# Patient Record
Sex: FEMALE | Race: WHITE | Employment: OTHER | RURAL
[De-identification: names, ages, dates, MRNs, and addresses within clinical notes are randomized per-mention and may not be internally consistent; named-entity substitution may affect disease eponyms.]

---

## 2017-01-06 ENCOUNTER — TELEPHONE (OUTPATIENT)
Dept: FAMILY MEDICINE CLINIC | Age: 82
End: 2017-01-06

## 2017-01-06 DIAGNOSIS — G25.81 RLS (RESTLESS LEGS SYNDROME): Primary | ICD-10-CM

## 2017-01-06 NOTE — TELEPHONE ENCOUNTER
Alva Lynne, Jane's daughter, called and would like to get a recommendation for a Neurologist.  She said her moms restless legs have not gotten any better and they would like for her to see a specialist.  Could you please give her a call back at [phone]. Thank you.

## 2017-01-06 NOTE — TELEPHONE ENCOUNTER
Spoke with daughter, given name of Neuro clinic and Dr. Urbano Orta. They want to call to make appointment so both daughters will be able to attend.  Told to call if they need any help with scheduling

## 2017-01-16 ENCOUNTER — TELEPHONE (OUTPATIENT)
Dept: FAMILY MEDICINE CLINIC | Age: 82
End: 2017-01-16

## 2017-01-16 NOTE — TELEPHONE ENCOUNTER
[phone] contact number juan Poon called and need you to call back asap concerning mom's medication and leg aggravation might be an issue to change Effexor 37.5 mg 3X/day and states that mom is miserable. She scheduled Dr. Hema Rojas 01/24/2017 and would like to discuss this before hand. Please call at earliest convenience.   Thanks,

## 2017-01-24 ENCOUNTER — OFFICE VISIT (OUTPATIENT)
Dept: NEUROLOGY | Age: 82
End: 2017-01-24

## 2017-01-24 VITALS
RESPIRATION RATE: 16 BRPM | HEART RATE: 99 BPM | BODY MASS INDEX: 26.06 KG/M2 | SYSTOLIC BLOOD PRESSURE: 116 MMHG | WEIGHT: 138 LBS | DIASTOLIC BLOOD PRESSURE: 52 MMHG | OXYGEN SATURATION: 98 % | HEIGHT: 61 IN

## 2017-01-24 DIAGNOSIS — E55.9 VITAMIN D DEFICIENCY: ICD-10-CM

## 2017-01-24 DIAGNOSIS — G25.81 RLS (RESTLESS LEGS SYNDROME): ICD-10-CM

## 2017-01-24 DIAGNOSIS — I67.89 CEREBRAL MICROVASCULAR DISEASE: ICD-10-CM

## 2017-01-24 DIAGNOSIS — R41.3 MEMORY DIFFICULTIES: ICD-10-CM

## 2017-01-24 DIAGNOSIS — E11.42 DIABETIC PERIPHERAL NEUROPATHY ASSOCIATED WITH TYPE 2 DIABETES MELLITUS (HCC): Primary | ICD-10-CM

## 2017-01-24 DIAGNOSIS — E53.8 B12 DEFICIENCY: ICD-10-CM

## 2017-01-24 DIAGNOSIS — I65.23 STENOSIS OF BOTH INTERNAL CAROTID ARTERIES: ICD-10-CM

## 2017-01-24 RX ORDER — GABAPENTIN 100 MG/1
100 CAPSULE ORAL 3 TIMES DAILY
Qty: 100 CAP | Refills: 11 | Status: SHIPPED | OUTPATIENT
Start: 2017-01-24 | End: 2017-02-15

## 2017-01-24 NOTE — PATIENT INSTRUCTIONS
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
## 2017-01-24 NOTE — MR AVS SNAPSHOT
Visit Information Date & Time Provider Department Dept. Phone Encounter #  
 1/24/2017  2:00 PM Chad Auguste MD [facility] [phone] [id number] Follow-up Instructions Return in about 3 months (around 4/24/2017). Your Appointments 1/24/2017  3:30 PM  
PROCEDURE with DOPPLER_NEUMR [facility] [address] [place] [address]) Appt Note: doppler tas patient, jrb 1/24/17  
 [address] [place] [address], 
[address] [place] [address], [address] [facility]  
[phone]  
  
   
 [address] [place] [address], [address] [place] [address], [address] [place] [address] [phone]  
  
    
 3/8/2017 10:30 AM  
ESTABLISHED PATIENT with Charles Hilton MD  
[place] [address] ([address]) Appt Note: 4 mo f/u  
 [address] [facility] [address] [facility] [address] [phone]  
  
   
 [address] [address] Upcoming Health Maintenance Date Due OSTEOPOROSIS SCREENING (DEXA) 2/27/2000 Pneumococcal 65+ Low/Medium Risk (2 of 2 - PCV13) 11/5/2011 DTaP/Tdap/Td series (1 - Tdap) 10/3/2015 MEDICARE YEARLY EXAM 11/24/2016 MICROALBUMIN Q1 4/5/2017 HEMOGLOBIN A1C Q6M 5/8/2017 FOOT EXAM Q1 7/5/2017 EYE EXAM RETINAL OR DILATED Q1 11/3/2017 LIPID PANEL Q1 11/8/2017 GLAUCOMA SCREENING Q2Y 11/3/2018 Allergies as of 1/24/2017  Review Complete On: 1/24/2017 By: Levi Arita No Known Allergies Current Immunizations  Reviewed on 10/13/2015 Name Date Influenza High Dose Vaccine PF 10/5/2016, 9/25/2015 Pneumococcal Polysaccharide (PPSV-23) 11/5/2010 Td 10/2/2015 Zoster Vaccine, Live 11/5/2010 Not reviewed this visit You Were Diagnosed With   
  
 Codes Comments Diabetic peripheral neuropathy associated with type 2 diabetes mellitus ([facility] [address]    -  Primary ICD-10-CM: E11.42 
ICD-9-CM: 250.60, 357.2 Memory difficulties     ICD-10-CM: R41.3 ICD-9-CM: 780.93   
 Vitamin D deficiency     ICD-10-CM: E55.9 ICD-9-CM: 268.9 RLS (restless legs syndrome)     ICD-10-CM: G25.81 ICD-9-CM: 333.94   
 B12 deficiency     ICD-10-CM: E53.8 ICD-9-CM: 266.2 Stenosis of both internal carotid arteries     ICD-10-CM: I65.23 ICD-9-CM: 433.10, 433.30 Cerebral microvascular disease     ICD-10-CM: I67.9 ICD-9-CM: 437.9 Vitals OB Status Smoking Status Postmenopausal Former Smoker Preferred Pharmacy Pharmacy Name Phone [facility] [address], [place] [phone] [phone] Your Updated Medication List  
  
   
This list is accurate as of: 1/24/17  3:22 PM.  Always use your most recent med list.  
  
  
  
  
 gabapentin 100 mg capsule Commonly known as:  NEURONTIN Take 1 Cap by mouth three (3) times daily. glucose blood VI test strips strip Commonly known as:  RELION PRIME TEST STRIPS  
test BID for NIDDM E11.9, One touch Ultra Blue  
  
 magnesium 250 mg Tab Take 500 mg by mouth nightly. metFORMIN 500 mg tablet Commonly known as:  GLUCOPHAGE Take 1 Tab by mouth two (2) times daily (with meals). Dose adjustment  
  
 rOPINIRole 2 mg tablet Commonly known as:  Nancy Vieira Take 1 Tab by mouth three (3) times daily. Indications: RESTLESS LEGS SYNDROME  
  
 venlafaxine 37.5 mg tablet Commonly known as:  [facility] Take 1 Tab by mouth three (3) times daily. vit B Cmplx 3-FA-Vit C-Biotin 1- mg-mg-mcg tablet Commonly known as:  NEPHRO RAJEEV RX Take 1 Tab by mouth daily. Indications: with stress relief B 12 VITAMIN B12 PO Take  by mouth. VITAMIN D3 1,000 unit tablet Generic drug:  cholecalciferol Take  by mouth daily. Prescriptions Sent to Pharmacy Refills  
 gabapentin (NEURONTIN) 100 mg capsule 11 Sig: Take 1 Cap by mouth three (3) times daily.   
 Class: Normal  
 Pharmacy: [address] [place] [address] [address] Ph #: [phone] Route: Oral  
  
We Performed the Following HEMOGLOBIN A1C WITH EAG [17493 CPT(R)] REFERRAL TO PSYCHIATRY [REF91 Custom] REFERRAL TO PSYCHOLOGY [HUM27 Custom] Comments:  
 Neuro psych testing for dementia vs psuedo dementia SED RATE (ESR) [id number] CPT(R)] VITAMIN B12 & FOLATE [23858 CPT(R)] VITAMIN D, 25 HYROXY PANEL [[id number] Custom] Follow-up Instructions Return in about 3 months (around 4/24/2017). To-Do List   
 01/24/2017 Imaging:  DUPLEX CAROTID BILATERAL AMB NEURO   
  
 02/02/2017 Imaging:  MRI BRAIN W WO CONT Referral Information Referral ID Referred By Referred To  
  
 [id number] Victoria Philip [address] [facility], [address] [place] [address] Phone: [phone] Fax: [phone] Visits Status Start Date End Date 1 New Request 1/24/17 1/24/18 If your referral has a status of pending review or denied, additional information will be sent to support the outcome of this decision. Referral ID Referred By Referred To  
 [id number] Tanisha Gonzalez MD  
   [address] [place] [address] [address] [facility] Phone: [phone] Fax: [phone] Visits Status Start Date End Date 1 New Request 1/24/17 1/24/18 If your referral has a status of pending review or denied, additional information will be sent to support the outcome of this decision. Patient Instructions A Healthy Lifestyle: Care Instructions Your Care Instructions A healthy lifestyle can help you feel good, stay at a healthy weight, and have plenty of energy for both work and play. A healthy lifestyle is something you can share with your whole family.  
A healthy lifestyle also can lower your risk for serious health problems, such as high blood pressure, heart disease, and diabetes. You can follow a few steps listed below to improve your health and the health of your family. Follow-up care is a key part of your treatment and safety. Be sure to make and go to all appointments, and call your doctor if you are having problems. Its also a good idea to know your test results and keep a list of the medicines you take. How can you care for yourself at home? · Do not eat too much sugar, fat, or fast foods. You can still have dessert and treats now and then. The goal is moderation. · Start small to improve your eating habits. Pay attention to portion sizes, drink less juice and soda pop, and eat more fruits and vegetables. ¨ Eat a healthy amount of food. A 3-ounce serving of meat, for example, is about the size of a deck of cards. Fill the rest of your plate with vegetables and whole grains. ¨ Limit the amount of soda and sports drinks you have every day. Drink more water when you are thirsty. ¨ Eat at least 5 servings of fruits and vegetables every day. It may seem like a lot, but it is not hard to reach this goal. A serving or helping is 1 piece of fruit, 1 cup of vegetables, or 2 cups of leafy, raw vegetables. Have an apple or some carrot sticks as an afternoon snack instead of a candy bar. Try to have fruits and/or vegetables at every meal. 
· Make exercise part of your daily routine. You may want to start with simple activities, such as walking, bicycling, or slow swimming. Try to be active 30 to 60 minutes every day. You do not need to do all 30 to 60 minutes all at once. For example, you can exercise 3 times a day for 10 or 20 minutes. Moderate exercise is safe for most people, but it is always a good idea to talk to your doctor before starting an exercise program. 
· Keep moving. Agustin Cain the lawn, work in the garden, or [employer]. Take the stairs instead of the elevator at work. · If you smoke, quit. People who smoke have an increased risk for heart attack, stroke, cancer, and other lung illnesses. Quitting is hard, but there are ways to boost your chance of quitting tobacco for good. ¨ Use nicotine gum, patches, or lozenges. ¨ Ask your doctor about stop-smoking programs and medicines. ¨ Keep trying. In addition to reducing your risk of diseases in the future, you will notice some benefits soon after you stop using tobacco. If you have shortness of breath or asthma symptoms, they will likely get better within a few weeks after you quit. · Limit how much alcohol you drink. Moderate amounts of alcohol (up to 2 drinks a day for men, 1 drink a day for women) are okay. But drinking too much can lead to liver problems, high blood pressure, and other health problems. Family health If you have a family, there are many things you can do together to improve your health. · Eat meals together as a family as often as possible. · Eat healthy foods. This includes fruits, vegetables, lean meats and dairy, and whole grains. · Include your family in your fitness plan. Most people think of activities such as jogging or tennis as the way to fitness, but there are many ways you and your family can be more active. Anything that makes you breathe hard and gets your heart pumping is exercise. Here are some tips: 
¨ Walk to do errands or to take your child to school or the bus. ¨ Go for a family bike ride after dinner instead of watching TV. Where can you learn more? Go to http://adam-jono.info/. Enter C096 in the search box to learn more about \"A Healthy Lifestyle: Care Instructions. \" Current as of: July 26, 2016 Content Version: 11.1 © 0631-6263 [employer]. Care instructions adapted under license by [employer] (which disclaims liability or warranty for this information).  If you have questions about a medical condition or this instruction, always ask your healthcare professional. [place] [address] any warranty or liability for your use of this information. Introducing [facility]! Beba Balderas introduces [employer] patient portal. Now you can access parts of your medical record, email your doctor's office, and request medication refills online. 1. In your internet browser, go to [email or web address] 2. Click on the First Time User? Click Here link in the Sign In box. You will see the New Member Sign Up page. 3. Enter your [employer] Access Code exactly as it appears below. You will not need to use this code after youve completed the sign-up process. If you do not sign up before the expiration date, you must request a new code. · [employer] Access Code: [email or web address] Expires: 4/24/2017  1:19 PM 
 
4. Enter the last four digits of your Social Security Number (xxxx) and Date of Birth (mm/dd/yyyy) as indicated and click Submit. You will be taken to the next sign-up page. 5. Create a [employer] ID. This will be your [employer] login ID and cannot be changed, so think of one that is secure and easy to remember. 6. Create a [employer] password. You can change your password at any time. 7. Enter your Password Reset Question and Answer. This can be used at a later time if you forget your password. 8. Enter your e-mail address. You will receive e-mail notification when new information is available in [address] 9. Click Sign Up. You can now view and download portions of your medical record. 10. Click the Download Summary menu link to download a portable copy of your medical information. If you have questions, please visit the Frequently Asked Questions section of the [email or web address] website. Remember, [employer] is NOT to be used for urgent needs. For medical emergencies, dial 911. Now available from your iPhone and Android! Please provide this summary of care documentation to your next provider. Your primary care clinician is listed as Wily Whitney. If you have any questions after today's visit, please call [phone].
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
## 2017-01-26 ENCOUNTER — DOCUMENTATION ONLY (OUTPATIENT)
Dept: NEUROLOGY | Age: 82
End: 2017-01-26

## 2017-01-27 LAB
25(OH)D2 SERPL-MCNC: <1 NG/ML
25(OH)D3 SERPL-MCNC: 52 NG/ML
25(OH)D3+25(OH)D2 SERPL-MCNC: 53 NG/ML
ERYTHROCYTE [SEDIMENTATION RATE] IN BLOOD BY WESTERGREN METHOD: 2 MM/HR (ref 0–40)
EST. AVERAGE GLUCOSE BLD GHB EST-MCNC: 260 MG/DL
FOLATE SERPL-MCNC: >20 NG/ML
HBA1C MFR BLD: 10.7 % (ref 4.8–5.6)
VIT B12 SERPL-MCNC: 1387 PG/ML (ref 211–946)

## 2017-02-03 ENCOUNTER — TELEPHONE (OUTPATIENT)
Dept: NEUROLOGY | Age: 82
End: 2017-02-03

## 2017-02-06 DIAGNOSIS — E11.42 DIABETIC PERIPHERAL NEUROPATHY ASSOCIATED WITH TYPE 2 DIABETES MELLITUS (HCC): Primary | ICD-10-CM

## 2017-02-06 RX ORDER — GABAPENTIN 300 MG/1
300 CAPSULE ORAL 3 TIMES DAILY
Qty: 100 CAP | Refills: 11 | Status: SHIPPED | OUTPATIENT
Start: 2017-02-06 | End: 2017-03-15 | Stop reason: SDUPTHER

## 2017-02-06 NOTE — TELEPHONE ENCOUNTER
Jane Haynes, they can get a hold of Dr. Richar Garcia office for neuropsych testing, can you somehow email Iris Jay, they call and nobody answered the phone

## 2017-02-06 NOTE — TELEPHONE ENCOUNTER
Taking gabapentin 200mg 3 times daily with very little help. She is also frustrated with taking this amount of medication. They are ok with 1 pill 3 times daily with 300mg if necessary. Wondering about a 1 pill daily medication for this instead. Please advise. Can call the daughter at [phone] or at work during the day: [phone]    This is more issues with antidepressants and the RLS which is made worse by the medications for depression. She would like to get the patient in as soon as possible because of the issues and the patient is not sleeping. They think they might try to go to one they are more comfortable with.

## 2017-02-09 ENCOUNTER — TELEPHONE (OUTPATIENT)
Dept: NEUROLOGY | Age: 82
End: 2017-02-09

## 2017-02-09 DIAGNOSIS — E55.9 VITAMIN D DEFICIENCY: ICD-10-CM

## 2017-02-09 DIAGNOSIS — I65.23 STENOSIS OF BOTH INTERNAL CAROTID ARTERIES: ICD-10-CM

## 2017-02-09 DIAGNOSIS — E11.42 DIABETIC PERIPHERAL NEUROPATHY ASSOCIATED WITH TYPE 2 DIABETES MELLITUS (HCC): Primary | ICD-10-CM

## 2017-02-09 DIAGNOSIS — I67.89 CEREBRAL MICROVASCULAR DISEASE: ICD-10-CM

## 2017-02-09 DIAGNOSIS — R41.3 MEMORY DIFFICULTIES: ICD-10-CM

## 2017-02-09 DIAGNOSIS — E53.8 B12 DEFICIENCY: ICD-10-CM

## 2017-02-09 NOTE — TELEPHONE ENCOUNTER
In order for the patient to keep her 8a mri appt, she needs to have a creatin order for labs. She can have it done there, but the order has to be faxed to the attention of Missy Travis today.  Fax [phone]

## 2017-02-13 ENCOUNTER — TELEPHONE (OUTPATIENT)
Dept: FAMILY MEDICINE CLINIC | Age: 82
End: 2017-02-13

## 2017-02-13 NOTE — TELEPHONE ENCOUNTER
Pumajose Alexandra called today and wanted you to know she checked her sugar and these were her results:    2/09/17  476  2/11/17 574  2/13/17 235    She has an apt on 3/8/17 and wanted to know if she should be seen sooner. Please give her a call back at [phone]. Thank you.

## 2017-02-14 NOTE — TELEPHONE ENCOUNTER
Spoke with patient, states BS was 300s this morning, states she is watching her diet.  Scheduled to be seen earlier appointment

## 2017-02-15 ENCOUNTER — OFFICE VISIT (OUTPATIENT)
Dept: FAMILY MEDICINE CLINIC | Age: 82
End: 2017-02-15

## 2017-02-15 ENCOUNTER — TELEPHONE (OUTPATIENT)
Dept: FAMILY MEDICINE CLINIC | Age: 82
End: 2017-02-15

## 2017-02-15 VITALS
HEART RATE: 75 BPM | DIASTOLIC BLOOD PRESSURE: 82 MMHG | SYSTOLIC BLOOD PRESSURE: 126 MMHG | BODY MASS INDEX: 27.06 KG/M2 | WEIGHT: 143.2 LBS | RESPIRATION RATE: 17 BRPM | OXYGEN SATURATION: 97 %

## 2017-02-15 DIAGNOSIS — Z00.00 MEDICARE ANNUAL WELLNESS VISIT, SUBSEQUENT: ICD-10-CM

## 2017-02-15 DIAGNOSIS — Z71.89 ACP (ADVANCE CARE PLANNING): ICD-10-CM

## 2017-02-15 DIAGNOSIS — E11.9 TYPE 2 DIABETES MELLITUS WITHOUT COMPLICATION, WITHOUT LONG-TERM CURRENT USE OF INSULIN (HCC): Primary | ICD-10-CM

## 2017-02-15 DIAGNOSIS — N63.10 BREAST MASS, RIGHT: ICD-10-CM

## 2017-02-15 RX ORDER — GLIMEPIRIDE 2 MG/1
2 TABLET ORAL
Qty: 30 TAB | Refills: 12 | Status: SHIPPED | OUTPATIENT
Start: 2017-02-15 | End: 2018-04-24 | Stop reason: SDUPTHER

## 2017-02-15 NOTE — PROGRESS NOTES
Chief Complaint   Patient presents with    Annual Wellness Visit    Diabetes    Breast pain         HPI:       is a 80 y.o. female. Deb Turner lives in [place], [place], near her son who is a . SHe enjoys gardening. She has AODM, RLS and Depression. She has taken SSRI medications with favorable outcomes over the years. She will generally wean herself off when she is feeling better. Her A1c July 6, 2016 was 8.3. Lab Results   Component Value Date/Time    Creatinine 0.52 11/08/2016 11:39 AM       No vascular or retinal complications. She takes Metformin. New Issues:  A1c is 10.7. Not compliant with diet. Checks glucose daily and this has been running higher than 200 for several weeks. RLS is being addressed with neurology who has discontinued her SSRI as this is worsening her RLS. She is due for SAWV    No Known Allergies    Current Outpatient Prescriptions   Medication Sig    glimepiride (AMARYL) 2 mg tablet Take 1 Tab by mouth every morning.  CYANOCOBALAMIN, VITAMIN B-12, (VITAMIN B12 PO) Take  by mouth.  glucose blood VI test strips (RELION PRIME TEST STRIPS) strip test BID for NIDDM E11.9, One touch Ultra Blue    metFORMIN (GLUCOPHAGE) 500 mg tablet Take 1 Tab by mouth two (2) times daily (with meals). Dose adjustment    rOPINIRole (REQUIP) 2 mg tablet Take 1 Tab by mouth three (3) times daily. Indications: RESTLESS LEGS SYNDROME    magnesium 250 mg tab Take 500 mg by mouth nightly.  gabapentin (NEURONTIN) 300 mg capsule Take 1 Cap by mouth three (3) times daily.  cholecalciferol (VITAMIN D3) 1,000 unit tablet Take  by mouth daily.  vit B Cmplx 3-FA-Vit C-Biotin (NEPHRO RAJEEV RX) 1- mg-mg-mcg tablet Take 1 Tab by mouth daily. Indications: with stress relief B 12     No current facility-administered medications for this visit.         Past Medical History   Diagnosis Date    Anxiety     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     bursitis  Depression     Diabetes ([facility] [address]     GERD (gastroesophageal reflux disease)     H/O hand fracture     Hyperlipidemia     Osteopenia     Recurrent UTI     RLS (restless legs syndrome)          ROS:  Denies fever, chills, cough, chest pain, SOB,  nausea, vomiting, or diarrhea. Denies wt loss, wt gain, hemoptysis, hematochezia or melena. Physical Examination:    Visit Vitals    /82 (BP 1 Location: Left arm, BP Patient Position: Sitting)    Pulse 75    Resp 17    Wt 143 lb 3.2 oz (65 kg)    SpO2 97%    BMI 27.06 kg/m2     General: Alert and Ox3, Fluent speech  HEENT:  NC/AT, EOMI, OP: clear  Neck:  Supple, no adenopathy, JVD, mass or bruit  Chest:  Clear to Ausculation, without wheezes, rales, rubs or ronchi:  Right breast has an ill defined mass in the axillary tail of caceres  Cardiac: RRR  Abdomen:  +BS, soft, nontender without palpable HSM  Extremities:  No cyanosis, clubbing or edema  Neurologic:  Ambulatory without assist, CN 2-12 grossly intact. Moves all extremities. Skin: no rash  Lymphadenopathy: no cervical or supraclavicular nodes    Lab Results   Component Value Date/Time    Hemoglobin A1c 10.7 01/24/2017 03:51 PM       Lab Results   Component Value Date/Time    Sodium 140 11/08/2016 11:39 AM    Potassium 4.7 11/08/2016 11:39 AM    Chloride 98 11/08/2016 11:39 AM    CO2 26 11/08/2016 11:39 AM    Glucose 204 11/08/2016 11:39 AM    BUN 18 11/08/2016 11:39 AM    Creatinine 0.52 11/08/2016 11:39 AM    BUN/Creatinine ratio 35 11/08/2016 11:39 AM    GFR est  11/08/2016 11:39 AM    GFR est non-AA 90 11/08/2016 11:39 AM    Calcium 10.0 11/08/2016 11:39 AM    Bilirubin, total 0.4 11/08/2016 11:39 AM    AST (SGOT) 31 11/08/2016 11:39 AM    Alk.  phosphatase 98 11/08/2016 11:39 AM    Protein, total 6.4 11/08/2016 11:39 AM    Albumin 4.3 11/08/2016 11:39 AM    A-G Ratio 2.0 11/08/2016 11:39 AM    ALT (SGPT) 44 11/08/2016 11:39 AM       Lab Results   Component Value Date/Time    WBC 7.9 07/05/2016 10:55 AM    HGB 14.3 07/05/2016 10:55 AM    HCT 42.5 07/05/2016 10:55 AM    PLATELET 412 61/59/9037 10:55 AM    MCV 92 07/05/2016 10:55 AM           ASSESSMENT AND PLAN:     1. AODM:  Teaching today. She has seen the dietician. Has reference materials at home. Reviewed diet with her. Not due to A1c until after April. Add Glimepiride 2 mg every am.  May need to adjust dose. If unable to achieve a glucose < 200, will start Lantus. She will continue to check glucose daily and return for F/U in 2 weeks  2. Depression:  Neurology will advise her on antidepressant. 3.  Due for SAWV4  4. Right breast pain with ill defined palpable mass:  US right breast.      Orders Placed This Encounter    US BREAST RT COMPLETE 4 QUAD     Standing Status:   Future     Standing Expiration Date:   3/15/2018    glimepiride (AMARYL) 2 mg tablet     Sig: Take 1 Tab by mouth every morning. Dispense:  30 Tab     Refill:  12       Sary Koroma MD, FACP        ______________________________________________________________________    Scharlene Gowers is a 80 y.o. female and presents for annual Medicare Wellness Visit. Problem List: Reviewed with patient and discussed risk factors. Patient Active Problem List   Diagnosis Code    Hyperlipidemia E78.5    Anxiety F41.9    Type 2 diabetes mellitus without complication (HCC) R57.4    RLS (restless legs syndrome) G25.81    Diabetic peripheral neuropathy associated with type 2 diabetes mellitus (HCC) E11.42    Memory difficulties R41.3    B12 deficiency E53.8    Vitamin D deficiency E55.9    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    Cerebral microvascular disease I67.9       Current medical providers:  Patient Care Team:  Sabrina Evangelista MD as PCP - General (Internal Medicine)    PM, , Medications/Allergies: reviewed, on chart. Female Alcohol Screening:   On any occasion during the past 3 months, have you had more than 3 drinks containing alcohol? No    Do you average more than 7 drinks per week? No    ROS:  Constitutional: No fever, chills or weight loss  Respiratory: No cough, SOB   CV: No chest pain or Palpitations    Objective:  Visit Vitals    /82 (BP 1 Location: Left arm, BP Patient Position: Sitting)    Pulse 75    Resp 17    Wt 143 lb 3.2 oz (65 kg)    SpO2 97%    BMI 27.06 kg/m2    Body mass index is 27.06 kg/(m^2). Assessment of cognitive impairment: Alert and oriented x 3    Depression Screen:   PHQ 2 / 9, over the last two weeks 10/26/2016   Little interest or pleasure in doing things Not at all   Feeling down, depressed or hopeless Not at all   Total Score PHQ 2 0       Fall Risk Assessment:    Fall Risk Assessment, last 12 mths 10/26/2016   Able to walk? Yes   Fall in past 12 months? No       Functional Ability:   Does the patient exhibit a steady gait? yes   How long did it take the patient to get up and walk from a sitting position? 12 sec   Is the patient self reliant?  (ie can do own laundry, meals, household chores)  yes     Does the patient handle his/her own medications? yes     Does the patient handle his/her own money? yes     Is the patients home safe (ie good lighting, handrails on stairs and bath, etc.)? yes     Did you notice or did patient express any hearing difficulties? yes     Did you notice or did patient express any vision difficulties?    no       Advance Care Planning:   Patient was offered the opportunity to discuss advance care planning:  yes     Does patient have an Advance Directive:  yes   If no, did you provide information on Caring Connections?  no       Plan:      Orders Placed This Encounter    US BREAST RT COMPLETE 4 QUAD    glimepiride (AMARYL) 2 mg tablet       Health Maintenance   Topic Date Due    OSTEOPOROSIS SCREENING (DEXA)  02/27/2000    Pneumococcal 65+ Low/Medium Risk (2 of 2 - PCV13) 11/05/2011    DTaP/Tdap/Td series (1 - Tdap) 10/03/2015    MEDICARE YEARLY EXAM 11/24/2016    MICROALBUMIN Q1  04/05/2017    FOOT EXAM Q1  07/05/2017    HEMOGLOBIN A1C Q6M  07/24/2017    EYE EXAM RETINAL OR DILATED Q1  11/03/2017    LIPID PANEL Q1  11/08/2017    GLAUCOMA SCREENING Q2Y  11/03/2018    ZOSTER VACCINE AGE 60>  Completed    INFLUENZA AGE 9 TO ADULT  Completed       *Patient verbalized understanding and agreement with the plan. A copy of the After Visit Summary with personalized health plan was given to the patient today.

## 2017-02-15 NOTE — ACP (ADVANCE CARE PLANNING)
Mingo Florez has a living will. In the event that she cannot speak for herself, contact her daughter, Emir Alfonso at [phone].     Nitza Delgadillo

## 2017-02-15 NOTE — TELEPHONE ENCOUNTER
Spoke with Phani Lay, informed of scheduled mammogram and US of breast. Discussed medication change and reinforced need to control diet choices.

## 2017-02-15 NOTE — MR AVS SNAPSHOT
Visit Information Date & Time Provider Department Dept. Phone Encounter #  
 2/15/2017 11:30 AM Johanna Peters, [address] Ay[address] Damico [id number] Your Appointments 3/1/2017 10:00 AM  
ESTABLISHED PATIENT with MD Suad Beal 38 ([facility]) Appt Note: 2 wk f/u  
 1000 [facility] [address] [facility],5Th [facility] [address] [phone]  
  
   
 [address] [facility],5Th Floor [address]  
  
    
 3/8/2017 10:30 AM  
ESTABLISHED PATIENT with MD Suad Beal 38 ([facility]) Appt Note: 4 mo f/u  
 1000 [facility] [address] [facility],5Th Floor [address] [phone] 4/13/2017 12:20 PM  
Follow Up with Kun Croft MD  
Neurology [facility] at [facility]) Appt Note: f/u RLS, b 1/24/17 $0cp  
 [address], [address] [address]  
[address], [address] [facility] [address] [address]  
  
    
 4/13/2017  1:40 PM  
New Patient with Jackie Nunn MD  
7501 [facility]) Appt Note: NP, referred by Dr. Zina Lopez from neuro, depression; corrected time [address] [address]  
[address] [place] [address] [address]  
  
    
 5/24/2017 11:20 AM  
New Patient with Lise Bronson PsyD Neurology [address] [facility]) Appt Note: new patient dementia vs psuedo dementia/ [address] [place] [address] [facility] 99 [address] [phone]  
  
   
 [address]  
  
    
 5/24/2017  1:00 PM  
Any with Lise Bronson PsyD Neurology [address] [facility]) Appt Note: 3 hour testing/ [address] [facility] [address] [place] [address] [phone] [place] [address] Upcoming Health Maintenance Date Due OSTEOPOROSIS SCREENING (DEXA) 2/27/2000 Pneumococcal 65+ Low/Medium Risk (2 of 2 - PCV13) 11/5/2011 DTaP/Tdap/Td series (1 - Tdap) 10/3/2015 MEDICARE YEARLY EXAM 11/24/2016 MICROALBUMIN Q1 4/5/2017 FOOT EXAM Q1 7/5/2017 HEMOGLOBIN A1C Q6M 7/24/2017 EYE EXAM RETINAL OR DILATED Q1 11/3/2017 LIPID PANEL Q1 11/8/2017 GLAUCOMA SCREENING Q2Y 11/3/2018 Allergies as of 2/15/2017  Review Complete On: 2/15/2017 By: Arnold Howe MD  
 No Known Allergies Current Immunizations  Reviewed on 10/13/2015 Name Date Influenza High Dose Vaccine PF 10/5/2016, 9/25/2015 Pneumococcal Polysaccharide (PPSV-23) 11/5/2010 Td 10/2/2015 Zoster Vaccine, Live 11/5/2010 Not reviewed this visit You Were Diagnosed With   
  
 Codes Comments Type 2 diabetes mellitus without complication, without long-term current use of insulin (HCC)    -  Primary ICD-10-CM: E11.9 ICD-9-CM: 250.00 Breast mass, right     ICD-10-CM: N63 
ICD-9-CM: 611.72 Medicare annual wellness visit, subsequent     ICD-10-CM: Z00.00 ICD-9-CM: V70.0 ACP (advance care planning)     ICD-10-CM: Z71.89 ICD-9-CM: V65.49 Vitals BP Pulse Resp Weight(growth percentile) SpO2 BMI  
 126/82 (BP 1 Location: Left arm, BP Patient Position: Sitting) 75 17 143 lb 3.2 oz (65 kg) 97% 27.06 kg/m2 OB Status Smoking Status Postmenopausal Former Smoker Vitals History BMI and BSA Data Body Mass Index Body Surface Area  
 27.06 kg/m 2 1.67 m 2 Preferred Pharmacy Pharmacy Name Phone [facility] [place] [address], [place] [phone] [phone] Your Updated Medication List  
  
   
This list is accurate as of: 2/15/17 12:48 PM.  Always use your most recent med list.  
  
  
  
  
 gabapentin 300 mg capsule Commonly known as:  NEURONTIN Take 1 Cap by mouth three (3) times daily. glimepiride 2 mg tablet Commonly known as:  AMARYL Take 1 Tab by mouth every morning. glucose blood VI test strips strip Commonly known as:  RELION PRIME TEST STRIPS  
test BID for NIDDM E11.9, One touch Ultra Blue  
  
 magnesium 250 mg Tab Take 500 mg by mouth nightly. metFORMIN 500 mg tablet Commonly known as:  GLUCOPHAGE Take 1 Tab by mouth two (2) times daily (with meals). Dose adjustment  
  
 rOPINIRole 2 mg tablet Commonly known as:  Cherl Lian Take 1 Tab by mouth three (3) times daily. Indications: RESTLESS LEGS SYNDROME  
  
 vit B Cmplx 3-FA-Vit C-Biotin 1- mg-mg-mcg tablet Commonly known as:  NEPHRO RAJEEV RX Take 1 Tab by mouth daily. Indications: with stress relief B 12 VITAMIN B12 PO Take  by mouth. VITAMIN D3 1,000 unit tablet Generic drug:  cholecalciferol Take  by mouth daily. Prescriptions Sent to Pharmacy Refills  
 glimepiride (AMARYL) 2 mg tablet 12 Sig: Take 1 Tab by mouth every morning. Class: Normal  
 Pharmacy: [address] [facility], [address] Ph #: [phone] Route: Oral  
  
To-Do List   
 02/28/2017 Imaging:  US BREAST RT COMPLETE 4 QUAD Patient Instructions If you have any questions regarding [employer], you may call [employer] support at [phone]. Introducing [facility] & HE[facility]! [facility] introduces Dashbook patient portal. Now you can access parts of your medical record, email your doctor's office, and request medication refills online. 1. In your internet browser, go to [email or web address] 2. Click on the First Time User? Click Here link in the Sign In box. You will see the New Member Sign Up page. 3. Enter your Dashbook Access Code exactly as it appears below. You will not need to use this code after youve completed the sign-up process.  If you do not sign up before the expiration date, you must request a new code. · Cambiatta Access Code: [email or web address] Expires: 4/24/2017  1:19 PM 
 
4. Enter the last four digits of your Social Security Number (xxxx) and Date of Birth (mm/dd/yyyy) as indicated and click Submit. You will be taken to the next sign-up page. 5. Create a Cambiatta ID. This will be your Cambiatta login ID and cannot be changed, so think of one that is secure and easy to remember. 6. Create a Cambiatta password. You can change your password at any time. 7. Enter your Password Reset Question and Answer. This can be used at a later time if you forget your password. 8. Enter your e-mail address. You will receive e-mail notification when new information is available in [address] 9. Click Sign Up. You can now view and download portions of your medical record. 10. Click the Download Summary menu link to download a portable copy of your medical information. If you have questions, please visit the Frequently Asked Questions section of the Cambiat[employer] website. Remember, Cambiatta is NOT to be used for urgent needs. For medical emergencies, dial 911. Now available from your iPhone and Android! Please provide this summary of care documentation to your next provider. Your primary care clinician is listed as Hamida Mancilla. If you have any questions after today's visit, please call [phone].

## 2017-02-15 NOTE — TELEPHONE ENCOUNTER
Sulema Leonora (Daughter) called. Wants Miranda to call her please. Cain Rodriguez could not tell her what procedure she is having tomorrow and how to take her meds. Please call.

## 2017-02-20 ENCOUNTER — TELEPHONE (OUTPATIENT)
Dept: FAMILY MEDICINE CLINIC | Age: 82
End: 2017-02-20

## 2017-02-23 DIAGNOSIS — G25.81 RLS (RESTLESS LEGS SYNDROME): ICD-10-CM

## 2017-02-23 DIAGNOSIS — R41.3 MEMORY DIFFICULTIES: ICD-10-CM

## 2017-02-23 DIAGNOSIS — I67.89 CEREBRAL MICROVASCULAR DISEASE: ICD-10-CM

## 2017-02-23 DIAGNOSIS — E55.9 VITAMIN D DEFICIENCY: ICD-10-CM

## 2017-02-23 DIAGNOSIS — I65.23 STENOSIS OF BOTH INTERNAL CAROTID ARTERIES: ICD-10-CM

## 2017-02-23 DIAGNOSIS — E11.42 DIABETIC PERIPHERAL NEUROPATHY ASSOCIATED WITH TYPE 2 DIABETES MELLITUS (HCC): ICD-10-CM

## 2017-02-23 DIAGNOSIS — E53.8 B12 DEFICIENCY: ICD-10-CM

## 2017-02-28 ENCOUNTER — TELEPHONE (OUTPATIENT)
Dept: NEUROLOGY | Age: 82
End: 2017-02-28

## 2017-02-28 NOTE — TELEPHONE ENCOUNTER
----- Message from Laura Jacob sent at 2/28/2017  3:12 PM EST -----  Regarding: FW: Dr. Jay Genera: [phone]      ----- Message -----     From: Uzair Adams     Sent: 2/27/2017   1:55 PM       To: [facility] Front Office  Subject: Dr. Candace Hunt                              Pt wants to know if the doctor received the results from her MRI and the psychology. She would also like to know is it still necessary for her to see the other two doctors he referred. Best contact number is [phone].       Thanks

## 2017-03-01 ENCOUNTER — OFFICE VISIT (OUTPATIENT)
Dept: FAMILY MEDICINE CLINIC | Age: 82
End: 2017-03-01

## 2017-03-01 VITALS
HEART RATE: 72 BPM | BODY MASS INDEX: 27.36 KG/M2 | WEIGHT: 144.8 LBS | OXYGEN SATURATION: 94 % | RESPIRATION RATE: 16 BRPM | SYSTOLIC BLOOD PRESSURE: 114 MMHG | DIASTOLIC BLOOD PRESSURE: 60 MMHG

## 2017-03-01 DIAGNOSIS — Z23 ENCOUNTER FOR IMMUNIZATION: ICD-10-CM

## 2017-03-01 DIAGNOSIS — E11.9 TYPE 2 DIABETES MELLITUS WITHOUT COMPLICATION, WITHOUT LONG-TERM CURRENT USE OF INSULIN (HCC): Primary | ICD-10-CM

## 2017-03-01 RX ORDER — BUSPIRONE HYDROCHLORIDE 5 MG/1
5 TABLET ORAL
Qty: 90 TAB | Refills: 4
Start: 2017-03-01 | End: 2017-11-16 | Stop reason: SDUPTHER

## 2017-03-01 RX ORDER — LANOLIN ALCOHOL/MO/W.PET/CERES
CREAM (GRAM) TOPICAL
COMMUNITY
End: 2017-04-13 | Stop reason: CLARIF

## 2017-03-01 NOTE — MR AVS SNAPSHOT
Visit Information Date & Time Provider Department Dept. Phone Encounter #  
 3/1/2017 10:00 [place] [address] [address] [id number] Your Appointments 3/8/2017 10:30 AM  
ESTABLISHED PATIENT with Norma Kilgore MD  
[facility]) Appt Note: 4 mo f/u  
 1000 [facility] [address] [facility],5Th Floor [address] [phone]  
  
   
 1000 [facility] [address] [facility],5Th Floor [address] 4/13/2017 12:20 PM  
Follow Up with Bette Lao MD  
Neurology Clinic at [facility]) Appt Note: f/u RLS, jrb 1/24/17 $0cp  
 200 [facility], 
[address] [place] [address], [address] [address]  
695 [address] [facility], [address] [place] [address], [address] [facility] [address] [address]  
  
    
 4/13/2017  1:40 PM  
New Patient with Sarika Hawkins MD  
[facility]) Appt Note: NP, referred by Dr. Fuentes from neuro, depression; corrected time [address] [facility] [address] [address]  
[facility] [address] [place] [address] Drive [address] [address]  
  
    
 5/24/2017 11:20 AM  
New Patient with Sarah Zaragoza PsyD Neurology [address] [facility]) Appt Note: new patient dementia vs psuedo dementia/ [address] [place] [address] [facility] [address] [address] [phone]  
  
   
 [place] [address]  
  
    
 5/24/2017  1:00 PM  
Any with Sarah Zaragoza PsyD Neurology [address] [facility]) Appt Note: 3 hour testing/ [address] [place] [address] [place] [address] [address] [phone]  
  
   
 [place] [address] [address] [address] Upcoming Health Maintenance Date Due Pneumococcal 65+ Low/Medium Risk (2 of 2 - PCV13) 11/5/2011 MICROALBUMIN Q1 4/5/2017 FOOT EXAM Q1 7/5/2017 HEMOGLOBIN A1C Q6M 7/24/2017 EYE EXAM RETINAL OR DILATED Q1 11/3/2017 LIPID PANEL Q1 11/8/2017 MEDICARE YEARLY EXAM 2/16/2018 BREAST CANCER SCRN MAMMOGRAM 2/16/2018 GLAUCOMA SCREENING Q2Y 11/3/2018 DTaP/Tdap/Td series (2 - Td) 3/1/2027 Allergies as of 3/1/2017  Review Complete On: 3/1/2017 By: Clement Rodriguez MD  
 No Known Allergies Current Immunizations  Reviewed on 10/13/2015 Name Date Influenza High Dose Vaccine PF 10/5/2016, 9/25/2015 Pneumococcal Conjugate (PCV-13)  Incomplete Pneumococcal Polysaccharide (PPSV-23) 11/5/2010 Td 10/2/2015 Zoster Vaccine, Live 11/5/2010 Not reviewed this visit You Were Diagnosed With   
  
 Codes Comments Type 2 diabetes mellitus without complication, without long-term current use of insulin (HCC)    -  Primary ICD-10-CM: E11.9 ICD-9-CM: 250.00 Encounter for immunization     ICD-10-CM: S34 ICD-9-CM: V03.89 Vitals BP  
  
  
  
  
  
 114/60 (BP 1 Location: Left arm, BP Patient Position: Sitting) BMI and BSA Data Body Mass Index Body Surface Area  
 27.36 kg/m 2 1.68 m 2 Preferred Pharmacy Pharmacy Name [facility] [place] [address] [phone] Your Updated Medication List  
  
   
This list is accurate as of: 3/1/17 10:48 AM.  Always use your most recent med list.  
  
  
  
  
 busPIRone 5 mg tablet Commonly known as:  BUSPAR Take 1 Tab by mouth nightly. FERROUSUL 325 mg (65 mg iron) tablet Generic drug:  ferrous sulfate Take  by mouth Daily (before breakfast). gabapentin 300 mg capsule Commonly known as:  NEURONTIN Take 1 Cap by mouth three (3) times daily. glimepiride 2 mg tablet Commonly known as:  AMARYL Take 1 Tab by mouth every morning. glucose blood VI test strips strip Commonly known as:  RELION PRIME TEST STRIPS  
test BID for NIDDM E11.9, One touch Ultra Blue magnesium 250 mg Tab Take 500 mg by mouth nightly. metFORMIN 500 mg tablet Commonly known as:  GLUCOPHAGE Take 1 Tab by mouth two (2) times daily (with meals). Dose adjustment  
  
 vit B Cmplx 3-FA-Vit C-Biotin 1- mg-mg-mcg tablet Commonly known as:  NEPHRO RAJEEV RX Take 1 Tab by mouth daily. Indications: with stress relief B 12 VITAMIN B12 PO Take  by mouth. VITAMIN D3 1,000 unit tablet Generic drug:  cholecalciferol Take  by mouth daily. We Performed the Following ADMIN PNEUMOCOCCAL VACCINE [ \[facility]\""] PNEUMOCOCCAL CONJ VACCINE 13 VALENT IM [id number] CPT(R)] Introducing [facility] & [facility] SERVICES! Shirin Pearce introduces Molecule Synth patient portal. Now you can access parts of your medical record, email your doctor's office, and request medication refills online. 1. In your internet browser, go to [email or web address] 2. Click on the First Time User? Click Here link in the Sign In box. You will see the New Member Sign Up page. 3. Enter your Molecule Synth Access Code exactly as it appears below. You will not need to use this code after youve completed the sign-up process. If you do not sign up before the expiration date, you must request a new code. · Molecule Synth Access Code: [email or web address] Expires: 4/24/2017  1:19 PM 
 
4. Enter the last four digits of your Social Security Number (xxxx) and Date of Birth (mm/dd/yyyy) as indicated and click Submit. You will be taken to the next sign-up page. 5. Create a Cel[employer] ID. This will be your Molecule Synth login ID and cannot be changed, so think of one that is secure and easy to remember. 6. Create a [employer] password. You can change your password at any time. 7. Enter your Password Reset Question and Answer. This can be used at a later time if you forget your password. 8. Enter your e-mail address. You will receive e-mail notification when new information is available in [address] 9. Click Sign Up. You can now view and download portions of your medical record. 10. Click the Download Summary menu link to download a portable copy of your medical information. If you have questions, please visit the Frequently Asked Questions section of the [employer] website. Remember, [employer] is NOT to be used for urgent needs. For medical emergencies, dial 911. Now available from your iPhone and Android! Please provide this summary of care documentation to your next provider. Your primary care clinician is listed as Laisha Brewer. If you have any questions after today's visit, please call [phone].

## 2017-03-01 NOTE — PROGRESS NOTES
Chief Complaint   Patient presents with    Diabetes         HPI:      Romero Foster is a 80 y.o. female. Jacky Lea lives in [place], [place], near her son who is a . SHe enjoys gardening. She has AODM, RLS and Depression. She has taken SSRI medications with favorable outcomes over the years. She will generally wean herself off when she is feeling better. Her A1c July 6, 2016 was 8.3. Lab Results   Component Value Date/Time    Creatinine 0.52 11/08/2016 11:39 AM       No vascular or retinal complications. She takes Metformin. New Issues:  Notes that glucose is much better controlled now. No hypoglycemia. Depression is better now that daughter is checking on her regularly. Buspar helps. No Known Allergies    Current Outpatient Prescriptions   Medication Sig    ferrous sulfate (FERROUSUL) 325 mg (65 mg iron) tablet Take  by mouth Daily (before breakfast).  glimepiride (AMARYL) 2 mg tablet Take 1 Tab by mouth every morning.  gabapentin (NEURONTIN) 300 mg capsule Take 1 Cap by mouth three (3) times daily.  CYANOCOBALAMIN, VITAMIN B-12, (VITAMIN B12 PO) Take  by mouth.  glucose blood VI test strips (RELION PRIME TEST STRIPS) strip test BID for NIDDM E11.9, One touch Ultra Blue    metFORMIN (GLUCOPHAGE) 500 mg tablet Take 1 Tab by mouth two (2) times daily (with meals). Dose adjustment    cholecalciferol (VITAMIN D3) 1,000 unit tablet Take  by mouth daily.  vit B Cmplx 3-FA-Vit C-Biotin (NEPHRO RAJEEV RX) 1- mg-mg-mcg tablet Take 1 Tab by mouth daily. Indications: with stress relief B 12    magnesium 250 mg tab Take 500 mg by mouth nightly.  rOPINIRole (REQUIP) 2 mg tablet Take 1 Tab by mouth three (3) times daily. Indications: RESTLESS LEGS SYNDROME     No current facility-administered medications for this visit.         Past Medical History:   Diagnosis Date    Anxiety     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    bursitis    Depression     Diabetes ([facility] [address]     GERD (gastroesophageal reflux disease)     H/O hand fracture     Hyperlipidemia     Osteopenia     Recurrent UTI     RLS (restless legs syndrome)          ROS:  Denies fever, chills, cough, chest pain, SOB,  nausea, vomiting, or diarrhea. Denies wt loss, wt gain, hemoptysis, hematochezia or melena. Physical Examination:    Visit Vitals    /60 (BP 1 Location: Left arm, BP Patient Position: Sitting)    Pulse 72    Resp 16    Wt 144 lb 12.8 oz (65.7 kg)    SpO2 94%    BMI 27.36 kg/m2     General: Alert and Ox3, Fluent speech  HEENT:  NC/AT, EOMI, OP: clear  Neck:  Supple, no adenopathy, JVD, mass or bruit  Chest:  Clear to Ausculation, without wheezes, rales, rubs or ronchi  Cardiac: RRR  Abdomen:  +BS, soft, nontender without palpable HSM  Extremities:  No cyanosis, clubbing or edema  Neurologic:  Ambulatory without assist, CN 2-12 grossly intact. Moves all extremities. Skin: no rash  Lymphadenopathy: no cervical or supraclavicular nodes    Lab Results   Component Value Date/Time    Hemoglobin A1c 10.7 01/24/2017 03:51 PM       Lab Results   Component Value Date/Time    Sodium 140 11/08/2016 11:39 AM    Potassium 4.7 11/08/2016 11:39 AM    Chloride 98 11/08/2016 11:39 AM    CO2 26 11/08/2016 11:39 AM    Glucose 204 11/08/2016 11:39 AM    BUN 18 11/08/2016 11:39 AM    Creatinine 0.52 11/08/2016 11:39 AM    BUN/Creatinine ratio 35 11/08/2016 11:39 AM    GFR est  11/08/2016 11:39 AM    GFR est non-AA 90 11/08/2016 11:39 AM    Calcium 10.0 11/08/2016 11:39 AM    Bilirubin, total 0.4 11/08/2016 11:39 AM    AST (SGOT) 31 11/08/2016 11:39 AM    Alk.  phosphatase 98 11/08/2016 11:39 AM    Protein, total 6.4 11/08/2016 11:39 AM    Albumin 4.3 11/08/2016 11:39 AM    A-G Ratio 2.0 11/08/2016 11:39 AM    ALT (SGPT) 44 11/08/2016 11:39 AM       Lab Results   Component Value Date/Time    WBC 7.9 07/05/2016 10:55 AM    HGB 14.3 07/05/2016 10:55 AM    HCT 42.5 07/05/2016 10:55 AM    PLATELET 096 75/20/6153 10:55 AM    MCV 92 07/05/2016 10:55 AM           ASSESSMENT AND PLAN:     1. AODM:  She is doing so much better and will maintain current meds and diet. Needs A1c after April 25.  2.  Depression:  Much better today;  Buspirone 5 mg HS is helping  3. P-13 today    Orders Placed This Encounter    ferrous sulfate (FERROUSUL) 325 mg (65 mg iron) tablet     Sig: Take  by mouth Daily (before breakfast).        Nohelia Brenner MD, [address]

## 2017-03-02 DIAGNOSIS — N63.10 BREAST MASS, RIGHT: ICD-10-CM

## 2017-03-07 ENCOUNTER — TELEPHONE (OUTPATIENT)
Dept: NEUROLOGY | Age: 82
End: 2017-03-07

## 2017-03-08 ENCOUNTER — TELEPHONE (OUTPATIENT)
Dept: NEUROLOGY | Age: 82
End: 2017-03-08

## 2017-03-08 NOTE — TELEPHONE ENCOUNTER
----- Message from Jarad Nguyen sent at 3/7/2017  4:16 PM EST -----  Regarding:  Dr.Smith Mitzy Mendez   Message: Pt needs to know if she needs to keep appointment with Dr. Neha Madrigal and  Dr. Karuna Wade.  Best contact number [phone]

## 2017-03-08 NOTE — TELEPHONE ENCOUNTER
----- Message from Rekha Naidu sent at 3/3/2017  3:47 PM EST -----  Regarding: Dr. Erasto Kauffman  Pt returning call from [facility].  Pt number [phone]

## 2017-03-15 DIAGNOSIS — E11.42 DIABETIC PERIPHERAL NEUROPATHY ASSOCIATED WITH TYPE 2 DIABETES MELLITUS (HCC): Primary | ICD-10-CM

## 2017-03-15 RX ORDER — GABAPENTIN 300 MG/1
CAPSULE ORAL
Qty: 100 CAP | Refills: 11
Start: 2017-03-15 | End: 2018-01-12

## 2017-03-16 DIAGNOSIS — N63.10 BREAST MASS, RIGHT: ICD-10-CM

## 2017-04-13 ENCOUNTER — OFFICE VISIT (OUTPATIENT)
Dept: NEUROLOGY | Age: 82
End: 2017-04-13

## 2017-04-13 VITALS
OXYGEN SATURATION: 97 % | SYSTOLIC BLOOD PRESSURE: 110 MMHG | DIASTOLIC BLOOD PRESSURE: 64 MMHG | WEIGHT: 145 LBS | HEIGHT: 61 IN | HEART RATE: 83 BPM | BODY MASS INDEX: 27.38 KG/M2

## 2017-04-13 DIAGNOSIS — R41.3 MEMORY DIFFICULTIES: ICD-10-CM

## 2017-04-13 DIAGNOSIS — E11.42 DIABETIC PERIPHERAL NEUROPATHY ASSOCIATED WITH TYPE 2 DIABETES MELLITUS (HCC): Primary | ICD-10-CM

## 2017-04-13 DIAGNOSIS — I67.89 CEREBRAL MICROVASCULAR DISEASE: ICD-10-CM

## 2017-04-13 DIAGNOSIS — G25.81 RLS (RESTLESS LEGS SYNDROME): ICD-10-CM

## 2017-04-13 DIAGNOSIS — I65.23 STENOSIS OF BOTH INTERNAL CAROTID ARTERIES: ICD-10-CM

## 2017-04-13 RX ORDER — ROPINIROLE 3 MG/1
3 TABLET, FILM COATED ORAL 2 TIMES DAILY
Qty: 100 TAB | Refills: 11 | Status: SHIPPED | OUTPATIENT
Start: 2017-04-13 | End: 2018-02-12 | Stop reason: SDUPTHER

## 2017-04-13 NOTE — MR AVS SNAPSHOT
Visit Information Date & Time Provider Department Dept. Phone Encounter #  
 4/13/2017 12:20 PM Baldev Bee MD [facility]rology [facility] at [facility] [phone] [id number] Follow-up Instructions Return in about 6 months (around 10/13/2017). Your Appointments 5/2/2017 10:30 AM  
ESTABLISHED PATIENT with Allyson Narayanan MD  
[facility] [address] ([address]) Appt Note: 2 MO F/U  
 1000 [facility] [address] [facility],5Th Floor [address] [phone]  
  
   
 [address] [address] 5/24/2017 11:20 AM  
New Patient with Haven Alberto, GetyD Neurology [address]) Appt Note: new patient dementia vs psuedo dementia/ Eladio Nadeem [place] [address] [address] [phone]  
  
   
 [place] [address]  
  
    
 5/24/2017  1:00 PM  
Any with Haven [place], PsyD Neurology [address]) Appt Note: 3 hour testing/ Eladio [place] [address] [address] [phone]  
  
   
 [place] [address] [address] [address] Upcoming Health Maintenance Date Due MICROALBUMIN Q1 4/5/2017 FOOT EXAM Q1 7/5/2017 HEMOGLOBIN A1C Q6M 7/24/2017 EYE EXAM RETINAL OR DILATED Q1 11/3/2017 LIPID PANEL Q1 11/8/2017 MEDICARE YEARLY EXAM 2/16/2018 BREAST CANCER SCRN MAMMOGRAM 2/16/2018 GLAUCOMA SCREENING Q2Y 11/3/2018 DTaP/Tdap/Td series (2 - Td) 3/1/2027 Allergies as of 4/13/2017  Review Complete On: 4/13/2017 By: Baldev Bee MD  
 No Known Allergies Current Immunizations  Reviewed on 10/13/2015 Name Date Influenza High Dose Vaccine PF 10/5/2016, 9/25/2015 Pneumococcal Conjugate (PCV-13) 3/1/2017 10:48 AM  
 Pneumococcal Polysaccharide (PPSV-23) 11/5/2010 Td 10/2/2015 Zoster Vaccine, Live 11/5/2010 Not reviewed this visit You Were Diagnosed With   
  
 Codes Comments Diabetic peripheral neuropathy associated with type 2 diabetes mellitus ([facility] [address]    -  Primary ICD-10-CM: E11.42 
ICD-9-CM: 250.60, 357.2 Cerebral microvascular disease     ICD-10-CM: I67.9 ICD-9-CM: 437.9 Stenosis of both internal carotid arteries     ICD-10-CM: I65.23 ICD-9-CM: 433.10, 433.30 Memory difficulties     ICD-10-CM: R41.3 ICD-9-CM: 780.93   
 RLS (restless legs syndrome)     ICD-10-CM: G25.81 ICD-9-CM: 333.94 Vitals BP Pulse Height(growth percentile) Weight(growth percentile) SpO2 BMI  
 110/64 83 5' 1\" (1.549 m) 145 lb (65.8 kg) 97% 27.4 kg/m2 OB Status Smoking Status Postmenopausal Former Smoker BMI and BSA Data Body Mass Index Body Surface Area  
 27.4 kg/m 2 1.68 m 2 Preferred Pharmacy Pharmacy Name Phone [facility] [address] [place] - [address] [phone] Your Updated Medication List  
  
   
This list is accurate as of: 4/13/17  1:22 PM.  Always use your most recent med list.  
  
  
  
  
 busPIRone 5 mg tablet Commonly known as:  BUSPAR Take 1 Tab by mouth nightly.  
  
 gabapentin 300 mg capsule Commonly known as:  NEURONTIN  
1 pill twice a day and 2 at bedtime  
  
 glimepiride 2 mg tablet Commonly known as:  AMARYL Take 1 Tab by mouth every morning. glucose blood VI test strips strip Commonly known as:  RELION PRIME TEST STRIPS  
test BID for NIDDM E11.9, One touch Ultra Blue  
  
 magnesium 250 mg Tab Take 500 mg by mouth nightly. metFORMIN 500 mg tablet Commonly known as:  GLUCOPHAGE Take 1 Tab by mouth two (2) times daily (with meals). Dose adjustment  
  
 rOPINIRole 3 mg Tab tab Commonly known as:  Ranjith Pascal Take 1 Tab by mouth two (2) times a day. vit B Cmplx 3-FA-Vit C-Biotin 1- mg-mg-mcg tablet Commonly known as:  NEPHRO RAJEEV RX  
 Take 1 Tab by mouth daily. Indications: with stress relief B 12 VITAMIN B12 PO Take  by mouth. VITAMIN D3 1,000 unit tablet Generic drug:  cholecalciferol Take  by mouth daily. Prescriptions Sent to Pharmacy Refills  
 rOPINIRole (REQUIP) 3 mg tab tab 11 Sig: Take 1 Tab by mouth two (2) times a day. Class: Normal  
 Pharmacy: [facility] [address] [facility] [address] Ph #: [phone] Route: Oral  
  
Follow-up Instructions Return in about 6 months (around 10/13/2017). Patient Instructions A Healthy Lifestyle: Care Instructions Your Care Instructions A healthy lifestyle can help you feel good, stay at a healthy weight, and have plenty of energy for both work and play. A healthy lifestyle is something you can share with your whole family. A healthy lifestyle also can lower your risk for serious health problems, such as high blood pressure, heart disease, and diabetes. You can follow a few steps listed below to improve your health and the health of your family. Follow-up care is a key part of your treatment and safety. Be sure to make and go to all appointments, and call your doctor if you are having problems. Its also a good idea to know your test results and keep a list of the medicines you take. How can you care for yourself at home? · Do not eat too much sugar, fat, or fast foods. You can still have dessert and treats now and then. The goal is moderation. · Start small to improve your eating habits. Pay attention to portion sizes, drink less juice and soda pop, and eat more fruits and vegetables. ¨ Eat a healthy amount of food. A 3-ounce serving of meat, for example, is about the size of a deck of cards. Fill the rest of your plate with vegetables and whole grains. ¨ Limit the amount of soda and sports drinks you have every day. Drink more water when you are thirsty. ¨ Eat at least 5 servings of fruits and vegetables every day. It may seem like a lot, but it is not hard to reach this goal. A serving or helping is 1 piece of fruit, 1 cup of vegetables, or 2 cups of leafy, raw vegetables. Have an apple or some carrot sticks as an afternoon snack instead of a candy bar. Try to have fruits and/or vegetables at every meal. 
· Make exercise part of your daily routine. You may want to start with simple activities, such as walking, bicycling, or slow swimming. Try to be active 30 to 60 minutes every day. You do not need to do all 30 to 60 minutes all at once. For example, you can exercise 3 times a day for 10 or 20 minutes. Moderate exercise is safe for most people, but it is always a good idea to talk to your doctor before starting an exercise program. 
· Keep moving. [place] the lawn, work in the garden, or [place]. Take the stairs instead of the elevator at work. · If you smoke, quit. People who smoke have an increased risk for heart attack, stroke, cancer, and other lung illnesses. Quitting is hard, but there are ways to boost your chance of quitting tobacco for good. ¨ Use nicotine gum, patches, or lozenges. ¨ Ask your doctor about stop-smoking programs and medicines. ¨ Keep trying. In addition to reducing your risk of diseases in the future, you will notice some benefits soon after you stop using tobacco. If you have shortness of breath or asthma symptoms, they will likely get better within a few weeks after you quit. · Limit how much alcohol you drink. Moderate amounts of alcohol (up to 2 drinks a day for men, 1 drink a day for women) are okay. But drinking too much can lead to liver problems, high blood pressure, and other health problems. Family health If you have a family, there are many things you can do together to improve your health. · Eat meals together as a family as often as possible. · Eat healthy foods.  This includes fruits, vegetables, lean meats and dairy, and whole grains. · Include your family in your fitness plan. Most people think of activities such as jogging or tennis as the way to fitness, but there are many ways you and your family can be more active. Anything that makes you breathe hard and gets your heart pumping is exercise. Here are some tips: 
¨ Walk to do errands or to take your child to school or the bus. ¨ Go for a family bike ride after dinner instead of watching TV. Where can you learn more? Go to http://adam-jono.info/. Enter U023 in the search box to learn more about \"A Healthy Lifestyle: Care Instructions. \" Current as of: July 26, 2016 Content Version: 11.2 © 3679-6202 [employer]. Care instructions adapted under license by [employer] (which disclaims liability or warranty for this information). If you have questions about a medical condition or this instruction, always ask your healthcare professional. [place] [address] any warranty or liability for your use of this information. Introducing [facility]! [employer] introduces BioV[employer] patient portal. Now you can access parts of your medical record, email your doctor's office, and request medication refills online. 1. In your internet browser, go to [email or web address] 2. Click on the First Time User? Click Here link in the Sign In box. You will see the New Member Sign Up page. 3. Enter your BioV[employer]ria Access Code exactly as it appears below. You will not need to use this code after youve completed the sign-up process. If you do not sign up before the expiration date, you must request a new code. · BioV[employer] Access Code: [email or web address] Expires: 4/24/2017  2:19 PM 
 
4. Enter the last four digits of your Social Security Number (xxxx) and Date of Birth (mm/dd/yyyy) as indicated and click Submit. You will be taken to the next sign-up page. 5. Create a BURLESQUICEOUS ID. This will be your BURLESQUICEOUS login ID and cannot be changed, so think of one that is secure and easy to remember. 6. Create a BURLESQUICEOUS password. You can change your password at any time. 7. Enter your Password Reset Question and Answer. This can be used at a later time if you forget your password. 8. Enter your e-mail address. You will receive e-mail notification when new information is available in [address] 9. Click Sign Up. You can now view and download portions of your medical record. 10. Click the Download Summary menu link to download a portable copy of your medical information. If you have questions, please visit the Frequently Asked Questions section of the BURLESQUICEOUS website. Remember, BURLESQUICEOUS is NOT to be used for urgent needs. For medical emergencies, dial 911. Now available from your iPhone and Android! Please provide this summary of care documentation to your next provider. Your primary care clinician is listed as Missy Sanchez. If you have any questions after today's visit, please call [phone].

## 2017-04-13 NOTE — LETTER
4/13/2017 8:41 PM 
 
Patient:  Yoseph Murphy  
YOB: 1935 Date of Visit: 4/13/2017 Dear No Recipients: Thank you for referring Ms. Odalis Pineda to me for evaluation/treatment. Below are the relevant portions of my assessment and plan of care. Consult REFERRED BY: 
Jovani Moran MD 
 
CHIEF COMPLAINT: Restless leg syndrome, depression, insomnia, and memory loss Subjective:  
 
Yoseph Murphy is a 80 y.o. right-handed  female seen for evaluation of a new problem of worsening restless leg syndrome to the point that the patient cannot sleep. She is currently taking Neurontin 300 mg at around 2 PM and 600 mg around 4 PM, takes Requip 2 mg at 630 and at 830 when she wakes up again with her symptoms being much worse. She is not sleeping well at nighttime. Her symptoms initially got better when she went off the Effexor. She is only on BuSpar now 5 mg a day. She is off her iron pills in addition. She wants to know whether she can increase her Requip. We will advance her to 3 mg at 6:30 PM and 8:30 PM, and she is to change her Neurontin dosing to 300 mg at supper and 600 mg at 8:30 PM.  We will maybe increase that more if we need to. She does sleep more in the daytime and we advised her she should quit sleeping during the day. She is to start melatonin 3-5 mg at bedtime to help her sleep in addition. She is also seen for evaluation of a multiplicity of neurological problems, the main ones being progressive memory loss, worsening restless leg syndrome, severe depression, diabetic neuropathy.   Patient says she has had restless leg syndrome is 1998,  Her and her daughters are concerned that she may be having some side effect of the medication as a list off the side effects including nasal congestion, depression, and a multiplicity of other side effects, but we told him last time they were most likely not related to the Requip and when she stopped the Effexor they all got better and she went back on the Requip. She is also tried other SSRIs including Celexa and other medication, and seems to have either intolerance or side effects on all the medications and will like to try a new antidepressant, and I suggested that she needs to see a therapist who is more skilled at using medication to help her along this line, and since we are going to change her restless legs medication we should do it only one thing at a time. She has had a gradual progressive memory loss for the last year or 2, some of which seems more anxiety related, but she is concerned she has dementia. We will send her to psychology for neuropsych testing to rule out pseudodementia, normal memory loss of aging, or progressive degenerative brain condition like Alzheimer's, and she is to get that done in about a month's time. She has never had a history of stroke or seizures. She was concerned she had Parkinson's disease because she knows is a chronic neurologic disease, but we assured her she has no symptoms suggesting Parkinson's disease. She also has diabetes, and thinks she has neuropathy with migratory paresthesias all over her body. She is also had some increasing difficulty with urinary urgency incontinence, I suggested she see a urologist.  Seems to have a fair amount of musculoskeletal pain. She has had some increased stress because her   (bankruptcy, and he had been unfaithful to her and that has really upset her. She has a lot of cardiac issues and shortness of breath. She has seen Louanna Hodgkin and they have told her that she has osteoarthritis of the hip lumbar spine and she was treated with physical therapy. She has had endoscopy and has esophagitis and gastritis. She also has fibromyalgia. She thinks she has had an EMG in the past also but has no idea when it was done who did it.   She seen an audiologist and speech therapist.  As high-frequency hearing loss.  He has had sleep studies done by neurologist in [facility]. She has chronic sinus problems. She has occasional headache and a generalized fashion. She gets blurred vision intermittently. She has migratory paresthesias over her arms and legs. Restless leg can be so bad that she would just shake in bed at night and sometimes it does not go away and she cannot sleep all night long. She has not had any recent tests or imaging done in over 10 years. Past Medical History:  
Diagnosis Date  Anxiety  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  
 bursitis  Depression  Diabetes ([address]  GERD (gastroesophageal reflux disease)  H/O hand fracture  Hyperlipidemia  Osteopenia  Recurrent UTI  RLS (restless legs syndrome) Past Surgical History:  
Procedure Laterality Date  HX APPENDECTOMY  HX COLONOSCOPY  2010  HX DILATION AND CURETTAGE    
 HX TONSIL AND ADENOIDECTOMY Family History Problem Relation Age of Onset  Cancer Mother   
  breast/ lung  Cancer Father   
  prostate  Migraines Father  Cancer Sister   
  breast  
 Heart Disease Maternal Grandmother Social History Substance Use Topics  Smoking status: Former Smoker Packs/day: 0.50 Years: 20.00 Types: Cigarettes Quit date: 7/8/1960  Smokeless tobacco: Never Used  Alcohol use 1.2 oz/week 2 Glasses of wine per week Current Outpatient Prescriptions:  
  rOPINIRole (REQUIP) 3 mg tab tab, Take 1 Tab by mouth two (2) times a day., Disp: 100 Tab, Rfl: 11 
  gabapentin (NEURONTIN) 300 mg capsule, 1 pill twice a day and 2 at bedtime, Disp: 100 Cap, Rfl: 11 
  busPIRone (BUSPAR) 5 mg tablet, Take 1 Tab by mouth nightly., Disp: 90 Tab, Rfl: 4 
  glimepiride (AMARYL) 2 mg tablet, Take 1 Tab by mouth every morning., Disp: 30 Tab, Rfl: 12 
  CYANOCOBALAMIN, VITAMIN B-12, (VITAMIN B12 PO), Take  by mouth., Disp: , Rfl:  
   glucose blood VI test strips (RELION PRIME TEST STRIPS) strip, test BID for NIDDM E11.9, One touch Ultra Blue, Disp: 100 Strip, Rfl: 3 
  metFORMIN (GLUCOPHAGE) 500 mg tablet, Take 1 Tab by mouth two (2) times daily (with meals). Dose adjustment, Disp: 60 Tab, Rfl: 4   cholecalciferol (VITAMIN D3) 1,000 unit tablet, Take  by mouth daily. , Disp: , Rfl:  
  vit B Cmplx 3-FA-Vit C-Biotin (NEPHRO RAJEEV RX) 1- mg-mg-mcg tablet, Take 1 Tab by mouth daily. Indications: with stress relief B 12, Disp: , Rfl:  
  magnesium 250 mg tab, Take 500 mg by mouth nightly., Disp: , Rfl:  
 
 
 
No Known Allergies Review of Systems: A comprehensive review of systems was negative except for: Constitutional: positive for fatigue, malaise and anorexia Eyes: positive for visual disturbance Ears, nose, mouth, throat, and face: positive for hearing loss and tinnitus Respiratory: positive for cough, wheezing or dyspnea on exertion Cardiovascular: positive for chest pressure/discomfort, dyspnea, palpitations, irregular heart beats, near-syncope Gastrointestinal: positive for dyspepsia, reflux symptoms and nausea Genitourinary: positive for frequency, urinary incontinence and hesitancy Musculoskeletal: positive for myalgias, arthralgias, stiff joints, neck pain, back pain and muscle weakness Neurological: positive for headaches, memory problems, paresthesia, coordination problems, gait problems, tremor and weakness Behvioral/Psych: positive for anxiety and depression Vitals:  
 04/13/17 1300 BP: 110/64 Pulse: 83 SpO2: 97% Weight: 145 lb (65.8 kg) Height: 5' 1\" (1.549 m) Objective: I 
 
 
NEUROLOGICAL EXAM: 
 
Appearance:   patient well-developed, well-nourished, provides a fair history and is in no acute distress.     
Mental Status: Oriented to time, place and person, and the president, patient can do serial sevens, remember 2 out of 3 words at 30 seconds with distraction, spell world backwards and draw a clock that shows a time 10:50, basically a normal Mini-Mental status exam and cognitive function is normal and speech is fluent and no aphasia or dysarthria. Mood and affect anxious and depressed . Cranial Nerves:   Intact visual fields. Fundi are benign. CASIMIRO, EOM's full, no nystagmus, no ptosis. Facial sensation is normal. Corneal reflexes are not tested. Facial movement is symmetric. Hearing is abnormal bilaterally. Palate is midline with normal sternocleidomastoid and trapezius muscles are normal. Tongue is midline. Neck without meningismus or bruits Temporal arteries are not tender or enlarged Motor:  4/5 strength in upper and lower proximal and distal muscles. Normal bulk and tone. No fasciculations. Reflexes:   Deep tendon reflexes 1+/4 and symmetrical. 
No babinski or clonus present Sensory:   Normal to touch, pinprick and vibration and temperature decreased in both feet. DSS is intact Gait:  Normal gait for her age and arthritis . Tremor:   No tremor noted. Cerebellar:  No cerebellar signs present. Neurovascular:  Normal heart sounds and regular rhythm, peripheral pulses decreased, and no carotid bruits. Assessment: ICD-10-CM ICD-9-CM 1. Diabetic peripheral neuropathy associated with type 2 diabetes mellitus (HCC) E11.42 250.60   
  357.2 2. Cerebral microvascular disease I67.9 437.9 3. 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 433.10   
  433.30   
4. Memory difficulties R41.3 780.93   
5. RLS (restless legs syndrome) G25.81 333.94 Plan:  
 
Patient with a multiplicity of problems, the most salient seem to be #1  Restless leg syndrome, for which we will increase the Requip to 3 mg twice a day, and change the gabapentin to 300 mg at supper and 600 mg at 8:30 PM, and we may need to advance further depending on her response to the medications and clinical symptoms. For her memory loss she needs neuropsych testing and MRI imaging of the brain and B12 levels thyroid functions and vitamin D levels were all normal for her age For her depression she is sent to psychiatry to evaluate her medications and make appropriate changes She needs neuropsych testing to rule out pseudodementia, normal memory loss of aging, or dementia and she will get that next month For her insomnia she is to try melatonin 3-5 mg at night She is to take a multivitamin, vitamin D, and try to remain mentally and physically active Carotid Dopplers unremarkable last visit 40 minutes spent with patient and her daughters in detail, reviewed her records, reviewed her MRI scan and lab tests personally on the computer, discussing her history, and having in-depth conversation with both daughters and the patient, and discussing treatment options, therapeutic plans, diagnostic workup, and they agree with plans. Signed By: Avelina Quiroz MD   
 April 13, 2017 CC: Yolande Camacho MD 
FAX: [phone] This note will not be viewable in [address] If you have questions, please do not hesitate to call me. I look forward to following Ms. Pineda along with you. Sincerely, Avelina Quiroz MD

## 2017-04-13 NOTE — PATIENT INSTRUCTIONS

## 2017-04-14 NOTE — PROGRESS NOTES
Consult  REFERRED BY:  Beltran Mosquera MD    CHIEF COMPLAINT: Restless leg syndrome, depression, insomnia, and memory loss      Subjective:     Sade Granado is a 80 y.o. right-handed  female seen for evaluation of a new problem of worsening restless leg syndrome to the point that the patient cannot sleep. She is currently taking Neurontin 300 mg at around 2 PM and 600 mg around 4 PM, takes Requip 2 mg at 630 and at 830 when she wakes up again with her symptoms being much worse. She is not sleeping well at nighttime. Her symptoms initially got better when she went off the Effexor. She is only on BuSpar now 5 mg a day. She is off her iron pills in addition. She wants to know whether she can increase her Requip. We will advance her to 3 mg at 6:30 PM and 8:30 PM, and she is to change her Neurontin dosing to 300 mg at supper and 600 mg at 8:30 PM.  We will maybe increase that more if we need to. She does sleep more in the daytime and we advised her she should quit sleeping during the day. She is to start melatonin 3-5 mg at bedtime to help her sleep in addition. She is also seen for evaluation of a multiplicity of neurological problems, the main ones being progressive memory loss, worsening restless leg syndrome, severe depression, diabetic neuropathy. Patient says she has had restless leg syndrome is 1998,  Her and her daughters are concerned that she may be having some side effect of the medication as a list off the side effects including nasal congestion, depression, and a multiplicity of other side effects, but we told him last time they were most likely not related to the Requip and when she stopped the Effexor they all got better and she went back on the Requip.   She is also tried other SSRIs including Celexa and other medication, and seems to have either intolerance or side effects on all the medications and will like to try a new antidepressant, and I suggested that she needs to see a therapist who is more skilled at using medication to help her along this line, and since we are going to change her restless legs medication we should do it only one thing at a time. She has had a gradual progressive memory loss for the last year or 2, some of which seems more anxiety related, but she is concerned she has dementia. We will send her to psychology for neuropsych testing to rule out pseudodementia, normal memory loss of aging, or progressive degenerative brain condition like Alzheimer's, and she is to get that done in about a month's time. She has never had a history of stroke or seizures. She was concerned she had Parkinson's disease because she knows is a chronic neurologic disease, but we assured her she has no symptoms suggesting Parkinson's disease. She also has diabetes, and thinks she has neuropathy with migratory paresthesias all over her body. She is also had some increasing difficulty with urinary urgency incontinence, I suggested she see a urologist.  Seems to have a fair amount of musculoskeletal pain. She has had some increased stress because her   (bankruptcy, and he had been unfaithful to her and that has really upset her. She has a lot of cardiac issues and shortness of breath. She has seen [facility] and they have told her that she has osteoarthritis of the hip lumbar spine and she was treated with physical therapy. She has had endoscopy and has esophagitis and gastritis. She also has fibromyalgia. She thinks she has had an EMG in the past also but has no idea when it was done who did it. She seen an audiologist and speech therapist.  As high-frequency hearing loss. He has had sleep studies done by neurologist in [facility]. She has chronic sinus problems. She has occasional headache and a generalized fashion. She gets blurred vision intermittently. She has migratory paresthesias over her arms and legs.   Restless leg can be so bad that she would just shake in bed at night and sometimes it does not go away and she cannot sleep all night long. She has not had any recent tests or imaging done in over 10 years. Past Medical History:   Diagnosis Date    Anxiety     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    bursitis    Depression     Diabetes (HCC)     GERD (gastroesophageal reflux disease)     H/O hand fracture     Hyperlipidemia     Osteopenia     Recurrent UTI     RLS (restless legs syndrome)       Past Surgical History:   Procedure Laterality Date    HX APPENDECTOMY      HX COLONOSCOPY  2010    HX DILATION AND CURETTAGE      HX TONSIL AND ADENOIDECTOMY       Family History   Problem Relation Age of Onset    Cancer Mother      breast/ lung    Cancer Father      prostate    Migraines Father     Cancer Sister      breast    Heart Disease Maternal Grandmother       Social History   Substance Use Topics    Smoking status: Former Smoker     Packs/day: 0.50     Years: 20.00     Types: Cigarettes     Quit date: 7/8/1960    Smokeless tobacco: Never Used    Alcohol use 1.2 oz/week     2 Glasses of wine per week         Current Outpatient Prescriptions:     rOPINIRole (REQUIP) 3 mg tab tab, Take 1 Tab by mouth two (2) times a day., Disp: 100 Tab, Rfl: 11    gabapentin (NEURONTIN) 300 mg capsule, 1 pill twice a day and 2 at bedtime, Disp: 100 Cap, Rfl: 11    busPIRone (BUSPAR) 5 mg tablet, Take 1 Tab by mouth nightly., Disp: 90 Tab, Rfl: 4    glimepiride (AMARYL) 2 mg tablet, Take 1 Tab by mouth every morning., Disp: 30 Tab, Rfl: 12    CYANOCOBALAMIN, VITAMIN B-12, (VITAMIN B12 PO), Take  by mouth., Disp: , Rfl:     glucose blood VI test strips (RELION PRIME TEST STRIPS) strip, test BID for NIDDM E11.9, One touch Ultra Blue, Disp: 100 Strip, Rfl: 3    metFORMIN (GLUCOPHAGE) 500 mg tablet, Take 1 Tab by mouth two (2) times daily (with meals).  Dose adjustment, Disp: 60 Tab, Rfl: 4    cholecalciferol (VITAMIN D3) 1,000 unit tablet, Take  by mouth daily. , Disp: , Rfl:     vit B Cmplx 3-FA-Vit C-Biotin (NEPHRO RAJEEV RX) 1- mg-mg-mcg tablet, Take 1 Tab by mouth daily. Indications: with stress relief B 12, Disp: , Rfl:     magnesium 250 mg tab, Take 500 mg by mouth nightly., Disp: , Rfl:         No Known Allergies     Review of Systems:  A comprehensive review of systems was negative except for: Constitutional: positive for fatigue, malaise and anorexia  Eyes: positive for visual disturbance  Ears, nose, mouth, throat, and face: positive for hearing loss and tinnitus  Respiratory: positive for cough, wheezing or dyspnea on exertion  Cardiovascular: positive for chest pressure/discomfort, dyspnea, palpitations, irregular heart beats, near-syncope  Gastrointestinal: positive for dyspepsia, reflux symptoms and nausea  Genitourinary: positive for frequency, urinary incontinence and hesitancy  Musculoskeletal: positive for myalgias, arthralgias, stiff joints, neck pain, back pain and muscle weakness  Neurological: positive for headaches, memory problems, paresthesia, coordination problems, gait problems, tremor and weakness  Behvioral/Psych: positive for anxiety and depression   Vitals:    04/13/17 1300   BP: 110/64   Pulse: 83   SpO2: 97%   Weight: 145 lb (65.8 kg)   Height: 5' 1\" (1.549 m)     Objective:     I      NEUROLOGICAL EXAM:    Appearance:   patient well-developed, well-nourished, provides a fair history and is in no acute distress. Mental Status: Oriented to time, place and person, and the president, patient can do serial sevens, remember 2 out of 3 words at 30 seconds with distraction, spell world backwards and draw a clock that shows a time 10:50, basically a normal Mini-Mental status exam and cognitive function is normal and speech is fluent and no aphasia or dysarthria. Mood and affect anxious and depressed . Cranial Nerves:   Intact visual fields. Fundi are benign. CASIMIRO, EOM's full, no nystagmus, no ptosis.  Facial sensation is normal. Corneal reflexes are not tested. Facial movement is symmetric. Hearing is abnormal bilaterally. Palate is midline with normal sternocleidomastoid and trapezius muscles are normal. Tongue is midline. Neck without meningismus or bruits  Temporal arteries are not tender or enlarged    Motor:  4/5 strength in upper and lower proximal and distal muscles. Normal bulk and tone. No fasciculations. Reflexes:   Deep tendon reflexes 1+/4 and symmetrical.  No babinski or clonus present   Sensory:   Normal to touch, pinprick and vibration and temperature decreased in both feet. DSS is intact   Gait:  Normal gait for her age and arthritis . Tremor:   No tremor noted. Cerebellar:  No cerebellar signs present. Neurovascular:  Normal heart sounds and regular rhythm, peripheral pulses decreased, and no carotid bruits. Assessment:       ICD-10-CM ICD-9-CM    1. Diabetic peripheral neuropathy associated with type 2 diabetes mellitus (HCC) E11.42 250.60      357.2    2. Cerebral microvascular disease I67.9 437.9    3. 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 433.10      433.30    4. Memory difficulties R41.3 780.93    5. RLS (restless legs syndrome) G25.81 333.94          Plan:     Patient with a multiplicity of problems, the most salient seem to be   #1  Restless leg syndrome, for which we will increase the Requip to 3 mg twice a day, and change the gabapentin to 300 mg at supper and 600 mg at 8:30 PM, and we may need to advance further depending on her response to the medications and clinical symptoms.   For her memory loss she needs neuropsych testing and MRI imaging of the brain and B12 levels thyroid functions and vitamin D levels were all normal for her age  For her depression she is sent to psychiatry to evaluate her medications and make appropriate changes  She needs neuropsych testing to rule out pseudodementia, normal memory loss of aging, or dementia and she will get that next month  For her insomnia she is to try melatonin 3-5 mg at night  She is to take a multivitamin, vitamin D, and try to remain mentally and physically active  Carotid Dopplers unremarkable last visit  40 minutes spent with patient and her daughters in detail, reviewed her records, reviewed her MRI scan and lab tests personally on the computer, discussing her history, and having in-depth conversation with both daughters and the patient, and discussing treatment options, therapeutic plans, diagnostic workup, and they agree with plans. Signed By: Unique Guzman MD     April 13, 2017       CC: Roque Corcoran MD  FAX: [phone]    This note will not be viewable in [address]

## 2017-04-27 ENCOUNTER — LAB ONLY (OUTPATIENT)
Dept: FAMILY MEDICINE CLINIC | Age: 82
End: 2017-04-27

## 2017-04-27 DIAGNOSIS — E78.5 HYPERLIPIDEMIA, UNSPECIFIED HYPERLIPIDEMIA TYPE: ICD-10-CM

## 2017-04-27 DIAGNOSIS — E11.9 TYPE 2 DIABETES MELLITUS WITHOUT COMPLICATION, WITHOUT LONG-TERM CURRENT USE OF INSULIN (HCC): Primary | ICD-10-CM

## 2017-04-27 NOTE — MR AVS SNAPSHOT
Visit Information Date & Time Provider Department Dept. Phone Encounter #  
 4/27/2017 10:00 AM [facility] [phone] [id number] Your Appointments 5/2/2017 10:30 AM  
ESTABLISHED PATIENT with MD Suad Canela 38 ([address]) Appt Note: 2 MO F/U  
 [address] [employer] [address] [facility],5[address] [address] [phone]  
  
   
 [address] [employer] [address] [place] [address],[address] [address] 5/24/2017 11:20 AM  
New Patient with Dell Harry PsyD Neurology [address]) Appt Note: new patient dementia vs psuedo dementia/ [address] [place] [address] [address] [phone]  
  
   
 [place] [address]  
  
    
 5/24/2017  1:00 PM  
Any with Dell Harry PsyD Neurology [address]) Appt Note: 3 hour testing/ [address] [place] [address] [address] [phone]  
  
   
 [place] [address] [id number] [address] Upcoming Health Maintenance Date Due MICROALBUMIN Q1 4/5/2017 FOOT EXAM Q1 7/5/2017 HEMOGLOBIN A1C Q6M 7/24/2017 EYE EXAM RETINAL OR DILATED Q1 11/3/2017 LIPID PANEL Q1 11/8/2017 MEDICARE YEARLY EXAM 2/16/2018 BREAST CANCER SCRN MAMMOGRAM 2/16/2018 GLAUCOMA SCREENING Q2Y 11/3/2018 DTaP/Tdap/Td series (2 - Td) 3/1/2027 Allergies as of 4/27/2017  Review Complete On: 4/13/2017 By: Jessi Damon MD  
 No Known Allergies Current Immunizations  Reviewed on 10/13/2015 Name Date Influenza High Dose Vaccine PF 10/5/2016, 9/25/2015 Pneumococcal Conjugate (PCV-13) 3/1/2017 10:48 AM  
 Pneumococcal Polysaccharide (PPSV-23) 11/5/2010 Td 10/2/2015 Zoster Vaccine, Live 11/5/2010 Not reviewed this visit You Were Diagnosed With   
  
 Codes Comments Type 2 diabetes mellitus without complication, without long-term current use of insulin (HCC)    -  Primary ICD-10-CM: E11.9 ICD-9-CM: 250.00 Hyperlipidemia, unspecified hyperlipidemia type     ICD-10-CM: E78.5 ICD-9-CM: 272.4 Vitals OB Status Smoking Status Postmenopausal Former Smoker Preferred Pharmacy Pharmacy Name Phone [facility] [place] - [address] [phone] Your Updated Medication List  
  
   
This list is accurate as of: 4/27/17 10:03 AM.  Always use your most recent med list.  
  
  
  
  
 busPIRone 5 mg tablet Commonly known as:  BUSPAR Take 1 Tab by mouth nightly.  
  
 gabapentin 300 mg capsule Commonly known as:  NEURONTIN  
1 pill twice a day and 2 at bedtime  
  
 glimepiride 2 mg tablet Commonly known as:  AMARYL Take 1 Tab by mouth every morning. glucose blood VI test strips strip Commonly known as:  RELION PRIME TEST STRIPS  
test BID for NIDDM E11.9, One touch Ultra Blue  
  
 magnesium 250 mg Tab Take 500 mg by mouth nightly. metFORMIN 500 mg tablet Commonly known as:  GLUCOPHAGE Take 1 Tab by mouth two (2) times daily (with meals). Dose adjustment  
  
 rOPINIRole 3 mg Tab tab Commonly known as:  Evie Glass Take 1 Tab by mouth two (2) times a day. vit B Cmplx 3-FA-Vit C-Biotin 1- mg-mg-mcg tablet Commonly known as:  NEPHRO RAJEEV RX Take 1 Tab by mouth daily. Indications: with stress relief B 12 VITAMIN B12 PO Take  by mouth. VITAMIN D3 1,000 unit tablet Generic drug:  cholecalciferol Take  by mouth daily. We Performed the Following HEMOGLOBIN A1C WITH EAG [48083 CPT(R)] LIPID PANEL [57186 CPT(R)] METABOLIC PANEL, COMPREHENSIVE [09501 CPT(R)] Introducing [facility]!    
 [employer] introduces [employer] patient portal. Now you can access parts of your medical record, email your doctor's office, and request medication refills online. 1. In your internet browser, go to [email or web address] 2. Click on the First Time User? Click Here link in the Sign In box. You will see the New Member Sign Up page. 3. Enter your [employer] Access Code exactly as it appears below. You will not need to use this code after youve completed the sign-up process. If you do not sign up before the expiration date, you must request a new code. · [employer] Access Code: [email or web address] Expires: 7/26/2017 10:02 AM 
 
4. Enter the last four digits of your Social Security Number (xxxx) and Date of Birth (mm/dd/yyyy) as indicated and click Submit. You will be taken to the next sign-up page. 5. Create a [employer] ID. This will be your [employer] login ID and cannot be changed, so think of one that is secure and easy to remember. 6. Create a [employer] password. You can change your password at any time. 7. Enter your Password Reset Question and Answer. This can be used at a later time if you forget your password. 8. Enter your e-mail address. You will receive e-mail notification when new information is available in [address] 9. Click Sign Up. You can now view and download portions of your medical record. 10. Click the Download Summary menu link to download a portable copy of your medical information. If you have questions, please visit the Frequently Asked Questions section of the [employer] website. Remember, [employer] is NOT to be used for urgent needs. For medical emergencies, dial 911. Now available from your iPhone and Android! Please provide this summary of care documentation to your next provider. Your primary care clinician is listed as Juan Hightower. If you have any questions after today's visit, please call [phone].

## 2017-04-28 LAB
ALBUMIN SERPL-MCNC: 4.3 G/DL (ref 3.5–4.7)
ALBUMIN/GLOB SERPL: 2 {RATIO} (ref 1.2–2.2)
ALP SERPL-CCNC: 76 IU/L (ref 39–117)
ALT SERPL-CCNC: 37 IU/L (ref 0–32)
AST SERPL-CCNC: 38 IU/L (ref 0–40)
BILIRUB SERPL-MCNC: 0.3 MG/DL (ref 0–1.2)
BUN SERPL-MCNC: 17 MG/DL (ref 8–27)
BUN/CREAT SERPL: 27 (ref 12–28)
CALCIUM SERPL-MCNC: 9.6 MG/DL (ref 8.7–10.3)
CHLORIDE SERPL-SCNC: 104 MMOL/L (ref 96–106)
CHOLEST SERPL-MCNC: 205 MG/DL (ref 100–199)
CO2 SERPL-SCNC: 26 MMOL/L (ref 18–29)
CREAT SERPL-MCNC: 0.62 MG/DL (ref 0.57–1)
EST. AVERAGE GLUCOSE BLD GHB EST-MCNC: 209 MG/DL
GLOBULIN SER CALC-MCNC: 2.1 G/DL (ref 1.5–4.5)
GLUCOSE SERPL-MCNC: 86 MG/DL (ref 65–99)
HBA1C MFR BLD: 8.9 % (ref 4.8–5.6)
HDLC SERPL-MCNC: 54 MG/DL
LDLC SERPL CALC-MCNC: 125 MG/DL (ref 0–99)
POTASSIUM SERPL-SCNC: 4 MMOL/L (ref 3.5–5.2)
PROT SERPL-MCNC: 6.4 G/DL (ref 6–8.5)
SODIUM SERPL-SCNC: 149 MMOL/L (ref 134–144)
TRIGL SERPL-MCNC: 129 MG/DL (ref 0–149)
VLDLC SERPL CALC-MCNC: 26 MG/DL (ref 5–40)

## 2017-05-02 ENCOUNTER — OFFICE VISIT (OUTPATIENT)
Dept: FAMILY MEDICINE CLINIC | Age: 82
End: 2017-05-02

## 2017-05-02 VITALS
SYSTOLIC BLOOD PRESSURE: 127 MMHG | BODY MASS INDEX: 26.81 KG/M2 | HEART RATE: 88 BPM | DIASTOLIC BLOOD PRESSURE: 63 MMHG | WEIGHT: 142 LBS | RESPIRATION RATE: 16 BRPM | TEMPERATURE: 98.9 F | HEIGHT: 61 IN | OXYGEN SATURATION: 98 %

## 2017-05-02 DIAGNOSIS — G25.81 RLS (RESTLESS LEGS SYNDROME): ICD-10-CM

## 2017-05-02 DIAGNOSIS — F41.9 ANXIETY: ICD-10-CM

## 2017-05-02 DIAGNOSIS — E11.9 TYPE 2 DIABETES MELLITUS WITHOUT COMPLICATION, WITHOUT LONG-TERM CURRENT USE OF INSULIN (HCC): Primary | ICD-10-CM

## 2017-05-02 DIAGNOSIS — E53.8 CYANOCOBALAMIN DEFICIENCY: ICD-10-CM

## 2017-05-02 NOTE — PROGRESS NOTES
Chief Complaint   Patient presents with    Medication Refill         HPI:       is a 80 y.o. female with T2DM. With support from her daughters and a lot of hard work, she has dropped her A1c from 10.7 to 8.9.  FS this morning was 130. Ate green and black beans with perch last evening. Walking and gardening. RLS and depression are better at this time. Under the care of neurology and psychiatry. No Known Allergies    Current Outpatient Prescriptions   Medication Sig    rOPINIRole (REQUIP) 3 mg tab tab Take 1 Tab by mouth two (2) times a day.  gabapentin (NEURONTIN) 300 mg capsule 1 pill twice a day and 2 at bedtime    busPIRone (BUSPAR) 5 mg tablet Take 1 Tab by mouth nightly.  glimepiride (AMARYL) 2 mg tablet Take 1 Tab by mouth every morning.  CYANOCOBALAMIN, VITAMIN B-12, (VITAMIN B12 PO) Take  by mouth.  glucose blood VI test strips (RELION PRIME TEST STRIPS) strip test BID for NIDDM E11.9, One touch Ultra Blue    metFORMIN (GLUCOPHAGE) 500 mg tablet Take 1 Tab by mouth two (2) times daily (with meals). Dose adjustment    cholecalciferol (VITAMIN D3) 1,000 unit tablet Take  by mouth daily.  vit B Cmplx 3-FA-Vit C-Biotin (NEPHRO RAJEEV RX) 1- mg-mg-mcg tablet Take 1 Tab by mouth daily. Indications: with stress relief B 12    magnesium 250 mg tab Take 500 mg by mouth nightly. No current facility-administered medications for this visit. Past Medical History:   Diagnosis Date    Anxiety     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    bursitis    Depression     Diabetes (HCC)     GERD (gastroesophageal reflux disease)     H/O hand fracture     Hyperlipidemia     Osteopenia     Recurrent UTI     RLS (restless legs syndrome)          ROS:  Denies fever, chills, cough, chest pain, SOB,  nausea, vomiting, or diarrhea. Denies wt loss, wt gain, hemoptysis, hematochezia or melena.     Physical Examination:    /63 (BP 1 Location: Right arm, BP Patient Position: Sitting)  Pulse 88  Temp 98.9 °F (37.2 °C) (Oral)   Resp 16  Ht 5' 1\" (1.549 m)  Wt 142 lb (64.4 kg)  SpO2 98%  BMI 26.83 kg/m2    General: Alert and Ox3, Fluent speech  HEENT:  NC/AT, EOMI, OP: clear  Neck:  Supple, no adenopathy, JVD, mass or bruit  Chest:  Clear to Ausculation, without wheezes, rales, rubs or ronchi  Cardiac: RRR  Abdomen:  +BS, soft, nontender without palpable HSM  Extremities:  No cyanosis, clubbing or edema  Neurologic:  Ambulatory without assist, CN 2-12 grossly intact. Moves all extremities. Skin: no rash  Lymphadenopathy: no cervical or supraclavicular nodes      ASSESSMENT AND PLAN:     1. T2DM:  Continue current meds. Checking A1c again in 3 months. 2.  RLD and Depression: these have imroved. 3.  RTC in 3 months    No orders of the defined types were placed in this encounter.       Nathanial Brunner, MD, [facility]

## 2017-05-02 NOTE — MR AVS SNAPSHOT
Visit Information Date & Time Provider Department Dept. Phone Encounter #  
 5/2/2017 10:30 AM Svitlana Carrillo MD [address] [place] [address] [id number] Follow-up Instructions Return in about 3 months (around 8/2/2017) for Follow up. Follow-up and Disposition History Your Appointments 5/24/2017 11:20 AM  
New Patient with [place], [facility] [address]) Appt Note: new patient dementia vs psuedo dementia/ [address] [place] [address] [place] [address] [phone]  
  
   
 [place] [address]  
  
    
 5/24/2017  1:00 PM  
Any with Lis Palms, [facility] [address]) Appt Note: 3 hour testing/ [address] [place] [address] [place] [address] [phone]  
  
   
 [place] [address] [id number] [address] Upcoming Health Maintenance Date Due MICROALBUMIN Q1 4/5/2017 FOOT EXAM Q1 7/5/2017 INFLUENZA AGE 9 TO ADULT 8/1/2017 HEMOGLOBIN A1C Q6M 10/27/2017 EYE EXAM RETINAL OR DILATED Q1 11/3/2017 MEDICARE YEARLY EXAM 2/16/2018 BREAST CANCER SCRN MAMMOGRAM 2/16/2018 LIPID PANEL Q1 4/27/2018 GLAUCOMA SCREENING Q2Y 11/3/2018 DTaP/Tdap/Td series (2 - Td) 3/1/2027 Allergies as of 5/2/2017  Review Complete On: 5/2/2017 By: Julisa Rivera LPN No Known Allergies Current Immunizations  Reviewed on 10/13/2015 Name Date Influenza High Dose Vaccine PF 10/5/2016, 9/25/2015 Pneumococcal Conjugate (PCV-13) 3/1/2017 10:48 AM  
 Pneumococcal Polysaccharide (PPSV-23) 11/5/2010 Td 10/2/2015 Zoster Vaccine, Live 11/5/2010 Not reviewed this visit You Were Diagnosed With   
  
 Codes Comments Type 2 diabetes mellitus without complication, without long-term current use of insulin (HCC)    -  Primary ICD-10-CM: E11.9 ICD-9-CM: 250.00   
 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  
 26.83 kg/m 2 1.66 m 2 Preferred Pharmacy Pharmacy Name Phone [facility] [place] [facility] [place] - [address] [phone] Your Updated Medication List  
  
   
This list is accurate as of: 5/2/17 12:50 PM.  Always use your most recent med list.  
  
  
  
  
 busPIRone 5 mg tablet Commonly known as:  BUSPAR Take 1 Tab by mouth nightly.  
  
 gabapentin 300 mg capsule Commonly known as:  NEURONTIN  
1 pill twice a day and 2 at bedtime  
  
 glimepiride 2 mg tablet Commonly known as:  AMARYL Take 1 Tab by mouth every morning. glucose blood VI test strips strip Commonly known as:  RELION PRIME TEST STRIPS  
test BID for NIDDM E11.9, One touch Ultra Blue  
  
 magnesium 250 mg Tab Take 500 mg by mouth nightly. metFORMIN 500 mg tablet Commonly known as:  GLUCOPHAGE Take 1 Tab by mouth two (2) times daily (with meals). Dose adjustment  
  
 rOPINIRole 3 mg Tab tab Commonly known as:  Lesta Goo Take 1 Tab by mouth two (2) times a day. vit B Cmplx 3-FA-Vit C-Biotin 1- mg-mg-mcg tablet Commonly known as:  NEPHRO RAJEEV RX Take 1 Tab by mouth daily. Indications: with stress relief B 12 VITAMIN B12 PO Take  by mouth. VITAMIN D3 1,000 unit tablet Generic drug:  cholecalciferol Take  by mouth daily. Follow-up Instructions Return in about 3 months (around 8/2/2017) for Follow up. To-Do List   
 08/02/2017 Lab:  CBC WITH AUTOMATED DIFF   
  
 08/02/2017 Lab:  LIPID PANEL   
  
 08/02/2017 Lab:  METABOLIC PANEL, COMPREHENSIVE   
  
 08/02/2017 Lab:  VITAMIN B12 Introducing [facility]! Endy Waldron introduces [employer] patient portal. Now you can access parts of your medical record, email your doctor's office, and request medication refills online. 1. In your internet browser, go to [email or web address] 2. Click on the First Time User? Click Here link in the Sign In box. You will see the New Member Sign Up page. 3. Enter your [employer] Access Code exactly as it appears below. You will not need to use this code after youve completed the sign-up process. If you do not sign up before the expiration date, you must request a new code. · [employer] Access Code: [email or web address] Expires: 7/26/2017 10:02 AM 
 
4. Enter the last four digits of your Social Security Number (xxxx) and Date of Birth (mm/dd/yyyy) as indicated and click Submit. You will be taken to the next sign-up page. 5. Create a [employer] ID. This will be your [employer] login ID and cannot be changed, so think of one that is secure and easy to remember. 6. Create a [employer] password. You can change your password at any time. 7. Enter your Password Reset Question and Answer. This can be used at a later time if you forget your password. 8. Enter your e-mail address. You will receive e-mail notification when new information is available in [address] 9. Click Sign Up. You can now view and download portions of your medical record. 10. Click the Download Summary menu link to download a portable copy of your medical information. If you have questions, please visit the Frequently Asked Questions section of the [employer] website. Remember, [employer] is NOT to be used for urgent needs. For medical emergencies, dial 911. Now available from your iPhone and Android! Please provide this summary of care documentation to your next provider. Your primary care clinician is listed as Leda Heredia. If you have any questions after today's visit, please call [phone].

## 2017-05-24 ENCOUNTER — OFFICE VISIT (OUTPATIENT)
Dept: NEUROLOGY | Age: 82
End: 2017-05-24

## 2017-05-24 DIAGNOSIS — F41.9 ANXIETY AND DEPRESSION: ICD-10-CM

## 2017-05-24 DIAGNOSIS — R41.3 MEMORY LOSS: ICD-10-CM

## 2017-05-24 DIAGNOSIS — F32.A ANXIETY AND DEPRESSION: ICD-10-CM

## 2017-05-24 DIAGNOSIS — F09 MILD COGNITIVE DISORDER: Primary | ICD-10-CM

## 2017-05-24 DIAGNOSIS — E11.42 DIABETIC PERIPHERAL NEUROPATHY ASSOCIATED WITH TYPE 2 DIABETES MELLITUS (HCC): ICD-10-CM

## 2017-05-24 DIAGNOSIS — F41.9 MODERATE ANXIETY: ICD-10-CM

## 2017-05-24 DIAGNOSIS — G25.81 RLS (RESTLESS LEGS SYNDROME): ICD-10-CM

## 2017-05-24 DIAGNOSIS — E55.9 VITAMIN D DEFICIENCY: ICD-10-CM

## 2017-05-24 DIAGNOSIS — R41.89 PSEUDODEMENTIA: ICD-10-CM

## 2017-05-24 DIAGNOSIS — F33.1 DEPRESSION, MAJOR, RECURRENT, MODERATE (HCC): ICD-10-CM

## 2017-05-24 DIAGNOSIS — R47.89 WORD FINDING PROBLEM: ICD-10-CM

## 2017-05-24 DIAGNOSIS — I67.89 CEREBRAL MICROVASCULAR DISEASE: ICD-10-CM
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
## 2017-06-13 ENCOUNTER — OFFICE VISIT (OUTPATIENT)
Dept: NEUROLOGY | Age: 82
End: 2017-06-13

## 2017-06-13 VITALS
BODY MASS INDEX: 27 KG/M2 | HEIGHT: 61 IN | SYSTOLIC BLOOD PRESSURE: 120 MMHG | OXYGEN SATURATION: 95 % | WEIGHT: 143 LBS | DIASTOLIC BLOOD PRESSURE: 72 MMHG | HEART RATE: 65 BPM

## 2017-06-13 DIAGNOSIS — G25.81 RLS (RESTLESS LEGS SYNDROME): ICD-10-CM

## 2017-06-13 DIAGNOSIS — R01.1 MURMUR: ICD-10-CM

## 2017-06-13 DIAGNOSIS — F41.9 ANXIETY: ICD-10-CM

## 2017-06-13 DIAGNOSIS — R41.3 MEMORY DIFFICULTIES: Primary | ICD-10-CM

## 2017-06-13 NOTE — MR AVS SNAPSHOT
Visit Information Date & Time Provider Department Dept. Phone Encounter #  
 6/13/2017 11:00 AM Jonell Boeck, NP [facility]rology [facility] at [facility] [phone] [id number] Your Appointments 8/1/2017  1:20 PM  
Follow Up with Edwige Davis PsyD [address] [facility] ([address] [facility]) Appt Note: office feedback. ..kme; office feedback. ..[address] [facility] [address] [facility] [address] [address] [phone]  
  
   
 [facility]  
  
    
 8/2/2017 11:00 AM  
ESTABLISHED PATIENT with Georgette Crowley MD  
[place] [address] ([address]) Appt Note: 3 mo f/u  
 [address] [place] [address] [facility],5Th Floor [address] [phone]  
  
   
 [address] [address]  
  
    
 10/2/2017  1:00 PM  
Follow Up with Arleen Mccracken MD  
Neurology Clinic at [address]) Appt Note: follow up. ..tlw 5/8/17  
 [address], [address] [address]  
[address] [facility], [address] [place] [address], [address] [place] [address] St [address] [phone] Upcoming Health Maintenance Date Due MICROALBUMIN Q1 4/5/2017 FOOT EXAM Q1 7/5/2017 INFLUENZA AGE 9 TO ADULT 8/1/2017 HEMOGLOBIN A1C Q6M 10/27/2017 EYE EXAM RETINAL OR DILATED Q1 11/3/2017 MEDICARE YEARLY EXAM 2/16/2018 BREAST CANCER SCRN MAMMOGRAM 2/16/2018 LIPID PANEL Q1 4/27/2018 GLAUCOMA SCREENING Q2Y 11/3/2018 DTaP/Tdap/Td series (2 - Td) 3/1/2027 Allergies as of 6/13/2017  Review Complete On: 6/13/2017 By: Marly Ferrari LPN No Known Allergies Current Immunizations  Reviewed on 10/13/2015 Name Date Influenza High Dose Vaccine PF 10/5/2016, 9/25/2015 Pneumococcal Conjugate (PCV-13) 3/1/2017 10:48 AM  
 Pneumococcal Polysaccharide (PPSV-23) 11/5/2010 Td 10/2/2015 Zoster Vaccine, Live 11/5/2010 Not reviewed this visit You Were Diagnosed With   
  
 Codes Comments Memory difficulties    -  Primary ICD-10-CM: R41.3 ICD-9-CM: 780.93 Anxiety     ICD-10-CM: F41.9 ICD-9-CM: 300.00   
 RLS (restless legs syndrome)     ICD-10-CM: G25.81 ICD-9-CM: 333.94   
 Murmur     ICD-10-CM: R01.1 ICD-9-CM: 967. 2 Vitals BP Pulse Height(growth percentile) Weight(growth percentile) SpO2 BMI  
 120/72 65 5' 1\" (1.549 m) 143 lb (64.9 kg) 95% 27.02 kg/m2 OB Status Smoking Status Postmenopausal Former Smoker Vitals History BMI and BSA Data Body Mass Index Body Surface Area  
 27.02 kg/m 2 1.67 m 2 Preferred Pharmacy Pharmacy Name Phone [facility] [phone] [phone] Your Updated Medication List  
  
   
This list is accurate as of: 6/13/17 11:39 AM.  Always use your most recent med list.  
  
  
  
  
 busPIRone 5 mg tablet Commonly known as:  BUSPAR Take 1 Tab by mouth nightly.  
  
 gabapentin 300 mg capsule Commonly known as:  NEURONTIN  
1 pill twice a day and 2 at bedtime  
  
 glimepiride 2 mg tablet Commonly known as:  AMARYL Take 1 Tab by mouth every morning. glucose blood VI test strips strip Commonly known as:  RELION PRIME TEST STRIPS  
test BID for NIDDM E11.9, One touch Ultra Blue  
  
 magnesium 250 mg Tab Take 500 mg by mouth nightly. metFORMIN 500 mg tablet Commonly known as:  GLUCOPHAGE Take 1 Tab by mouth two (2) times daily (with meals). Dose adjustment  
  
 rOPINIRole 3 mg Tab tab Commonly known as:  Rhona  Take 1 Tab by mouth two (2) times a day. vit B Cmplx 3-FA-Vit C-Biotin 1- mg-mg-mcg tablet Commonly known as:  NEPHRO RAJEEV RX Take 1 Tab by mouth daily. Indications: with stress relief B 12 VITAMIN B12 PO Take  by mouth. VITAMIN D3 1,000 unit tablet Generic drug:  cholecalciferol Take  by mouth daily. We Performed the Following REFERRAL TO CARDIOLOGY [PFX50 Custom] Comments: Murmur, chest pain, shortness of breath Referral Information Referral ID Referred By Referred To  
  
 [id number] Himanshu MCGEE MD   
   [address] Phone: [phone] Fax: [phone] Visits Status Start Date End Date 1 New Request 6/13/17 6/13/18 If your referral has a status of pending review or denied, additional information will be sent to support the outcome of this decision. Patient Instructions PRESCRIPTION REFILL POLICY [facility] Statement to Patients April 1, 2014 In an effort to ensure the large volume of patient prescription refills is processed in the most efficient and expeditious manner, we are asking our patients to assist us by calling your Pharmacy for all prescription refills, this will include also your  Mail Order Pharmacy. The pharmacy will contact our office electronically to continue the refill process. Please do not wait until the last minute to call your pharmacy. We need at least 48 hours (2days) to fill prescriptions. We also encourage you to call your pharmacy before going to  your prescription to make sure it is ready. With regard to controlled substance prescription refill requests (narcotic refills) that need to be picked up at our office, we ask your cooperation by providing us with at least 72 hours (3days) notice that you will need a refill. We will not refill narcotic prescription refill requests after 4:00pm on any weekday, Monday through Thursday, or after 2:00pm on Fridays, or on the weekends. We encourage everyone to explore another way of getting your prescription refill request processed using [employer], our patient web portal through our electronic medical record system.  [employer] is an efficient and effective way to communicate your medication request directly to the office and  downloadable as an abram on your smart phone . [employer] also features a review functionality that allows you to view your medication list as well as leave messages for your physician. Are you ready to get connected? If so please review the attatched instructions or speak to any of our staff to get you set up right away! Thank you so much for your cooperation. Should you have any questions please contact our Practice Administrator. The Physicians and Staff,  [facility] A Healthy Lifestyle: Care Instructions Your Care Instructions A healthy lifestyle can help you feel good, stay at a healthy weight, and have plenty of energy for both work and play. A healthy lifestyle is something you can share with your whole family. A healthy lifestyle also can lower your risk for serious health problems, such as high blood pressure, heart disease, and diabetes. You can follow a few steps listed below to improve your health and the health of your family. Follow-up care is a key part of your treatment and safety. Be sure to make and go to all appointments, and call your doctor if you are having problems. Its also a good idea to know your test results and keep a list of the medicines you take. How can you care for yourself at home? · Do not eat too much sugar, fat, or fast foods. You can still have dessert and treats now and then. The goal is moderation. · Start small to improve your eating habits. Pay attention to portion sizes, drink less juice and soda pop, and eat more fruits and vegetables. ¨ Eat a healthy amount of food. A 3-ounce serving of meat, for example, is about the size of a deck of cards. Fill the rest of your plate with vegetables and whole grains. ¨ Limit the amount of soda and sports drinks you have every day. Drink more water when you are thirsty. ¨ Eat at least 5 servings of fruits and vegetables every day. It may seem like a lot, but it is not hard to reach this goal. A serving or helping is 1 piece of fruit, 1 cup of vegetables, or 2 cups of leafy, raw vegetables. Have an apple or some carrot sticks as an afternoon snack instead of a candy bar. Try to have fruits and/or vegetables at every meal. 
· Make exercise part of your daily routine. You may want to start with simple activities, such as walking, bicycling, or slow swimming. Try to be active 30 to 60 minutes every day. You do not need to do all 30 to 60 minutes all at once. For example, you can exercise 3 times a day for 10 or 20 minutes. Moderate exercise is safe for most people, but it is always a good idea to talk to your doctor before starting an exercise program. 
· Keep moving. Simone Frieda the lawn, work in the garden, or [employer]. Take the stairs instead of the elevator at work. · If you smoke, quit. People who smoke have an increased risk for heart attack, stroke, cancer, and other lung illnesses. Quitting is hard, but there are ways to boost your chance of quitting tobacco for good. ¨ Use nicotine gum, patches, or lozenges. ¨ Ask your doctor about stop-smoking programs and medicines. ¨ Keep trying. In addition to reducing your risk of diseases in the future, you will notice some benefits soon after you stop using tobacco. If you have shortness of breath or asthma symptoms, they will likely get better within a few weeks after you quit. · Limit how much alcohol you drink. Moderate amounts of alcohol (up to 2 drinks a day for men, 1 drink a day for women) are okay. But drinking too much can lead to liver problems, high blood pressure, and other health problems. Family health If you have a family, there are many things you can do together to improve your health. · Eat meals together as a family as often as possible. · Eat healthy foods.  This includes fruits, vegetables, lean meats and dairy, and whole grains. · Include your family in your fitness plan. Most people think of activities such as jogging or tennis as the way to fitness, but there are many ways you and your family can be more active. Anything that makes you breathe hard and gets your heart pumping is exercise. Here are some tips: 
¨ Walk to do errands or to take your child to school or the bus. ¨ Go for a family bike ride after dinner instead of watching TV. Where can you learn more? Go to http://adam-jono.info/. Enter U565 in the search box to learn more about \"A Healthy Lifestyle: Care Instructions. \" Current as of: July 26, 2016 Content Version: 11.2 © 0612-5992 [employer]. Care instructions adapted under license by [employer] (which disclaims liability or warranty for this information). If you have questions about a medical condition or this instruction, always ask your healthcare professional. [place] [address] any warranty or liability for your use of this information. Introducing [facility] & HE[facility]! Cuate Pedroza introduces [employer] patient portal. Now you can access parts of your medical record, email your doctor's office, and request medication refills online. 1. In your internet browser, go to [email or web address] 2. Click on the First Time User? Click Here link in the Sign In box. You will see the New Member Sign Up page. 3. Enter your [employer] Access Code exactly as it appears below. You will not need to use this code after youve completed the sign-up process. If you do not sign up before the expiration date, you must request a new code. · [employer] Access Code: [email or web address] Expires: 7/26/2017 10:02 AM 
 
4. Enter the last four digits of your Social Security Number (xxxx) and Date of Birth (mm/dd/yyyy) as indicated and click Submit. You will be taken to the next sign-up page. 5. Create a [employer] ID. This will be your [employer] login ID and cannot be changed, so think of one that is secure and easy to remember. 6. Create a [employer] password. You can change your password at any time. 7. Enter your Password Reset Question and Answer. This can be used at a later time if you forget your password. 8. Enter your e-mail address. You will receive e-mail notification when new information is available in [address] 9. Click Sign Up. You can now view and download portions of your medical record. 10. Click the Download Summary menu link to download a portable copy of your medical information. If you have questions, please visit the Frequently Asked Questions section of the [employer] website. Remember, [employer] is NOT to be used for urgent needs. For medical emergencies, dial 911. Now available from your iPhone and Android! Please provide this summary of care documentation to your next provider. Your primary care clinician is listed as Emiliana Callaway. If you have any questions after today's visit, please call [phone].

## 2017-06-13 NOTE — LETTER
2017 4:49 PM 
 
RE:    Ed Briscoe  
[address] [address] Thank you for agreeing to see [facility] I am referring my patient to you for evaluation of Cardiology. Please see her 
pertinent patient information below. Date:             2017 Name:  Darron Garcia 
:  1935 MRN:  [id number] PCP:  Raquel Thrasher MD 
 
Chief Complaint Patient presents with  Follow-up  Results Neuropsych results. HISTORY OF PRESENT ILLNESS: 
Ed Briscoe is a 80 y.o., female who presents today for follow up for reported memory loss and RLS. Neuropsych testing was not suggestive of dementia. She thinks that her memory issues are stable, her family thinks that she forgets information from appointments, names, whether she has told her family something. Things will come to her if she is reminded. She admits that she has had trouble with her mood for years, has been on many antidepressants. She is currently on buspar and is following with psychiatry. She last tried effexor, which made her RLS worse. She does feel that RLS is well controlled currently, she takes 3 mg requip in the morning and 6 mg at bedtime. She does think that she is more likely to nap in the afternoon. She does not think that afternoon nap is affecting her night time sleep, but her daughters think that she is not sleeping as well. She keeps the TV in her room, likes to have the company. She turns it off to read but will turn it back on throughout the night when she wakes up. She is not seeing a counselor, did see one after her   and she did not find it helpful. She is busy with her [employer], she plays piano during the service but admits that she is not very socially engaged with other members of the [employer].  She complains of pressure in her chest when she lies down, has been told that she has a murmur but has not seen a cardiologist.  She also thinks that she has worsening shortness of breath and exercise intolerance. 5.24.2017 neuropsych In my opinion, this appears to be a case of normal aging exacerbated by marked depression and anxiety (pseudodementia). I suggest consideration for medication for anxiety and depression. Counseling may prove helpful as well. I find her competent to make medical decisions, financial decisions, to vote, to , and to own a firearm. I am not concerned about driving or day-to-day supervision. I fully expect an improvement in her day-to-day memory functioning pending successful mental health intervention. If not a follow up neuropsych would then be indicated. Baseline now established. Follow up prn. Clinical correlation is, of course, indicated. 4.13.2017 reynold Khanna is a 80 y.o. right-handed  female seen for evaluation of a new problem of worsening restless leg syndrome to the point that the patient cannot sleep. She is currently taking Neurontin 300 mg at around 2 PM and 600 mg around 4 PM, takes Requip 2 mg at 630 and at 830 when she wakes up again with her symptoms being much worse. She is not sleeping well at nighttime. Her symptoms initially got better when she went off the Effexor. She is only on BuSpar now 5 mg a day. She is off her iron pills in addition. She wants to know whether she can increase her Requip. We will advance her to 3 mg at 6:30 PM and 8:30 PM, and she is to change her Neurontin dosing to 300 mg at supper and 600 mg at 8:30 PM. We will maybe increase that more if we need to. She does sleep more in the daytime and we advised her she should quit sleeping during the day. She is to start melatonin 3-5 mg at bedtime to help her sleep in addition.  She is also seen for evaluation of a multiplicity of neurological problems, the main ones being progressive memory loss, worsening restless leg syndrome, severe depression, diabetic neuropathy. Patient says she has had restless leg syndrome is , Her and her daughters are concerned that she may be having some side effect of the medication as a list off the side effects including nasal congestion, depression, and a multiplicity of other side effects, but we told him last time they were most likely not related to the Requip and when she stopped the Effexor they all got better and she went back on the Requip. She is also tried other SSRIs including Celexa and other medication, and seems to have either intolerance or side effects on all the medications and will like to try a new antidepressant, and I suggested that she needs to see a therapist who is more skilled at using medication to help her along this line, and since we are going to change her restless legs medication we should do it only one thing at a time. She has had a gradual progressive memory loss for the last year or 2, some of which seems more anxiety related, but she is concerned she has dementia. We will send her to psychology for neuropsych testing to rule out pseudodementia, normal memory loss of aging, or progressive degenerative brain condition like Alzheimer's, and she is to get that done in about a month's time. She has never had a history of stroke or seizures. She was concerned she had Parkinson's disease because she knows is a chronic neurologic disease, but we assured her she has no symptoms suggesting Parkinson's disease. She also has diabetes, and thinks she has neuropathy with migratory paresthesias all over her body. She is also had some increasing difficulty with urinary urgency incontinence, I suggested she see a urologist. Seems to have a fair amount of musculoskeletal pain. She has had some increased stress because her   (bankruptcy, and he had been unfaithful to her and that has really upset her.  She has a lot of cardiac issues and shortness of breath. She has seen [address] [place] [address] and they have told her that she has osteoarthritis of the hip lumbar spine and she was treated with physical therapy. She has had endoscopy and has esophagitis and gastritis. She also has fibromyalgia. She thinks she has had an EMG in the past also but has no idea when it was done who did it. She seen an audiologist and speech therapist. As high-frequency hearing loss. He has had sleep studies done by neurologist in [facility]. She has chronic sinus problems. She has occasional headache and a generalized fashion. She gets blurred vision intermittently. She has migratory paresthesias over her arms and legs. Restless leg can be so bad that she would just shake in bed at night and sometimes it does not go away and she cannot sleep all night long. She has not had any recent tests or imaging done in over 10 years. Current Outpatient Prescriptions Medication Sig  
 rOPINIRole (REQUIP) 3 mg tab tab Take 1 Tab by mouth two (2) times a day.  gabapentin (NEURONTIN) 300 mg capsule 1 pill twice a day and 2 at bedtime  busPIRone (BUSPAR) 5 mg tablet Take 1 Tab by mouth nightly. (Patient taking differently: Take 5 mg by mouth two (2) times a day.)  glimepiride (AMARYL) 2 mg tablet Take 1 Tab by mouth every morning.  CYANOCOBALAMIN, VITAMIN B-12, (VITAMIN B12 PO) Take  by mouth.  glucose blood VI test strips (RELION PRIME TEST STRIPS) strip test BID for NIDDM E11.9, One touch Ultra Blue  metFORMIN (GLUCOPHAGE) 500 mg tablet Take 1 Tab by mouth two (2) times daily (with meals). Dose adjustment  cholecalciferol (VITAMIN D3) 1,000 unit tablet Take  by mouth daily.  vit B Cmplx 3-FA-Vit C-Biotin (NEPHRO RAJEEV RX) 1- mg-mg-mcg tablet Take 1 Tab by mouth daily. Indications: with stress relief B 12  
 magnesium 250 mg tab Take 500 mg by mouth nightly. No current facility-administered medications for this visit. No Known Allergies Past Medical History:  
Diagnosis Date  Anxiety  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  
 bursitis  Depression  Diabetes ([facility] [address]  GERD (gastroesophageal reflux disease)  H/O hand fracture  Hyperlipidemia  Osteopenia  Recurrent UTI  RLS (restless legs syndrome) Past Surgical History:  
Procedure Laterality Date  HX APPENDECTOMY  HX COLONOSCOPY  2010  HX DILATION AND CURETTAGE    
 HX TONSIL AND ADENOIDECTOMY Social History Social History  Marital status: UNKNOWN Spouse name: N/A  
 Number of children: 4  
 Years of education: N/A Occupational History [address] Worked for [employer] Social History Main Topics  Smoking status: Former Smoker Packs/day: 0.50 Years: 20.00 Types: Cigarettes Quit date: 7/8/1960  Smokeless tobacco: Never Used  Alcohol use 1.8 oz/week 3 Glasses of wine per week  Drug use: Yes Special: Prescription  Sexual activity: No  
 
Other Topics Concern   Service No  
 Blood Transfusions No  
 Caffeine Concern No  
 Occupational Exposure No  
 Hobby Hazards No  
 Sleep Concern No  
 Stress Concern Yes  
  usually financial  
 Weight Concern No  
 Special Diet Yes Diabetic  Back Care No  
 Exercise No  
  stays active, loves to be outside, daily walks [address] [facility] [facility] Yes  Self-Exams Yes Social History Narrative Family History Problem Relation Age of Onset  Cancer Mother   
  breast/ lung  Cancer Father   
  prostate  Migraines Father  Cancer Sister   
  breast  
 Heart Disease Maternal Grandmother PHYSICAL EXAMINATION:   
Visit Vitals  /72  Pulse 65  Ht 5' 1\" (1.549 m)  Wt 143 lb (64.9 kg)  SpO2 95%  BMI 27.02 kg/m2 General:  Well defined, nourished, and groomed individual in no acute distress. Neck: Supple, nontender, no bruits, no pain with resistance to active range of motion. Heart: Regular rate and rhythm, + murmur, no rub, or gallop. Normal S1S2. Lungs:  Clear to auscultation bilaterally with equal chest expansion, no cough, no wheeze Musculoskeletal:  Extremities revealed no edema and had full range of motion of joints. Psych:  Good mood and bright affect NEUROLOGICAL EXAMINATION:    
Mental Status:   Alert and oriented to person, place, and time with recent and remote memory intact. Attention span and concentration are normal. Speech is fluent with a full fund of knowledge. Cranial Nerves:   
II, III, IV, VI:  Visual acuity grossly intact. Pupils are equal, round, and reactive to light. Extra-ocular movements are full and fluid. No ptosis or nystagmus. V-XII: Hearing is grossly intact. Facial features are symmetric, with normal sensation and strength. The palate rises symmetrically and the tongue protrudes midline. Sternocleidomastoids 5/5. Motor Examination: Normal tone, bulk, and strength, 5/5 muscle strength throughout. Coordination:  Finger to nose testing was normal.   No resting or intention tremor Gait and Station:  Steady while walking. Normal arm swing. No pronator drift. No muscle wasting or fasciculations noted. ASSESSMENT AND PLAN 
  ICD-10-CM ICD-9-CM 1. Memory difficulties R41.3 780.93   
2. Anxiety F41.9 300.00 3. RLS (restless legs syndrome) G25.81 333.94   
4. Murmur R01.1 785.2 REFERRAL TO CARDIOLOGY 80-year-old female seen in follow-up for RLS and complaints of memory changes. Are less well controlled on current regimen, she does think that daytime dosing of Mirapex might make her little bit tired but otherwise she tolerates it well. 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 She follows with psychiatry, but admits that mood is still not well controlled.   She also has a heart murmur on exam today, reports that she is short of breath and feels chest pressure when she lies down at night. 1.  Patient and her family are reassured that she does not have evidence of dementia, instructed to notify us if she feels that memory is getting worse we can repeat testing 2. Discussed the importance of continuing to follow with her psychiatrist for management of her mood, but that she should also establish care with a psychologist close to home for counseling 3. Continue Mirapex 3 mg in the morning, 6 mg at bedtime for RLS. Advised to limit daytime napping to improve nighttime sleep 4. Referral to cardiology for evaluation of murmur and complaints of chest pressure at night and shortness of breath Follow-up in 4 months as scheduled with Dr. Douglas Le, call sooner with alden Tompkins NP This note was created using voice recognition software. Despite editing, there may be syntax errors. I appreciate your assistance in Ms. Pineda's care  and look forward to your findings and recommendations. Sincerely, Erica Ramon NP Patient Registration [address] [facility] Contact Information Patient ID: [id number] Last Name: [address] [place] [address] Name: 
First Name: Patel Hagan Phone: 
Middle Name: [address] [place] [address] Sex: F YOB: 1935 Name: 
Social Security No.: [id number] Phone: 
Address: [address] [place] [address] Guarantor Information (to whom statements are sent) Zip: [address] Name: Radha Silva City: [place]: [place] Address: [address] [address] Home Phone: [phone] Phone: ( ) _______ - ______________ Work Phone: Other: 
Mobile Phone: [phone] Patient Referred by: ______________________ Marital Status: UNKNOWN Patient PCP: ________________________ Primary Insurance Information Insurance Plan Name: Dami ([address] [place] [address] [address]) Address to [address] [address] Insurance Phone Number: [phone] [address] Policy Information Policy Abreu Patient's relationship to policy abreu: Self Last Name:CARNEAL 
ID/Certification No.: [id number] First Name:MARCUS Policy/Group No.: Middle Name: P Issue Date: 02/01/2000 Address:[address] [address] Exp Date: [facility]:[place] [id number] Copay Amount: ___________________ Social Sec Number: [id number] Co-insurance Percent:__________________________________ YOB: 1935 Sex: ______________ Employer: 
 
Secondary Insurance Information Insurance Plan Name: [facility] (Medicare Supplement) Address to Send Claims: [address] [place] [address] Insurance Phone Number: [phone] [address] Policy Information Policy Abreu Patient's relationship to policy abreu: Self Last Name:CARNEAL 
ID/Certification No.: [id number] First Name:MARCUS Policy/Group No.:[id number] Middle Name: P Issue Date: 07/01/2014 Address:[facility] [address] Exp Date: [facility]: [place] [id number] Copay Amount: ______________________ Social Sec Number: [id number] Co-insurance Percent:__________________________________ YOB: 1935 Sex: ____________ Employer:

## 2017-06-13 NOTE — PATIENT INSTRUCTIONS
[facility]   Statement to Patients  April 1, 2014      In an effort to ensure the large volume of patient prescription refills is processed in the most efficient and expeditious manner, we are asking our patients to assist us by calling your Pharmacy for all prescription refills, this will include also your  Mail Order Pharmacy. The pharmacy will contact our office electronically to continue the refill process. Please do not wait until the last minute to call your pharmacy. We need at least 48 hours (2days) to fill prescriptions. We also encourage you to call your pharmacy before going to  your prescription to make sure it is ready. With regard to controlled substance prescription refill requests (narcotic refills) that need to be picked up at our office, we ask your cooperation by providing us with at least 72 hours (3days) notice that you will need a refill. We will not refill narcotic prescription refill requests after 4:00pm on any weekday, Monday through Thursday, or after 2:00pm on Fridays, or on the weekends. We encourage everyone to explore another way of getting your prescription refill request processed using [employer], our patient web portal through our electronic medical record system. [employer] is an efficient and effective way to communicate your medication request directly to the office and  downloadable as an abram on your smart phone . [employer] also features a review functionality that allows you to view your medication list as well as leave messages for your physician. Are you ready to get connected? If so please review the attatched instructions or speak to any of our staff to get you set up right away! Thank you so much for your cooperation. Should you have any questions please contact our Practice Administrator.     The Physicians and Staff,  [facility]          A Healthy Lifestyle: Care Instructions  Your Care Instructions  A healthy lifestyle can help you feel good, stay at a healthy weight, and have plenty of energy for both work and play. A healthy lifestyle is something you can share with your whole family. A healthy lifestyle also can lower your risk for serious health problems, such as high blood pressure, heart disease, and diabetes. You can follow a few steps listed below to improve your health and the health of your family. Follow-up care is a key part of your treatment and safety. Be sure to make and go to all appointments, and call your doctor if you are having problems. Its also a good idea to know your test results and keep a list of the medicines you take. How can you care for yourself at home? · Do not eat too much sugar, fat, or fast foods. You can still have dessert and treats now and then. The goal is moderation. · Start small to improve your eating habits. Pay attention to portion sizes, drink less juice and soda pop, and eat more fruits and vegetables. ¨ Eat a healthy amount of food. A 3-ounce serving of meat, for example, is about the size of a deck of cards. Fill the rest of your plate with vegetables and whole grains. ¨ Limit the amount of soda and sports drinks you have every day. Drink more water when you are thirsty. ¨ Eat at least 5 servings of fruits and vegetables every day. It may seem like a lot, but it is not hard to reach this goal. A serving or helping is 1 piece of fruit, 1 cup of vegetables, or 2 cups of leafy, raw vegetables. Have an apple or some carrot sticks as an afternoon snack instead of a candy bar. Try to have fruits and/or vegetables at every meal.  · Make exercise part of your daily routine. You may want to start with simple activities, such as walking, bicycling, or slow swimming. Try to be active 30 to 60 minutes every day. You do not need to do all 30 to 60 minutes all at once. For example, you can exercise 3 times a day for 10 or 20 minutes.  Moderate exercise is safe for most people, but it is always a good idea to talk to your doctor before starting an exercise program.  · Keep moving. [employer] the lawn, work in the garden, or [employer]. Take the stairs instead of the elevator at work. · If you smoke, quit. People who smoke have an increased risk for heart attack, stroke, cancer, and other lung illnesses. Quitting is hard, but there are ways to boost your chance of quitting tobacco for good. ¨ Use nicotine gum, patches, or lozenges. ¨ Ask your doctor about stop-smoking programs and medicines. ¨ Keep trying. In addition to reducing your risk of diseases in the future, you will notice some benefits soon after you stop using tobacco. If you have shortness of breath or asthma symptoms, they will likely get better within a few weeks after you quit. · Limit how much alcohol you drink. Moderate amounts of alcohol (up to 2 drinks a day for men, 1 drink a day for women) are okay. But drinking too much can lead to liver problems, high blood pressure, and other health problems. Family health  If you have a family, there are many things you can do together to improve your health. · Eat meals together as a family as often as possible. · Eat healthy foods. This includes fruits, vegetables, lean meats and dairy, and whole grains. · Include your family in your fitness plan. Most people think of activities such as jogging or tennis as the way to fitness, but there are many ways you and your family can be more active. Anything that makes you breathe hard and gets your heart pumping is exercise. Here are some tips:  ¨ Walk to do errands or to take your child to school or the bus. ¨ Go for a family bike ride after dinner instead of watching TV. Where can you learn more? Go to http://adam-jono.info/. Enter R578 in the search box to learn more about \"A Healthy Lifestyle: Care Instructions. \"  Current as of: July 26, 2016  Content Version: 11.2  © 2713-6627 [employer]. Care instructions adapted under license by [employer] (which disclaims liability or warranty for this information). If you have questions about a medical condition or this instruction, always ask your healthcare professional. [address] any warranty or liability for your use of this information.

## 2017-06-13 NOTE — PROGRESS NOTES
Date:             2017    Name:  Giovany Tomlinson  :  1935  MRN:  [id number]     PCP:  Rochelle Bedolla MD    Chief Complaint   Patient presents with    Follow-up    Results     Neuropsych results. HISTORY OF PRESENT ILLNESS:  Neftali Guillen is a 80 y.o., female who presents today for follow up for reported memory loss and RLS. Neuropsych testing was not suggestive of dementia. She thinks that her memory issues are stable, her family thinks that she forgets information from appointments, names, whether she has told her family something. Things will come to her if she is reminded. She admits that she has had trouble with her mood for years, has been on many antidepressants. She is currently on buspar and is following with psychiatry. She last tried effexor, which made her RLS worse. She does feel that RLS is well controlled currently, she takes 3 mg requip in the morning and 6 mg at bedtime. She does think that she is more likely to nap in the afternoon. She does not think that afternoon nap is affecting her night time sleep, but her daughters think that she is not sleeping as well. She keeps the TV in her room, likes to have the company. She turns it off to read but will turn it back on throughout the night when she wakes up. She is not seeing a counselor, did see one after her   and she did not find it helpful. She is busy with her [employer], she plays piano during the service but admits that she is not very socially engaged with other members of the [employer]. She complains of pressure in her chest when she lies down, has been told that she has a murmur but has not seen a cardiologist.  She also thinks that she has worsening shortness of breath and exercise intolerance. 2017 neuropsych  In my opinion, this appears to be a case of normal aging exacerbated by marked depression and anxiety (pseudodementia).   I suggest consideration for medication for anxiety and depression. Counseling may prove helpful as well. I find her competent to make medical decisions, financial decisions, to vote, to , and to own a firearm. I am not concerned about driving or day-to-day supervision. I fully expect an improvement in her day-to-day memory functioning pending successful mental health intervention. If not a follow up neuropsych would then be indicated. Baseline now established. Follow up prn. Clinical correlation is, of course, indicated. 4.13.2017 reynold  Parish Alanis is a 80 y.o. right-handed  female seen for evaluation of a new problem of worsening restless leg syndrome to the point that the patient cannot sleep. She is currently taking Neurontin 300 mg at around 2 PM and 600 mg around 4 PM, takes Requip 2 mg at 630 and at 830 when she wakes up again with her symptoms being much worse. She is not sleeping well at nighttime. Her symptoms initially got better when she went off the Effexor. She is only on BuSpar now 5 mg a day. She is off her iron pills in addition. She wants to know whether she can increase her Requip. We will advance her to 3 mg at 6:30 PM and 8:30 PM, and she is to change her Neurontin dosing to 300 mg at supper and 600 mg at 8:30 PM. We will maybe increase that more if we need to. She does sleep more in the daytime and we advised her she should quit sleeping during the day. She is to start melatonin 3-5 mg at bedtime to help her sleep in addition. She is also seen for evaluation of a multiplicity of neurological problems, the main ones being progressive memory loss, worsening restless leg syndrome, severe depression, diabetic neuropathy.  Patient says she has had restless leg syndrome is 1998, Her and her daughters are concerned that she may be having some side effect of the medication as a list off the side effects including nasal congestion, depression, and a multiplicity of other side effects, but we told him last time they were most likely not related to the Requip and when she stopped the Effexor they all got better and she went back on the Requip. She is also tried other SSRIs including Celexa and other medication, and seems to have either intolerance or side effects on all the medications and will like to try a new antidepressant, and I suggested that she needs to see a therapist who is more skilled at using medication to help her along this line, and since we are going to change her restless legs medication we should do it only one thing at a time. She has had a gradual progressive memory loss for the last year or 2, some of which seems more anxiety related, but she is concerned she has dementia. We will send her to psychology for neuropsych testing to rule out pseudodementia, normal memory loss of aging, or progressive degenerative brain condition like Alzheimer's, and she is to get that done in about a month's time. She has never had a history of stroke or seizures. She was concerned she had Parkinson's disease because she knows is a chronic neurologic disease, but we assured her she has no symptoms suggesting Parkinson's disease. She also has diabetes, and thinks she has neuropathy with migratory paresthesias all over her body. She is also had some increasing difficulty with urinary urgency incontinence, I suggested she see a urologist. Seems to have a fair amount of musculoskeletal pain. She has had some increased stress because her   (bankruptcy, and he had been unfaithful to her and that has really upset her. She has a lot of cardiac issues and shortness of breath. She has seen Kevin Gibson and they have told her that she has osteoarthritis of the hip lumbar spine and she was treated with physical therapy. She has had endoscopy and has esophagitis and gastritis. She also has fibromyalgia. She thinks she has had an EMG in the past also but has no idea when it was done who did it.  She seen an audiologist and speech therapist. As high-frequency hearing loss. He has had sleep studies done by neurologist in [facility]. She has chronic sinus problems. She has occasional headache and a generalized fashion. She gets blurred vision intermittently. She has migratory paresthesias over her arms and legs. Restless leg can be so bad that she would just shake in bed at night and sometimes it does not go away and she cannot sleep all night long. She has not had any recent tests or imaging done in over 10 years. Current Outpatient Prescriptions   Medication Sig    rOPINIRole (REQUIP) 3 mg tab tab Take 1 Tab by mouth two (2) times a day.  gabapentin (NEURONTIN) 300 mg capsule 1 pill twice a day and 2 at bedtime    busPIRone (BUSPAR) 5 mg tablet Take 1 Tab by mouth nightly. (Patient taking differently: Take 5 mg by mouth two (2) times a day.)    glimepiride (AMARYL) 2 mg tablet Take 1 Tab by mouth every morning.  CYANOCOBALAMIN, VITAMIN B-12, (VITAMIN B12 PO) Take  by mouth.  glucose blood VI test strips (RELION PRIME TEST STRIPS) strip test BID for NIDDM E11.9, One touch Ultra Blue    metFORMIN (GLUCOPHAGE) 500 mg tablet Take 1 Tab by mouth two (2) times daily (with meals). Dose adjustment    cholecalciferol (VITAMIN D3) 1,000 unit tablet Take  by mouth daily.  vit B Cmplx 3-FA-Vit C-Biotin (NEPHRO RAJEEV RX) 1- mg-mg-mcg tablet Take 1 Tab by mouth daily. Indications: with stress relief B 12    magnesium 250 mg tab Take 500 mg by mouth nightly. No current facility-administered medications for this visit.       No Known Allergies  Past Medical History:   Diagnosis Date    Anxiety     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    bursitis    Depression     Diabetes (HCC)     GERD (gastroesophageal reflux disease)     H/O hand fracture     Hyperlipidemia     Osteopenia     Recurrent UTI     RLS (restless legs syndrome)      Past Surgical History:   Procedure Laterality Date    HX APPENDECTOMY      HX COLONOSCOPY 2010    HX DILATION AND CURETTAGE      HX TONSIL AND ADENOIDECTOMY       Social History     Social History    Marital status: UNKNOWN     Spouse name: N/A    Number of children: 3    Years of education: N/A     Occupational History    Victim Assistance      Worked for [employer]     Social History Main Topics    Smoking status: Former Smoker     Packs/day: 0.50     Years: 20.00     Types: Cigarettes     Quit date: 7/8/1960    Smokeless tobacco: Never Used    Alcohol use 1.8 oz/week     3 Glasses of wine per week    Drug use: Yes     Special: Prescription    Sexual activity: No     Other Topics Concern     Service No    Blood Transfusions No    Caffeine Concern No    Occupational Exposure No    Hobby Hazards No    Sleep Concern No    Stress Concern Yes     usually financial    Weight Concern No    Special Diet Yes     Diabetic    Back Care No    Exercise No     stays active, loves to be outside, daily walks   [place] Yes    Self-Exams Yes     Social History Narrative     Family History   Problem Relation Age of Onset    Cancer Mother      breast/ lung    Cancer Father      prostate    Migraines Father     Cancer Sister      breast    Heart Disease Maternal Grandmother          PHYSICAL EXAMINATION:    Visit Vitals    /72    Pulse 65    Ht 5' 1\" (1.549 m)    Wt 143 lb (64.9 kg)    SpO2 95%    BMI 27.02 kg/m2     General:  Well defined, nourished, and groomed individual in no acute distress. Neck: Supple, nontender, no bruits, no pain with resistance to active range of motion. Heart: Regular rate and rhythm, + murmur, no rub, or gallop. Normal S1S2. Lungs:  Clear to auscultation bilaterally with equal chest expansion, no cough, no wheeze  Musculoskeletal:  Extremities revealed no edema and had full range of motion of joints.     Psych:  Good mood and bright affect    NEUROLOGICAL EXAMINATION:     Mental Status:   Alert and oriented to person, place, and time with recent and remote memory intact. Attention span and concentration are normal. Speech is fluent with a full fund of knowledge. Cranial Nerves:    II, III, IV, VI:  Visual acuity grossly intact. Pupils are equal, round, and reactive to light. Extra-ocular movements are full and fluid. No ptosis or nystagmus. V-XII: Hearing is grossly intact. Facial features are symmetric, with normal sensation and strength. The palate rises symmetrically and the tongue protrudes midline. Sternocleidomastoids 5/5. Motor Examination: Normal tone, bulk, and strength, 5/5 muscle strength throughout. Coordination:  Finger to nose testing was normal.   No resting or intention tremor  Gait and Station:  Steady while walking. Normal arm swing. No pronator drift. No muscle wasting or fasciculations noted. ASSESSMENT AND PLAN    ICD-10-CM ICD-9-CM    1. Memory difficulties R41.3 780.93    2. Anxiety F41.9 300.00    3. RLS (restless legs syndrome) G25.81 333.94    4. Murmur R01.1 785.2 REFERRAL TO CARDIOLOGY     55-year-old female seen in follow-up for RLS and complaints of memory changes. Are less well controlled on current regimen, she does think that daytime dosing of Mirapex might make her little bit tired but otherwise she tolerates it well. 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 She follows with psychiatry, but admits that mood is still not well controlled. She also has a heart murmur on exam today, reports that she is short of breath and feels chest pressure when she lies down at night. 1.  Patient and her family are reassured that she does not have evidence of dementia, instructed to notify us if she feels that memory is getting worse we can repeat testing  2.   Discussed the importance of continuing to follow with her psychiatrist for management of her mood, but that she should also establish care with a psychologist close to home for counseling  3. Continue Mirapex 3 mg in the morning, 6 mg at bedtime for RLS. Advised to limit daytime napping to improve nighttime sleep  4. Referral to cardiology for evaluation of murmur and complaints of chest pressure at night and shortness of breath    Follow-up in 4 months as scheduled with Dr. Yaakov Mcdowell, call sooner with concerns      Casi Beckford NP    This note was created using voice recognition software. Despite editing, there may be syntax errors.

## 2017-07-12 ENCOUNTER — CLINICAL SUPPORT (OUTPATIENT)
Dept: CARDIOLOGY CLINIC | Age: 82
End: 2017-07-12

## 2017-07-12 ENCOUNTER — OFFICE VISIT (OUTPATIENT)
Dept: CARDIOLOGY CLINIC | Age: 82
End: 2017-07-12

## 2017-07-12 VITALS
HEART RATE: 70 BPM | DIASTOLIC BLOOD PRESSURE: 80 MMHG | BODY MASS INDEX: 26.15 KG/M2 | RESPIRATION RATE: 16 BRPM | SYSTOLIC BLOOD PRESSURE: 110 MMHG | WEIGHT: 138.5 LBS | OXYGEN SATURATION: 97 % | HEIGHT: 61 IN

## 2017-07-12 DIAGNOSIS — E11.42 DIABETIC PERIPHERAL NEUROPATHY ASSOCIATED WITH TYPE 2 DIABETES MELLITUS (HCC): ICD-10-CM

## 2017-07-12 DIAGNOSIS — E78.2 MIXED HYPERLIPIDEMIA: ICD-10-CM

## 2017-07-12 DIAGNOSIS — R06.09 DOE (DYSPNEA ON EXERTION): ICD-10-CM

## 2017-07-12 DIAGNOSIS — I65.23 STENOSIS OF BOTH INTERNAL CAROTID ARTERIES: ICD-10-CM

## 2017-07-12 DIAGNOSIS — R00.2 PALPITATIONS: Primary | ICD-10-CM

## 2017-07-12 DIAGNOSIS — R01.1 MURMUR, CARDIAC: ICD-10-CM

## 2017-07-12 DIAGNOSIS — R01.1 MURMUR, CARDIAC: Primary | ICD-10-CM

## 2017-07-12 NOTE — MR AVS SNAPSHOT
Visit Information Date & Time Provider Department Dept. Phone Encounter #  
 7/12/2017  2:00 PM Delona Felty, [address] [facility] [phone] [id number] Your Appointments 7/24/2017  9:30 AM  
STRESS TEST with HARSH, [facility] [address]) Appt Note: per Dr. Ave Correa, 5'1, 138, STRESS TEST LEXISCAN/CARDIOLITE [[id number]] (Order [id number]) [address] [facility]  
[phone] [address] [facility]  
  
    
 8/1/2017  1:20 PM  
Follow Up with mEy Middleton PsyD [address] [place] [address] ([facility]) Appt Note: office feedback. ..kme; office feedback. ..[address] [facility] [address] [address] [phone]  
  
   
 [facility]  
  
    
 8/2/2017 11:00 AM  
ESTABLISHED PATIENT with Mattie Leyden, MD  
[place] [address] ([address]) Appt Note: 3 mo f/u  
 [address] [address] [phone]  
  
   
 [address] [address]  
  
    
 10/2/2017  1:00 PM  
Follow Up with Severo Frederic, MD  
[facility] at [facility] [address] [facility]) Appt Note: follow up. ..tlw 5/8/17  
 [address] [phone]  
[address], [address] [place] [address] [address] [phone] Upcoming Health Maintenance Date Due MICROALBUMIN Q1 4/5/2017 FOOT EXAM Q1 7/5/2017 INFLUENZA AGE 9 TO ADULT 8/1/2017 HEMOGLOBIN A1C Q6M 10/27/2017 EYE EXAM RETINAL OR DILATED Q1 11/3/2017 MEDICARE YEARLY EXAM 2/16/2018 BREAST CANCER SCRN MAMMOGRAM 2/16/2018 LIPID PANEL Q1 4/27/2018 GLAUCOMA SCREENING Q2Y 11/3/2018 DTaP/Tdap/Td series (2 - Td) 3/1/2027 Allergies as of 7/12/2017  Review Complete On: 7/12/2017 By: Delona Felty, MD  
 No Known Allergies Current Immunizations  Reviewed on 10/13/2015 Name Date Influenza High Dose Vaccine PF 10/5/2016, 9/25/2015 Pneumococcal Conjugate (PCV-13) 3/1/2017 10:48 AM  
 Pneumococcal Polysaccharide (PPSV-23) 11/5/2010 Td 10/2/2015 Zoster Vaccine, Live 11/5/2010 Not reviewed this visit You Were Diagnosed With   
  
 Codes Comments Mixed hyperlipidemia    -  Primary ICD-10-CM: G99.3 ICD-9-CM: 272.2 Murmur, cardiac     ICD-10-CM: R01.1 ICD-9-CM: 785.2 Stenosis of both internal carotid arteries     ICD-10-CM: I65.23 ICD-9-CM: 433.10, 433.30 Diabetic peripheral neuropathy associated with type 2 diabetes mellitus (HCC)     ICD-10-CM: E11.42 
ICD-9-CM: 250.60, 357.2 ALFORD (dyspnea on exertion)     ICD-10-CM: R06.09 
ICD-9-CM: 786.09 Vitals BP Pulse Resp Height(growth percentile) Weight(growth percentile) SpO2  
 110/80 (BP 1 Location: Right arm, BP Patient Position: Sitting) 70 16 5' 1\" (1.549 m) 138 lb 8 oz (62.8 kg) 97% BMI OB Status Smoking Status 26.17 kg/m2 Postmenopausal Former Smoker Vitals History BMI and BSA Data Body Mass Index Body Surface Area  
 26.17 kg/m 2 1.64 m 2 Preferred Pharmacy Pharmacy Name Phone [facility] [place] [address] [place] [id number] [phone] Your Updated Medication List  
  
   
This list is accurate as of: 7/12/17  3:25 PM.  Always use your most recent med list.  
  
  
  
  
 busPIRone 5 mg tablet Commonly known as:  BUSPAR Take 1 Tab by mouth nightly.  
  
 gabapentin 300 mg capsule Commonly known as:  NEURONTIN  
1 pill twice a day and 2 at bedtime  
  
 glimepiride 2 mg tablet Commonly known as:  AMARYL Take 1 Tab by mouth every morning.  
  
 magnesium 250 mg Tab Take 500 mg by mouth nightly. metFORMIN 500 mg tablet Commonly known as:  GLUCOPHAGE  
TAKE ONE TABLET BY MOUTH TWICE DAILY (WITH  MEALS)  --  DOSE  ADJUSTMENT RELION PRIME TEST STRIPS strip Generic drug:  glucose blood VI test strips USE 1 STRIP EACH TIME TO TEST BLOOD SUGAR 2 TIMES A DAY FOR NON-INSULIN DEPENDANT DIABETES MANAGEMENT  
  
 rOPINIRole 3 mg Tab tab Commonly known as:  [place] Take 1 Tab by mouth two (2) times a day. vit B Cmplx 3-FA-Vit C-Biotin 1- mg-mg-mcg tablet Commonly known as:  NEPHRO RAJEEV RX Take 1 Tab by mouth daily. Indications: with stress relief B 12 VITAMIN B12 PO Take  by mouth. VITAMIN D3 1,000 unit tablet Generic drug:  cholecalciferol Take  by mouth daily. We Performed the Following AMB POC EKG ROUTINE W/ 12 LEADS, INTER & REP [69565 CPT(R)] To-Do List   
 Around 07/12/2017 ECHO:  2D ECHO COMPLETE ADULT (TTE) W OR WO CONTR Around 07/12/2017 ECG:  CARDIAC HOLTER MONITOR, 24 HOURS Around 07/12/2017 ECG:  STRESS TEST LEXISCAN/CARDIOLITE Introducing [facility]! Francisco Gilbert introduces Auth0 patient portal. Now you can access parts of your medical record, email your doctor's office, and request medication refills online. 1. In your internet browser, go to [email or web address] 2. Click on the First Time User? Click Here link in the Sign In box. You will see the New Member Sign Up page. 3. Enter your Auth0 Access Code exactly as it appears below. You will not need to use this code after youve completed the sign-up process. If you do not sign up before the expiration date, you must request a new code. · Auth0 Access Code: [email or web address] Expires: 7/26/2017 10:02 AM 
 
4. Enter the last four digits of your Social Security Number (xxxx) and Date of Birth (mm/dd/yyyy) as indicated and click Submit. You will be taken to the next sign-up page. 5. Create a Auth0 ID. This will be your Auth0 login ID and cannot be changed, so think of one that is secure and easy to remember. 6. Create a scrible password. You can change your password at any time. 7. Enter your Password Reset Question and Answer. This can be used at a later time if you forget your password. 8. Enter your e-mail address. You will receive e-mail notification when new information is available in [address] 9. Click Sign Up. You can now view and download portions of your medical record. 10. Click the Download Summary menu link to download a portable copy of your medical information. If you have questions, please visit the Frequently Asked Questions section of the scrible website. Remember, scrible is NOT to be used for urgent needs. For medical emergencies, dial 911. Now available from your iPhone and Android! Please provide this summary of care documentation to your next provider. Your primary care clinician is listed as [employer]. If you have any questions after today's visit, please call [phone].

## 2017-07-12 NOTE — PROGRESS NOTES
NAME:  Benjamin Palma   :   1935   MRN:   [id number]   PCP:  Evelyn Tineo MD           Subjective: The patient is a 80y.o. year old female  who presents for a new patient evalation for the following: murmur. Patient reports ALFORD which is unchanged. She notes erratic, fast non-continual fast heartbeats that occur when she is laying supine. No associated symptoms just an awareness. This has occurred for the last 6-8 mo. She denies any chest pain, pressure, tightness or indigestion. Was noted to have a murmur during PE. She denies rheumatic fever or scarlet fever. Overall, her daughter points out that she is quite functional and does a lot of gardening and seems to tolerate above average activity for her age.       Patient Active Problem List   Diagnosis Code    Hyperlipidemia E78.5    Anxiety F41.9    Type 2 diabetes mellitus without complication (HCC) B41.9    RLS (restless legs syndrome) G25.81    Diabetic peripheral neuropathy associated with type 2 diabetes mellitus (HCC) E11.42    Memory difficulties R41.3    B12 deficiency E53.8    Vitamin D deficiency E55.9    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    Cerebral microvascular disease I67.9    Medicare annual wellness visit, subsequent Z00.00       Past Medical History:   Diagnosis Date    Anxiety     Degenerative joint disease (DJD) of hip 2013    bursitis    Depression     Diabetes ([facility] [address]     GERD (gastroesophageal reflux disease)     H/O hand fracture     Hyperlipidemia     Osteopenia     Recurrent UTI     RLS (restless legs syndrome)        Social History   Substance Use Topics    Smoking status: Former Smoker     Packs/day: 0.50     Years: 20.00     Types: Cigarettes     Quit date: 1960    Smokeless tobacco: Never Used    Alcohol use 1.8 oz/week     3 Glasses of wine per week      Family History   Problem Relation Age of Onset    Cancer Mother      breast/ lung    Cancer Father prostate    Migraines Father     Cancer Sister      breast    Heart Disease Maternal Grandmother         Review of Systems  Constitutional: Negative for fever, chills, and diaphoresis. Respiratory: Negative for cough, hemoptysis, sputum production, reports shortness of breath. Cardiovascular: Negative for chest pain, orthopnea, claudication, leg swelling and PND. Reports palpitations  Gastrointestinal: Negative for heartburn, nausea, vomiting, blood in stool and melena. Genitourinary: Negative for dysuria and flank pain. Musculoskeletal: Negative for joint pain and back pain. Skin: Negative for rash. Neurological: Negative for focal weakness, seizures, loss of consciousness, weakness and headaches. Endo/Heme/Allergies: Does not bruise/bleed easily. Psychiatric/Behavioral: Negative for memory loss. The patient does not have insomnia. Objective:       Vitals:    07/12/17 1325 07/12/17 1326   BP: 120/80 110/80   Pulse: 70    Resp: 16    SpO2: 97%    Weight: 138 lb 8 oz (62.8 kg)    Height: 5' 1\" (1.549 m)     Body mass index is 26.17 kg/(m^2). General PE    Gen: NAD     Mental Status - Alert. General Appearance - Not in acute distress. Neck - no JVD     Chest and Lung Exam     Inspection: Accessory muscles - No use of accessory muscles in breathing. Auscultation:   Breath sounds: - Normal.     Cardiovascular   Inspection: Jugular vein - Bilateral - Inspection Normal.   Palpation/Percussion:   Apical Impulse: - Normal.   Auscultation: Rhythm - Regular. Heart Sounds - S1 WNL and S2 WNL. No S3 or S4. Murmurs & Other Heart Sounds: Auscultation of the heart reveals - II-III/VI NADIR    Peripheral Vascular   Upper Extremity: Inspection - Bilateral - No Cyanotic nailbeds or Digital clubbing. Lower Extremity:   Palpation: Edema - Bilateral - No edema. Abdomen: Soft, non-tender, bowel sounds are active.      Neuro: A&O times 3, CN and motor grossly WNL      Data Review:     EKG -  Sinus Rhythm  - occasional PAC     # PACs = 1.  -Prominent R(V1) and left axis -nonspecific  -Seen with pulmonary disease -possible anterior fascicular block. Allergies reviewed  No Known Allergies    Medications reviewed  Current Outpatient Prescriptions   Medication Sig    RELION PRIME TEST STRIPS strip USE 1 STRIP EACH TIME TO TEST BLOOD SUGAR 2 TIMES A DAY FOR NON-INSULIN DEPENDANT DIABETES MANAGEMENT    metFORMIN (GLUCOPHAGE) 500 mg tablet TAKE ONE TABLET BY MOUTH TWICE DAILY (WITH  MEALS)  --  DOSE  ADJUSTMENT    rOPINIRole (REQUIP) 3 mg tab tab Take 1 Tab by mouth two (2) times a day. (Patient taking differently: Take 3 mg by mouth nightly.)    gabapentin (NEURONTIN) 300 mg capsule 1 pill twice a day and 2 at bedtime    busPIRone (BUSPAR) 5 mg tablet Take 1 Tab by mouth nightly. (Patient taking differently: Take 5 mg by mouth two (2) times a day.)    glimepiride (AMARYL) 2 mg tablet Take 1 Tab by mouth every morning.  CYANOCOBALAMIN, VITAMIN B-12, (VITAMIN B12 PO) Take  by mouth.  cholecalciferol (VITAMIN D3) 1,000 unit tablet Take  by mouth daily.  magnesium 250 mg tab Take 500 mg by mouth nightly.  vit B Cmplx 3-FA-Vit C-Biotin (NEPHRO RAJEEV RX) 1- mg-mg-mcg tablet Take 1 Tab by mouth daily. Indications: with stress relief B 12     No current facility-administered medications for this visit. Assessment:       ICD-10-CM ICD-9-CM    1. Murmur, cardiac R01.1 785.2 2D ECHO COMPLETE ADULT (TTE) W OR WO CONTR      STRESS TEST LEXISCAN/CARDIOLITE      CARDIAC HOLTER MONITOR, 24 HOURS   2. Mixed hyperlipidemia E78.2 272.2 AMB POC EKG ROUTINE W/ 12 LEADS, INTER & REP      2D ECHO COMPLETE ADULT (TTE) W OR WO CONTR      STRESS TEST LEXISCAN/CARDIOLITE      CARDIAC HOLTER MONITOR, 24 HOURS   3. 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 433.10 2D ECHO COMPLETE ADULT (TTE) W OR WO CONTR     433.30 STRESS TEST LEXISCAN/CARDIOLITE      CARDIAC HOLTER MONITOR, 24 HOURS   4.  Diabetic peripheral neuropathy associated with type 2 diabetes mellitus (HCC) E11.42 250.60 2D ECHO COMPLETE ADULT (TTE) W OR WO CONTR     357.2 STRESS TEST LEXISCAN/CARDIOLITE      CARDIAC HOLTER MONITOR, 24 HOURS   5. ALFORD (dyspnea on exertion) R06.09 786.09 2D ECHO COMPLETE ADULT (TTE) W OR WO CONTR      STRESS TEST LEXISCAN/CARDIOLITE      CARDIAC HOLTER MONITOR, 24 HOURS        Orders Placed This Encounter    AMB POC EKG ROUTINE W/ 12 LEADS, INTER & REP     Order Specific Question:   Reason for Exam:     Answer:   routine    LEXISCAN/CARDIOLITE, Clinic Performed     Standing Status:   Future     Standing Expiration Date:   1/12/2018     Order Specific Question:   Reason for Exam:     Answer:   ALFORD, DM, murmur    HOLTER MONITOR, 24 HOURS, Clinic Performed     Standing Status:   Future     Standing Expiration Date:   1/12/2018     Order Specific Question:   Reason for Exam:     Answer:   nocturnal palpitations, aortic murmur    2D ECHO COMPLETE ADULT (TTE) W OR WO CONTR     Standing Status:   Future     Number of Occurrences:   1     Standing Expiration Date:   1/12/2018     Order Specific Question:   Reason for Exam:     Answer:   ALFORD, aortic murmur. Order Specific Question:   Contrast Enhancement (Bubble Study, Definity, Optison) may be used if criteria listed in established evidence-based protocol has been identified. Answer:   Yes         Plan:     Patient presents for new patient evaluation. 1. ALFORD- aortic murmur. Will evaluate for AS. If not significant will evaluate for ischemia with lexiscan. 2. Nocturnal palpitations- Holter today. Normal TSH in November. Follow-up to be determined based on test results.          Emilee Blackmon MD

## 2017-07-13 NOTE — PROGRESS NOTES
Good newsecho shows mild thickening of the aortic valve as we discussed might be the case, but no flow limitation. Proceed with the remainder of testing as planned.

## 2017-07-14 ENCOUNTER — TELEPHONE (OUTPATIENT)
Dept: CARDIOLOGY CLINIC | Age: 82
End: 2017-07-14

## 2017-07-14 NOTE — TELEPHONE ENCOUNTER
----- Message from Jennifer Turcios MD sent at 7/13/2017  4:59 PM EDT -----  [place] ray shows mild thickening of the aortic valve as we discussed might be the case, but no flow limitation. Proceed with the remainder of testing as planned.

## 2017-07-24 ENCOUNTER — CLINICAL SUPPORT (OUTPATIENT)
Dept: CARDIOLOGY CLINIC | Age: 82
End: 2017-07-24

## 2017-07-24 DIAGNOSIS — E78.2 MIXED HYPERLIPIDEMIA: ICD-10-CM

## 2017-07-24 DIAGNOSIS — E11.42 DIABETIC PERIPHERAL NEUROPATHY ASSOCIATED WITH TYPE 2 DIABETES MELLITUS (HCC): ICD-10-CM

## 2017-07-24 DIAGNOSIS — I65.23 STENOSIS OF BOTH INTERNAL CAROTID ARTERIES: ICD-10-CM

## 2017-07-24 DIAGNOSIS — R01.1 MURMUR, CARDIAC: ICD-10-CM

## 2017-07-24 DIAGNOSIS — R06.09 DOE (DYSPNEA ON EXERTION): ICD-10-CM

## 2017-07-24 NOTE — PROGRESS NOTES
Stress test without evidence of blockages in the arteries of the heart. Holter monitor results to follow.

## 2017-07-26 ENCOUNTER — TELEPHONE (OUTPATIENT)
Dept: CARDIOLOGY CLINIC | Age: 82
End: 2017-07-26

## 2017-07-26 NOTE — TELEPHONE ENCOUNTER
Verified patient with two identifiers. Pt informed of stress test results. Will call with monitor results once complete. Pt verbalized understanding.

## 2017-07-26 NOTE — TELEPHONE ENCOUNTER
----- Message from Alfred Chaudhry MD sent at 7/24/2017  3:51 PM EDT -----  Stress test without evidence of blockages in the arteries of the heart. Holter monitor results to follow.

## 2017-07-27 ENCOUNTER — TELEPHONE (OUTPATIENT)
Dept: CARDIOLOGY CLINIC | Age: 82
End: 2017-07-27

## 2017-07-27 NOTE — TELEPHONE ENCOUNTER
----- Message from Francesca Bernabe MD sent at 7/26/2017 11:15 PM EDT -----  Occasional early heartbeats from the upper and bottom chambers of the heart that are not dangerous correspond to her awareness of her heart. Nothing of concern. If doing well, continue with regular activity and follow-up in 6-12 months.

## 2017-07-27 NOTE — TELEPHONE ENCOUNTER
Miss Reyna Cardoso,     Pt called back and she is requesting if you could tell leave the same information you just gave her on her voicemail, so she can write it down.      Thanks

## 2017-07-27 NOTE — TELEPHONE ENCOUNTER
Verified patient with two identifiers. Pt informed of monitor results. She states she is doing well. She will follow up in 6-12 months or sooner if needed. Pt verbalized understanding.

## 2017-07-28 NOTE — TELEPHONE ENCOUNTER
Verified patient with two identifiers. Called pt back with information she wrote it down and verbalized understanding.

## 2017-07-31 NOTE — PROGRESS NOTES
Spoke with Jasmyn Tenorio at [employer] and they received the request for the add-on testing and confirmed that it is in progress.  KT

## 2017-08-01 ENCOUNTER — OFFICE VISIT (OUTPATIENT)
Dept: NEUROLOGY | Age: 82
End: 2017-08-01

## 2017-08-01 ENCOUNTER — TELEPHONE (OUTPATIENT)
Dept: NEUROLOGY | Age: 82
End: 2017-08-01

## 2017-08-01 DIAGNOSIS — F33.1 DEPRESSION, MAJOR, RECURRENT, MODERATE (HCC): ICD-10-CM

## 2017-08-01 DIAGNOSIS — F09 MILD COGNITIVE DISORDER: Primary | ICD-10-CM

## 2017-08-01 DIAGNOSIS — R41.89 PSEUDODEMENTIA: ICD-10-CM

## 2017-08-01 DIAGNOSIS — F41.9 MODERATE ANXIETY: ICD-10-CM

## 2017-08-01 NOTE — TELEPHONE ENCOUNTER
----- Message from Alissa Huang sent at 8/1/2017  9:28 AM EDT -----  Regarding: ALAYNA Newton/Telephone  Pt stated that she missed a call from the practice and would llike for another attempt to be made. Her best contact number is [phone].

## 2017-08-01 NOTE — PROGRESS NOTES
Office feedback session with the patient and daughter today. I reviewed the results of the recent Neuropsychological Evaluation, including discussing individual tests as well as patient's areas of neurocognitive strength versus weakness. Education was provided regarding my diagnostic impressions, and we discussed treatment plan/options. I also answered numerous questions related to the clinical findings, including discussing various methods to improve cognition and mood. Counseling provided regarding mood and cognition. 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 Family finds this relieving news. CBT and supportive psychotherapy techniques were utilized. The patient will follow up with the referring provider, and reported being very pleased with the services provided. Follow up with [facility] prn. 20 minutes with patient and daughter, record review, coordination of care. Records provided. We discussed: This is a predominantly normal range Neuropsychological Evaluation with respect to neurocognitive functioning. In this regard, she is showing mild impairments with verbal fluency and confrontation naming. They are very mild problems. All of her other domain performances, including mental status,  visual attention, auditory learning, auditory memory, processing speed, working memory, perceptual reasoning, verbal comprehension, bilateral fine motor dexterity, and executive functioning are well within the normal range. Emotionally, there is support for moderate depression and anxiety.                             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 I suggest consideration for medication for anxiety and depression. Counseling may prove helpful as well.   I find her competent to make medical decisions, financial decisions, to vote, to , and to own a firearm. I am not concerned about driving or day-to-day supervision. I fully expect an improvement in her day-to-day memory functioning pending successful mental health intervention. If not a follow up neuropsych would then be indicated. Baseline now established. Follow up prn. Clinical correlation is, of course, indicated.                              I will discuss these findings with the patient when she follows up with me in the near future.   A follow up Neuropsychological Evaluation is indicated on a prn basis, especially if there are any cognitive and/or emotional changes.       DIAGNOSES:                                 The Referral Dx of Memory Loss - IS NOT SUPPORTED                                                                    Major Depression - Moderate                                                                    Anxiety - Moderate

## 2017-08-02 ENCOUNTER — OFFICE VISIT (OUTPATIENT)
Dept: FAMILY MEDICINE CLINIC | Age: 82
End: 2017-08-02

## 2017-08-02 VITALS
DIASTOLIC BLOOD PRESSURE: 68 MMHG | BODY MASS INDEX: 25.45 KG/M2 | HEART RATE: 73 BPM | HEIGHT: 61 IN | OXYGEN SATURATION: 98 % | RESPIRATION RATE: 18 BRPM | SYSTOLIC BLOOD PRESSURE: 132 MMHG | WEIGHT: 134.8 LBS

## 2017-08-02 DIAGNOSIS — E78.2 MIXED HYPERLIPIDEMIA: ICD-10-CM

## 2017-08-02 DIAGNOSIS — F32.89 OTHER DEPRESSION: ICD-10-CM

## 2017-08-02 DIAGNOSIS — E11.42 DIABETIC PERIPHERAL NEUROPATHY ASSOCIATED WITH TYPE 2 DIABETES MELLITUS (HCC): Primary | ICD-10-CM

## 2017-08-02 PROBLEM — F32.A DEPRESSION: Status: ACTIVE | Noted: 2017-08-02

## 2017-08-02 NOTE — PROGRESS NOTES
Chief Complaint   Patient presents with    Results     Lab results     Leg Injury     right leg, itching over the past week but is better now. Is currently neosporin.  Allergies         HPI:       is a 80 y.o. female with T2DM who has lowered her A1c from 8.9 to 7. Works in the garden daily. 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 Participates in [employer] groups. Followed recently by cardiology and neurology in [address] [place] [address]. Urged to see a counselor by her daughter. . Presently not taking statins. No Known Allergies    Current Outpatient Prescriptions   Medication Sig    RELION PRIME TEST STRIPS strip USE 1 STRIP EACH TIME TO TEST BLOOD SUGAR 2 TIMES A DAY FOR NON-INSULIN DEPENDANT DIABETES MANAGEMENT    rOPINIRole (REQUIP) 3 mg tab tab Take 1 Tab by mouth two (2) times a day. (Patient taking differently: Take 3 mg by mouth nightly.)    gabapentin (NEURONTIN) 300 mg capsule 1 pill twice a day and 2 at bedtime    CYANOCOBALAMIN, VITAMIN B-12, (VITAMIN B12 PO) Take  by mouth.  cholecalciferol (VITAMIN D3) 1,000 unit tablet Take  by mouth daily.  vit B Cmplx 3-FA-Vit C-Biotin (NEPHRO RAJEEV RX) 1- mg-mg-mcg tablet Take 1 Tab by mouth daily. Indications: with stress relief B 12    magnesium 250 mg tab Take 500 mg by mouth nightly.  metFORMIN (GLUCOPHAGE) 500 mg tablet TAKE ONE TABLET BY MOUTH TWICE DAILY (WITH  MEALS)  --  DOSE  ADJUSTMENT    busPIRone (BUSPAR) 5 mg tablet Take 1 Tab by mouth nightly. (Patient taking differently: Take 5 mg by mouth two (2) times a day.)    glimepiride (AMARYL) 2 mg tablet Take 1 Tab by mouth every morning. No current facility-administered medications for this visit.         Past Medical History:   Diagnosis Date    Anxiety     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    bursitis    Depression     Diabetes (HCC)     GERD (gastroesophageal reflux disease)     H/O hand fracture     Hyperlipidemia     Osteopenia     Recurrent UTI  RLS (restless legs syndrome)          ROS:  Denies fever, chills, cough, chest pain, SOB,  nausea, vomiting, or diarrhea. Denies wt loss, wt gain, hemoptysis, hematochezia or melena. Physical Examination:    /68 (BP 1 Location: Left arm, BP Patient Position: Sitting)  Pulse 73  Resp 18  Ht 5' 0.5\" (1.537 m)  Wt 134 lb 12.8 oz (61.1 kg)  SpO2 98%  BMI 25.89 kg/m2    General: Alert and Ox3, Fluent speech  HEENT:  NC/AT, EOMI, OP: clear  Neck:  Supple, no adenopathy, JVD, mass or bruit  Chest:  Clear to Ausculation, without wheezes, rales, rubs or ronchi  Cardiac: RRR  Abdomen:  +BS, soft, nontender without palpable HSM  Extremities:  No cyanosis, clubbing or edema  Neurologic:  Ambulatory without assist, CN 2-12 grossly intact. Moves all extremities. Skin: no rash  Lymphadenopathy: no cervical or supraclavicular nodes      ASSESSMENT AND PLAN:     1. T2DM:  Excellent control. Needs labs and appt in 4 months  2. Depression:  She has Laura Ambriz's number  3. :  Close to target. 4.  allergiesL OTC FLonase for 2 wk trial    No orders of the defined types were placed in this encounter.       Prateek Cross MD, [address]

## 2017-08-02 NOTE — PROGRESS NOTES
Urged to no longer use Neosporin. Expect wound to slowly improve over the next 2 weeks; if not, RTC for re evaluation.     Chip Pandey

## 2017-08-02 NOTE — MR AVS SNAPSHOT
Visit Information Date & Time Provider Department Dept. Phone Encounter #  
 8/2/2017 11:00 AM Maru Ozuna MD [address] [place] [address] [id number] Your Appointments 2/12/2018  2:00 PM  
Follow Up with Cline Nyhan, MD  
Neurology [facility] at [facility] [address]) Appt Note: follow up. ..tlw 5/8/17; r/s from 10/2 f/up nerve issues $0 cp [facility] 8/1  
 [facility], 
[address] [place] [address], [address] [phone]  
[address] [facility], [address] [place] [address], [address] [phone] Upcoming Health Maintenance Date Due MICROALBUMIN Q1 4/5/2017 FOOT EXAM Q1 7/5/2017 INFLUENZA AGE 9 TO ADULT 8/1/2017 EYE EXAM RETINAL OR DILATED Q1 11/3/2017 HEMOGLOBIN A1C Q6M 1/27/2018 MEDICARE YEARLY EXAM 2/16/2018 BREAST CANCER SCRN MAMMOGRAM 2/16/2018 LIPID PANEL Q1 7/27/2018 GLAUCOMA SCREENING Q2Y 11/3/2018 DTaP/Tdap/Td series (2 - Td) 3/1/2027 Allergies as of 8/2/2017  Review Complete On: 8/2/2017 By: Maru Ozuna MD  
 No Known Allergies Current Immunizations  Reviewed on 8/2/2017 Name Date Influenza High Dose Vaccine PF 10/5/2016, 9/25/2015 Pneumococcal Conjugate (PCV-13) 3/1/2017 10:48 AM  
 Pneumococcal Polysaccharide (PPSV-23) 11/5/2010 Td 10/2/2015 Zoster Vaccine, Live 11/5/2010 Reviewed by Margarita Gaffney on 8/2/2017 at 11:14 AM  
Vitals BP Pulse Resp Height(growth percentile) Weight(growth percentile) SpO2  
 132/68 (BP 1 Location: Left arm, BP Patient Position: Sitting) 73 18 5' 0.5\" (1.537 m) 134 lb 12.8 oz (61.1 kg) 98% BMI OB Status Smoking Status 25.89 kg/m2 Postmenopausal Former Smoker Vitals History BMI and BSA Data Body Mass Index Body Surface Area  
 25.89 kg/m 2 1.61 m 2 Preferred Pharmacy Pharmacy Name Phone [facility] [place] - [address] [phone] Your Updated Medication List  
  
   
This list is accurate as of: 8/2/17 11:44 AM.  Always use your most recent med list.  
  
  
  
  
 busPIRone 5 mg tablet Commonly known as:  BUSPAR Take 1 Tab by mouth nightly.  
  
 gabapentin 300 mg capsule Commonly known as:  NEURONTIN  
1 pill twice a day and 2 at bedtime  
  
 glimepiride 2 mg tablet Commonly known as:  AMARYL Take 1 Tab by mouth every morning.  
  
 magnesium 250 mg Tab Take 500 mg by mouth nightly. metFORMIN 500 mg tablet Commonly known as:  GLUCOPHAGE  
TAKE ONE TABLET BY MOUTH TWICE DAILY (WITH  MEALS)  --  DOSE  ADJUSTMENT  
  
 RELION PRIME TEST STRIPS strip Generic drug:  glucose blood VI test strips USE 1 STRIP EACH TIME TO TEST BLOOD SUGAR 2 TIMES A DAY FOR NON-INSULIN DEPENDANT DIABETES MANAGEMENT  
  
 rOPINIRole 3 mg Tab tab Commonly known as:  Lavena She Take 1 Tab by mouth two (2) times a day. vit B Cmplx 3-FA-Vit C-Biotin 1- mg-mg-mcg tablet Commonly known as:  NEPHRO RAJEEV RX Take 1 Tab by mouth daily. Indications: with stress relief B 12 VITAMIN B12 PO Take  by mouth. VITAMIN D3 1,000 unit tablet Generic drug:  cholecalciferol Take  by mouth daily. Introducing [facility]! Manuelito Alonso introduces [employer] patient portal. Now you can access parts of your medical record, email your doctor's office, and request medication refills online. 1. In your internet browser, go to [email or web address] 2. Click on the First Time User? Click Here link in the Sign In box. You will see the New Member Sign Up page. 3. Enter your [employer] Access Code exactly as it appears below. You will not need to use this code after youve completed the sign-up process. If you do not sign up before the expiration date, you must request a new code. · [employer] Access Code: [email or web address] Expires: 10/25/2017 10:15 AM 
 
 4. Enter the last four digits of your Social Security Number (xxxx) and Date of Birth (mm/dd/yyyy) as indicated and click Submit. You will be taken to the next sign-up page. 5. Create a Risk Ident ID. This will be your Risk Ident login ID and cannot be changed, so think of one that is secure and easy to remember. 6. Create a Risk Ident password. You can change your password at any time. 7. Enter your Password Reset Question and Answer. This can be used at a later time if you forget your password. 8. Enter your e-mail address. You will receive e-mail notification when new information is available in [address] 9. Click Sign Up. You can now view and download portions of your medical record. 10. Click the Download Summary menu link to download a portable copy of your medical information. If you have questions, please visit the Frequently Asked Questions section of the Risk Ident website. Remember, Risk Ident is NOT to be used for urgent needs. For medical emergencies, dial 911. Now available from your iPhone and Android! Please provide this summary of care documentation to your next provider. Your primary care clinician is listed as Goldie Nath. If you have any questions after today's visit, please call [phone].

## 2017-08-16 ENCOUNTER — TELEPHONE (OUTPATIENT)
Dept: FAMILY MEDICINE CLINIC | Age: 82
End: 2017-08-16

## 2017-08-16 NOTE — TELEPHONE ENCOUNTER
Pt called requesting if  called the [address] in [place] for a prior authorization on the [employer].  Pt best contact number is [phone]     Message from call center

## 2017-11-10 RX ORDER — METFORMIN HYDROCHLORIDE 500 MG/1
500 TABLET ORAL 2 TIMES DAILY WITH MEALS
Qty: 180 TAB | Refills: 4 | Status: SHIPPED | OUTPATIENT
Start: 2017-11-10 | End: 2018-05-11 | Stop reason: DRUGHIGH

## 2017-11-16 RX ORDER — BUSPIRONE HYDROCHLORIDE 5 MG/1
5 TABLET ORAL 2 TIMES DAILY
Qty: 60 TAB | Refills: 5 | Status: SHIPPED | OUTPATIENT
Start: 2017-11-16 | End: 2018-10-29 | Stop reason: SDUPTHER

## 2017-11-21 ENCOUNTER — TELEPHONE (OUTPATIENT)
Dept: NEUROLOGY | Age: 82
End: 2017-11-21

## 2017-11-21 NOTE — TELEPHONE ENCOUNTER
----- Message from Nya Mueller sent at 11/21/2017  9:45 AM EST -----  Regarding: Dr. Matthew Lopez Telephone/ Refill  Janelle White, daughter, is needing recommendation for her mothers medications. The doctor recommended that the pt be on anxiety and depression meds, but she would rather Dr. Pamela Aguilera have an input.              Kim's best contact number is [phone]

## 2018-01-12 ENCOUNTER — OFFICE VISIT (OUTPATIENT)
Dept: FAMILY MEDICINE CLINIC | Age: 83
End: 2018-01-12

## 2018-01-12 VITALS
OXYGEN SATURATION: 98 % | TEMPERATURE: 98.4 F | SYSTOLIC BLOOD PRESSURE: 112 MMHG | HEIGHT: 60 IN | BODY MASS INDEX: 26.11 KG/M2 | WEIGHT: 133 LBS | RESPIRATION RATE: 16 BRPM | HEART RATE: 90 BPM | DIASTOLIC BLOOD PRESSURE: 60 MMHG

## 2018-01-12 DIAGNOSIS — E78.2 MIXED HYPERLIPIDEMIA: ICD-10-CM

## 2018-01-12 DIAGNOSIS — Z23 ENCOUNTER FOR IMMUNIZATION: ICD-10-CM

## 2018-01-12 DIAGNOSIS — E11.42 DIABETIC PERIPHERAL NEUROPATHY ASSOCIATED WITH TYPE 2 DIABETES MELLITUS (HCC): Primary | ICD-10-CM

## 2018-01-12 DIAGNOSIS — E53.8 B12 DEFICIENCY: ICD-10-CM

## 2018-01-12 NOTE — MR AVS SNAPSHOT
Visit Information Date & Time Provider Department Dept. Phone Encounter #  
 1/12/2018  2:00 PM Michel Grant, [address] [address] [id number] Follow-up Instructions Return in about 4 months (around 5/12/2018). Follow-up and Disposition History Your Appointments 2/12/2018  2:00 PM  
Follow Up with Roscoe Baig MD  
[facility] [address] [facility]) Appt Note: follow up. ..tlw 5/8/17; r/s from 10/2 f/up nerve issues $0 cp [facility] 8/1  
 [address] [facility], 
[address] [place] [address], [address] [phone]  
[address] [facility], [address] [place] [address], [phone] Upcoming Health Maintenance Date Due MICROALBUMIN Q1 4/5/2017 FOOT EXAM Q1 7/5/2017 Influenza Age 5 to Adult 8/1/2017 EYE EXAM RETINAL OR DILATED Q1 11/3/2017 HEMOGLOBIN A1C Q6M 1/27/2018 BREAST CANCER SCRN MAMMOGRAM 2/16/2018 MEDICARE YEARLY EXAM 2/16/2018 LIPID PANEL Q1 7/27/2018 GLAUCOMA SCREENING Q2Y 11/3/2018 DTaP/Tdap/Td series (2 - Td) 3/1/2027 Allergies as of 1/12/2018  Review Complete On: 1/12/2018 By: Michel Grant MD  
 No Known Allergies Current Immunizations  Reviewed on 8/2/2017 Name Date Influenza High Dose Vaccine PF 1/12/2018  1:47 PM, 10/5/2016, 9/25/2015 Pneumococcal Conjugate (PCV-13) 3/1/2017 10:48 AM  
 Pneumococcal Polysaccharide (PPSV-23) 11/5/2010 Td 10/2/2015 Zoster Vaccine, Live 11/5/2010 Not reviewed this visit You Were Diagnosed With   
  
 Codes Comments Diabetic peripheral neuropathy associated with type 2 diabetes mellitus ([facility] [address]    -  Primary ICD-10-CM: E11.42 
ICD-9-CM: 250.60, 357.2 Encounter for immunization     ICD-10-CM: Q13 ICD-9-CM: V03.89 Mixed hyperlipidemia     ICD-10-CM: E78.2 ICD-9-CM: 272.2 B12 deficiency     ICD-10-CM: E53.8 ICD-9-CM: 266.2 Vitals BP Pulse Temp Resp Height(growth percentile) Weight(growth percentile) 112/60 (BP 1 Location: Left arm, BP Patient Position: Sitting) 90 98.4 °F (36.9 °C) (Oral) 16 5' (1.524 m) 133 lb (60.3 kg) SpO2 BMI OB Status Smoking Status 98% 25.97 kg/m2 Postmenopausal Former Smoker BMI and BSA Data Body Mass Index Body Surface Area  
 25.97 kg/m 2 1.6 m 2 Preferred Pharmacy Pharmacy Name Phone [facility] [phone] Your Updated Medication List  
  
   
This list is accurate as of: 1/12/18  2:14 PM.  Always use your most recent med list.  
  
  
  
  
 busPIRone 5 mg tablet Commonly known as:  BUSPAR Take 1 Tab by mouth two (2) times a day. glimepiride 2 mg tablet Commonly known as:  AMARYL Take 1 Tab by mouth every morning. glucose blood VI test strips strip Commonly known as:  RELION PRIME TEST STRIPS  
by Does Not Apply route two (2) times a day. Use 1 strip each time to test glucose 2 times a day. E11.9  
  
 magnesium 250 mg Tab Take 500 mg by mouth nightly. metFORMIN 500 mg tablet Commonly known as:  GLUCOPHAGE Take 1 Tab by mouth two (2) times daily (with meals). rOPINIRole 3 mg Tab tab Commonly known as:  Niraj Rounds Take 1 Tab by mouth two (2) times a day. vit B Cmplx 3-FA-Vit C-Biotin 1- mg-mg-mcg tablet Commonly known as:  NEPHRO RAJEEV RX Take 1 Tab by mouth daily. Indications: with stress relief B 12 VITAMIN B12 PO Take  by mouth. VITAMIN D3 1,000 unit tablet Generic drug:  cholecalciferol Take  by mouth daily. zinc 50 mg Tab tablet Take  by mouth daily. We Performed the Following ADMIN INFLUENZA VIRUS VAC [ [facility]] CBC WITH AUTOMATED DIFF [81064 CPT(R)] HEMOGLOBIN A1C WITH EAG [28375 CPT(R)]  DIABETES FOOT EXAM [7 Custom] INFLUENZA VIRUS VACCINE, HIGH DOSE SEASONAL, PRESERVATIVE FREE [29882 CPT(R)] LIPID PANEL [96749 CPT(R)] METABOLIC PANEL, COMPREHENSIVE [36425 CPT(R)] TSH 3RD GENERATION [30507 CPT(R)] VITAMIN B12 [id number] CPT(R)] Follow-up Instructions Return in about 4 months (around 5/12/2018). To-Do List   
 03/20/2018 Lab:  MICROALBUMIN, UR, RAND W/ MICROALBUMIN/CREA RATIO Patient Instructions Vaccine Information Statement Influenza (Flu) Vaccine (Inactivated or Recombinant): What you need to know Many Vaccine Information Statements are available in [place] and other languages. See www.immunize.org/vis Hojas de Información Sobre Vacunas están disponibles en Español y en muchos otros idiomas. Visite www.immunize.org/vis 1. Why get vaccinated? Influenza (flu) is a contagious disease that spreads around the [place] every year, usually between October and May. Flu is caused by influenza viruses, and is spread mainly by coughing, sneezing, and close contact. Anyone can get flu. Flu strikes suddenly and can last several days. Symptoms vary by age, but can include: 
 fever/chills  sore throat  muscle aches  fatigue  cough  headache  runny or stuffy nose Flu can also lead to pneumonia and blood infections, and cause diarrhea and seizures in children. If you have a medical condition, such as heart or lung disease, flu can make it worse. Flu is more dangerous for some people. Infants and young children, people 72years of age and older, pregnant women, and people with certain health conditions or a weakened immune system are at greatest risk. Each year thousands of people in the [facility] die from flu, and many more are hospitalized. Flu vaccine can: 
 keep you from getting flu, 
 make flu less severe if you do get it, and 
 keep you from spreading flu to your family and other people. 2. Inactivated and recombinant flu vaccines A dose of flu vaccine is recommended every flu season. Children 6 months through 6years of age may need two doses during the same flu season. Everyone else needs only one dose each flu season. Some inactivated flu vaccines contain a very small amount of a mercury-based preservative called thimerosal. Studies have not shown thimerosal in vaccines to be harmful, but flu vaccines that do not contain thimerosal are available. There is no live flu virus in flu shots. They cannot cause the flu. There are many flu viruses, and they are always changing. Each year a new flu vaccine is made to protect against three or four viruses that are likely to cause disease in the upcoming flu season. But even when the vaccine doesnt exactly match these viruses, it may still provide some protection Flu vaccine cannot prevent: 
 flu that is caused by a virus not covered by the vaccine, or 
 illnesses that look like flu but are not. It takes about 2 weeks for protection to develop after vaccination, and protection lasts through the flu season. 3. Some people should not get this vaccine Tell the person who is giving you the vaccine:  If you have any severe, life-threatening allergies. If you ever had a life-threatening allergic reaction after a dose of flu vaccine, or have a severe allergy to any part of this vaccine, you may be advised not to get vaccinated. Most, but not all, types of flu vaccine contain a small amount of egg protein.  If you ever had Guillain-Barré Syndrome (also called GBS). Some people with a history of GBS should not get this vaccine. This should be discussed with your doctor.  If you are not feeling well. It is usually okay to get flu vaccine when you have a mild illness, but you might be asked to come back when you feel better. 4. Risks of a vaccine reaction With any medicine, including vaccines, there is a chance of reactions. These are usually mild and go away on their own, but serious reactions are also possible. Most people who get a flu shot do not have any problems with it. Minor problems following a flu shot include:  
 soreness, redness, or swelling where the shot was given  hoarseness  sore, red or itchy eyes  cough  fever  aches  headache  itching  fatigue If these problems occur, they usually begin soon after the shot and last 1 or 2 days. More serious problems following a flu shot can include the following:  There may be a small increased risk of Guillain-Barré Syndrome (GBS) after inactivated flu vaccine. This risk has been estimated at 1 or 2 additional cases per million people vaccinated. This is much lower than the risk of severe complications from flu, which can be prevented by flu vaccine.  Young children who get the flu shot along with pneumococcal vaccine (PCV13) and/or DTaP vaccine at the same time might be slightly more likely to have a seizure caused by fever. Ask your doctor for more information. Tell your doctor if a child who is getting flu vaccine has ever had a seizure. Problems that could happen after any injected vaccine:  People sometimes faint after a medical procedure, including vaccination. Sitting or lying down for about 15 minutes can help prevent fainting, and injuries caused by a fall. Tell your doctor if you feel dizzy, or have vision changes or ringing in the ears.  Some people get severe pain in the shoulder and have difficulty moving the arm where a shot was given. This happens very rarely.  Any medication can cause a severe allergic reaction. Such reactions from a vaccine are very rare, estimated at about 1 in a million doses, and would happen within a few minutes to a few hours after the vaccination. As with any medicine, there is a very remote chance of a vaccine causing a serious injury or death. The safety of vaccines is always being monitored. For more information, visit: www.cdc.gov/vaccinesafety/ 
 
5. What if there is a serious reaction? What should I look for?  Look for anything that concerns you, such as signs of a severe allergic reaction, very high fever, or unusual behavior. Signs of a severe allergic reaction can include hives, swelling of the face and throat, difficulty breathing, a fast heartbeat, dizziness, and weakness  usually within a few minutes to a few hours after the vaccination. What should I do?  If you think it is a severe allergic reaction or other emergency that cant wait, call 9-1-1 and get the person to the nearest hospital. Otherwise, call your doctor.  Reactions should be reported to the Vaccine Adverse Event Reporting System (VAERS). Your doctor should file this report, or you can do it yourself through  the VAERS web site at www.vaers. [facility].gov, or by calling 3-766.730.4555. VAERS does not give medical advice. 6. The National Vaccine Injury Compensation Program 
 
The [facility] Vaccine Injury Compensation Program (VICP) is a federal program that was created to compensate people who may have been injured by certain vaccines. Persons who believe they may have been injured by a vaccine can learn about the program and about filing a claim by calling 2-604.219.8442 or visiting the [employer] website at www.[facility].gov/vaccinecompensation. There is a time limit to file a claim for compensation. 7. How can I learn more?  Ask your healthcare provider. He or she can give you the vaccine package insert or suggest other sources of information.  Call your local or state health department.  Contact the Centers for Disease Control and Prevention (CDC): 
- Call 9-128.251.5012 (1-800-CDC-INFO) or 
- Visit CDCs website at www.cdc.gov/flu Vaccine Information Statement Inactivated Influenza Vaccine 8/7/2015 
42 AMILCAR Day [id number] Department of [facility] and [employer]ial Centers for Disease Control and Prevention Office Use Only If you have any questions regarding [employer], you may call [employer] support at [phone]. Patient Instructions History Introducing [facility]! [address] introduces [employer] patient portal. Now you can access parts of your medical record, email your doctor's office, and request medication refills online. 1. In your internet browser, go to [email or web address] [employer] 2. Click on the First Time User? Click Here link in the Sign In box. You will see the New Member Sign Up page. 3. Enter your [employer] Access Code exactly as it appears below. You will not need to use this code after youve completed the sign-up process. If you do not sign up before the expiration date, you must request a new code. · [employer] Access Code: [email or web address] Expires: 4/12/2018  1:42 PM 
 
4. Enter the last four digits of your Social Security Number (xxxx) and Date of Birth (mm/dd/yyyy) as indicated and click Submit. You will be taken to the next sign-up page. 5. Create a [employer] ID. This will be your [employer] login ID and cannot be changed, so think of one that is secure and easy to remember. 6. Create a [employer] password. You can change your password at any time. 7. Enter your Password Reset Question and Answer. This can be used at a later time if you forget your password. 8. Enter your e-mail address. You will receive e-mail notification when new information is available in [address] 9. Click Sign Up. You can now view and download portions of your medical record. 10. Click the Download Summary menu link to download a portable copy of your medical information.  
 
If you have questions, please visit the Frequently Asked Questions section of the [employer]. Remember, [employer] is NOT to be used for urgent needs. For medical emergencies, dial 911. Now available from your iPhone and Android! Please provide this summary of care documentation to your next provider. Your primary care clinician is listed as William Benton. If you have any questions after today's visit, please call [phone].

## 2018-01-12 NOTE — PATIENT INSTRUCTIONS
Vaccine Information Statement    Influenza (Flu) Vaccine (Inactivated or Recombinant): What you need to know    Many Vaccine Information Statements are available in English and other languages. See www.immunize.org/vis  Hojas de Información Sobre Vacunas están disponibles en Español y en muchos otros idiomas. Visite www.immunize.org/vis    1. Why get vaccinated? Influenza (flu) is a contagious disease that spreads around the [place] every year, usually between October and May. Flu is caused by influenza viruses, and is spread mainly by coughing, sneezing, and close contact. Anyone can get flu. Flu strikes suddenly and can last several days. Symptoms vary by age, but can include:   fever/chills   sore throat   muscle aches   fatigue   cough   headache    runny or stuffy nose    Flu can also lead to pneumonia and blood infections, and cause diarrhea and seizures in children. If you have a medical condition, such as heart or lung disease, flu can make it worse. Flu is more dangerous for some people. Infants and young children, people 72years of age and older, pregnant women, and people with certain health conditions or a weakened immune system are at greatest risk. Each year thousands of people in the [facility] die from flu, and many more are hospitalized. Flu vaccine can:   keep you from getting flu,   make flu less severe if you do get it, and   keep you from spreading flu to your family and other people. 2. Inactivated and recombinant flu vaccines    A dose of flu vaccine is recommended every flu season. Children 6 months through 6years of age may need two doses during the same flu season. Everyone else needs only one dose each flu season.        Some inactivated flu vaccines contain a very small amount of a mercury-based preservative called thimerosal. Studies have not shown thimerosal in vaccines to be harmful, but flu vaccines that do not contain thimerosal are available. There is no live flu virus in flu shots. They cannot cause the flu. There are many flu viruses, and they are always changing. Each year a new flu vaccine is made to protect against three or four viruses that are likely to cause disease in the upcoming flu season. But even when the vaccine doesnt exactly match these viruses, it may still provide some protection    Flu vaccine cannot prevent:   flu that is caused by a virus not covered by the vaccine, or   illnesses that look like flu but are not. It takes about 2 weeks for protection to develop after vaccination, and protection lasts through the flu season. 3. Some people should not get this vaccine    Tell the person who is giving you the vaccine:     If you have any severe, life-threatening allergies. If you ever had a life-threatening allergic reaction after a dose of flu vaccine, or have a severe allergy to any part of this vaccine, you may be advised not to get vaccinated. Most, but not all, types of flu vaccine contain a small amount of egg protein.  If you ever had Guillain-Barré Syndrome (also called GBS). Some people with a history of GBS should not get this vaccine. This should be discussed with your doctor.  If you are not feeling well. It is usually okay to get flu vaccine when you have a mild illness, but you might be asked to come back when you feel better. 4. Risks of a vaccine reaction    With any medicine, including vaccines, there is a chance of reactions. These are usually mild and go away on their own, but serious reactions are also possible. Most people who get a flu shot do not have any problems with it.      Minor problems following a flu shot include:    soreness, redness, or swelling where the shot was given     hoarseness   sore, red or itchy eyes   cough   fever   aches   headache   itching   fatigue  If these problems occur, they usually begin soon after the shot and last 1 or 2 days. More serious problems following a flu shot can include the following:     There may be a small increased risk of Guillain-Barré Syndrome (GBS) after inactivated flu vaccine. This risk has been estimated at 1 or 2 additional cases per million people vaccinated. This is much lower than the risk of severe complications from flu, which can be prevented by flu vaccine.  Young children who get the flu shot along with pneumococcal vaccine (PCV13) and/or DTaP vaccine at the same time might be slightly more likely to have a seizure caused by fever. Ask your doctor for more information. Tell your doctor if a child who is getting flu vaccine has ever had a seizure. Problems that could happen after any injected vaccine:      People sometimes faint after a medical procedure, including vaccination. Sitting or lying down for about 15 minutes can help prevent fainting, and injuries caused by a fall. Tell your doctor if you feel dizzy, or have vision changes or ringing in the ears.  Some people get severe pain in the shoulder and have difficulty moving the arm where a shot was given. This happens very rarely.  Any medication can cause a severe allergic reaction. Such reactions from a vaccine are very rare, estimated at about 1 in a million doses, and would happen within a few minutes to a few hours after the vaccination. As with any medicine, there is a very remote chance of a vaccine causing a serious injury or death. The safety of vaccines is always being monitored. For more information, visit: www.cdc.gov/vaccinesafety/    5. What if there is a serious reaction? What should I look for?  Look for anything that concerns you, such as signs of a severe allergic reaction, very high fever, or unusual behavior.     Signs of a severe allergic reaction can include hives, swelling of the face and throat, difficulty breathing, a fast heartbeat, dizziness, and weakness - usually within a few minutes to a few hours after the vaccination. What should I do?  If you think it is a severe allergic reaction or other emergency that cant wait, call 9-1-1 and get the person to the nearest hospital. Otherwise, call your doctor.  Reactions should be reported to the Vaccine Adverse Event Reporting System (VAERS). Your doctor should file this report, or you can do it yourself through  the VAERS web site at www.vaers. [facility].gov, or by calling 4-149.378.4134. VAERS does not give medical advice. 6. The National Vaccine Injury Compensation Program    The [facility] Vaccine Injury Compensation Program (VICP) is a federal program that was created to compensate people who may have been injured by certain vaccines. Persons who believe they may have been injured by a vaccine can learn about the program and about filing a claim by calling 9-117.319.5658 or visiting the [employer] website at www.[facility].gov/vaccinecompensation. There is a time limit to file a claim for compensation. 7. How can I learn more?  Ask your healthcare provider. He or she can give you the vaccine package insert or suggest other sources of information.  Call your local or state health department.  Contact the Centers for Disease Control and Prevention (CDC):  - Call 0-176.758.9739 (1-800-CDC-INFO) or  - Visit CDCs website at www.cdc.gov/flu    Vaccine Information Statement   Inactivated Influenza Vaccine   8/7/2015  42 U.  Marissa [id number]    Department of Health and Human Services  Centers for Disease Control and Prevention    Office Use Only  If you have any questions regarding [employer], you may call [employer] support at [phone].

## 2018-01-12 NOTE — PROGRESS NOTES
Aida Forde is a 80 y.o. female who presents for routine immunizations. She denies any symptoms , reactions or allergies that would exclude them from being immunized today. Risks and adverse reactions were discussed and the VIS was given to them. All questions were addressed.     Selene cEhevarria RN

## 2018-01-12 NOTE — PROGRESS NOTES
Chief Complaint   Patient presents with    Diabetes         HPI:      Anna Guadarrama is a 80 y.o. female. Retired. Plays piano and enjoys gardening. Lives in [facility]. Has 4 daughters, two twins. Also has twin granddaughters. T2DM:  Denies hypoglycemia. FS readings from home are in the mid 100 range. Compliant with meds. No side effects. RLS remains a problem. She is B 12 def. No Known Allergies    Current Outpatient Prescriptions   Medication Sig    zinc 50 mg tab tablet Take  by mouth daily.  busPIRone (BUSPAR) 5 mg tablet Take 1 Tab by mouth two (2) times a day.  metFORMIN (GLUCOPHAGE) 500 mg tablet Take 1 Tab by mouth two (2) times daily (with meals).  glucose blood VI test strips (RELION PRIME TEST STRIPS) strip by Does Not Apply route two (2) times a day. Use 1 strip each time to test glucose 2 times a day. E11.9    glimepiride (AMARYL) 2 mg tablet Take 1 Tab by mouth every morning.  CYANOCOBALAMIN, VITAMIN B-12, (VITAMIN B12 PO) Take  by mouth.  cholecalciferol (VITAMIN D3) 1,000 unit tablet Take  by mouth daily.  vit B Cmplx 3-FA-Vit C-Biotin (NEPHRO RAJEEV RX) 1- mg-mg-mcg tablet Take 1 Tab by mouth daily. Indications: with stress relief B 12    magnesium 250 mg tab Take 500 mg by mouth nightly.  rOPINIRole (REQUIP) 3 mg tab tab Take 1 Tab by mouth two (2) times a day. (Patient taking differently: Take 3 mg by mouth nightly.)     No current facility-administered medications for this visit. Past Medical History:   Diagnosis Date    Anxiety     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    bursitis    Depression     Diabetes (HCC)     GERD (gastroesophageal reflux disease)     H/O hand fracture     Hyperlipidemia     Osteopenia     Recurrent UTI     RLS (restless legs syndrome)          ROS:  Denies fever, chills, cough, chest pain, SOB,  nausea, vomiting, or diarrhea.   Denies wt loss, wt gain, hemoptysis, hematochezia or melena. Physical Examination:    /60 (BP 1 Location: Left arm, BP Patient Position: Sitting)  Pulse 90  Temp 98.4 °F (36.9 °C) (Oral)   Resp 16  Ht 5' (1.524 m)  Wt 133 lb (60.3 kg)  SpO2 98%  BMI 25.97 kg/m2    General: Alert and Ox3, Fluent speech  HEENT:  NC/AT, EOMI, OP: clear  Neck:  Supple, no adenopathy, JVD, mass or bruit  Chest:  Clear to Ausculation, without wheezes, rales, rubs or ronchi  Cardiac: RRR with a 2/6 NADIR  Abdomen:  +BS, soft, nontender without palpable HSM  Extremities:  No cyanosis, clubbing or edema  Neurologic:  Ambulatory without assist, CN 2-12 grossly intact. Moves all extremities. Skin: no rash  Lymphadenopathy: no cervical or supraclavicular nodes  Diabetic Foot Exam:  Both feet are examined and demonstrate intact DP pulses. There is good capillary refill and sensation is intact. Skin is intact and there are no ulcers or sores. ASSESSMENT AND PLAN:     1. T2DM:  Labs and foot exam today  2. HTN is well controlled  3. RLS: nothing new to offer at this time  4. RTC in May    Orders Placed This Encounter    Influenza virus vaccine ([address]) 72 years and older ([address])    Administration fee () for Medicare insured patients    zinc 50 mg tab tablet     Sig: Take  by mouth daily.        Andres Rosenthal MD, [address]

## 2018-01-13 LAB
ALBUMIN SERPL-MCNC: 4.4 G/DL (ref 3.5–4.7)
ALBUMIN/GLOB SERPL: 2.2 {RATIO} (ref 1.2–2.2)
ALP SERPL-CCNC: 65 IU/L (ref 39–117)
ALT SERPL-CCNC: 22 IU/L (ref 0–32)
AST SERPL-CCNC: 20 IU/L (ref 0–40)
BASOPHILS # BLD AUTO: 0 X10E3/UL (ref 0–0.2)
BASOPHILS NFR BLD AUTO: 0 %
BILIRUB SERPL-MCNC: 0.4 MG/DL (ref 0–1.2)
BUN SERPL-MCNC: 23 MG/DL (ref 8–27)
BUN/CREAT SERPL: 35 (ref 12–28)
CALCIUM SERPL-MCNC: 9.4 MG/DL (ref 8.7–10.3)
CHLORIDE SERPL-SCNC: 102 MMOL/L (ref 96–106)
CHOLEST SERPL-MCNC: 211 MG/DL (ref 100–199)
CO2 SERPL-SCNC: 21 MMOL/L (ref 18–29)
CREAT SERPL-MCNC: 0.66 MG/DL (ref 0.57–1)
EOSINOPHIL # BLD AUTO: 0.1 X10E3/UL (ref 0–0.4)
EOSINOPHIL NFR BLD AUTO: 1 %
ERYTHROCYTE [DISTWIDTH] IN BLOOD BY AUTOMATED COUNT: 12.8 % (ref 12.3–15.4)
EST. AVERAGE GLUCOSE BLD GHB EST-MCNC: 146 MG/DL
GLOBULIN SER CALC-MCNC: 2 G/DL (ref 1.5–4.5)
GLUCOSE SERPL-MCNC: 83 MG/DL (ref 65–99)
HBA1C MFR BLD: 6.7 % (ref 4.8–5.6)
HCT VFR BLD AUTO: 42 % (ref 34–46.6)
HDLC SERPL-MCNC: 56 MG/DL
HGB BLD-MCNC: 14.6 G/DL (ref 11.1–15.9)
IMM GRANULOCYTES # BLD: 0 X10E3/UL (ref 0–0.1)
IMM GRANULOCYTES NFR BLD: 0 %
LDLC SERPL CALC-MCNC: 139 MG/DL (ref 0–99)
LYMPHOCYTES # BLD AUTO: 1.7 X10E3/UL (ref 0.7–3.1)
LYMPHOCYTES NFR BLD AUTO: 18 %
MCH RBC QN AUTO: 32.2 PG (ref 26.6–33)
MCHC RBC AUTO-ENTMCNC: 34.8 G/DL (ref 31.5–35.7)
MCV RBC AUTO: 93 FL (ref 79–97)
MONOCYTES # BLD AUTO: 0.6 X10E3/UL (ref 0.1–0.9)
MONOCYTES NFR BLD AUTO: 6 %
NEUTROPHILS # BLD AUTO: 6.9 X10E3/UL (ref 1.4–7)
NEUTROPHILS NFR BLD AUTO: 75 %
PLATELET # BLD AUTO: 287 X10E3/UL (ref 150–379)
POTASSIUM SERPL-SCNC: 3.9 MMOL/L (ref 3.5–5.2)
PROT SERPL-MCNC: 6.4 G/DL (ref 6–8.5)
RBC # BLD AUTO: 4.53 X10E6/UL (ref 3.77–5.28)
SODIUM SERPL-SCNC: 141 MMOL/L (ref 134–144)
TRIGL SERPL-MCNC: 78 MG/DL (ref 0–149)
TSH SERPL DL<=0.005 MIU/L-ACNC: 2.55 UIU/ML (ref 0.45–4.5)
VIT B12 SERPL-MCNC: 1612 PG/ML (ref 232–1245)
VLDLC SERPL CALC-MCNC: 16 MG/DL (ref 5–40)
WBC # BLD AUTO: 9.3 X10E3/UL (ref 3.4–10.8)

## 2018-02-07 ENCOUNTER — TELEPHONE (OUTPATIENT)
Dept: FAMILY MEDICINE CLINIC | Age: 83
End: 2018-02-07

## 2018-02-07 NOTE — TELEPHONE ENCOUNTER
Fax from [facility] DR ZULLY BENZ stating glimepiride 2 mg  is on backorder and they want to know if they can give patient 4 mg take 1/2 tablet. Please advise.

## 2018-02-12 ENCOUNTER — OFFICE VISIT (OUTPATIENT)
Dept: NEUROLOGY | Age: 83
End: 2018-02-12

## 2018-02-12 VITALS
SYSTOLIC BLOOD PRESSURE: 110 MMHG | OXYGEN SATURATION: 97 % | BODY MASS INDEX: 26.9 KG/M2 | DIASTOLIC BLOOD PRESSURE: 62 MMHG | HEIGHT: 60 IN | WEIGHT: 137 LBS | HEART RATE: 97 BPM

## 2018-02-12 DIAGNOSIS — E11.42 DIABETIC PERIPHERAL NEUROPATHY ASSOCIATED WITH TYPE 2 DIABETES MELLITUS (HCC): Primary | ICD-10-CM

## 2018-02-12 DIAGNOSIS — I67.89 CEREBRAL MICROVASCULAR DISEASE: ICD-10-CM

## 2018-02-12 DIAGNOSIS — I65.23 STENOSIS OF BOTH INTERNAL CAROTID ARTERIES: ICD-10-CM

## 2018-02-12 DIAGNOSIS — G25.81 RLS (RESTLESS LEGS SYNDROME): ICD-10-CM

## 2018-02-12 RX ORDER — ROPINIROLE 3 MG/1
3 TABLET, FILM COATED ORAL 2 TIMES DAILY
Qty: 100 TAB | Refills: 11 | Status: SHIPPED | OUTPATIENT
Start: 2018-02-12 | End: 2019-03-14 | Stop reason: SDUPTHER

## 2018-02-12 RX ORDER — MELATONIN 5 MG
5 CAPSULE ORAL
COMMUNITY
End: 2019-10-07 | Stop reason: ALTCHOICE

## 2018-02-12 NOTE — LETTER
2/12/2018 8:11 PM 
 
Patient:  Chan Pena  
YOB: 1935 Date of Visit: 2/12/2018 Dear No Recipients: Thank you for referring Ms. Odalis Pineda to me for evaluation/treatment. Below are the relevant portions of my assessment and plan of care. Consult REFERRED BY: 
Olga Dumont MD 
 
CHIEF COMPLAINT: Restless leg syndrome, depression, insomnia, and memory loss Subjective:  
 
Chan Pena is a 80 y.o. right-handed  female seen for evaluation at the request of Dr. Ralf Patel for  a new problem of worsening restless leg syndrome to the point that the patient cannot sleep and takes Requip 3 mg twice a day, taking it at 6 or 7 PM, and then at 9 PM each night. . The patient does have diabetes and diabetic neuropathy, and that may be a cause of her restless leg syndromes. We recommended that she try melatonin for sleep, and she can take Tylenol PM at bedtime in addition if he needs something else for her pain. This also helps her too, and she seems to have more arthritis is bothering her to the Tylenol PM may help that in addition. Her symptoms initially got better when she went off the Effexor. She is only on BuSpar now 5 mg twice a day. She is off her iron pills in addition. We will maybe increase the Requip more if we need to. She was intolerant of the Neurontin, and does not want to take that anymore. She is also seen for evaluation of a multiplicity of neurological problems, the main ones being progressive memory loss, worsening restless leg syndrome, severe depression, diabetic neuropathy.   Patient says she has had restless leg syndrome since 1998,   She has also tried other SSRIs including Celexa and other medication, and seems to have either intolerance or side effects on all the medications and will like to try a new antidepressant, and I suggested that she needs to see a therapist who is more skilled at using medication to help her along this line, and since we are going to change her restless legs medication we should do it only one thing at a time. 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 She has never had a history of stroke or seizures. She had some urinary frequency and we referred her to another neurologist, and she also has a fair amount of musculoskeletal pain. She has had some increased stress because her   (bankruptcy, and he had been unfaithful to her and that has really upset her. She has a lot of cardiac issues and shortness of breath. She has seen [address] [place] [address] and they have told her that she has osteoarthritis of the hip lumbar spine and she was treated with physical therapy. She has had endoscopy and has esophagitis and gastritis. She also has fibromyalgia. She thinks she has had an EMG in the past also but has no idea when it was done who did it. She seen an audiologist and speech therapist.  As high-frequency hearing loss. He has had sleep studies done by neurologist in [place]. She has chronic sinus problems. She has occasional headache and a generalized fashion. She gets blurred vision intermittently. She has migratory paresthesias over her arms and legs. Past Medical History:  
Diagnosis Date  Anxiety  Degenerative joint disease (DJD) of hip 2013  
 bursitis  Depression  Diabetes ([facility] [address]  GERD (gastroesophageal reflux disease)  H/O hand fracture  Hyperlipidemia  Osteopenia  Recurrent UTI  RLS (restless legs syndrome) Past Surgical History:  
Procedure Laterality Date  HX APPENDECTOMY  HX COLONOSCOPY    HX DILATION AND CURETTAGE    
 HX TONSIL AND ADENOIDECTOMY Family History Problem Relation Age of Onset  Cancer Mother   
  breast/ lung  Cancer Father   
  prostate  Migraines Father  Cancer Sister   
  breast  
 Heart Disease Maternal Grandmother Social History Substance Use Topics  Smoking status: Former Smoker Packs/day: 0.50 Years: 20.00 Types: Cigarettes Quit date: 7/8/1960  Smokeless tobacco: Never Used  Alcohol use 1.8 oz/week 3 Glasses of wine per week Current Outpatient Prescriptions:  
  LUTEIN PO, Take  by mouth., Disp: , Rfl:  
  melatonin 5 mg cap capsule, Take 5 mg by mouth nightly., Disp: , Rfl:  
  rOPINIRole (REQUIP) 3 mg tab tab, Take 1 Tab by mouth two (2) times a day., Disp: 100 Tab, Rfl: 11 
  zinc 50 mg tab tablet, Take  by mouth daily. , Disp: , Rfl:  
  busPIRone (BUSPAR) 5 mg tablet, Take 1 Tab by mouth two (2) times a day., Disp: 60 Tab, Rfl: 5 
  metFORMIN (GLUCOPHAGE) 500 mg tablet, Take 1 Tab by mouth two (2) times daily (with meals). , Disp: 180 Tab, Rfl: 4 
  glucose blood VI test strips (RELION PRIME TEST STRIPS) strip, by Does Not Apply route two (2) times a day. Use 1 strip each time to test glucose 2 times a day. E11.9, Disp: 100 Strip, Rfl: 11 
  glimepiride (AMARYL) 2 mg tablet, Take 1 Tab by mouth every morning., Disp: 30 Tab, Rfl: 12 
  cholecalciferol (VITAMIN D3) 1,000 unit tablet, Take  by mouth daily. , Disp: , Rfl:  
  vit B Cmplx 3-FA-Vit C-Biotin (NEPHRO RAJEEV RX) 1- mg-mg-mcg tablet, Take 1 Tab by mouth daily. Indications: with stress relief B 12, Disp: , Rfl:  
  magnesium 250 mg tab, Take 500 mg by mouth nightly., Disp: , Rfl:  
 
 
 
No Known Allergies Review of Systems: A comprehensive review of systems was negative except for: Constitutional: positive for fatigue, malaise and anorexia Eyes: positive for visual disturbance Ears, nose, mouth, throat, and face: positive for hearing loss and tinnitus Respiratory: positive for cough, wheezing or dyspnea on exertion Cardiovascular: positive for chest pressure/discomfort, dyspnea, palpitations, irregular heart beats, near-syncope Gastrointestinal: positive for dyspepsia, reflux symptoms and nausea Genitourinary: positive for frequency, urinary incontinence and hesitancy Musculoskeletal: positive for myalgias, arthralgias, stiff joints, neck pain, back pain and muscle weakness Neurological: positive for headaches, memory problems, paresthesia, coordination problems, gait problems, tremor and weakness Behvioral/Psych: positive for anxiety and depression Vitals:  
 02/12/18 1414 BP: 110/62 Pulse: 97 SpO2: 97% Weight: 137 lb (62.1 kg) Height: 5' (1.524 m) Objective: I 
 
 
NEUROLOGICAL EXAM: 
 
Appearance:   patient well-developed, well-nourished, provides a fair history and is in no acute distress. Mental Status: Oriented to time, place and person, and the president, patient can do serial sevens, remember 2 out of 3 words at 30 seconds with distraction, spell world backwards and draw a clock that shows a time 10:50, basically a normal Mini-Mental status exam and cognitive function is normal and speech is fluent and no aphasia or dysarthria. Mood and affect anxious and depressed . Cranial Nerves:   Intact visual fields. Fundi are benign. CASIMIRO, EOM's full, no nystagmus, no ptosis. Facial sensation is normal. Corneal reflexes are not tested. Facial movement is symmetric. Hearing is abnormal bilaterally. Palate is midline with normal sternocleidomastoid and trapezius muscles are normal. Tongue is midline. Neck without meningismus or bruits Temporal arteries are not tender or enlarged Motor:  4/5 strength in upper and lower proximal and distal muscles. Normal bulk and tone. No fasciculations. Reflexes:   Deep tendon reflexes 1+/4 and symmetrical. 
No babinski or clonus present Sensory:   Normal to touch, pinprick and vibration and temperature decreased in both feet. DSS is intact Gait:  Normal gait for her age and arthritis . Tremor:   No tremor noted. Cerebellar:  No cerebellar signs present. Neurovascular:  Normal heart sounds and regular rhythm, peripheral pulses decreased, and no carotid bruits. Assessment: ICD-10-CM ICD-9-CM 1. Diabetic peripheral neuropathy associated with type 2 diabetes mellitus (HCC) E11.42 250.60 LUTEIN PO  
  357.2 melatonin 5 mg cap capsule  
   rOPINIRole (REQUIP) 3 mg tab tab 2. 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 433.10 LUTEIN PO  
  433.30 melatonin 5 mg cap capsule  
   rOPINIRole (REQUIP) 3 mg tab tab 3. Cerebral microvascular disease I67.9 437.9 LUTEIN PO  
   melatonin 5 mg cap capsule  
   rOPINIRole (REQUIP) 3 mg tab tab 4. RLS (restless legs syndrome) G25.81 333.94 LUTEIN PO  
   melatonin 5 mg cap capsule  
   rOPINIRole (REQUIP) 3 mg tab tab Plan:  
 
Patient with a multiplicity of problems, the most salient seem to be restless leg syndrome, for which we will continue the Requip 3 mg twice a day, and start melatonin 5 mg at night, and she can take Tylenol PM as tolerated New prescription in for patient today for the Requip. For her memory loss she needs to remain mentally and physically active and take her vitamins and vitamin D on a regular basis For her insomnia she is to try melatonin 5 mg at night She is to take a multivitamin, vitamin D, and try to remain mentally and physically active Carotid Dopplers unremarkable last visit 35 minutes spent with patient and her daughters in detail, reviewed her records, reviewed her MRI scan and lab tests personally on the computer, discussing her history, and having in-depth conversation with both daughters and the patient, and discussing treatment options, therapeutic plans, diagnostic workup, and they agree with plans. Signed By: Roscoe Baig MD   
 February 12, 2018 CC: Michel Grant MD 
FAX: [phone] This note will not be viewable in [address] If you have questions, please do not hesitate to call me. I look forward to following Ms. Pineda along with you. Sincerely, Roscoe Baig MD

## 2018-02-13 NOTE — PROGRESS NOTES
Consult  REFERRED BY:  Yovana Johnson MD    CHIEF COMPLAINT: Restless leg syndrome, depression, insomnia, and memory loss      Subjective:     Twylla Lennox is a 80 y.o. right-handed  female seen for evaluation at the request of Dr. Choco Fonseca for  a new problem of worsening restless leg syndrome to the point that the patient cannot sleep and takes Requip 3 mg twice a day, taking it at 6 or 7 PM, and then at 9 PM each night. . The patient does have diabetes and diabetic neuropathy, and that may be a cause of her restless leg syndromes. We recommended that she try melatonin for sleep, and she can take Tylenol PM at bedtime in addition if he needs something else for her pain. This also helps her too, and she seems to have more arthritis is bothering her to the Tylenol PM may help that in addition. Her symptoms initially got better when she went off the Effexor. She is only on BuSpar now 5 mg twice a day. She is off her iron pills in addition. We will maybe increase the Requip more if we need to. She was intolerant of the Neurontin, and does not want to take that anymore. She is also seen for evaluation of a multiplicity of neurological problems, the main ones being progressive memory loss, worsening restless leg syndrome, severe depression, diabetic neuropathy. Patient says she has had restless leg syndrome since 1998,   She has also tried other SSRIs including Celexa and other medication, and seems to have either intolerance or side effects on all the medications and will like to try a new antidepressant, and I suggested that she needs to see a therapist who is more skilled at using medication to help her along this line, and since we are going to change her restless legs medication we should do it only one thing at a time.   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 She has never had a history of stroke or seizures. She had some urinary frequency and we referred her to another neurologist, and she also has a fair amount of musculoskeletal pain. She has had some increased stress because her   (bankruptcy, and he had been unfaithful to her and that has really upset her. She has a lot of cardiac issues and shortness of breath. She has seen [facility] and they have told her that she has osteoarthritis of the hip lumbar spine and she was treated with physical therapy. She has had endoscopy and has esophagitis and gastritis. She also has fibromyalgia. She thinks she has had an EMG in the past also but has no idea when it was done who did it. She seen an audiologist and speech therapist.  As high-frequency hearing loss. He has had sleep studies done by neurologist in [facility]. She has chronic sinus problems. She has occasional headache and a generalized fashion. She gets blurred vision intermittently. She has migratory paresthesias over her arms and legs.       Past Medical History:   Diagnosis Date    Anxiety     Degenerative joint disease (DJD) of hip 2013    bursitis    Depression     Diabetes (HCC)     GERD (gastroesophageal reflux disease)     H/O hand fracture     Hyperlipidemia     Osteopenia     Recurrent UTI     RLS (restless legs syndrome)       Past Surgical History:   Procedure Laterality Date    HX APPENDECTOMY      HX COLONOSCOPY      HX DILATION AND CURETTAGE      HX TONSIL AND ADENOIDECTOMY       Family History   Problem Relation Age of Onset    Cancer Mother      breast/ lung    Cancer Father      prostate    Migraines Father     Cancer Sister      breast    Heart Disease Maternal Grandmother       Social History   Substance Use Topics    Smoking status: Former Smoker     Packs/day: 0.50     Years: 20.00     Types: Cigarettes     Quit date: 7/8/1960    Smokeless tobacco: Never Used    Alcohol use 1.8 oz/week     3 Glasses of wine per week         Current Outpatient Prescriptions:     LUTEIN PO, Take  by mouth., Disp: , Rfl:     melatonin 5 mg cap capsule, Take 5 mg by mouth nightly., Disp: , Rfl:     rOPINIRole (REQUIP) 3 mg tab tab, Take 1 Tab by mouth two (2) times a day., Disp: 100 Tab, Rfl: 11    zinc 50 mg tab tablet, Take  by mouth daily. , Disp: , Rfl:     busPIRone (BUSPAR) 5 mg tablet, Take 1 Tab by mouth two (2) times a day., Disp: 60 Tab, Rfl: 5    metFORMIN (GLUCOPHAGE) 500 mg tablet, Take 1 Tab by mouth two (2) times daily (with meals). , Disp: 180 Tab, Rfl: 4    glucose blood VI test strips (RELION PRIME TEST STRIPS) strip, by Does Not Apply route two (2) times a day. Use 1 strip each time to test glucose 2 times a day. E11.9, Disp: 100 Strip, Rfl: 11    glimepiride (AMARYL) 2 mg tablet, Take 1 Tab by mouth every morning., Disp: 30 Tab, Rfl: 12    cholecalciferol (VITAMIN D3) 1,000 unit tablet, Take  by mouth daily. , Disp: , Rfl:     vit B Cmplx 3-FA-Vit C-Biotin (NEPHRO RAJEEV RX) 1- mg-mg-mcg tablet, Take 1 Tab by mouth daily.  Indications: with stress relief B 12, Disp: , Rfl:     magnesium 250 mg tab, Take 500 mg by mouth nightly., Disp: , Rfl:         No Known Allergies     Review of Systems:  A comprehensive review of systems was negative except for: Constitutional: positive for fatigue, malaise and anorexia  Eyes: positive for visual disturbance  Ears, nose, mouth, throat, and face: positive for hearing loss and tinnitus  Respiratory: positive for cough, wheezing or dyspnea on exertion  Cardiovascular: positive for chest pressure/discomfort, dyspnea, palpitations, irregular heart beats, near-syncope  Gastrointestinal: positive for dyspepsia, reflux symptoms and nausea  Genitourinary: positive for frequency, urinary incontinence and hesitancy  Musculoskeletal: positive for myalgias, arthralgias, stiff joints, neck pain, back pain and muscle weakness  Neurological: positive for headaches, memory problems, paresthesia, coordination problems, gait problems, tremor and weakness  Behvioral/Psych: positive for anxiety and depression   Vitals:    02/12/18 1414   BP: 110/62   Pulse: 97   SpO2: 97%   Weight: 137 lb (62.1 kg)   Height: 5' (1.524 m)     Objective:     I      NEUROLOGICAL EXAM:    Appearance:   patient well-developed, well-nourished, provides a fair history and is in no acute distress. Mental Status: Oriented to time, place and person, and the president, patient can do serial sevens, remember 2 out of 3 words at 30 seconds with distraction, spell world backwards and draw a clock that shows a time 10:50, basically a normal Mini-Mental status exam and cognitive function is normal and speech is fluent and no aphasia or dysarthria. Mood and affect anxious and depressed . Cranial Nerves:   Intact visual fields. Fundi are benign. CASIMIRO, EOM's full, no nystagmus, no ptosis. Facial sensation is normal. Corneal reflexes are not tested. Facial movement is symmetric. Hearing is abnormal bilaterally. Palate is midline with normal sternocleidomastoid and trapezius muscles are normal. Tongue is midline. Neck without meningismus or bruits  Temporal arteries are not tender or enlarged    Motor:  4/5 strength in upper and lower proximal and distal muscles. Normal bulk and tone. No fasciculations. Reflexes:   Deep tendon reflexes 1+/4 and symmetrical.  No babinski or clonus present   Sensory:   Normal to touch, pinprick and vibration and temperature decreased in both feet. DSS is intact   Gait:  Normal gait for her age and arthritis . Tremor:   No tremor noted. Cerebellar:  No cerebellar signs present. Neurovascular:  Normal heart sounds and regular rhythm, peripheral pulses decreased, and no carotid bruits. Assessment:       ICD-10-CM ICD-9-CM    1.  Diabetic peripheral neuropathy associated with type 2 diabetes mellitus (HCC) E11.42 250.60 LUTEIN PO     357.2 melatonin 5 mg cap capsule      rOPINIRole (REQUIP) 3 mg tab tab   2. 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 433.10 LUTEIN PO     433.30 melatonin 5 mg cap capsule      rOPINIRole (REQUIP) 3 mg tab tab   3. Cerebral microvascular disease I67.9 437.9 LUTEIN PO      melatonin 5 mg cap capsule      rOPINIRole (REQUIP) 3 mg tab tab   4. RLS (restless legs syndrome) G25.81 333.94 LUTEIN PO      melatonin 5 mg cap capsule      rOPINIRole (REQUIP) 3 mg tab tab         Plan:     Patient with a multiplicity of problems, the most salient seem to be restless leg syndrome, for which we will continue the Requip 3 mg twice a day, and start melatonin 5 mg at night, and she can take Tylenol PM as tolerated  New prescription in for patient today for the Requip. For her memory loss she needs to remain mentally and physically active and take her vitamins and vitamin D on a regular basis  For her insomnia she is to try melatonin 5 mg at night  She is to take a multivitamin, vitamin D, and try to remain mentally and physically active  Carotid Dopplers unremarkable last visit  35 minutes spent with patient and her daughters in detail, reviewed her records, reviewed her MRI scan and lab tests personally on the computer, discussing her history, and having in-depth conversation with both daughters and the patient, and discussing treatment options, therapeutic plans, diagnostic workup, and they agree with plans. Signed By: Carolin Willett MD     February 12, 2018       CC: Heidi Bailey MD  FAX: [phone]    This note will not be viewable in [address]

## 2018-04-09 ENCOUNTER — TELEPHONE (OUTPATIENT)
Dept: FAMILY MEDICINE CLINIC | Age: 83
End: 2018-04-09

## 2018-04-09 NOTE — TELEPHONE ENCOUNTER
Spoke with Andrew Hamilton, states she was rushed to [facility] with severe chest pain.   Ruled out heart, told costochondritis, sent home with Z-pack, home on the 5th, had to hold Metformin for 48hours, concerned about resuming  Metformin, explained reason for holding and at this point [id number] [address] to resume as it has been >3days

## 2018-04-09 NOTE — TELEPHONE ENCOUNTER
Patient was seen in ER Thurs. 5th. They told her to hold her Metformin for a few days and get in touch with her PCP . She would like to speak with Dr. Treva Pool or Thuy Spicer [id number].

## 2018-04-23 ENCOUNTER — TELEPHONE (OUTPATIENT)
Dept: FAMILY MEDICINE CLINIC | Age: 83
End: 2018-04-23

## 2018-04-23 DIAGNOSIS — Z12.39 SCREENING FOR BREAST CANCER: Primary | ICD-10-CM

## 2018-04-23 NOTE — TELEPHONE ENCOUNTER
LVM that she is due for Mammogram, checking to see if she had had one already and if not does she need us to schedule for her.   Patient called back, will schedule her mammogram

## 2018-04-24 ENCOUNTER — TELEPHONE (OUTPATIENT)
Dept: FAMILY MEDICINE CLINIC | Age: 83
End: 2018-04-24

## 2018-04-24 DIAGNOSIS — E11.9 TYPE 2 DIABETES MELLITUS WITHOUT COMPLICATION, WITHOUT LONG-TERM CURRENT USE OF INSULIN (HCC): ICD-10-CM

## 2018-04-24 RX ORDER — GLIMEPIRIDE 2 MG/1
2 TABLET ORAL
Qty: 90 TAB | Refills: 3 | Status: SHIPPED | OUTPATIENT
Start: 2018-04-24 | End: 2019-07-25 | Stop reason: SDUPTHER

## 2018-04-24 NOTE — TELEPHONE ENCOUNTER
Spoke with patient, we received a refill request from [address] [place] [address] thinking the patient requested the refill, she didn't recognize the name.

## 2018-04-24 NOTE — TELEPHONE ENCOUNTER
----- Message from [facility] sent at 4/24/2018 11:24 AM EDT -----  Regarding: Dr. Yoselin Dia  Pt stated the pharmacy called her and told her that she had \"Glimepiride\" ready and she is unaware of why the doctor called her in this medication? Pt best contact [phone].

## 2018-05-11 ENCOUNTER — OFFICE VISIT (OUTPATIENT)
Dept: FAMILY MEDICINE CLINIC | Age: 83
End: 2018-05-11

## 2018-05-11 VITALS
WEIGHT: 132 LBS | DIASTOLIC BLOOD PRESSURE: 60 MMHG | TEMPERATURE: 98.1 F | SYSTOLIC BLOOD PRESSURE: 94 MMHG | OXYGEN SATURATION: 95 % | BODY MASS INDEX: 24.92 KG/M2 | HEART RATE: 80 BPM | HEIGHT: 61 IN | RESPIRATION RATE: 16 BRPM

## 2018-05-11 DIAGNOSIS — E53.8 CYANOCOBALAMIN DEFICIENCY: ICD-10-CM

## 2018-05-11 DIAGNOSIS — E78.2 MIXED HYPERLIPIDEMIA: ICD-10-CM

## 2018-05-11 DIAGNOSIS — E53.8 B12 DEFICIENCY: ICD-10-CM

## 2018-05-11 DIAGNOSIS — Z00.00 MEDICARE ANNUAL WELLNESS VISIT, SUBSEQUENT: Primary | ICD-10-CM

## 2018-05-11 DIAGNOSIS — E11.42 DIABETIC PERIPHERAL NEUROPATHY ASSOCIATED WITH TYPE 2 DIABETES MELLITUS (HCC): ICD-10-CM

## 2018-05-11 RX ORDER — METFORMIN HYDROCHLORIDE 500 MG/1
500 TABLET, EXTENDED RELEASE ORAL
Qty: 90 TAB | Refills: 4 | Status: SHIPPED | OUTPATIENT
Start: 2018-05-11 | End: 2019-09-03 | Stop reason: SDUPTHER

## 2018-05-11 NOTE — ACP (ADVANCE CARE PLANNING)
Pt has advance directive at home. Recommended to bring by the clinic for inclusion in chart at patient's convenience.  Discussed 5/11/2018

## 2018-05-11 NOTE — PATIENT INSTRUCTIONS
If you have any questions regarding [employer], you may call [employer] support at [phone].

## 2018-05-11 NOTE — MR AVS SNAPSHOT
[facility] 
 
 
 [address] [address] [phone] Patient: Herve Tilley 
MRN: [id number] BAR:5/85/4650 Visit Information Date & Time Provider Department Dept. Phone Encounter #  
 5/11/2018  2:00 PM So Green, Kareem Schroeder [id number] Follow-up Instructions Return in about 4 months (around 9/11/2018). Your Appointments 9/4/2018  3:00 PM  
Follow Up with Manpreet Rosales MD  
[facility] at [facility] Appt Note: Follow up $0CP tdb 2/12/18  
 200 [facility], 
[address] [place] [address], [address] [address]  
[address] [facility], 300 [facility], [address] [address] Upcoming Health Maintenance Date Due MICROALBUMIN Q1 4/5/2017 EYE EXAM RETINAL OR DILATED Q1 11/3/2017 MEDICARE YEARLY EXAM 3/14/2018 HEMOGLOBIN A1C Q6M 7/12/2018 Influenza Age 5 to Adult 8/1/2018 GLAUCOMA SCREENING Q2Y 11/3/2018 FOOT EXAM Q1 1/12/2019 LIPID PANEL Q1 1/12/2019 BREAST CANCER SCRN MAMMOGRAM 5/4/2019 DTaP/Tdap/Td series (2 - Td) 3/1/2027 Allergies as of 5/11/2018  Review Complete On: 5/11/2018 By: So Green MD  
 No Known Allergies Current Immunizations  Reviewed on 8/2/2017 Name Date Influenza High Dose Vaccine PF 1/12/2018  1:47 PM, 10/5/2016, 9/25/2015 Pneumococcal Conjugate (PCV-13) 3/1/2017 10:48 AM  
 Pneumococcal Polysaccharide (PPSV-23) 11/5/2010 Td 10/2/2015 Zoster Vaccine, Live 11/5/2010 Not reviewed this visit You Were Diagnosed With   
  
 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 
ICD-9-CM: 250.60, 357.2 B12 deficiency     ICD-10-CM: E53.8 ICD-9-CM: 266.2 Mixed hyperlipidemia     ICD-10-CM: E78.2 ICD-9-CM: 272.2 Cyanocobalamin deficiency     ICD-10-CM: E53.8 ICD-9-CM: 266.2 Vitals BP Pulse Temp Resp Height(growth percentile) Weight(growth percentile) 94/60 (BP 1 Location: Left arm, BP Patient Position: Sitting) 80 98.1 °F (36.7 °C) (Oral) 16 5' 0.5\" (1.537 m) 132 lb (59.9 kg) SpO2 BMI OB Status Smoking Status 95% 25.36 kg/m2 Postmenopausal Former Smoker BMI and BSA Data Body Mass Index Body Surface Area  
 25.36 kg/m 2 1.6 m 2 Preferred Pharmacy Pharmacy Name Phone [address], [address] [phone] Your Updated Medication List  
  
   
This list is accurate as of 5/11/18  3:05 PM.  Always use your most recent med list.  
  
  
  
  
 busPIRone 5 mg tablet Commonly known as:  BUSPAR Take 1 Tab by mouth two (2) times a day. glimepiride 2 mg tablet Commonly known as:  AMARYL Take 1 Tab by mouth every morning. glucose blood VI test strips strip Commonly known as:  RELION PRIME TEST STRIPS  
by Does Not Apply route two (2) times a day. Use 1 strip each time to test glucose 2 times a day. E11.9 LUTEIN PO Take  by mouth.  
  
 magnesium 250 mg Tab Take 500 mg by mouth nightly. melatonin 5 mg Cap capsule Take 5 mg by mouth nightly. metFORMIN 500 mg tablet Commonly known as:  GLUCOPHAGE Take 1 Tab by mouth two (2) times daily (with meals). rOPINIRole 3 mg Tab tab Commonly known as:  Clarice Bella Take 1 Tab by mouth two (2) times a day. vit B Cmplx 3-FA-Vit C-Biotin 1- mg-mg-mcg tablet Commonly known as:  NEPHRO RAJEEV RX Take 1 Tab by mouth daily. Indications: with stress relief B 12 VITAMIN D3 1,000 unit tablet Generic drug:  cholecalciferol Take  by mouth daily. zinc 50 mg Tab tablet Take  by mouth daily. We Performed the Following CBC WITH AUTOMATED DIFF [07324 CPT(R)] HEMOGLOBIN A1C WITH EAG [37892 CPT(R)]  DIABETES FOOT EXAM [HM7 Custom] LIPID PANEL [65899 CPT(R)] METABOLIC PANEL, COMPREHENSIVE [92367 CPT(R)] MICROALBUMIN, UR, RAND W/ MICROALB/CREAT RATIO [id number] CPT(R)] TSH 3RD GENERATION [28885 CPT(R)] VITAMIN B12 [id number] CPT(R)] Follow-up Instructions Return in about 4 months (around 9/11/2018). Patient Instructions If you have any questions regarding [employer], you may call [employer] support at [phone]. Introducing [facility] & HEALTH SERVICES! Leonel Suárez introduces too.me patient portal. Now you can access parts of your medical record, email your doctor's office, and request medication refills online. 1. In your internet browser, go to [email or web address] 2. Click on the First Time User? Click Here link in the Sign In box. You will see the New Member Sign Up page. 3. Enter your too.me Access Code exactly as it appears below. You will not need to use this code after youve completed the sign-up process. If you do not sign up before the expiration date, you must request a new code. · too.me Access Code: [email or web address] Expires: 8/1/2018 12:34 PM 
 
4. Enter the last four digits of your Social Security Number (xxxx) and Date of Birth (mm/dd/yyyy) as indicated and click Submit. You will be taken to the next sign-up page. 5. Create a too.me ID. This will be your too.me login ID and cannot be changed, so think of one that is secure and easy to remember. 6. Create a too.me password. You can change your password at any time. 7. Enter your Password Reset Question and Answer. This can be used at a later time if you forget your password. 8. Enter your e-mail address. You will receive e-mail notification when new information is available in [address]. 9. Click Sign Up. You can now view and download portions of your medical record. 10. Click the Download Summary menu link to download a portable copy of your medical information. If you have questions, please visit the Frequently Asked Questions section of the [employer] website. Remember, [employer] is NOT to be used for urgent needs. For medical emergencies, dial 911. Now available from your iPhone and Android! Please provide this summary of care documentation to your next provider. Your primary care clinician is listed as Brayden Santana. If you have any questions after today's visit, please call [phone].

## 2018-05-11 NOTE — PROGRESS NOTES
Chief Complaint   Patient presents with    Annual Wellness Visit    Diabetes         HPI:       is a 80 y.o. female  who has T2DM. Lives alone in [facility]. Last A1c < 7. Some recent diarrhea. Has experienced some weight loss. Prefers to eat protein bars rather than cook. This is a longstanding habit. Due for SAWV. No Known Allergies    Current Outpatient Prescriptions   Medication Sig    glimepiride (AMARYL) 2 mg tablet Take 1 Tab by mouth every morning.  LUTEIN PO Take  by mouth.  melatonin 5 mg cap capsule Take 5 mg by mouth nightly.  busPIRone (BUSPAR) 5 mg tablet Take 1 Tab by mouth two (2) times a day.  metFORMIN (GLUCOPHAGE) 500 mg tablet Take 1 Tab by mouth two (2) times daily (with meals).  glucose blood VI test strips (RELION PRIME TEST STRIPS) strip by Does Not Apply route two (2) times a day. Use 1 strip each time to test glucose 2 times a day. E11.9    cholecalciferol (VITAMIN D3) 1,000 unit tablet Take  by mouth daily.  vit B Cmplx 3-FA-Vit C-Biotin (NEPHRO RAJEEV RX) 1- mg-mg-mcg tablet Take 1 Tab by mouth daily. Indications: with stress relief B 12    magnesium 250 mg tab Take 500 mg by mouth nightly.  rOPINIRole (REQUIP) 3 mg tab tab Take 1 Tab by mouth two (2) times a day.  zinc 50 mg tab tablet Take  by mouth daily. No current facility-administered medications for this visit. Past Medical History:   Diagnosis Date    Anxiety     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    bursitis    Depression     Diabetes (HCC)     GERD (gastroesophageal reflux disease)     H/O hand fracture     Hyperlipidemia     Menopause     Osteopenia     Recurrent UTI     RLS (restless legs syndrome)          ROS:  Denies fever, chills, cough, chest pain, SOB,  nausea, or vomiting. Denies wt loss, wt gain, hemoptysis, hematochezia or melena.     Physical Examination:    BP 94/60 (BP 1 Location: Left arm, BP Patient Position: Sitting) Pulse 80  Temp 98.1 °F (36.7 °C) (Oral)   Resp 16  Ht 5' 0.5\" (1.537 m)  Wt 132 lb (59.9 kg)  SpO2 95%  BMI 25.36 kg/m2    General: Alert and Ox3, Fluent speech  HEENT:  NC/AT, EOMI, OP: clear  Neck:  Supple, no adenopathy, JVD, mass or bruit  Chest:  Clear to Ausculation, without wheezes, rales, rubs or ronchi  Cardiac: RRR  Abdomen:  +BS, soft, nontender without palpable HSM  Extremities:  No cyanosis, clubbing or edema  Neurologic:  Ambulatory without assist, CN 2-12 grossly intact. Moves all extremities. Skin: no rash  Lymphadenopathy: no cervical or supraclavicular nodes      ASSESSMENT AND PLAN:     1. T2DM:  Due for labs  2. SAWV today  3. Review A1c:  She may need to have her T2DM meds reduced    Orders Placed This Encounter    HEMOGLOBIN A1C WITH EAG    TSH 3RD GENERATION    LIPID PANEL    METABOLIC PANEL, COMPREHENSIVE    CBC WITH AUTOMATED DIFF    MICROALBUMIN, UR, RAND W/ MICROALB/CREAT RATIO    VITAMIN B12    HM DIABETES FOOT EXAM       Juno Milner MD, FACP        ______________________________________________________________________    Vidal Lr is a 80 y.o. female and presents for annual Medicare Wellness Visit. Problem List: Reviewed with patient and discussed risk factors.     Patient Active Problem List   Diagnosis Code    Hyperlipidemia E78.5    Anxiety F41.9    Type 2 diabetes mellitus without complication (HCC) C27.2    RLS (restless legs syndrome) G25.81    Diabetic peripheral neuropathy associated with type 2 diabetes mellitus (HCC) E11.42    Memory difficulties R41.3    B12 deficiency E53.8    Vitamin D deficiency E55.9    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    Cerebral microvascular disease I67.9    Medicare annual wellness visit, subsequent Z00.00    Mixed hyperlipidemia E78.2    Depression F32.9       Current medical providers:  Patient Care Team:  Dao Garcia MD as PCP - General (Internal Medicine)    PM, , Medications/Allergies: reviewed, on chart. Female Alcohol Screening: On any occasion during the past 3 months, have you had more than 3 drinks containing alcohol? No    Do you average more than 7 drinks per week? No    ROS:  Constitutional: No fever, chills or weight loss  Respiratory: No cough, SOB   CV: No chest pain or Palpitations    Objective:  Visit Vitals    BP 94/60 (BP 1 Location: Left arm, BP Patient Position: Sitting)    Pulse 80    Temp 98.1 °F (36.7 °C) (Oral)    Resp 16    Ht 5' 0.5\" (1.537 m)    Wt 132 lb (59.9 kg)    SpO2 95%    BMI 25.36 kg/m2    Body mass index is 25.36 kg/(m^2). Assessment of cognitive impairment: Alert and oriented x 3    Depression Screen:   PHQ over the last two weeks 8/2/2017   PHQ Not Done Active Diagnosis of Depression or Bipolar Disorder   Little interest or pleasure in doing things -   Feeling down, depressed or hopeless -   Total Score PHQ 2 -       Fall Risk Assessment:    Fall Risk Assessment, last 12 mths 5/11/2018   Able to walk? Yes   Fall in past 12 months? No       Functional Ability:   Does the patient exhibit a steady gait? yes   How long did it take the patient to get up and walk from a sitting position? 12 sec   Is the patient self reliant?  (ie can do own laundry, meals, household chores)  yes     Does the patient handle his/her own medications? yes     Does the patient handle his/her own money? yes     Is the patients home safe (ie good lighting, handrails on stairs and bath, etc.)? yes     Did you notice or did patient express any hearing difficulties? yes     Did you notice or did patient express any vision difficulties?    no       Advance Care Planning:   Patient was offered the opportunity to discuss advance care planning:  yes     Does patient have an Advance Directive:  yes   If no, did you provide information on Caring Connections?  no       Plan:      Orders Placed This Encounter    HEMOGLOBIN A1C WITH EAG    TSH 3RD GENERATION    LIPID PANEL    METABOLIC PANEL, COMPREHENSIVE    CBC WITH AUTOMATED DIFF    MICROALBUMIN, UR, RAND W/ MICROALB/CREAT RATIO    VITAMIN B12    HM DIABETES FOOT EXAM       Health Maintenance   Topic Date Due    MICROALBUMIN Q1  04/05/2017    EYE EXAM RETINAL OR DILATED Q1  11/03/2017    MEDICARE YEARLY EXAM  03/14/2018    HEMOGLOBIN A1C Q6M  07/12/2018    Influenza Age 9 to Adult  08/01/2018    GLAUCOMA SCREENING Q2Y  11/03/2018    FOOT EXAM Q1  01/12/2019    LIPID PANEL Q1  01/12/2019    BREAST CANCER SCRN MAMMOGRAM  05/04/2019    DTaP/Tdap/Td series (2 - Td) 03/01/2027    Bone Densitometry (Dexa) Screening  Completed    ZOSTER VACCINE AGE 60>  Completed    Pneumococcal 65+ Low/Medium Risk  Completed       *Patient verbalized understanding and agreement with the plan. A copy of the After Visit Summary with personalized health plan was given to the patient today.

## 2018-05-11 NOTE — PROGRESS NOTES
1. Have you been to the ER, urgent care clinic since your last visit? Hospitalized since your last visit? Yes Reason for visit: Yes, [place], costochondritis    2. Have you seen or consulted any other health care providers outside of the [address] [place] [address] since your last visit? Include any pap smears or colon screening.  No

## 2018-05-12 LAB
ALBUMIN SERPL-MCNC: 4.4 G/DL (ref 3.5–4.7)
ALBUMIN/CREAT UR: 7.7 MG/G CREAT (ref 0–30)
ALBUMIN/GLOB SERPL: 2.1 {RATIO} (ref 1.2–2.2)
ALP SERPL-CCNC: 57 IU/L (ref 39–117)
ALT SERPL-CCNC: 22 IU/L (ref 0–32)
AST SERPL-CCNC: 26 IU/L (ref 0–40)
BASOPHILS # BLD AUTO: 0 X10E3/UL (ref 0–0.2)
BASOPHILS NFR BLD AUTO: 0 %
BILIRUB SERPL-MCNC: 0.4 MG/DL (ref 0–1.2)
BUN SERPL-MCNC: 18 MG/DL (ref 8–27)
BUN/CREAT SERPL: 28 (ref 12–28)
CALCIUM SERPL-MCNC: 10 MG/DL (ref 8.7–10.3)
CHLORIDE SERPL-SCNC: 105 MMOL/L (ref 96–106)
CHOLEST SERPL-MCNC: 201 MG/DL (ref 100–199)
CO2 SERPL-SCNC: 25 MMOL/L (ref 18–29)
CREAT SERPL-MCNC: 0.64 MG/DL (ref 0.57–1)
CREAT UR-MCNC: 77.6 MG/DL
EOSINOPHIL # BLD AUTO: 0.2 X10E3/UL (ref 0–0.4)
EOSINOPHIL NFR BLD AUTO: 2 %
ERYTHROCYTE [DISTWIDTH] IN BLOOD BY AUTOMATED COUNT: 13.6 % (ref 12.3–15.4)
EST. AVERAGE GLUCOSE BLD GHB EST-MCNC: 148 MG/DL
GFR SERPLBLD CREATININE-BSD FMLA CKD-EPI: 83 ML/MIN/1.73
GFR SERPLBLD CREATININE-BSD FMLA CKD-EPI: 95 ML/MIN/1.73
GLOBULIN SER CALC-MCNC: 2.1 G/DL (ref 1.5–4.5)
GLUCOSE SERPL-MCNC: 80 MG/DL (ref 65–99)
HBA1C MFR BLD: 6.8 % (ref 4.8–5.6)
HCT VFR BLD AUTO: 44 % (ref 34–46.6)
HDLC SERPL-MCNC: 68 MG/DL
HGB BLD-MCNC: 14.5 G/DL (ref 11.1–15.9)
IMM GRANULOCYTES # BLD: 0 X10E3/UL (ref 0–0.1)
IMM GRANULOCYTES NFR BLD: 0 %
LDLC SERPL CALC-MCNC: 111 MG/DL (ref 0–99)
LYMPHOCYTES # BLD AUTO: 1.7 X10E3/UL (ref 0.7–3.1)
LYMPHOCYTES NFR BLD AUTO: 18 %
MCH RBC QN AUTO: 30.6 PG (ref 26.6–33)
MCHC RBC AUTO-ENTMCNC: 33 G/DL (ref 31.5–35.7)
MCV RBC AUTO: 93 FL (ref 79–97)
MICROALBUMIN UR-MCNC: 6 UG/ML
MONOCYTES # BLD AUTO: 0.6 X10E3/UL (ref 0.1–0.9)
MONOCYTES NFR BLD AUTO: 6 %
NEUTROPHILS # BLD AUTO: 6.7 X10E3/UL (ref 1.4–7)
NEUTROPHILS NFR BLD AUTO: 74 %
PLATELET # BLD AUTO: 258 X10E3/UL (ref 150–379)
POTASSIUM SERPL-SCNC: 4 MMOL/L (ref 3.5–5.2)
PROT SERPL-MCNC: 6.5 G/DL (ref 6–8.5)
RBC # BLD AUTO: 4.74 X10E6/UL (ref 3.77–5.28)
SODIUM SERPL-SCNC: 146 MMOL/L (ref 134–144)
TRIGL SERPL-MCNC: 109 MG/DL (ref 0–149)
TSH SERPL DL<=0.005 MIU/L-ACNC: 2.33 UIU/ML (ref 0.45–4.5)
VIT B12 SERPL-MCNC: 943 PG/ML (ref 232–1245)
VLDLC SERPL CALC-MCNC: 22 MG/DL (ref 5–40)
WBC # BLD AUTO: 9.2 X10E3/UL (ref 3.4–10.8)

## 2018-05-18 ENCOUNTER — TELEPHONE (OUTPATIENT)
Dept: FAMILY MEDICINE CLINIC | Age: 83
End: 2018-05-18

## 2018-05-18 NOTE — TELEPHONE ENCOUNTER
----- Message from Andres Mejia sent at 5/18/2018  8:38 AM EDT -----  Regarding: Dr. Kong Stanley  Pt is requesting a call back regarding questions she has about the change in her prescription.     Best contact number [phone]

## 2018-05-18 NOTE — TELEPHONE ENCOUNTER
Spoke with daughter, unable to reach Deepali Peoa, Dr. Jose Esposito did decrease Metformin to once a day with dinner, daughter remembers the coversation, she will try to contact Deepali Allison.

## 2018-05-31 ENCOUNTER — OFFICE VISIT (OUTPATIENT)
Dept: FAMILY MEDICINE CLINIC | Age: 83
End: 2018-05-31

## 2018-05-31 ENCOUNTER — TELEPHONE (OUTPATIENT)
Dept: FAMILY MEDICINE CLINIC | Age: 83
End: 2018-05-31

## 2018-05-31 VITALS
OXYGEN SATURATION: 97 % | TEMPERATURE: 98.1 F | SYSTOLIC BLOOD PRESSURE: 120 MMHG | WEIGHT: 132.6 LBS | RESPIRATION RATE: 12 BRPM | HEART RATE: 86 BPM | HEIGHT: 61 IN | BODY MASS INDEX: 25.04 KG/M2 | DIASTOLIC BLOOD PRESSURE: 58 MMHG

## 2018-05-31 DIAGNOSIS — L30.8 LYME DERMATITIS: Primary | ICD-10-CM

## 2018-05-31 DIAGNOSIS — A69.29 LYME DERMATITIS: Primary | ICD-10-CM

## 2018-05-31 RX ORDER — DOXYCYCLINE 100 MG/1
100 CAPSULE ORAL 2 TIMES DAILY
Qty: 20 CAP | Refills: 0 | Status: SHIPPED | OUTPATIENT
Start: 2018-05-31 | End: 2018-06-10

## 2018-05-31 NOTE — MR AVS SNAPSHOT
[facility] 
 
 
 1000 [facility] [address] [facility],[address] [address] [phone] Patient: Theodore Hough 
MRN: [id number] CEC:[birth year] Visit Information Date & Time Provider Department Dept. Phone Encounter #  
 5/31/2018 11:25 AM Keely Meyers MD Radha7 Pasha Schroeder [id number] Follow-up Instructions Return if symptoms worsen or fail to improve. Routing History Follow-up and Disposition History Your Appointments 9/4/2018  3:00 PM  
Follow Up with Reji Aviles MD  
Neurology Clinic at [facility]) Appt Note: Follow up $0CP tdb 2/12/18  
 500 [facility], 
[address] [place] [address], [address]  
[phone]  
  
   
 [address] [place] [address], [address] [place] [address].[place] [phone]  
  
    
 9/11/2018 10:00 AM  
ESTABLISHED PATIENT with Keely Meyres MD  
[address] ([facility]) Appt Note: 4 mo f/u  
 1000 [facility] [address] [facility],[address] [address] [phone]  
  
   
 [address] [address] Upcoming Health Maintenance Date Due  
 EYE EXAM RETINAL OR DILATED Q1 11/3/2017 Influenza Age 5 to Adult 8/1/2018 GLAUCOMA SCREENING Q2Y 11/3/2018 HEMOGLOBIN A1C Q6M 11/11/2018 BREAST CANCER SCRN MAMMOGRAM 5/4/2019 FOOT EXAM Q1 5/11/2019 MICROALBUMIN Q1 5/11/2019 LIPID PANEL Q1 5/11/2019 MEDICARE YEARLY EXAM 5/12/2019 DTaP/Tdap/Td series (2 - Td) 3/1/2027 Allergies as of 5/31/2018  Review Complete On: 5/31/2018 By: Keely Meyers MD  
 No Known Allergies Current Immunizations  Reviewed on 8/2/2017 Name Date Influenza High Dose Vaccine PF 1/12/2018  1:47 PM, 10/5/2016, 9/25/2015 Pneumococcal Conjugate (PCV-13) 3/1/2017 10:48 AM  
 Pneumococcal Polysaccharide (PPSV-23) 11/5/2010 Td 10/2/2015 Zoster Vaccine, Live 11/5/2010 Not reviewed this visit You Were Diagnosed With   
  
 Codes Comments Lyme dermatitis    -  Primary ICD-10-CM: A69.29, L30.8 ICD-9-CM: 088.81, 692.89 Vitals BP Pulse Temp Resp Height(growth percentile) 120/58 (BP 1 Location: Right arm, BP Patient Position: Sitting) 86 98.1 °F (36.7 °C) (Temporal) 12 5' 0.5\" (1.537 m) Weight(growth percentile) SpO2 BMI OB Status Smoking Status 132 lb 9.6 oz (60.1 kg) 97% 25.47 kg/m2 Postmenopausal Former Smoker BMI and BSA Data Body Mass Index Body Surface Area  
 25.47 kg/m 2 1.6 m 2 Preferred Pharmacy Pharmacy Name Phone [address], [address] [phone] Your Updated Medication List  
  
   
This list is accurate as of 5/31/18 12:27 PM.  Always use your most recent med list.  
  
  
  
  
 busPIRone 5 mg tablet Commonly known as:  BUSPAR Take 1 Tab by mouth two (2) times a day. doxycycline 100 mg capsule Commonly known as:  VIBRAMYCIN Take 1 Cap by mouth two (2) times a day for 10 days. glimepiride 2 mg tablet Commonly known as:  AMARYL Take 1 Tab by mouth every morning. glucose blood VI test strips strip Commonly known as:  RELION PRIME TEST STRIPS  
by Does Not Apply route two (2) times a day. Use 1 strip each time to test glucose 2 times a day. E11.9 LUTEIN PO Take  by mouth.  
  
 magnesium 250 mg Tab Take 500 mg by mouth nightly. melatonin 5 mg Cap capsule Take 5 mg by mouth nightly. metFORMIN  mg tablet Commonly known as:  GLUCOPHAGE XR Take 1 Tab by mouth daily (with dinner). rOPINIRole 3 mg Tab tab Commonly known as:  Garald Kenedy Take 1 Tab by mouth two (2) times a day. vit B Cmplx 3-FA-Vit C-Biotin 1- mg-mg-mcg tablet Commonly known as:  NEPHRO RAJEEV RX Take 1 Tab by mouth daily. Indications: with stress relief B 12 VITAMIN D3 1,000 unit tablet Generic drug:  cholecalciferol Take  by mouth daily. zinc 50 mg Tab tablet Take  by mouth daily. Prescriptions Sent to Pharmacy Refills  
 doxycycline (VIBRAMYCIN) 100 mg capsule 0 Sig: Take 1 Cap by mouth two (2) times a day for 10 days. Class: Normal  
 Pharmacy: [facility] [address], [address] [facility] [address]  #: [phone] Route: Oral  
  
Follow-up Instructions Return if symptoms worsen or fail to improve. Patient Instructions If you have any questions regarding [employer], you may call [employer] support at [phone]. Introducing [facility] & HEALTH SERVICES! [facility] introduces [employer] patient portal. Now you can access parts of your medical record, email your doctor's office, and request medication refills online. 1. In your internet browser, go to [email or web address] 2. Click on the First Time User? Click Here link in the Sign In box. You will see the New Member Sign Up page. 3. Enter your [employer] Access Code exactly as it appears below. You will not need to use this code after youve completed the sign-up process. If you do not sign up before the expiration date, you must request a new code. · [employer] Access Code: [email or web address] Expires: 8/1/2018 12:34 PM 
 
4. Enter the last four digits of your Social Security Number (xxxx) and Date of Birth (mm/dd/yyyy) as indicated and click Submit. You will be taken to the next sign-up page. 5. Create a [employer]at ID. This will be your [employer] login ID and cannot be changed, so think of one that is secure and easy to remember. 6. Create a [employer]at password. You can change your password at any time. 7. Enter your Password Reset Question and Answer. This can be used at a later time if you forget your password. 8. Enter your e-mail address. You will receive e-mail notification when new information is available in [address] 9. Click Sign Up. You can now view and download portions of your medical record. 10. Click the Download Summary menu link to download a portable copy of your medical information. If you have questions, please visit the Frequently Asked Questions section of the [employer] website. Remember, [employer] is NOT to be used for urgent needs. For medical emergencies, dial 911. Now available from your iPhone and Android! Please provide this summary of care documentation to your next provider. Your primary care clinician is listed as Addy Tay. If you have any questions after today's visit, please call [phone].

## 2018-05-31 NOTE — PROGRESS NOTES
Chief Complaint   Patient presents with    Tick Removal     bite         HPI:         is a 80 y.o. female who notes the onset of a skin problem. The details are as follows:  Daughter removed tick from her back and she has a red rash. No fever      No Known Allergies    Current Outpatient Prescriptions   Medication Sig    doxycycline (VIBRAMYCIN) 100 mg capsule Take 1 Cap by mouth two (2) times a day for 10 days.  metFORMIN ER (GLUCOPHAGE XR) 500 mg tablet Take 1 Tab by mouth daily (with dinner).  glimepiride (AMARYL) 2 mg tablet Take 1 Tab by mouth every morning.  LUTEIN PO Take  by mouth.  melatonin 5 mg cap capsule Take 5 mg by mouth nightly.  rOPINIRole (REQUIP) 3 mg tab tab Take 1 Tab by mouth two (2) times a day.  zinc 50 mg tab tablet Take  by mouth daily.  busPIRone (BUSPAR) 5 mg tablet Take 1 Tab by mouth two (2) times a day.  glucose blood VI test strips (RELION PRIME TEST STRIPS) strip by Does Not Apply route two (2) times a day. Use 1 strip each time to test glucose 2 times a day. E11.9    cholecalciferol (VITAMIN D3) 1,000 unit tablet Take  by mouth daily.  vit B Cmplx 3-FA-Vit C-Biotin (NEPHRO RAJEEV RX) 1- mg-mg-mcg tablet Take 1 Tab by mouth daily. Indications: with stress relief B 12    magnesium 250 mg tab Take 500 mg by mouth nightly. No current facility-administered medications for this visit. Past Medical History:   Diagnosis Date    Anxiety     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    bursitis    Depression     Diabetes (HCC)     GERD (gastroesophageal reflux disease)     H/O hand fracture     Hyperlipidemia     Menopause     Osteopenia     Recurrent UTI     RLS (restless legs syndrome)          ROS:  Denies fever, chills, cough, chest pain, SOB,  nausea, vomiting, or diarrhea. Denies wt loss, wt gain, hemoptysis, hematochezia or melena.     Physical Examination:    Visit Vitals    /58 (BP 1 Location: Right arm, BP Patient Position: Sitting)    Pulse 86    Temp 98.1 °F (36.7 °C) (Temporal)    Resp 12    Ht 5' 0.5\" (1.537 m)    Wt 132 lb 9.6 oz (60.1 kg)    SpO2 97%    BMI 25.47 kg/m2      General:  Alert, cooperative, no distress. Head:  Normocephalic, without obvious abnormality, atraumatic. Eyes:  Conjunctivae/corneas clear. Pupils equal, round, reactive to light. Chest wall:  No tenderness or deformity. Extremities: Extremities normal, atraumatic, no cyanosis or edema. Skin:         Lymph nodes: Cervical and supraclavicular nodes are normal.   Neurologic: Moves all extremities, fluent speech         ASSESSMENT AND PLAN:    1. Lyme vs STARI:  Doxy 100 mg BID for ten days. 2.  RTC if sx worsen    Orders Placed This Encounter    doxycycline (VIBRAMYCIN) 100 mg capsule     Sig: Take 1 Cap by mouth two (2) times a day for 10 days.      Dispense:  20 Cap     Refill:  0       Jaja Pandey MD, [address]

## 2018-05-31 NOTE — PATIENT INSTRUCTIONS
If you have any questions regarding [employer], you may call [employer] support at [phone].

## 2018-05-31 NOTE — PROGRESS NOTES
1. Have you been to the ER, urgent care clinic since your last visit? Hospitalized since your last visit? No    2. Have you seen or consulted any other health care providers outside of the [address] [place] [address] since your last visit? Include any pap smears or colon screening.  No

## 2018-06-15 ENCOUNTER — OFFICE VISIT (OUTPATIENT)
Dept: FAMILY MEDICINE CLINIC | Age: 83
End: 2018-06-15

## 2018-06-15 VITALS
OXYGEN SATURATION: 98 % | DIASTOLIC BLOOD PRESSURE: 60 MMHG | TEMPERATURE: 98.3 F | SYSTOLIC BLOOD PRESSURE: 118 MMHG | RESPIRATION RATE: 17 BRPM | HEIGHT: 61 IN | HEART RATE: 95 BPM | WEIGHT: 131.8 LBS | BODY MASS INDEX: 24.88 KG/M2
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
## 2018-06-15 DIAGNOSIS — L27.0 DRUG-INDUCED SKIN RASH: Primary | ICD-10-CM

## 2018-06-15 RX ORDER — METHYLPREDNISOLONE ACETATE 80 MG/ML
80 INJECTION, SUSPENSION INTRA-ARTICULAR; INTRALESIONAL; INTRAMUSCULAR; SOFT TISSUE ONCE
Qty: 1 VIAL | Refills: 0
Start: 2018-06-15 | End: 2018-06-15

## 2018-06-15 RX ORDER — MUPIROCIN 20 MG/G
OINTMENT TOPICAL DAILY
Qty: 22 G | Refills: 0 | Status: SHIPPED | OUTPATIENT
Start: 2018-06-15 | End: 2019-02-15

## 2018-06-15 NOTE — MR AVS SNAPSHOT
[facility] 
 
 
 [address] [address] [phone] Patient: Julio Cesar Quintero 
MRN: [id number] Visit Information Date & Time Provider Department Dept. Phone Encounter #  
 6/15/2018  9:30 AM ALAYNA Romeo [phone] [id number] Follow-up Instructions Return in about 3 days (around 6/18/2018). Your Appointments 6/18/2018 10:30 AM  
ESTABLISHED PATIENT with ALAYNA Romeo 38 ([facility]) Appt Note: fu per Port [address] [address] [phone]  
  
   
 [address] [address]  
  
    
 9/4/2018  3:00 PM  
Follow Up with Omer Kraus MD  
[facility]urology [facility] at [facility]) Appt Note: Follow up $0CP tdb 2/12/18  
 [address] [place] [address], 
[address] [place] [address], [address] [place] [phone]  
  
   
 [address] [place] [address], [address] [place] [address].[address] [phone]  
  
    
 9/11/2018 10:00 AM  
ESTABLISHED PATIENT with MD Suad Nam 38 ([facility]) Appt Note: 4 mo f/u  
 [address] [address] [phone]  
  
   
 [address] [address] Upcoming Health Maintenance Date Due  
 EYE EXAM RETINAL OR DILATED Q1 11/3/2017 Influenza Age 5 to Adult 8/1/2018 GLAUCOMA SCREENING Q2Y 11/3/2018 HEMOGLOBIN A1C Q6M 11/11/2018 BREAST CANCER SCRN MAMMOGRAM 5/4/2019 FOOT EXAM Q1 5/11/2019 MICROALBUMIN Q1 5/11/2019 LIPID PANEL Q1 5/11/2019 MEDICARE YEARLY EXAM 5/12/2019 DTaP/Tdap/Td series (2 - Td) 3/1/2027 Allergies as of 6/15/2018  Review Complete On: 6/15/2018 By: Humphrey Kirkpatrick No Known Allergies Current Immunizations  Reviewed on 8/2/2017 Name Date Influenza High Dose Vaccine PF 1/12/2018  1:47 PM, 10/5/2016, 9/25/2015 Pneumococcal Conjugate (PCV-13) 3/1/2017 10:48 AM  
 Pneumococcal Polysaccharide (PPSV-23) 11/5/2010 Td 10/2/2015 Zoster Vaccine, Live 11/5/2010 Not reviewed this visit You Were Diagnosed With   
  
 Codes Comments Drug-induced skin rash    -  Primary ICD-10-CM: L27.0 ICD-9-CM: 693.0 Tick bite, initial encounter     ICD-10-CM: W57. Casey Sarmiento ICD-9-CM: 919.4, E906.4 Vitals BP Pulse Temp Resp Height(growth percentile) Weight(growth percentile)  
 118/60 (BP 1 Location: Left arm, BP Patient Position: Sitting) 95 98.3 °F (36.8 °C) (Oral) 17 5' 0.5\" (1.537 m) 131 lb 12.8 oz (59.8 kg) SpO2 BMI OB Status Smoking Status 98% 25.32 kg/m2 Postmenopausal Former Smoker Vitals History BMI and BSA Data Body Mass Index Body Surface Area  
 25.32 kg/m 2 1.6 m 2 Preferred Pharmacy Pharmacy Name Phone [address] [place] [facility] [address] [phone] Your Updated Medication List  
  
   
This list is accurate as of 6/15/18  9:40 AM.  Always use your most recent med list.  
  
  
  
  
 busPIRone 5 mg tablet Commonly known as:  BUSPAR Take 1 Tab by mouth two (2) times a day. glimepiride 2 mg tablet Commonly known as:  AMARYL Take 1 Tab by mouth every morning. glucose blood VI test strips strip Commonly known as:  RELION PRIME TEST STRIPS  
by Does Not Apply route two (2) times a day. Use 1 strip each time to test glucose 2 times a day. E11.9 LUTEIN PO Take  by mouth.  
  
 magnesium 250 mg Tab Take 500 mg by mouth nightly. melatonin 5 mg Cap capsule Take 5 mg by mouth nightly. metFORMIN  mg tablet Commonly known as:  GLUCOPHAGE XR Take 1 Tab by mouth daily (with dinner). methylPREDNISolone acetate 80 mg/mL injection Commonly known as:  DEPO-MEDROL 1 mL by IntraMUSCular route once for 1 dose. mupirocin 2 % ointment Commonly known as:  [facility]  
 Apply  to affected area daily. rOPINIRole 3 mg Tab tab Commonly known as:  Gladies Bitter Take 1 Tab by mouth two (2) times a day. vit B Cmplx 3-FA-Vit C-Biotin 1- mg-mg-mcg tablet Commonly known as:  NEPHRO RAJEEV RX Take 1 Tab by mouth daily. Indications: with stress relief B 12 VITAMIN D3 1,000 unit tablet Generic drug:  cholecalciferol Take  by mouth daily. zinc 50 mg Tab tablet Take  by mouth daily. Prescriptions Sent to Pharmacy Refills  
 mupirocin (BACTROBAN) 2 % ointment 0 Sig: Apply  to affected area daily. Class: Normal  
 Pharmacy: [address], [address] Ph #: [phone] Route: Topical  
  
We Performed the Following METHYLPREDNISOLONE ACETATE INJECTION 80 MG [ hospitals] GA THER/PROPH/DIAG INJECTION, SUBCUT/IM [id number] CPT(R)] Follow-up Instructions Return in about 3 days (around 6/18/2018). Introducing [facility] & HEALTH SERVICES! [employer] introduces [employer] patient portal. Now you can access parts of your medical record, email your doctor's office, and request medication refills online. 1. In your internet browser, go to [email or web address] 2. Click on the First Time User? Click Here link in the Sign In box. You will see the New Member Sign Up page. 3. Enter your [employer] Access Code exactly as it appears below. You will not need to use this code after youve completed the sign-up process. If you do not sign up before the expiration date, you must request a new code. · [employer] Access Code: [email or web address] Expires: 8/1/2018 12:34 PM 
 
4. Enter the last four digits of your Social Security Number (xxxx) and Date of Birth (mm/dd/yyyy) as indicated and click Submit. You will be taken to the next sign-up page. 5. Create a [employer] ID. This will be your [employer] login ID and cannot be changed, so think of one that is secure and easy to remember. 6. Create a ZinMobi password. You can change your password at any time. 7. Enter your Password Reset Question and Answer. This can be used at a later time if you forget your password. 8. Enter your e-mail address. You will receive e-mail notification when new information is available in [address] 9. Click Sign Up. You can now view and download portions of your medical record. 10. Click the Download Summary menu link to download a portable copy of your medical information. If you have questions, please visit the Frequently Asked Questions section of the [employer] website. Remember, [employer] is NOT to be used for urgent needs. For medical emergencies, dial 911. Now available from your iPhone and Android! Please provide this summary of care documentation to your next provider. Your primary care clinician is listed as Goldie Nath. If you have any questions after today's visit, please call [phone].

## 2018-06-15 NOTE — PROGRESS NOTES
Chief Complaint   Patient presents with   Jose Raulida Eveline 83     on left side of back. Took antibiotic     Rash     feels it is due to the medication given for the tick bite         HPI:       is a 80 y.o. female. She is a  who has T2DM. Lives alone in [facility]. Last A1c < 7. Some recent diarrhea. Has experienced some weight loss. Prefers to eat protein bars rather than cook. This is a longstanding habit. Recently treated for tick bite with Doxycycline 5/31/18. New Issues:  She had a tick bite on the left side if her back. Took Doxycycline as prescribed. She has developed a rash and is concerned that it may be due to the antibiotic that she was given. She has been in the sun some as well. No Known Allergies    Current Outpatient Prescriptions   Medication Sig    metFORMIN ER (GLUCOPHAGE XR) 500 mg tablet Take 1 Tab by mouth daily (with dinner).  glimepiride (AMARYL) 2 mg tablet Take 1 Tab by mouth every morning.  LUTEIN PO Take  by mouth.  melatonin 5 mg cap capsule Take 5 mg by mouth nightly.  rOPINIRole (REQUIP) 3 mg tab tab Take 1 Tab by mouth two (2) times a day.  zinc 50 mg tab tablet Take  by mouth daily.  busPIRone (BUSPAR) 5 mg tablet Take 1 Tab by mouth two (2) times a day.  glucose blood VI test strips (RELION PRIME TEST STRIPS) strip by Does Not Apply route two (2) times a day. Use 1 strip each time to test glucose 2 times a day. E11.9    cholecalciferol (VITAMIN D3) 1,000 unit tablet Take  by mouth daily.  vit B Cmplx 3-FA-Vit C-Biotin (NEPHRO RAJEEV RX) 1- mg-mg-mcg tablet Take 1 Tab by mouth daily. Indications: with stress relief B 12    magnesium 250 mg tab Take 500 mg by mouth nightly. No current facility-administered medications for this visit.         Past Medical History:   Diagnosis Date    Anxiety     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    bursitis    Depression     Diabetes (HCC)     GERD (gastroesophageal reflux disease)     H/O hand fracture     Hyperlipidemia     Menopause     Osteopenia     Recurrent UTI     RLS (restless legs syndrome)        Past Surgical History:   Procedure Laterality Date    HX APPENDECTOMY      HX COLONOSCOPY  2010    HX DILATION AND CURETTAGE      HX TONSIL AND ADENOIDECTOMY         Social History     Social History    Marital status:      Spouse name: N/A    Number of children: 3    Years of education: N/A     Occupational History    Victim Assistance      Worked for [employer]     Social History Main Topics    Smoking status: Former Smoker     Packs/day: 0.50     Years: 20.00     Types: Cigarettes     Quit date: 7/8/1960    Smokeless tobacco: Never Used    Alcohol use 1.8 oz/week     3 Glasses of wine per week    Drug use: Yes     Special: Prescription    Sexual activity: No     Other Topics Concern     Service No    Blood Transfusions No    Caffeine Concern No    Occupational Exposure No    Hobby Hazards No    Sleep Concern No    Stress Concern Yes     usually financial    Weight Concern No    Special Diet Yes     Diabetic    Back Care No    Exercise No     stays active, loves to be outside, daily walks   [place] Yes    Self-Exams Yes     Social History Narrative       Family History   Problem Relation Age of Onset    Cancer Mother      breast/ lung    Cancer Father      prostate    Migraines Father     Cancer Sister      breast    Heart Disease Maternal Grandmother        Above history reviewed. ROS:  Denies fever, chills, cough, chest pain, SOB,  nausea, vomiting, or diarrhea. Denies wt loss, wt gain, hemoptysis, hematochezia or melena.     Physical Examination:    /60 (BP 1 Location: Left arm, BP Patient Position: Sitting)  Pulse 95  Temp 98.3 °F (36.8 °C) (Oral)   Resp 17  Ht 5' 0.5\" (1.537 m)  Wt 131 lb 12.8 oz (59.8 kg)  SpO2 98%  BMI 25.32 kg/m2    General: Alert and Ox3, Fluent speech  Neck:  Supple, no adenopathy, JVD, mass or bruit  Chest:  Clear to Ausculation, without wheezes, rales, rubs or ronchi  Cardiac: RRR  Extremities:  No cyanosis, clubbing or edema  Neurologic:  Ambulatory without assist, CN 2-12 grossly intact. Moves all extremities. Skin: Red papular rash spreading across back with some involvement of bilateral upper arms. Left, mid-back with excoriation and wound with yellow center. Lymphadenopathy: no cervical or supraclavicular nodes    ASSESSMENT AND PLAN:     1. Drug-induced skin rash  Patient has finished Doxycycline  Take Benadryl at night  Mupirocin to open wound bed near bra-line  - METHYLPREDNISOLONE ACETATE INJECTION 80 MG  - FL THER/PROPH/DIAG INJECTION, SUBCUT/IM  - methylPREDNISolone acetate (DEPO-MEDROL) 80 mg/mL injection; 1 mL by IntraMUSCular route once for 1 dose. Dispense: 1 Vial; Refill: 0  - mupirocin (BACTROBAN) 2 % ointment; Apply  to affected area daily. Dispense: 22 g; Refill: 0    2.  Tick bite, initial encounter  Resolving     RTC on Monday for skin check    Cheryle Richer, NP

## 2018-06-18 ENCOUNTER — OFFICE VISIT (OUTPATIENT)
Dept: FAMILY MEDICINE CLINIC | Age: 83
End: 2018-06-18

## 2018-06-18 VITALS
BODY MASS INDEX: 24.84 KG/M2 | OXYGEN SATURATION: 97 % | SYSTOLIC BLOOD PRESSURE: 125 MMHG | DIASTOLIC BLOOD PRESSURE: 80 MMHG | RESPIRATION RATE: 17 BRPM | HEIGHT: 61 IN | HEART RATE: 84 BPM | WEIGHT: 131.6 LBS

## 2018-06-18 DIAGNOSIS — L27.0 DRUG RASH: Primary | ICD-10-CM

## 2018-06-18 NOTE — PROGRESS NOTES
Chief Complaint   Patient presents with   Guillermo Garrido     follow up appointment         HPI:      Amor Chandler is a 80 y.o. female. She is a  who has T2DM. Lives alone in [facility]. Last A1c < 7. Some recent diarrhea. Has experienced some weight loss. Prefers to eat protein bars rather than cook. This is a longstanding habit. Recently treated for tick bite with Doxycycline 5/31/18. Took Doxycycline as prescribed. She developed a rash and was in the sun some as well. Given 80 mg IM DepoMedrol and Mupirocin for open areas. New Issues:  She is here for a follow-up of her rash. Doing pretty well. Is itching less. No Known Allergies    Current Outpatient Prescriptions   Medication Sig    mupirocin (BACTROBAN) 2 % ointment Apply  to affected area daily.  metFORMIN ER (GLUCOPHAGE XR) 500 mg tablet Take 1 Tab by mouth daily (with dinner).  glimepiride (AMARYL) 2 mg tablet Take 1 Tab by mouth every morning.  LUTEIN PO Take  by mouth.  melatonin 5 mg cap capsule Take 5 mg by mouth nightly.  rOPINIRole (REQUIP) 3 mg tab tab Take 1 Tab by mouth two (2) times a day.  zinc 50 mg tab tablet Take  by mouth daily.  busPIRone (BUSPAR) 5 mg tablet Take 1 Tab by mouth two (2) times a day.  glucose blood VI test strips (RELION PRIME TEST STRIPS) strip by Does Not Apply route two (2) times a day. Use 1 strip each time to test glucose 2 times a day. E11.9    cholecalciferol (VITAMIN D3) 1,000 unit tablet Take  by mouth daily.  vit B Cmplx 3-FA-Vit C-Biotin (NEPHRO RAJEEV RX) 1- mg-mg-mcg tablet Take 1 Tab by mouth daily. Indications: with stress relief B 12    magnesium 250 mg tab Take 500 mg by mouth nightly. No current facility-administered medications for this visit.         Past Medical History:   Diagnosis Date    Anxiety     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    bursitis    Depression     Diabetes (HCC)     GERD (gastroesophageal reflux disease)     H/O hand fracture     Hyperlipidemia     Menopause     Osteopenia     Recurrent UTI     RLS (restless legs syndrome)        Past Surgical History:   Procedure Laterality Date    HX APPENDECTOMY      HX COLONOSCOPY  2010    HX DILATION AND CURETTAGE      HX TONSIL AND ADENOIDECTOMY         Social History     Social History    Marital status:      Spouse name: N/A    Number of children: 4    Years of education: N/A     Occupational History    Victim Assistance      Worked for [employer]     Social History Main Topics    Smoking status: Former Smoker     Packs/day: 0.50     Years: 20.00     Types: Cigarettes     Quit date: 7/8/1960    Smokeless tobacco: Never Used    Alcohol use 1.8 oz/week     3 Glasses of wine per week    Drug use: Yes     Special: Prescription    Sexual activity: No     Other Topics Concern     Service No    Blood Transfusions No    Caffeine Concern No    Occupational Exposure No    Hobby Hazards No    Sleep Concern No    Stress Concern Yes     usually financial    Weight Concern No    Special Diet Yes     Diabetic    Back Care No    Exercise No     stays active, loves to be outside, daily walks   [place] Yes    Self-Exams Yes     Social History Narrative       Family History   Problem Relation Age of Onset    Cancer Mother      breast/ lung    Cancer Father      prostate    Migraines Father     Cancer Sister      breast    Heart Disease Maternal Grandmother        Above history reviewed. ROS:  Denies fever, chills, cough, chest pain, SOB,  nausea, vomiting, or diarrhea. Denies wt loss, wt gain, hemoptysis, hematochezia or melena.     Physical Examination:    /80 (BP 1 Location: Left arm, BP Patient Position: Sitting)  Pulse 84  Resp 17  Ht 5' 0.5\" (1.537 m)  Wt 131 lb 9.6 oz (59.7 kg)  SpO2 97%  BMI 25.28 kg/m2    General: Alert and Ox3, Fluent speech  Neck:  Supple, no adenopathy, JVD, mass or bruit  Chest:  Clear to Ausculation, without wheezes, rales, rubs or ronchi  Cardiac: Murmur  Extremities:  No cyanosis, clubbing or edema  Neurologic:  Ambulatory without assist, CN 2-12 grossly intact. Moves all extremities. Skin: Papular red rash down back to upper buttock. Left mid-back with open yellow abrasion. Scatted papules noted on bilateral upper arms. Lymphadenopathy: no cervical or supraclavicular nodes    ASSESSMENT AND PLAN:     1.  Drug rash  Improving  Take 2nd generation antihistamine such as Claritin, Zyrtec, or Allegra once per day  Take Benadryl at bedtime  Take Zantac or Pepcid BID  Use Benadryl gel or cortisone cream for topical relief  Continue to apply Mupirocin to open areas 1-3 times per day      RTC PRN    Andres Steele NP

## 2018-06-18 NOTE — PATIENT INSTRUCTIONS
If you have any questions regarding [employer], you may call [employer] support at [phone].

## 2018-06-18 NOTE — MR AVS SNAPSHOT
303 [facility] 
 
 
 [address] [address] [phone] Patient: Trixie Bolden 
MRN: [id number] Visit Information Date & Time Provider Department Dept. Phone Encounter #  
 6/18/2018 10:30 AM ALAYNA Sow [phone] [id number] Follow-up Instructions Return if symptoms worsen or fail to improve. Follow-up and Disposition History Your Appointments 6/18/2018 10:30 AM  
ESTABLISHED PATIENT with ALAYNA Sow 38 ([facility]) Appt Note: fu per Port [address] [address] [phone]  
  
   
 [address] [address]  
  
    
 9/4/2018  3:00 PM  
Follow Up with Zelda Brown MD  
Neurology Clinic at [facility]) Appt Note: Follow up $0CP tdb 2/12/18  
 [address] [place] [address], 
[address] [place] [address], [address] [place], [place] [id number] [place] [address], [address] [place] [address]. [address] [phone]  
  
    
 9/11/2018 10:00 AM  
ESTABLISHED PATIENT with MD Suad Littlejohn 38 ([facility]) Appt Note: 4 mo f/u  
 [address] [address] [phone]  
  
   
 [address] [address] Upcoming Health Maintenance Date Due  
 EYE EXAM RETINAL OR DILATED Q1 11/3/2017 Influenza Age 5 to Adult 8/1/2018 GLAUCOMA SCREENING Q2Y 11/3/2018 HEMOGLOBIN A1C Q6M 11/11/2018 BREAST CANCER SCRN MAMMOGRAM 5/4/2019 FOOT EXAM Q1 5/11/2019 MICROALBUMIN Q1 5/11/2019 LIPID PANEL Q1 5/11/2019 MEDICARE YEARLY EXAM 5/12/2019 DTaP/Tdap/Td series (2 - Td) 3/1/2027 Allergies as of 6/18/2018  Review Complete On: 6/18/2018 By: Reynaldo Picket, NP No Known Allergies Current Immunizations  Reviewed on 8/2/2017 Name Date Influenza High Dose Vaccine PF 1/12/2018  1:47 PM, 10/5/2016, 9/25/2015 Pneumococcal Conjugate (PCV-13) 3/1/2017 10:48 AM  
 Pneumococcal Polysaccharide (PPSV-23) 11/5/2010 Td 10/2/2015 Zoster Vaccine, Live 11/5/2010 Not reviewed this visit You Were Diagnosed With   
  
 Codes Comments Drug rash    -  Primary ICD-10-CM: L27.0 ICD-9-CM: 693.0 Vitals BP Pulse Resp Height(growth percentile) Weight(growth percentile) SpO2  
 125/80 (BP 1 Location: Left arm, BP Patient Position: Sitting) 84 17 5' 0.5\" (1.537 m) 131 lb 9.6 oz (59.7 kg) 97% BMI OB Status Smoking Status 25.28 kg/m2 Postmenopausal Former Smoker Vitals History BMI and BSA Data Body Mass Index Body Surface Area  
 25.28 kg/m 2 1.6 m 2 Preferred Pharmacy Pharmacy Name Phone [address], [address] [phone] Your Updated Medication List  
  
   
This list is accurate as of 6/18/18  9:04 AM.  Always use your most recent med list.  
  
  
  
  
 busPIRone 5 mg tablet Commonly known as:  BUSPAR Take 1 Tab by mouth two (2) times a day. glimepiride 2 mg tablet Commonly known as:  AMARYL Take 1 Tab by mouth every morning. glucose blood VI test strips strip Commonly known as:  RELION PRIME TEST STRIPS  
by Does Not Apply route two (2) times a day. Use 1 strip each time to test glucose 2 times a day. E11.9 LUTEIN PO Take  by mouth.  
  
 magnesium 250 mg Tab Take 500 mg by mouth nightly. melatonin 5 mg Cap capsule Take 5 mg by mouth nightly. metFORMIN  mg tablet Commonly known as:  GLUCOPHAGE XR Take 1 Tab by mouth daily (with dinner). mupirocin 2 % ointment Commonly known as:  [employer] Apply  to affected area daily. rOPINIRole 3 mg Tab tab Commonly known as:  Puneet  Take 1 Tab by mouth two (2) times a day. vit B Cmplx 3-FA-Vit C-Biotin 1- mg-mg-mcg tablet Commonly known as:  NEPHRO RAJEEV RX  
 Take 1 Tab by mouth daily. Indications: with stress relief B 12 VITAMIN D3 1,000 unit tablet Generic drug:  cholecalciferol Take  by mouth daily. zinc 50 mg Tab tablet Take  by mouth daily. Follow-up Instructions Return if symptoms worsen or fail to improve. Patient Instructions If you have any questions regarding [employer], you may call [employer] support at [phone]. Introducing [facility]! Ed[employer] introduces [employer] patient portal. Now you can access parts of your medical record, email your doctor's office, and request medication refills online. 1. In your internet browser, go to [email or web address] 2. Click on the First Time User? Click Here link in the Sign In box. You will see the New Member Sign Up page. 3. Enter your [employer] Access Code exactly as it appears below. You will not need to use this code after youve completed the sign-up process. If you do not sign up before the expiration date, you must request a new code. · [employer] Access Code: [email or web address] Expires: 8/1/2018 12:34 PM 
 
4. Enter the last four digits of your Social Security Number (xxxx) and Date of Birth (mm/dd/yyyy) as indicated and click Submit. You will be taken to the next sign-up page. 5. Create a [employer] ID. This will be your [employer] login ID and cannot be changed, so think of one that is secure and easy to remember. 6. Create a [employer] password. You can change your password at any time. 7. Enter your Password Reset Question and Answer. This can be used at a later time if you forget your password. 8. Enter your e-mail address. You will receive e-mail notification when new information is available in [address] 9. Click Sign Up. You can now view and download portions of your medical record. 10. Click the Download Summary menu link to download a portable copy of your medical information. If you have questions, please visit the Frequently Asked Questions section of the [employer] website. Remember, [email or web address] is NOT to be used for urgent needs. For medical emergencies, dial 911. Now available from your iPhone and Android! Please provide this summary of care documentation to your next provider. Your primary care clinician is listed as Addy Tay. If you have any questions after today's visit, please call [phone].

## 2018-06-26 ENCOUNTER — TELEPHONE (OUTPATIENT)
Dept: FAMILY MEDICINE CLINIC | Age: 83
End: 2018-06-26

## 2018-06-26 NOTE — TELEPHONE ENCOUNTER
Rash on back on getting worse, spreading and itching. Told to come to office but patient lives in [facility] and not sure how to get here, will try to find a . If not will try to come Thursday.

## 2018-06-27 ENCOUNTER — TELEPHONE (OUTPATIENT)
Dept: FAMILY MEDICINE CLINIC | Age: 83
End: 2018-06-27

## 2018-06-27 NOTE — TELEPHONE ENCOUNTER
Spoke with daughter, patient was unable to get to the [place] office yesterday, has open sores on back and breast from previous Tick bite.  Scheduled to be seen

## 2018-06-27 NOTE — TELEPHONE ENCOUNTER
----- Message from Carmita Durán sent at 6/27/2018  7:55 AM EDT -----  Regarding: Dr. Tomy Abreu, daughter, is requesting an appt for the pt because she is having an allergic reaction to an antibiotic given for a tick bite. She has open sores on her back and breast and she was cleaning them with bleach. Please call back to get scheduled for an appt before July 3rd.  Best contact: [phone] (Evangeline Severin) [address]

## 2018-06-28 ENCOUNTER — OFFICE VISIT (OUTPATIENT)
Dept: FAMILY MEDICINE CLINIC | Age: 83
End: 2018-06-28

## 2018-06-28 VITALS
WEIGHT: 131 LBS | HEIGHT: 61 IN | RESPIRATION RATE: 16 BRPM | HEART RATE: 76 BPM | BODY MASS INDEX: 24.73 KG/M2 | TEMPERATURE: 98.1 F | OXYGEN SATURATION: 96 % | DIASTOLIC BLOOD PRESSURE: 70 MMHG | SYSTOLIC BLOOD PRESSURE: 120 MMHG

## 2018-06-28 DIAGNOSIS — R21 RASH: Primary | ICD-10-CM

## 2018-06-28 RX ORDER — BETAMETHASONE DIPROPIONATE 0.5 MG/G
CREAM TOPICAL 2 TIMES DAILY
Qty: 50 G | Refills: 2 | Status: SHIPPED | OUTPATIENT
Start: 2018-06-28 | End: 2018-10-04 | Stop reason: ALTCHOICE

## 2018-06-28 RX ORDER — METHYLPREDNISOLONE ACETATE 40 MG/ML
80 INJECTION, SUSPENSION INTRA-ARTICULAR; INTRALESIONAL; INTRAMUSCULAR; SOFT TISSUE ONCE
Qty: 2 VIAL | Refills: 0
Start: 2018-06-28 | End: 2018-06-28

## 2018-06-28 NOTE — PROGRESS NOTES
Chief Complaint   Patient presents with    Allergic Reaction     Rash         HPI:         is a 80 y.o. female who notes the onset of a skin problem. The details are as follows:  Seen a week ago with an itchy rash on her back. Has used OTC cortisone. Not better. No new detergents, perfumes or soaps. Allergies   Allergen Reactions    Doxycycline Rash       Current Outpatient Prescriptions   Medication Sig    methylPREDNISolone acetate (DEPO-MEDROL) 40 mg/mL injection 2 mL by IntraMUSCular route once for 1 dose.  augmented betamethasone dipropionate (DIPROLENE-AF) 0.05 % topical cream Apply  to affected area two (2) times a day.  metFORMIN ER (GLUCOPHAGE XR) 500 mg tablet Take 1 Tab by mouth daily (with dinner).  glimepiride (AMARYL) 2 mg tablet Take 1 Tab by mouth every morning.  LUTEIN PO Take  by mouth.  melatonin 5 mg cap capsule Take 5 mg by mouth nightly.  rOPINIRole (REQUIP) 3 mg tab tab Take 1 Tab by mouth two (2) times a day.  zinc 50 mg tab tablet Take  by mouth daily.  glucose blood VI test strips (RELION PRIME TEST STRIPS) strip by Does Not Apply route two (2) times a day. Use 1 strip each time to test glucose 2 times a day. E11.9    cholecalciferol (VITAMIN D3) 1,000 unit tablet Take  by mouth daily.  vit B Cmplx 3-FA-Vit C-Biotin (NEPHRO RAJEEV RX) 1- mg-mg-mcg tablet Take 1 Tab by mouth daily. Indications: with stress relief B 12    magnesium 250 mg tab Take 500 mg by mouth nightly.  mupirocin (BACTROBAN) 2 % ointment Apply  to affected area daily.  busPIRone (BUSPAR) 5 mg tablet Take 1 Tab by mouth two (2) times a day. No current facility-administered medications for this visit.         Past Medical History:   Diagnosis Date    Anxiety     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    bursitis    Depression     Diabetes ([facility] [address]     GERD (gastroesophageal reflux disease)     H/O hand fracture     Hyperlipidemia     Menopause     Osteopenia     Recurrent UTI     RLS (restless legs syndrome)          ROS:  Denies fever, chills, cough, chest pain, SOB,  nausea, vomiting, or diarrhea. Denies wt loss, wt gain, hemoptysis, hematochezia or melena. Physical Examination:    Visit Vitals    /70 (BP 1 Location: Right arm, BP Patient Position: Sitting)    Pulse 76    Temp 98.1 °F (36.7 °C) (Oral)    Resp 16    Ht 5' 0.5\" (1.537 m)    Wt 131 lb (59.4 kg)    SpO2 96%    BMI 25.16 kg/m2      General:  Alert, cooperative, no distress. Head:  Normocephalic, without obvious abnormality, atraumatic. Eyes:  Conjunctivae/corneas clear. Pupils equal, round, reactive to light. Chest wall:  No tenderness or deformity. Extremities: Extremities normal, atraumatic, no cyanosis or edema. Skin:  red, nonblanching papules across the mid back sparing the frontal torso and extremities. Lymph nodes: Cervical and supraclavicular nodes are normal.   Neurologic: Moves all extremities, fluent speech         ASSESSMENT AND PLAN:    1. Eczema:  Trial of Betamethasone BID for 2 weeks. If not better, proceed to punch biopsy. 2.  Depo Medrol 80 mg IM once today    Orders Placed This Encounter    THER/PROPH/DIAG INJECTION, SUBCUT/IM    METHYLPREDNISOLONE ACETATE INJECTION 80 MG    methylPREDNISolone acetate (DEPO-MEDROL) 40 mg/mL injection     Si mL by IntraMUSCular route once for 1 dose. Dispense:  2 Vial     Refill:  0    augmented betamethasone dipropionate (DIPROLENE-AF) 0.05 % topical cream     Sig: Apply  to affected area two (2) times a day.      Dispense:  50 g     Refill:  2       Brianna Hughes MD, Buzz Shear

## 2018-06-28 NOTE — PATIENT INSTRUCTIONS
If you have any questions regarding [employer], you may call [employer] support at [phone].

## 2018-06-28 NOTE — MR AVS SNAPSHOT
[facility] 
 
 
 [address] [address] [phone] Patient: Sonia Ayon 
MRN: [id number] Visit Information Date & Time Provider Department Dept. Phone Encounter #  
 6/28/2018 10:00 AM Magan Owusu MD [address] [place] [address] [id number] Follow-up Instructions Return in about 2 weeks (around 7/12/2018). Your Appointments 9/4/2018  3:00 PM  
Follow Up with Kia Damian MD  
Neurology Clinic at [address]) Appt Note: Follow up $0CP tdb 2/12/18  
 [address] [place] [address], [address] [place] [phone]  
  
   
 [address] [place] [address], [address] [place], [place] [phone]  
  
    
 9/11/2018 10:00 AM  
ESTABLISHED PATIENT with Magan Owusu MD  
[facility] [address] ([facility]) Appt Note: 4 mo f/u  
 [address] [address] [phone]  
  
   
 [address] [address] Upcoming Health Maintenance Date Due  
 EYE EXAM RETINAL OR DILATED Q1 11/3/2017 Influenza Age 5 to Adult 8/1/2018 GLAUCOMA SCREENING Q2Y 11/3/2018 HEMOGLOBIN A1C Q6M 11/11/2018 BREAST CANCER SCRN MAMMOGRAM 5/4/2019 FOOT EXAM Q1 5/11/2019 MICROALBUMIN Q1 5/11/2019 LIPID PANEL Q1 5/11/2019 MEDICARE YEARLY EXAM 5/12/2019 DTaP/Tdap/Td series (2 - Td) 3/1/2027 Allergies as of 6/28/2018  Review Complete On: 6/28/2018 By: Magan Owusu MD  
  
 Severity Noted Reaction Type Reactions Doxycycline  06/28/2018    Rash Current Immunizations  Reviewed on 8/2/2017 Name Date Influenza High Dose Vaccine PF 1/12/2018  1:47 PM, 10/5/2016, 9/25/2015 Pneumococcal Conjugate (PCV-13) 3/1/2017 10:48 AM  
 Pneumococcal Polysaccharide (PPSV-23) 11/5/2010 Td 10/2/2015 Zoster Vaccine, Live 11/5/2010 Not reviewed this visit You Were Diagnosed With   
  
 Codes Comments Rash    -  Primary ICD-10-CM: R21 
ICD-9-CM: 782.1 Vitals BP Pulse Temp Resp Height(growth percentile) Weight(growth percentile) 120/70 (BP 1 Location: Right arm, BP Patient Position: Sitting) 76 98.1 °F (36.7 °C) (Oral) 16 5' 0.5\" (1.537 m) 131 lb (59.4 kg) SpO2 BMI OB Status Smoking Status 96% 25.16 kg/m2 Postmenopausal Former Smoker BMI and BSA Data Body Mass Index Body Surface Area  
 25.16 kg/m 2 1.59 m 2 Preferred Pharmacy Pharmacy Name Phone [address] [phone] Your Updated Medication List  
  
   
This list is accurate as of 6/28/18 10:41 AM.  Always use your most recent med list.  
  
  
  
  
 augmented betamethasone dipropionate 0.05 % topical cream  
Commonly known as:  Souleymane Javier Apply  to affected area two (2) times a day. busPIRone 5 mg tablet Commonly known as:  BUSPAR Take 1 Tab by mouth two (2) times a day. glimepiride 2 mg tablet Commonly known as:  AMARYL Take 1 Tab by mouth every morning. glucose blood VI test strips strip Commonly known as:  RELION PRIME TEST STRIPS  
by Does Not Apply route two (2) times a day. Use 1 strip each time to test glucose 2 times a day. E11.9 LUTEIN PO Take  by mouth.  
  
 magnesium 250 mg Tab Take 500 mg by mouth nightly. melatonin 5 mg Cap capsule Take 5 mg by mouth nightly. metFORMIN  mg tablet Commonly known as:  GLUCOPHAGE XR Take 1 Tab by mouth daily (with dinner). methylPREDNISolone acetate 40 mg/mL injection Commonly known as:  DEPO-Medrol 2 mL by IntraMUSCular route once for 1 dose. mupirocin 2 % ointment Commonly known as:  [employer] Apply  to affected area daily. rOPINIRole 3 mg Tab tab Commonly known as:  Fransisco Lemon Take 1 Tab by mouth two (2) times a day. vit B Cmplx 3-FA-Vit C-Biotin 1- mg-mg-mcg tablet Commonly known as:  NEPHRO RAJEEV RX Take 1 Tab by mouth daily. Indications: with stress relief B 12 VITAMIN D3 1,000 unit tablet Generic drug:  cholecalciferol Take  by mouth daily. zinc 50 mg Tab tablet Take  by mouth daily. Prescriptions Sent to Pharmacy Refills  
 augmented betamethasone dipropionate (DIPROLENE-AF) 0.05 % topical cream 2 Sig: Apply  to affected area two (2) times a day. Class: Normal  
 Pharmacy: [address] [facility] [address] Ph #: [phone] Route: Topical  
  
We Performed the Following METHYLPREDNISOLONE ACETATE INJECTION 80 MG [ [facility]] THER/PROPH/DIAG INJECTION, SUBCUT/IM [id number] CPT(R)] Follow-up Instructions Return in about 2 weeks (around 7/12/2018). Patient Instructions If you have any questions regarding [employer], you may call [employer] support at [phone]. Introducing [facility] & HEALTH SERVICES! [facility] introduces tomoguides patient portal. Now you can access parts of your medical record, email your doctor's office, and request medication refills online. 1. In your internet browser, go to [email or web address] 2. Click on the First Time User? Click Here link in the Sign In box. You will see the New Member Sign Up page. 3. Enter your tomoguides Access Code exactly as it appears below. You will not need to use this code after youve completed the sign-up process. If you do not sign up before the expiration date, you must request a new code. · tomoguides Access Code: [email or web address] Expires: 8/1/2018 12:34 PM 
 
4. Enter the last four digits of your Social Security Number (xxxx) and Date of Birth (mm/dd/yyyy) as indicated and click Submit. You will be taken to the next sign-up page. 5. Create a tomoguides ID. This will be your tomoguides login ID and cannot be changed, so think of one that is secure and easy to remember. 6. Create a Snocap password. You can change your password at any time. 7. Enter your Password Reset Question and Answer. This can be used at a later time if you forget your password. 8. Enter your e-mail address. You will receive e-mail notification when new information is available in [address] 9. Click Sign Up. You can now view and download portions of your medical record. 10. Click the Download Summary menu link to download a portable copy of your medical information. If you have questions, please visit the Frequently Asked Questions section of the Snocap website. Remember, Snocap is NOT to be used for urgent needs. For medical emergencies, dial 911. Now available from your iPhone and Android! Please provide this summary of care documentation to your next provider. Your primary care clinician is listed as Yenny Washington. If you have any questions after today's visit, please call [phone].

## 2018-07-12 ENCOUNTER — OFFICE VISIT (OUTPATIENT)
Dept: FAMILY MEDICINE CLINIC | Age: 83
End: 2018-07-12

## 2018-07-12 VITALS
BODY MASS INDEX: 25.13 KG/M2 | OXYGEN SATURATION: 96 % | HEART RATE: 82 BPM | DIASTOLIC BLOOD PRESSURE: 64 MMHG | SYSTOLIC BLOOD PRESSURE: 100 MMHG | RESPIRATION RATE: 16 BRPM | WEIGHT: 128 LBS | HEIGHT: 60 IN

## 2018-07-12 DIAGNOSIS — R21 RASH: Primary | ICD-10-CM

## 2018-07-12 RX ORDER — CLOTRIMAZOLE AND BETAMETHASONE DIPROPIONATE 10; .64 MG/G; MG/G
CREAM TOPICAL
Qty: 45 G | Refills: 3 | Status: SHIPPED | OUTPATIENT
Start: 2018-07-12 | End: 2018-10-29 | Stop reason: ALTCHOICE

## 2018-07-12 NOTE — MR AVS SNAPSHOT
Amrit Booker 
 
 
 1000 [facility] [address] [facility] [address] [phone] Patient: Mario Alberto Truong 
MRN: [id number] Visit Information Date & Time Provider Department Dept. Phone Encounter #  
 7/12/2018 11:15 AM Norma Kilgore MD [facility] Pasha Schroeder [id number] Follow-up Instructions Return in about 3 months (around 10/12/2018). Your Appointments 9/4/2018  3:00 PM  
Follow Up with Bette Lao MD  
[facility] Appt Note: Follow up $0CP tdb 2/12/18  
 [address] [place] [address], 
[address] [place] [address], [address] [facility]  
[phone]  
  
   
 [address] [place] [address], [address] [place] [address], [address] [place] [id number]  
  
    
 9/11/2018 10:00 AM  
ESTABLISHED PATIENT with Norma Kilgore MD  
[facility] Appt Note: 4 mo f/u  
 1000 [facility] [address] [facility] [address] [phone]  
  
   
 [address] [address] Upcoming Health Maintenance Date Due  
 EYE EXAM RETINAL OR DILATED Q1 11/3/2017 Influenza Age 5 to Adult 8/1/2018 GLAUCOMA SCREENING Q2Y 11/3/2018 HEMOGLOBIN A1C Q6M 11/11/2018 BREAST CANCER SCRN MAMMOGRAM 5/4/2019 FOOT EXAM Q1 5/11/2019 MICROALBUMIN Q1 5/11/2019 LIPID PANEL Q1 5/11/2019 MEDICARE YEARLY EXAM 5/12/2019 DTaP/Tdap/Td series (2 - Td) 3/1/2027 Allergies as of 7/12/2018  Review Complete On: 7/12/2018 By: Camila Montez RN Severity Noted Reaction Type Reactions Doxycycline  06/28/2018    Rash Current Immunizations  Reviewed on 8/2/2017 Name Date Influenza High Dose Vaccine PF 1/12/2018  1:47 PM, 10/5/2016, 9/25/2015 Pneumococcal Conjugate (PCV-13) 3/1/2017 10:48 AM  
 Pneumococcal Polysaccharide (PPSV-23) 11/5/2010 Td 10/2/2015 Zoster Vaccine, Live 11/5/2010 Not reviewed this visit You Were Diagnosed With   
  
 Codes Comments Rash    -  Primary ICD-10-CM: R21 
ICD-9-CM: 782.1 Vitals BP Pulse Resp Height(growth percentile) Weight(growth percentile) SpO2  
 100/64 (BP 1 Location: Left arm, BP Patient Position: Sitting) 82 16 5' (1.524 m) 128 lb (58.1 kg) 96% BMI OB Status Smoking Status 25 kg/m2 Postmenopausal Former Smoker BMI and BSA Data Body Mass Index Body Surface Area  
 25 kg/m 2 1.57 m 2 Preferred Pharmacy Pharmacy Name Phone [address], [address] [phone] Your Updated Medication List  
  
   
This list is accurate as of 7/12/18 11:17 AM.  Always use your most recent med list.  
  
  
  
  
 augmented betamethasone dipropionate 0.05 % topical cream  
Commonly known as:  Geanie Frame Apply  to affected area two (2) times a day. busPIRone 5 mg tablet Commonly known as:  BUSPAR Take 1 Tab by mouth two (2) times a day. clotrimazole-betamethasone topical cream  
Commonly known as:  LOTRISONE  
AAA BID to rash for 2 weeks. glimepiride 2 mg tablet Commonly known as:  AMARYL Take 1 Tab by mouth every morning. glucose blood VI test strips strip Commonly known as:  RELION PRIME TEST STRIPS  
by Does Not Apply route two (2) times a day. Use 1 strip each time to test glucose 2 times a day. E11.9 LUTEIN PO Take  by mouth.  
  
 magnesium 250 mg Tab Take 500 mg by mouth nightly. melatonin 5 mg Cap capsule Take 5 mg by mouth nightly. metFORMIN  mg tablet Commonly known as:  GLUCOPHAGE XR Take 1 Tab by mouth daily (with dinner). mupirocin 2 % ointment Commonly known as:  [employer] Apply  to affected area daily. rOPINIRole 3 mg Tab tab Commonly known as:  Bishnu Bleak Take 1 Tab by mouth two (2) times a day. vit B Cmplx 3-FA-Vit C-Biotin 1- mg-mg-mcg tablet Commonly known as:  NEPHRO RAJEEV RX  
 Take 1 Tab by mouth daily. Indications: with stress relief B 12 VITAMIN D3 1,000 unit tablet Generic drug:  cholecalciferol Take  by mouth daily. zinc 50 mg Tab tablet Take  by mouth daily. Prescriptions Sent to Pharmacy Refills  
 clotrimazole-betamethasone (LOTRISONE) topical cream 3 Sig: AAA BID to rash for 2 weeks. Class: Normal  
 Pharmacy: [facility] [address] [place] [address] [facility] Ph #: [phone] Follow-up Instructions Return in about 3 months (around 10/12/2018). Introducing [facility] & HEALTH SERVICES! [employer] introduces [employer] patient portal. Now you can access parts of your medical record, email your doctor's office, and request medication refills online. 1. In your internet browser, go to [email or web address] 2. Click on the First Time User? Click Here link in the Sign In box. You will see the New Member Sign Up page. 3. Enter your [employer] Access Code exactly as it appears below. You will not need to use this code after youve completed the sign-up process. If you do not sign up before the expiration date, you must request a new code. · [employer] Access Code: [email or web address] Expires: 8/1/2018 12:34 PM 
 
4. Enter the last four digits of your Social Security Number (xxxx) and Date of Birth (mm/dd/yyyy) as indicated and click Submit. You will be taken to the next sign-up page. 5. Create a [employer] ID. This will be your [employer] Silicon login ID and cannot be changed, so think of one that is secure and easy to remember. 6. Create a [employer] password. You can change your password at any time. 7. Enter your Password Reset Question and Answer. This can be used at a later time if you forget your password. 8. Enter your e-mail address. You will receive e-mail notification when new information is available in [address] 9. Click Sign Up. You can now view and download portions of your medical record. 10. Click the Download Summary menu link to download a portable copy of your medical information. If you have questions, please visit the Frequently Asked Questions section of the Ravti website. Remember, Ravti is NOT to be used for urgent needs. For medical emergencies, dial 911. Now available from your iPhone and Android! Please provide this summary of care documentation to your next provider. Your primary care clinician is listed as Yvrose Jiménez. If you have any questions after today's visit, please call [phone].

## 2018-07-12 NOTE — PROGRESS NOTES
1. Have you been to the ER, urgent care clinic since your last visit? Hospitalized since your last visit? No    2. Have you seen or consulted any other health care providers outside of the [address] [place] [address] since your last visit? Include any pap smears or colon screening.  No

## 2018-07-12 NOTE — PROGRESS NOTES
Chief Complaint   Patient presents with    Diabetes         HPI:         is a 80 y.o. female who notes the onset of a skin problem. The details are as follows:  Notes a rash that comes and goes along her waistline particularly in the back. No pain or drainage. Worse in the heat. Better when inside. Allergies   Allergen Reactions    Doxycycline Rash       Current Outpatient Prescriptions   Medication Sig    augmented betamethasone dipropionate (DIPROLENE-AF) 0.05 % topical cream Apply  to affected area two (2) times a day.  glimepiride (AMARYL) 2 mg tablet Take 1 Tab by mouth every morning.  melatonin 5 mg cap capsule Take 5 mg by mouth nightly.  rOPINIRole (REQUIP) 3 mg tab tab Take 1 Tab by mouth two (2) times a day.  glucose blood VI test strips (RELION PRIME TEST STRIPS) strip by Does Not Apply route two (2) times a day. Use 1 strip each time to test glucose 2 times a day. E11.9    cholecalciferol (VITAMIN D3) 1,000 unit tablet Take  by mouth daily.  vit B Cmplx 3-FA-Vit C-Biotin (NEPHRO RAJEEV RX) 1- mg-mg-mcg tablet Take 1 Tab by mouth daily. Indications: with stress relief B 12    magnesium 250 mg tab Take 500 mg by mouth nightly.  mupirocin (BACTROBAN) 2 % ointment Apply  to affected area daily.  metFORMIN ER (GLUCOPHAGE XR) 500 mg tablet Take 1 Tab by mouth daily (with dinner).  LUTEIN PO Take  by mouth.  zinc 50 mg tab tablet Take  by mouth daily.  busPIRone (BUSPAR) 5 mg tablet Take 1 Tab by mouth two (2) times a day. No current facility-administered medications for this visit.         Past Medical History:   Diagnosis Date    Anxiety     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    bursitis    Depression     Diabetes (HCC)     GERD (gastroesophageal reflux disease)     H/O hand fracture     Hyperlipidemia     Menopause     Osteopenia     Recurrent UTI     RLS (restless legs syndrome)          ROS:  Denies fever, chills, cough, chest pain, SOB,  nausea, vomiting, or diarrhea. Denies wt loss, wt gain, hemoptysis, hematochezia or melena. Physical Examination:    Visit Vitals    /64 (BP 1 Location: Left arm, BP Patient Position: Sitting)    Pulse 82    Resp 16    Ht 5' (1.524 m)    Wt 128 lb (58.1 kg)    SpO2 96%    BMI 25 kg/m2      General:  Alert, cooperative, no distress. Head:  Normocephalic, without obvious abnormality, atraumatic. Eyes:  Conjunctivae/corneas clear. Pupils equal, round, reactive to light. Chest wall:  No tenderness or deformity. Extremities: Extremities normal, atraumatic, no cyanosis or edema. Skin:  MP erythema along the posterior waistline   Lymph nodes: Cervical and supraclavicular nodes are normal.   Neurologic: Moves all extremities, fluent speech         ASSESSMENT AND PLAN:    1. Heat rash:  Lotrisone BID for 2 weeks  2. T2DM: labs in October  3. RTC as needed. No orders of the defined types were placed in this encounter.       Gabi Mccauley MD, [address]

## 2018-08-09 ENCOUNTER — TELEPHONE (OUTPATIENT)
Dept: FAMILY MEDICINE CLINIC | Age: 83
End: 2018-08-09

## 2018-11-26 RX ORDER — BUSPIRONE HYDROCHLORIDE 5 MG/1
TABLET ORAL
Qty: 60 TAB | Refills: 5 | Status: SHIPPED | OUTPATIENT
Start: 2018-11-26 | End: 2019-02-15 | Stop reason: SDUPTHER

## 2019-03-14 DIAGNOSIS — I67.89 CEREBRAL MICROVASCULAR DISEASE: ICD-10-CM

## 2019-03-14 DIAGNOSIS — I65.23 STENOSIS OF BOTH INTERNAL CAROTID ARTERIES: ICD-10-CM

## 2019-03-14 DIAGNOSIS — G25.81 RLS (RESTLESS LEGS SYNDROME): ICD-10-CM

## 2019-03-14 DIAGNOSIS — E11.42 DIABETIC PERIPHERAL NEUROPATHY ASSOCIATED WITH TYPE 2 DIABETES MELLITUS (HCC): ICD-10-CM

## 2019-03-14 RX ORDER — ROPINIROLE 3 MG/1
TABLET, FILM COATED ORAL
Qty: 50 TAB | Refills: 23 | Status: SHIPPED | OUTPATIENT
Start: 2019-03-14 | End: 2020-03-18

## 2019-07-25 DIAGNOSIS — E11.9 TYPE 2 DIABETES MELLITUS WITHOUT COMPLICATION, WITHOUT LONG-TERM CURRENT USE OF INSULIN (HCC): ICD-10-CM

## 2019-07-26 RX ORDER — GLIMEPIRIDE 2 MG/1
TABLET ORAL
Qty: 90 TAB | Refills: 3 | Status: SHIPPED | OUTPATIENT
Start: 2019-07-26 | End: 2020-08-29

## 2019-09-03 RX ORDER — METFORMIN HYDROCHLORIDE 500 MG/1
TABLET, EXTENDED RELEASE ORAL
Qty: 90 TAB | Refills: 4 | Status: SHIPPED | OUTPATIENT
Start: 2019-09-03 | End: 2020-06-19 | Stop reason: RX

## 2019-09-25 PROBLEM — Z00.00 MEDICARE ANNUAL WELLNESS VISIT, SUBSEQUENT: Status: RESOLVED | Noted: 2017-02-15 | Resolved: 2019-09-25

## 2020-03-18 DIAGNOSIS — I65.23 STENOSIS OF BOTH INTERNAL CAROTID ARTERIES: ICD-10-CM

## 2020-03-18 DIAGNOSIS — I67.89 CEREBRAL MICROVASCULAR DISEASE: ICD-10-CM

## 2020-03-18 DIAGNOSIS — E11.42 DIABETIC PERIPHERAL NEUROPATHY ASSOCIATED WITH TYPE 2 DIABETES MELLITUS (HCC): ICD-10-CM

## 2020-03-18 DIAGNOSIS — G25.81 RLS (RESTLESS LEGS SYNDROME): ICD-10-CM

## 2020-03-18 RX ORDER — ROPINIROLE 3 MG/1
TABLET, FILM COATED ORAL
Qty: 50 TAB | Refills: 0 | Status: SHIPPED | OUTPATIENT
Start: 2020-03-18 | End: 2020-05-21

## 2020-03-18 NOTE — TELEPHONE ENCOUNTER
[phone] contact # per Junaid Blum, need a refill called into [address] [place] [address] fro the following medications:  rOPINIRole 3 mg tab    Thanks,

## 2020-04-22 ENCOUNTER — TELEPHONE (OUTPATIENT)
Dept: PRIMARY CARE CLINIC | Age: 85
End: 2020-04-22

## 2020-04-22 NOTE — TELEPHONE ENCOUNTER
----- Message from Basia Carvalho sent at 4/22/2020  3:40 PM EDT -----  Regarding: Dr Yessica Vasquez  General Message/Vendor Calls    Caller's first and last name:      Reason for call:pt said in order to get her mammogram test sooner than 5/27/2020 she said Dr Shady Carey has to request on order a diagnostic mammogram which will give more details and to have the test jazmyne sooner       Callback required yes/no and why:yes to confirm the change and request      Best contact number(s):[phone]      Details to clarify the request:      Basia Carvalho

## 2020-04-23 NOTE — TELEPHONE ENCOUNTER
Call to patient she said she has megan soreness in her right breast.  Could we do a diagnostic mammogram?

## 2020-05-21 DIAGNOSIS — I65.23 STENOSIS OF BOTH INTERNAL CAROTID ARTERIES: ICD-10-CM

## 2020-05-21 DIAGNOSIS — G25.81 RLS (RESTLESS LEGS SYNDROME): ICD-10-CM

## 2020-05-21 DIAGNOSIS — I67.89 CEREBRAL MICROVASCULAR DISEASE: ICD-10-CM

## 2020-05-21 DIAGNOSIS — E11.42 DIABETIC PERIPHERAL NEUROPATHY ASSOCIATED WITH TYPE 2 DIABETES MELLITUS (HCC): ICD-10-CM

## 2020-05-21 RX ORDER — ROPINIROLE 3 MG/1
TABLET, FILM COATED ORAL
Qty: 50 TAB | Refills: 0 | Status: SHIPPED | OUTPATIENT
Start: 2020-05-21 | End: 2020-07-13 | Stop reason: SDUPTHER

## 2020-10-06 ENCOUNTER — TRANSCRIBE ORDER (OUTPATIENT)
Dept: FAMILY MEDICINE CLINIC | Age: 85
End: 2020-10-06

## 2020-12-29 PROBLEM — L03.90 CELLULITIS: Status: ACTIVE | Noted: 2020-12-29

## 2020-12-29 PROBLEM — L03.116 CELLULITIS AND ABSCESS OF LEFT LEG: Status: ACTIVE | Noted: 2020-12-29

## 2020-12-29 PROBLEM — L02.416 CELLULITIS AND ABSCESS OF LEFT LEG: Status: ACTIVE | Noted: 2020-12-29

## 2020-12-29 PROBLEM — R01.1 SYSTOLIC MURMUR: Chronic | Status: ACTIVE | Noted: 2020-12-29

## 2021-01-02 PROBLEM — L20.9 ATOPIC DERMATITIS: Status: ACTIVE | Noted: 2021-01-02

## 2021-03-03 ENCOUNTER — TELEPHONE (OUTPATIENT)
Dept: FAMILY MEDICINE CLINIC | Age: 86
End: 2021-03-03

## 2021-03-03 NOTE — TELEPHONE ENCOUNTER
----- Message from Brittani Love sent at 3/3/2021  1:55 PM EST -----  Regarding: Dr. Leila Mixon  General Message/Vendor Calls    Caller's first and last name:Carmen Br[facility]      Reason for call: requesting verbal order for 3 more weeks for wound care;       Callback required yes/no and why:yes      Best contact number(s): [phone]      Details to clarify the request: n/a      Brittani Love

## 2021-03-17 DIAGNOSIS — F41.9 ANXIETY: ICD-10-CM

## 2021-03-17 RX ORDER — VENLAFAXINE 37.5 MG/1
37.5 TABLET ORAL 3 TIMES DAILY
Qty: 90 TAB | Refills: 5 | Status: SHIPPED | OUTPATIENT
Start: 2021-03-17 | End: 2021-04-05 | Stop reason: SINTOL

## 2021-04-05 ENCOUNTER — OFFICE VISIT (OUTPATIENT)
Dept: FAMILY MEDICINE CLINIC | Age: 86
End: 2021-04-05
Payer: MEDICARE

## 2021-04-05 VITALS
DIASTOLIC BLOOD PRESSURE: 60 MMHG | OXYGEN SATURATION: 97 % | HEIGHT: 61 IN | RESPIRATION RATE: 24 BRPM | TEMPERATURE: 97.1 F | WEIGHT: 143 LBS | HEART RATE: 87 BPM | SYSTOLIC BLOOD PRESSURE: 120 MMHG | BODY MASS INDEX: 27 KG/M2

## 2021-04-05 DIAGNOSIS — E55.9 VITAMIN D DEFICIENCY: ICD-10-CM

## 2021-04-05 DIAGNOSIS — T07.XXXA MULTIPLE ABRASIONS: ICD-10-CM
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
## 2021-04-05 DIAGNOSIS — E53.8 VITAMIN B12 DEFICIENCY: ICD-10-CM

## 2021-04-05 DIAGNOSIS — F41.9 ANXIETY: ICD-10-CM
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
## 2021-04-05 DIAGNOSIS — E11.9 TYPE 2 DIABETES MELLITUS WITHOUT COMPLICATION, WITHOUT LONG-TERM CURRENT USE OF INSULIN (HCC): ICD-10-CM

## 2021-04-05 PROCEDURE — 1090F PRES/ABSN URINE INCON ASSESS: CPT | Performed by: NURSE PRACTITIONER

## 2021-04-05 PROCEDURE — G9717 DOC PT DX DEP/BP F/U NT REQ: HCPCS | Performed by: NURSE PRACTITIONER

## 2021-04-05 PROCEDURE — G8427 DOCREV CUR MEDS BY ELIG CLIN: HCPCS | Performed by: NURSE PRACTITIONER

## 2021-04-05 PROCEDURE — 1101F PT FALLS ASSESS-DOCD LE1/YR: CPT | Performed by: NURSE PRACTITIONER

## 2021-04-05 PROCEDURE — G8536 NO DOC ELDER MAL SCRN: HCPCS | Performed by: NURSE PRACTITIONER

## 2021-04-05 PROCEDURE — 99214 OFFICE O/P EST MOD 30 MIN: CPT | Performed by: NURSE PRACTITIONER

## 2021-04-05 PROCEDURE — G8419 CALC BMI OUT NRM PARAM NOF/U: HCPCS | Performed by: NURSE PRACTITIONER

## 2021-04-05 RX ORDER — SERTRALINE HYDROCHLORIDE 50 MG/1
50 TABLET, FILM COATED ORAL DAILY
Qty: 30 TAB | Refills: 2 | Status: SHIPPED | OUTPATIENT
Start: 2021-04-05 | End: 2021-05-03 | Stop reason: SDUPTHER

## 2021-04-05 NOTE — PROGRESS NOTES
Chief Complaint   Patient presents with   Brodie Johnson     Has had 4 falls since october. 3 most recent PHQ Screens 2/9/2021   PHQ Not Done -   Little interest or pleasure in doing things Not at all   Feeling down, depressed, irritable, or hopeless Several days   Total Score PHQ 2 1     Learning Assessment 1/8/2021   PRIMARY LEARNER Patient   PRIMARY LANGUAGE ENGLISH   LEARNER PREFERENCE PRIMARY READING     -   ANSWERED BY patient    RELATIONSHIP SELF     Fall Risk Assessment, last 12 mths 4/5/2021   Able to walk? Yes   Fall in past 12 months? 1   Do you feel unsteady? 0   Are you worried about falling 0   Is TUG test greater than 12 seconds? 0   Is the gait abnormal? 0   Number of falls in past 12 months 2   Fall with injury? 1     Abuse Screening Questionnaire 4/5/2021   Do you ever feel afraid of your partner? N   Are you in a relationship with someone who physically or mentally threatens you? N   Is it safe for you to go home? Y     ADL Assessment 4/5/2021   Feeding yourself No Help Needed   Getting from bed to chair No Help Needed   Getting dressed No Help Needed   Bathing or showering No Help Needed   Walk across the room (includes cane/walker) No Help Needed   Using the telphone No Help Needed   Taking your medications No Help Needed   Preparing meals No Help Needed   Managing money (expenses/bills) No Help Needed   Moderately strenuous housework (laundry) No Help Needed   Shopping for personal items (toiletries/medicines) No Help Needed   Shopping for groceries No Help Needed   Driving Help Needed   Climbing a flight of stairs No Help Needed   Getting to places beyond walking distances No Help Needed     1. Have you been to the ER, urgent care clinic since your last visit? Hospitalized since your last visit? No    2. Have you seen or consulted any other health care providers outside of the [address] [place] [address] Alexandre since your last visit? Include any pap smears or colon screening.  No      Chief Complaint Patient presents with   Amanuel Poag had 4 falls since october.           Visit Vitals  /60 (BP 1 Location: Right arm, BP Patient Position: Sitting, BP Cuff Size: Adult long)   Pulse 87   Temp 97.1 °F (36.2 °C) (Temporal)   Resp 24   Ht 5' 1\" (1.549 m)   Wt 143 lb (64.9 kg)   SpO2 97%   BMI 27.02 kg/m²       Pain Scale: 0 - No pain/10  Pain Location:     Angela Arredondo is a 80 y.o. female presenting for/with:    Fall (Has had 4 falls since october. )      Symptom review:    NO  Fever   NO  Shaking chills  NO  Cough  NO  Body aches  NO  Coughing up blood  NO  Chest congestion  NO  Chest pain  NO  Shortness of breath  NO  Profound Loss of smell/taste  NO  Nausea/Vomiting   NO  Loose stool/Diarrhea  NO  any skin issues    Patient Risk Factors Reviewed as follows:  NO  have you been in Close contact with confirmed COVID19 patient   NO  History of recent travel to affected geographical areas within the past 14 days  NO  COPD  NO  Active Cancer/Leukemia/Lymphoma/Chemotherapy  NO  Oral steroid use  NO  Pregnant  NO  Diabetes Mellitus  NO  Heart disease  NO  Asthma  NO Health care worker at home  NO Health care worker  NO Is there a Pregnant Woman in the home  NO Dialysis pt in the home   NO a large number of people living in the home

## 2021-04-05 NOTE — PROGRESS NOTES
Chief Complaint   Patient presents with   Doris Truong     Has had 4 falls since october. HPI:      Graciela Casillas is a 80 y.o. female. . Lives alone in [facility]. She has 4 daughters who are very helpful. She had a very rough winter with recurrent cellulitis. This is slowed her down quite a bit. She is also had anxiety related to this health condition. Not sleeping well at night.     T2DM:  Managed with Glimepiride and Metformin. Lab Results   Component Value Date/Time    Hemoglobin A1c 6.8 (H) 12/30/2020 05:35 AM      New Issues: She is here for a checkup. She has had 2 recent falls in the last month. For the first fall, she fell backwards and scraped her wrist.  For the second fall, she tripped up the stairs and scraped up her left lower leg. All the wounds are healing well. She still has home health coming out to help with her wounds. She is also finishing up home health physical therapy. She feels like her balance is not great and her stamina is definitely lessened. She is not sure how much the Effexor is helping her. The dose was increased to 3 tablets a day about a month ago. Allergies   Allergen Reactions    Doxycycline Rash       Current Outpatient Medications   Medication Sig    venlafaxine (EFFEXOR) 37.5 mg tablet Take 1 Tab by mouth three (3) times daily.  OTHER Indications: vitamin d spray    silver sulfADIAZINE (Silvadene) 1 % topical cream Apply  to affected area daily.  rOPINIRole (REQUIP) 3 mg tab tab Take 1 Tab by mouth two (2) times a day.  glimepiride (AMARYL) 2 mg tablet TAKE 1 TABLET BY MOUTH IN THE MORNING    metFORMIN (GLUCOPHAGE) 500 mg tablet Take 1 Tab by mouth daily (with breakfast).  multivitamin (ONE A DAY) tablet Take 1 Tab by mouth daily.     Blood-Glucose Meter misc Use to test glucose once daily, E 11.9   Relion meter please    RELION PRIME TEST STRIPS strip USE 1 TEST STRIP EACH TIME TO TEST GLUCOSE 2 TIMES A DAY    magnesium 250 mg tab Take 500 mg by mouth nightly. No current facility-administered medications for this visit. Past Medical History:   Diagnosis Date    Anxiety     Degenerative joint disease (DJD) of hip 2013    bursitis    Depression     Diabetes (HCC)     GERD (gastroesophageal reflux disease)     H/O hand fracture     Hyperlipidemia     Menopause     Osteopenia     Recurrent UTI     RLS (restless legs syndrome)        Past Surgical History:   Procedure Laterality Date    HX APPENDECTOMY      HX COLONOSCOPY      HX DILATION AND CURETTAGE      HX TONSIL AND ADENOIDECTOMY         Social History     Socioeconomic History    Marital status:      Spouse name: Not on file    Number of children: 3    Years of education: Not on file    Highest education level: Not on file   Occupational History    Occupation: Victim Assistance     Comment: Worked for [employer]   Tobacco Use    Smoking status: Former Smoker     Packs/day: 0.50     Years: 20.00     Pack years: 10.00     Types: Cigarettes     Quit date: 1960     Years since quittin.7    Smokeless tobacco: Never Used   Substance and Sexual Activity    Alcohol use:  Yes     Alcohol/week: 3.0 standard drinks     Types: 3 Glasses of wine per week     Frequency: 2-3 times a week     Drinks per session: 1 or 2     Binge frequency: Never    Drug use: Yes     Types: Prescription    Sexual activity: Not Currently   Other Topics Concern     Service No    Blood Transfusions No    Caffeine Concern No    Occupational Exposure No    Hobby Hazards No    Sleep Concern No    Stress Concern Yes     Comment: usually financial    Weight Concern No    Special Diet Yes     Comment: Diabetic    Back Care No    Exercise No     Comment: stays active, loves to be outside, daily walks    [address] Yes    Self-Exams Yes       Family History   Problem Relation Age of Onset    Cancer Mother         breast/ lung    Cancer Father         prostate    Migraines Father     Cancer Sister         breast    Heart Disease Maternal Grandmother        Above history reviewed. ROS:  Denies fever, chills, cough, chest pain, SOB,  nausea, vomiting, or diarrhea. Denies wt loss, wt gain, hemoptysis, hematochezia or melena. Physical Examination:    /60 (BP 1 Location: Right arm, BP Patient Position: Sitting, BP Cuff Size: Adult long)   Pulse 87   Temp 97.1 °F (36.2 °C) (Temporal)   Resp 24   Ht 5' 1\" (1.549 m)   Wt 143 lb (64.9 kg)   SpO2 97%   BMI 27.02 kg/m²     General: Alert and Ox3, Fluent speech  HEENT:  PERRLA, EOM intact, TMs, turbinates, pharynx normal.  No thyromegaly. No cervical adenopathy. Neck:  Supple, no adenopathy, JVD, mass or bruit  Chest:  Clear to Ausculation, without wheezes, rales, rubs or ronchi  Cardiac: Regular rate and rhythm  Abdomen:  +BS, soft, nontender without palpable HSM  Extremities:  No cyanosis, clubbing or edema  Neurologic:  Ambulatory without assist, CN 2-12 grossly intact. Moves all extremities. Skin: Left lower extremity with healed wounds and small healing abrasion. Right wrist with healing abrasions. Lymphadenopathy: no cervical or supraclavicular nodes        ASSESSMENT AND PLAN:     1. Fall in home, sequela  Starting with outpatient physical therapy since she is finishing up her home health  - REFERRAL TO PHYSICAL THERAPY    2. Type 2 diabetes mellitus without complication, without long-term current use of insulin ([facility])  Checking labs  Last A1c was well controlled  - HEMOGLOBIN A1C WITH EAG; Future  - METABOLIC PANEL, BASIC; Future  - METABOLIC PANEL, BASIC  - HEMOGLOBIN A1C WITH EAG    3. Anxiety  Effexor does not seem to be effective for the patient  Switch to Zoloft  Consider nonaddictive sleep aid as well  - sertraline (ZOLOFT) 50 mg tablet; Take 1 Tab by mouth daily. Dispense: 30 Tab; Refill: 2    4.  Vitamin D deficiency  Checking labs  - VITAMIN D, 25 HYDROXY; Future  - VITAMIN D, 25 HYDROXY    5. Vitamin B12 deficiency  Checking labs  - VITAMIN B12; Future  - VITAMIN B12    6.  Multiple abrasions  Healing well  Continue simple wound care    RTC as needed    Chelsy Ness NP

## 2021-04-06 LAB
25(OH)D3 SERPL-MCNC: 37.5 NG/ML (ref 30–100)
ANION GAP SERPL CALC-SCNC: 5 MMOL/L (ref 5–15)
BUN SERPL-MCNC: 17 MG/DL (ref 6–20)
BUN/CREAT SERPL: 27 (ref 12–20)
CALCIUM SERPL-MCNC: 8.8 MG/DL (ref 8.5–10.1)
CHLORIDE SERPL-SCNC: 106 MMOL/L (ref 97–108)
CO2 SERPL-SCNC: 27 MMOL/L (ref 21–32)
CREAT SERPL-MCNC: 0.63 MG/DL (ref 0.55–1.02)
EST. AVERAGE GLUCOSE BLD GHB EST-MCNC: 151 MG/DL
GLUCOSE SERPL-MCNC: 190 MG/DL (ref 65–100)
HBA1C MFR BLD: 6.9 % (ref 4–5.6)
POTASSIUM SERPL-SCNC: 4.2 MMOL/L (ref 3.5–5.1)
SODIUM SERPL-SCNC: 138 MMOL/L (ref 136–145)
VIT B12 SERPL-MCNC: 352 PG/ML (ref 193–986)

## 2021-04-06 NOTE — PROGRESS NOTES
Vitamin B12 is good. Vitamin D is good. Kidneys look good. A1c is well controlled at 6.9%. Good report.

## 2021-05-03 ENCOUNTER — VIRTUAL VISIT (OUTPATIENT)
Dept: FAMILY MEDICINE CLINIC | Age: 86
End: 2021-05-03
Payer: MEDICARE
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
## 2021-05-03 DIAGNOSIS — Z00.00 MEDICARE ANNUAL WELLNESS VISIT, SUBSEQUENT: Primary | ICD-10-CM
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
## 2021-05-03 DIAGNOSIS — F41.9 ANXIETY: ICD-10-CM

## 2021-05-03 PROCEDURE — G0439 PPPS, SUBSEQ VISIT: HCPCS | Performed by: NURSE PRACTITIONER

## 2021-05-03 PROCEDURE — G2025 DIS SITE TELE SVCS RHC/FQHC: HCPCS | Performed by: NURSE PRACTITIONER

## 2021-05-03 RX ORDER — SERTRALINE HYDROCHLORIDE 100 MG/1
100 TABLET, FILM COATED ORAL DAILY
Qty: 90 TAB | Refills: 4 | Status: SHIPPED | OUTPATIENT
Start: 2021-05-03 | End: 2021-12-30 | Stop reason: ALTCHOICE

## 2021-05-03 NOTE — PROGRESS NOTES
Chief Complaint   Patient presents with   Kristy Annual Wellness Visit    Fall     follow up from falls. Currently going to PT 2 times per week to improve balance.  Medication Evaluation     zoloft questions, feels like it is not helping. 3 most recent PHQ Screens 5/3/2021   PHQ Not Done -   Little interest or pleasure in doing things Several days   Feeling down, depressed, irritable, or hopeless Several days   Total Score PHQ 2 2     Learning Assessment 5/3/2021   PRIMARY LEARNER Patient   PRIMARY LANGUAGE ENGLISH   LEARNER PREFERENCE PRIMARY READING     -   ANSWERED BY patient   RELATIONSHIP SELF     Fall Risk Assessment, last 12 mths 5/3/2021   Able to walk? Yes   Fall in past 12 months? 1   Do you feel unsteady? 0   Are you worried about falling 0   Is TUG test greater than 12 seconds? 0   Is the gait abnormal? 0   Number of falls in past 12 months 2   Fall with injury? 1     Abuse Screening Questionnaire 5/3/2021   Do you ever feel afraid of your partner? N   Are you in a relationship with someone who physically or mentally threatens you? N   Is it safe for you to go home? Y     ADL Assessment 5/3/2021   Feeding yourself No Help Needed   Getting from bed to chair No Help Needed   Getting dressed No Help Needed   Bathing or showering No Help Needed   Walk across the room (includes cane/walker) No Help Needed   Using the telphone No Help Needed   Taking your medications No Help Needed   Preparing meals No Help Needed   Managing money (expenses/bills) No Help Needed   Moderately strenuous housework (laundry) No Help Needed   Shopping for personal items (toiletries/medicines) No Help Needed   Shopping for groceries No Help Needed   Driving No Help Needed   Climbing a flight of stairs No Help Needed   Getting to places beyond walking distances No Help Needed     1. Have you been to the ER, urgent care clinic since your last visit? Hospitalized since your last visit? No    2.  Have you seen or consulted any other health care providers outside of the [address] [place] [address] since your last visit? Include any pap smears or colon screening. No      Chief Complaint   Patient presents with    Annual Wellness Visit    Fall     follow up from falls. Currently going to PT 2 times per week to improve balance.  Medication Evaluation     zoloft questions, feels like it is not helping. There were no vitals taken for this visit. Pain Scale: /10  Pain Location:     Nicole Green is a 80 y.o. female presenting for/with: Annual Wellness Visit, Fall (follow up from falls. Currently going to PT 2 times per week to improve balance. ), and Medication Evaluation (zoloft questions, feels like it is not helping.)      Symptom review:    NO  Fever   NO  Shaking chills  NO  Cough  NO  Body aches  NO  Coughing up blood  NO  Chest congestion  NO  Chest pain  NO  Shortness of breath  NO  Profound Loss of smell/taste  NO  Nausea/Vomiting   NO  Loose stool/Diarrhea  NO  any skin issues    Patient Risk Factors Reviewed as follows:  NO  have you been in Close contact with confirmed COVID19 patient   NO  History of recent travel to affected geographical areas within the past 14 days  NO  COPD  NO  Active Cancer/Leukemia/Lymphoma/Chemotherapy  NO  Oral steroid use  NO  Pregnant  NO  Diabetes Mellitus  NO  Heart disease  NO  Asthma  NO Health care worker at home  NO Health care worker  NO Is there a Pregnant Woman in the home  NO Dialysis pt in the home   NO a large number of people living in the home      This is the Subsequent Medicare Annual Wellness Exam, performed 12 months or more after the Initial AWV or the last Subsequent AWV    I have reviewed the patient's medical history in detail and updated the computerized patient record. Assessment/Plan   Education and counseling provided:  Are appropriate based on today's review and evaluation    1.  Medicare annual wellness visit, subsequent       Depression Risk Factor Screening     3 most recent PHQ Screens 5/3/2021   PHQ Not Done -   Little interest or pleasure in doing things Several days   Feeling down, depressed, irritable, or hopeless Several days   Total Score PHQ 2 2       Alcohol Risk Screen    Do you average more than 1 drink per night or more than 7 drinks a week:  No    On any one occasion in the past three months have you have had more than 3 drinks containing alcohol:  No        Functional Ability and Level of Safety    Hearing: Hearing is good. Activities of Daily Living: The home contains: grab bars  Patient does total self care      Ambulation: with no difficulty     Fall Risk:  Fall Risk Assessment, last 12 mths 5/3/2021   Able to walk? Yes   Fall in past 12 months? 1   Do you feel unsteady? 0   Are you worried about falling 0   Is TUG test greater than 12 seconds? 0   Is the gait abnormal? 0   Number of falls in past 12 months 2   Fall with injury?  1      Abuse Screen:  Patient is not abused       Cognitive Screening    Has your family/caregiver stated any concerns about your memory: no         Health Maintenance Due     Health Maintenance Due   Topic Date Due    Shingrix Vaccine Age 49> (1 of 2) Never done    MICROALBUMIN Q1  05/11/2019    Foot Exam Q1  02/03/2021       Patient Care Team   Patient Care Team:  Milton Neves MD as PCP - General (Internal Medicine)  Milton Neves MD as PCP - [facility]    History     Patient Active Problem List   Diagnosis Code    Hyperlipidemia E78.5    Anxiety F41.9    Type 2 diabetes mellitus without complication ([facility] [address] Q13.3    RLS (restless legs syndrome) G25.81    Diabetic peripheral neuropathy associated with type 2 diabetes mellitus ([address] E11.42    Memory difficulties R41.3    B12 deficiency E53.8    Vitamin D deficiency E55.9    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    Cerebral microvascular disease I67.89    Mixed hyperlipidemia E78.2    Depression F32.9    Cellulitis and abscess of left leg L03.116, S98.907    Systolic murmur N22.4    Cellulitis L03.90    Atopic dermatitis L20.9     Past Medical History:   Diagnosis Date    Anxiety     Degenerative joint disease (DJD) of hip 2013    bursitis    Depression     Diabetes (HCC)     GERD (gastroesophageal reflux disease)     H/O hand fracture     Hyperlipidemia     Menopause     Osteopenia     Recurrent UTI     RLS (restless legs syndrome)       Past Surgical History:   Procedure Laterality Date    HX APPENDECTOMY      HX COLONOSCOPY      HX DILATION AND CURETTAGE      HX TONSIL AND ADENOIDECTOMY       Current Outpatient Medications   Medication Sig Dispense Refill    sertraline (ZOLOFT) 50 mg tablet Take 1 Tab by mouth daily. 30 Tab 2    OTHER Indications: vitamin d spray      rOPINIRole (REQUIP) 3 mg tab tab Take 1 Tab by mouth two (2) times a day. 60 Tab 2    glimepiride (AMARYL) 2 mg tablet TAKE 1 TABLET BY MOUTH IN THE MORNING 90 Tab 4    metFORMIN (GLUCOPHAGE) 500 mg tablet Take 1 Tab by mouth daily (with breakfast). 90 Tab 4    multivitamin (ONE A DAY) tablet Take 1 Tab by mouth daily.  Blood-Glucose Meter misc Use to test glucose once daily, E 11.9   Relion meter please 1 Each 0    RELION PRIME TEST STRIPS strip USE 1 TEST STRIP EACH TIME TO TEST GLUCOSE 2 TIMES A  Strip 11    magnesium 250 mg tab Take 500 mg by mouth nightly.        Allergies   Allergen Reactions    Doxycycline Rash       Family History   Problem Relation Age of Onset    Cancer Mother         breast/ lung    Cancer Father         prostate    Migraines Father     Cancer Sister         breast    Heart Disease Maternal Grandmother      Social History     Tobacco Use    Smoking status: Former Smoker     Packs/day: 0.50     Years: 20.00     Pack years: 10.00     Types: Cigarettes     Quit date: 1960     Years since quittin.8    Smokeless tobacco: Never Used   Substance Use Topics    Alcohol use: Yes     Alcohol/week: 3.0 standard drinks     Types: 3 Glasses of wine per week     Frequency: 2-3 times a week     Drinks per session: 1 or 2     Binge frequency: Never         ________      Jaqueline Neri is a 80 y.o. female evaluated via telephone on 5/3/2021. Consent:  She and/or health care decision maker is aware that that she may receive a bill for this telephone service, depending on her insurance coverage, and has provided verbal consent to proceed: Yes    New issues: She was seen last month after having a rough winter with recurrent cellulitis and multiple falls. She was switched from Effexor to Zoloft. She is not sure if it is helping much with her anxiety, but she is sleeping better at night. Documentation:  I communicated with the patient and/or health care decision maker about AWV, falls, anxiety. Details of this discussion including any medical advice provided:     1. Medicare annual wellness visit, subsequent  Plan for in-house appt in 1 month for micro and foot exam    2. Anxiety  Boosting dose  Sleep is improved   - sertraline (ZOLOFT) 100 mg tablet; Take 1 Tab by mouth daily. Dispense: 90 Tab; Refill: 4    3. Fall in home, sequela  No further falls        I affirm this is a Patient Initiated Episode with an Established Patient who has not had a related appointment within my department in the past 7 days or scheduled within the next 24 hours.     Total Time: minutes: 11-20 minutes    Note: not billable if this call serves to triage the patient into an appointment for the relevant concern      Diana Lopez NP

## 2021-05-03 NOTE — PATIENT INSTRUCTIONS

## 2021-05-14 ENCOUNTER — OFFICE VISIT (OUTPATIENT)
Dept: FAMILY MEDICINE CLINIC | Age: 86
End: 2021-05-14
Payer: MEDICARE

## 2021-05-14 VITALS
HEIGHT: 61 IN | TEMPERATURE: 97.2 F | WEIGHT: 141.8 LBS | BODY MASS INDEX: 26.77 KG/M2 | SYSTOLIC BLOOD PRESSURE: 127 MMHG | DIASTOLIC BLOOD PRESSURE: 68 MMHG | RESPIRATION RATE: 18 BRPM | HEART RATE: 83 BPM | OXYGEN SATURATION: 97 %

## 2021-05-14 DIAGNOSIS — E53.8 B12 DEFICIENCY: ICD-10-CM

## 2021-05-14 DIAGNOSIS — G25.81 RLS (RESTLESS LEGS SYNDROME): Primary | ICD-10-CM

## 2021-05-14 DIAGNOSIS — E11.9 TYPE 2 DIABETES MELLITUS WITHOUT COMPLICATION, WITHOUT LONG-TERM CURRENT USE OF INSULIN (HCC): ICD-10-CM

## 2021-05-14 PROCEDURE — 1101F PT FALLS ASSESS-DOCD LE1/YR: CPT | Performed by: INTERNAL MEDICINE

## 2021-05-14 PROCEDURE — 99214 OFFICE O/P EST MOD 30 MIN: CPT | Performed by: INTERNAL MEDICINE

## 2021-05-14 PROCEDURE — G8427 DOCREV CUR MEDS BY ELIG CLIN: HCPCS | Performed by: INTERNAL MEDICINE

## 2021-05-14 PROCEDURE — 1090F PRES/ABSN URINE INCON ASSESS: CPT | Performed by: INTERNAL MEDICINE

## 2021-05-14 PROCEDURE — G8536 NO DOC ELDER MAL SCRN: HCPCS | Performed by: INTERNAL MEDICINE

## 2021-05-14 PROCEDURE — G9717 DOC PT DX DEP/BP F/U NT REQ: HCPCS | Performed by: INTERNAL MEDICINE

## 2021-05-14 PROCEDURE — G8419 CALC BMI OUT NRM PARAM NOF/U: HCPCS | Performed by: INTERNAL MEDICINE

## 2021-05-14 NOTE — PROGRESS NOTES
Ms. Chiki Castillo is a 80 y.o. female who is here for evaluation of   Chief Complaint   Patient presents with    Referral Follow Up     Started physical [facility] last [facility]. Was told that she had abnomal movements. Requesting eval of neurology    Spasms     Daughter states that physical therapist was able to exhibit abnormal movement COncerns for Central nervous system Concerns for parkinsons. . _ also reports increased in restless leg syndrome    Other     Still have difficulty with balance   . ASSESSMENT AND PLAN:    1. RLS (restless legs syndrome)  Will refer to neurology for further evaluation--may be related to RLS, Sertraline, T2DM and others.  - REFERRAL TO NEUROLOGY    2. B12 deficiency  Urged to resume the SL B12    3. Type 2 diabetes mellitus without complication, without long-term current use of insulin ([facility] [address]  At goal:    Lab Results   Component Value Date/Time    Hemoglobin A1c 6.9 (H) 04/05/2021 11:18 AM           Orders Placed This Encounter    REFERRAL TO NEUROLOGY     Referral Priority:   Routine     Referral Type:   Consultation     Referral Reason:   Specialty Services Required     Referred to Provider:   Willis Hendrix DO     Number of Visits Requested:   1           HPI  While at PT, therapist observed myoclonic jerky movements primarily of the BLE. Long history of RLS (Requip), depression (Sertraline), B12 def and T2DM (A1c 6.9). Feels well. Leg wounds have healed. ROS:  Denies  fever, chills, cough, chest pain, SOB,  nausea, vomiting, or diarrhea. Denies wt loss, wt gain, hemoptysis, hematochezia or melena. Physical Examination:    Visit Vitals  /68 (BP 1 Location: Left upper arm, BP Patient Position: Sitting, BP Cuff Size: Small adult)   Pulse 83   Temp 97.2 °F (36.2 °C) (Oral)   Resp 18   Ht 5' 1\" (1.549 m)   Wt 141 lb 12.8 oz (64.3 kg)   SpO2 97%   BMI 26.79 kg/m²      General:  Alert, cooperative, no distress.    Head:  Normocephalic, without obvious abnormality, atraumatic. Eyes:  Conjunctivae/corneas clear. Pupils equal, round, reactive to light. Extraocular movements intact. Lungs:   Clear to auscultation bilaterally. Chest wall:  No tenderness or deformity. Cardiac:  RRR with 2/6 NADIR   Abdomen:   Soft, non-tender. Bowel sounds normal. No masses. No organomegaly. Extremities: Extremities normal, atraumatic, no cyanosis or edema. Pulses: 2+ and symmetric all extremities. Skin: Skin color, texture, turgor normal. No rashes or lesions. Lymph nodes: Cervical, supraclavicular, and axillary nodes normal.   Neurologic: CNII-XII intact. Normal strength, sensation, and reflexes throughout. On this date 05/14/2021 I have spent 25 minutes reviewing previous notes, test results and face to face with the patient discussing the diagnosis and importance of compliance with the treatment plan as well as documenting on the day of the visit.     Donna Dowling MD FACP    (signed electronically) on 5/14/2021 at 9:48 AM

## 2021-05-14 NOTE — PROGRESS NOTES
Katie Avina is a 80 y.o. female presenting for/with:    Referral Follow Up (Started physical therapy last week at [facility]. Was told that she had abnomal movements. Requesting eval of neurology), Spasms (Daughter states that physical therapist was able to exhibit abnormal movement COncerns for Central nervous system Concerns for parkinsons. . _ also reports increased in restless leg syndrome), and Other (Still have difficulty with balance)      Visit Vitals  /68 (BP 1 Location: Left upper arm, BP Patient Position: Sitting, BP Cuff Size: Small adult)   Pulse 83   Temp 97.2 °F (36.2 °C) (Oral)   Resp 18   Ht 5' 1\" (1.549 m)   Wt 141 lb 12.8 oz (64.3 kg)   SpO2 97%   BMI 26.79 kg/m²     Pain Scale: 0 - No pain/10  Pain Location:     1. Have you been to the ER, urgent care clinic since your last visit? Hospitalized since your last visit? NO    2. Have you seen or consulted any other health care providers outside of the [address] [place] [address] since your last visit? Include any pap smears or colon screening.  NO    Symptom review:  NO  Fever   NO  Shaking chills  NO  Cough  NO  Body aches  NO  Coughing up blood  NO  Chest congestion  NO  Chest pain  NO  Shortness of breath  NO  Profound Loss of smell/taste  NO  Nausea/Vomiting   NO  Loose stool/Diarrhea  NO  any skin issues    Patient Risk Factors Reviewed as follows:  NO  have you been in Close contact with confirmed COVID19 patient   NO  History of recent travel to affected geographical areas within the past 14 days  NO  COPD  NO  Active Cancer/Leukemia/Lymphoma/Chemotherapy  NO  Oral steroid use  NO  Pregnant  YES  Diabetes Mellitus  YES  Heart disease  NO  Asthma  NO Health care worker at home  [address] care worker  NO Is there a Pregnant Woman in the home  NO Dialysis pt in the home   NO a large number of people living in the home    Learning Assessment 5/3/2021   PRIMARY LEARNER Patient   PRIMARY LANGUAGE ENGLISH   LEARNER PREFERENCE PRIMARY READING     -   ANSWERED BY patient   RELATIONSHIP SELF     Fall Risk Assessment, last 12 mths 5/3/2021   Able to walk? Yes   Fall in past 12 months? 1   Do you feel unsteady? 0   Are you worried about falling 0   Is TUG test greater than 12 seconds? 0   Is the gait abnormal? 0   Number of falls in past 12 months 2   Fall with injury? 1       3 most recent PHQ Screens 5/3/2021   PHQ Not Done -   Little interest or pleasure in doing things Several days   Feeling down, depressed, irritable, or hopeless Several days   Total Score PHQ 2 2     Abuse Screening Questionnaire 5/3/2021   Do you ever feel afraid of your partner? N   Are you in a relationship with someone who physically or mentally threatens you? N   Is it safe for you to go home?  Y       ADL Assessment 5/3/2021   Feeding yourself No Help Needed   Getting from bed to chair No Help Needed   Getting dressed No Help Needed   Bathing or showering No Help Needed   Walk across the room (includes cane/walker) No Help Needed   Using the telphone No Help Needed   Taking your medications No Help Needed   Preparing meals No Help Needed   Managing money (expenses/bills) No Help Needed   Moderately strenuous housework (laundry) No Help Needed   Shopping for personal items (toiletries/medicines) No Help Needed   Shopping for groceries No Help Needed   Driving No Help Needed   Climbing a flight of stairs No Help Needed   Getting to places beyond walking distances No Help Needed      Verbal statement of next of kin as healthcare proxy

## 2021-05-20 ENCOUNTER — TELEPHONE (OUTPATIENT)
Dept: FAMILY MEDICINE CLINIC | Age: 86
End: 2021-05-20

## 2021-05-20 NOTE — TELEPHONE ENCOUNTER
----- Message from Laly Roland sent at 5/20/2021  7:51 AM EDT -----  Regarding: Dr Xochilt Ashley  Pt's daughter Annel Del Rosario is calling to speak the nurse Poornima Zavala, regarding the pt, she has been trying for several days now, was told to call this morning, please call [phone]

## 2021-06-04 ENCOUNTER — OFFICE VISIT (OUTPATIENT)
Dept: FAMILY MEDICINE CLINIC | Age: 86
End: 2021-06-04
Payer: MEDICARE

## 2021-06-04 VITALS
WEIGHT: 139.6 LBS | BODY MASS INDEX: 26.36 KG/M2 | OXYGEN SATURATION: 99 % | SYSTOLIC BLOOD PRESSURE: 110 MMHG | DIASTOLIC BLOOD PRESSURE: 70 MMHG | RESPIRATION RATE: 18 BRPM | TEMPERATURE: 97.8 F | HEART RATE: 87 BPM | HEIGHT: 61 IN

## 2021-06-04 DIAGNOSIS — L03.116 LEFT LEG CELLULITIS: Primary | ICD-10-CM

## 2021-06-04 PROCEDURE — 1090F PRES/ABSN URINE INCON ASSESS: CPT | Performed by: INTERNAL MEDICINE

## 2021-06-04 PROCEDURE — 1101F PT FALLS ASSESS-DOCD LE1/YR: CPT | Performed by: INTERNAL MEDICINE

## 2021-06-04 PROCEDURE — G8427 DOCREV CUR MEDS BY ELIG CLIN: HCPCS | Performed by: INTERNAL MEDICINE

## 2021-06-04 PROCEDURE — 99214 OFFICE O/P EST MOD 30 MIN: CPT | Performed by: INTERNAL MEDICINE

## 2021-06-04 PROCEDURE — G9717 DOC PT DX DEP/BP F/U NT REQ: HCPCS | Performed by: INTERNAL MEDICINE

## 2021-06-04 PROCEDURE — G8419 CALC BMI OUT NRM PARAM NOF/U: HCPCS | Performed by: INTERNAL MEDICINE

## 2021-06-04 PROCEDURE — G8536 NO DOC ELDER MAL SCRN: HCPCS | Performed by: INTERNAL MEDICINE

## 2021-06-04 RX ORDER — CEPHALEXIN 500 MG/1
500 CAPSULE ORAL 3 TIMES DAILY
Qty: 30 CAPSULE | Refills: 0 | Status: SHIPPED | OUTPATIENT
Start: 2021-06-04 | End: 2021-06-14

## 2021-06-04 NOTE — PROGRESS NOTES
Ms. Chiki Castillo is a 80 y.o. female who is here for evaluation of   Chief Complaint   Patient presents with    Leg Injury     (L)LE wound 45+    Other     C/O increased RLS S/S. Has Neuro appt at the end of June   . ASSESSMENT AND PLAN:    1. Left leg cellulitis  - cephALEXin (KEFLEX) 500 mg capsule; Take 1 Capsule by mouth three (3) times daily for 10 days. Dispense: 30 Capsule; Refill: 0      Orders Placed This Encounter    cephALEXin (KEFLEX) 500 mg capsule     Sig: Take 1 Capsule by mouth three (3) times daily for 10 days. Dispense:  30 Capsule     Refill:  0           HPI  81 yo with T2DM. Now with scrape on left leg--source  Unknown. No fever or chills. ROS:  Denies  fever, chills, cough, chest pain, SOB,  nausea, vomiting, or diarrhea. Denies wt loss, wt gain, hemoptysis, hematochezia or melena. Physical Examination:    Visit Vitals  /70 (BP 1 Location: Left upper arm, BP Patient Position: Sitting, BP Cuff Size: Adult)   Pulse 87   Temp 97.8 °F (36.6 °C) (Oral)   Resp 18   Ht 5' 1\" (1.549 m)   Wt 139 lb 9.6 oz (63.3 kg)   SpO2 99%   BMI 26.38 kg/m²      General:  Alert, cooperative, no distress. Head:  Normocephalic, without obvious abnormality, atraumatic. Eyes:  Conjunctivae/corneas clear. Pupils equal, round, reactive to light. Extraocular movements intact. Lungs:   Clear to auscultation bilaterally. Chest wall:  No tenderness or deformity. Cardiac:  RRR   Abdomen:   Soft, non-tender. Bowel sounds normal. No masses. No organomegaly. Extremities: Extremities normal, atraumatic, no cyanosis or edema. Pulses: 2+ and symmetric all extremities. Skin:     Skin color, texture, turgor normal. No rashes or lesions. Lymph nodes: Cervical, supraclavicular, and axillary nodes normal.   Neurologic: CNII-XII intact. Normal strength, sensation, and reflexes throughout.      On this date 06/04/2021 I have spent 30 minutes reviewing previous notes, test results and face to face with the patient discussing the diagnosis and importance of compliance with the treatment plan as well as documenting on the day of the visit.     Constantino Moreno MD FACP    (signed electronically) on 6/4/2021 at 12:25 PM

## 2021-06-04 NOTE — PROGRESS NOTES
Jaqueline Neri is a 80 y.o. female presenting for/with:    Chief Complaint   Patient presents with    Leg Injury     (L)LE wound 45+    Other     C/O increased RLS S/S. Has Neuro appt at the end of June       Visit Vitals  /70 (BP 1 Location: Left upper arm, BP Patient Position: Sitting, BP Cuff Size: Adult)   Pulse 87   Temp 97.8 °F (36.6 °C) (Oral)   Resp 18   Ht 5' 1\" (1.549 m)   Wt 139 lb 9.6 oz (63.3 kg)   SpO2 99%   BMI 26.38 kg/m²     Pain Scale: 0 - No pain/10  Pain Location:     1. Have you been to the ER, urgent care clinic since your last visit? Hospitalized since your last visit? NO    2. Have you seen or consulted any other health care providers outside of the [address] [place] [address] since your last visit? Include any pap smears or colon screening. NO    Symptom review:  NO  Fever   NO  Shaking chills  NO  Cough  NO  Body aches  NO  Coughing up blood  NO  Chest congestion  NO  Chest pain  NO  Shortness of breath  NO  Profound Loss of smell/taste  NO  Nausea/Vomiting   NO  Loose stool/Diarrhea  YES  any skin issues    Patient Risk Factors Reviewed as follows:  NO  have you been in Close contact with confirmed COVID19 patient   NO  History of recent travel to affected geographical areas within the past 14 days  NO  COPD  NO  Active Cancer/Leukemia/Lymphoma/Chemotherapy  NO  Oral steroid use  NO  Pregnant  YES  Diabetes Mellitus  YES  Heart disease  NO  Asthma  NO Health care worker at home  [address] care worker  NO Is there a Pregnant Woman in the home  NO Dialysis pt in the home   NO a large number of people living in the home    Learning Assessment 5/3/2021   PRIMARY LEARNER Patient   PRIMARY LANGUAGE ENGLISH   LEARNER PREFERENCE PRIMARY READING     -   ANSWERED BY patient   RELATIONSHIP SELF     Fall Risk Assessment, last 12 mths 5/3/2021   Able to walk? Yes   Fall in past 12 months? 1   Do you feel unsteady? 0   Are you worried about falling 0   Is TUG test greater than 12 seconds? 0   Is the gait abnormal? 0   Number of falls in past 12 months 2   Fall with injury? 1       3 most recent PHQ Screens 5/3/2021   PHQ Not Done -   Little interest or pleasure in doing things Several days   Feeling down, depressed, irritable, or hopeless Several days   Total Score PHQ 2 2     Abuse Screening Questionnaire 5/3/2021   Do you ever feel afraid of your partner? N   Are you in a relationship with someone who physically or mentally threatens you? N   Is it safe for you to go home?  Y       ADL Assessment 5/3/2021   Feeding yourself No Help Needed   Getting from bed to chair No Help Needed   Getting dressed No Help Needed   Bathing or showering No Help Needed   Walk across the room (includes cane/walker) No Help Needed   Using the telphone No Help Needed   Taking your medications No Help Needed   Preparing meals No Help Needed   Managing money (expenses/bills) No Help Needed   Moderately strenuous housework (laundry) No Help Needed   Shopping for personal items (toiletries/medicines) No Help Needed   Shopping for groceries No Help Needed   Driving No Help Needed   Climbing a flight of stairs No Help Needed   Getting to places beyond walking distances No Help Needed

## 2021-06-08 ENCOUNTER — TELEPHONE (OUTPATIENT)
Dept: FAMILY MEDICINE CLINIC | Age: 86
End: 2021-06-08

## 2021-06-08 NOTE — TELEPHONE ENCOUNTER
----- [facility] sent at 6/8/2021 10:53 AM EDT -----  Regarding: Dr. Sky Current telephone  General Message/Vendor Calls    Caller's first and last name:Yolande Segundo, daughter      Reason for call: zoloft      Callback required yes/no and why: yes      Best contact number(s): [phone]      Details to clarify the request: She would like to be contacted as soon as possible to confirm dosage of patient's Zoloft medication as patient said it was one pill daily and the home health nurse is saying twice daily.       [facility]

## 2021-06-24 ENCOUNTER — OFFICE VISIT (OUTPATIENT)
Dept: NEUROLOGY | Age: 86
End: 2021-06-24
Payer: MEDICARE

## 2021-06-24 VITALS
OXYGEN SATURATION: 98 % | HEART RATE: 72 BPM | DIASTOLIC BLOOD PRESSURE: 70 MMHG | SYSTOLIC BLOOD PRESSURE: 122 MMHG | RESPIRATION RATE: 18 BRPM

## 2021-06-24 DIAGNOSIS — G62.9 POLYNEUROPATHY: Primary | ICD-10-CM

## 2021-06-24 DIAGNOSIS — R41.3 COMPLAINTS OF MEMORY DISTURBANCE: ICD-10-CM

## 2021-06-24 DIAGNOSIS — G25.81 RLS (RESTLESS LEGS SYNDROME): ICD-10-CM

## 2021-06-24 DIAGNOSIS — F41.8 DEPRESSION WITH ANXIETY: ICD-10-CM

## 2021-06-24 DIAGNOSIS — G24.01 DYSKINESIA, DRUG-INDUCED: ICD-10-CM

## 2021-06-24 PROCEDURE — G8536 NO DOC ELDER MAL SCRN: HCPCS | Performed by: PSYCHIATRY & NEUROLOGY

## 2021-06-24 PROCEDURE — 99205 OFFICE O/P NEW HI 60 MIN: CPT | Performed by: PSYCHIATRY & NEUROLOGY

## 2021-06-24 PROCEDURE — G8419 CALC BMI OUT NRM PARAM NOF/U: HCPCS | Performed by: PSYCHIATRY & NEUROLOGY

## 2021-06-24 PROCEDURE — G8427 DOCREV CUR MEDS BY ELIG CLIN: HCPCS | Performed by: PSYCHIATRY & NEUROLOGY

## 2021-06-24 PROCEDURE — G9717 DOC PT DX DEP/BP F/U NT REQ: HCPCS | Performed by: PSYCHIATRY & NEUROLOGY

## 2021-06-24 PROCEDURE — 1090F PRES/ABSN URINE INCON ASSESS: CPT | Performed by: PSYCHIATRY & NEUROLOGY

## 2021-06-24 PROCEDURE — 1101F PT FALLS ASSESS-DOCD LE1/YR: CPT | Performed by: PSYCHIATRY & NEUROLOGY

## 2021-06-24 RX ORDER — PREGABALIN 75 MG/1
75 CAPSULE ORAL 2 TIMES DAILY
Qty: 60 CAPSULE | Refills: 2 | Status: SHIPPED | OUTPATIENT
Start: 2021-06-24 | End: 2021-07-21

## 2021-06-24 RX ORDER — ROPINIROLE 1 MG/1
TABLET, FILM COATED ORAL
Qty: 21 TABLET | Refills: 0 | Status: SHIPPED | OUTPATIENT
Start: 2021-06-24 | End: 2021-07-07

## 2021-06-24 RX ORDER — LANOLIN ALCOHOL/MO/W.PET/CERES
1000 CREAM (GRAM) TOPICAL DAILY
COMMUNITY

## 2021-06-24 NOTE — PROGRESS NOTES
NEUROLOGY  NEW PATIENT EVALUATION/CONSULTATION       PATIENT NAME: Brandon Villanueva    MRN: [id number]    REASON FOR CONSULTATION: Atypical movements    06/24/21      Previous records (physician notes, laboratory reports, and radiology reports) and imaging studies were reviewed and summarized. My recommendations will be communicated back to the patient's physician(s) via electronic medical record and/or by [address] mail. HISTORY OF PRESENT ILLNESS:  Brandon Villanueva is a 80 y.o.  female presenting for evaluation of atypical movements. These were initially noted by her physical therapist who is following her for chronic imbalance/falls. PT reported intermittent myoclonic jerking of the lower extremities b/l L>R with passive movements. Denies similar symptoms into the upper extremities. Family has also noted intermittent mouth movements and truncal swaying when patient is seated. She has a previous diagnosis of RLS on ropinirole over the past 15 years per pt/family. H/O depression/anxiety but no prior exposure to antipsychotics per pt/family. She describes a need to move the legs with LE paresthesias/numbness affecting sleep. Ropinirole does appear to be beneficial for RLS symptoms. She has tried gabapentin in the past but did not tolerate the medication. Other ROS: memory impairment-no signs of dementia on prior neuropsychologic testing per family, previously felt to be secondayr to anxiety/depression. +Gait instability/falls. Denies tremors.       PAST MEDICAL HISTORY:  Past Medical History:   Diagnosis Date    Anxiety     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    bursitis    Depression     Diabetes ([facility] [address]     GERD (gastroesophageal reflux disease)     H/O hand fracture     Hyperlipidemia     Menopause     Osteopenia     Recurrent UTI     RLS (restless legs syndrome)        PAST SURGICAL HISTORY:  Past Surgical History:   Procedure Laterality Date    HX APPENDECTOMY      HX COLONOSCOPY  2010    HX DILATION AND CURETTAGE      HX TONSIL AND ADENOIDECTOMY         FAMILY HISTORY:   Family History   Problem Relation Age of Onset    Cancer Mother         breast/ lung    Cancer Father         prostate    Migraines Father     Cancer Sister         breast    Heart Disease Maternal Grandmother          SOCIAL HISTORY:  Social History     Socioeconomic History    Marital status:      Spouse name: Not on file    Number of children: 3    Years of education: Not on file    Highest education level: Not on file   Occupational History    Occupation: Victim Assistance     Comment: Worked for [employer]   Tobacco Use    Smoking status: Former Smoker     Packs/day: 0.50     Years: 20.00     Pack years: 10.00     Types: Cigarettes     Quit date: 1960     Years since quittin.0    Smokeless tobacco: Never Used   Vaping Use    Vaping Use: Never used   Substance and Sexual Activity    Alcohol use: Yes     Alcohol/week: 3.0 standard drinks     Types: 3 Glasses of wine per week    Drug use: Yes     Types: Prescription    Sexual activity: Not Currently   Other Topics Concern     Service No    Blood Transfusions No    Caffeine Concern No    Occupational Exposure No    Hobby Hazards No    Sleep Concern No    Stress Concern Yes     Comment: usually financial    Weight Concern No    Special Diet Yes     Comment: Diabetic    Back Care No    Exercise No     Comment: stays active, loves to be outside, daily walks    [facility] [facility] Yes    Self-Exams Yes     Social Determinants of Health     Financial Resource Strain:     Difficulty of Paying Living Expenses:    Food Insecurity:     Worried About Running Out of Food in the Last Year:     Ran Out of Food in the Last Year:    Transportation Needs:     Lack of Transportation (Medical):      Lack of Transportation (Non-Medical):    Physical Activity:     Days of Exercise per Week:     Minutes of Exercise per Session:    Stress:  Feeling of Stress :    Social Connections:     Frequency of Communication with Friends and Family:     Frequency of Social Gatherings with Friends and Family:     Attends Sabianism Services:     Active Member of Clubs or Organizations:     Attends Club or Organization Meetings:     Marital Status:          MEDICATIONS:   Current Outpatient Medications   Medication Sig Dispense Refill    cyanocobalamin (Vitamin B-12) 1,000 mcg tablet Take 1,000 mcg by mouth daily.  rOPINIRole (REQUIP) 3 mg tab tab Take 1 tablet by mouth twice daily 60 Tab 0    sertraline (ZOLOFT) 100 mg tablet Take 1 Tab by mouth daily. 90 Tab 4    OTHER Indications: vitamin d spray      glimepiride (AMARYL) 2 mg tablet TAKE 1 TABLET BY MOUTH IN THE MORNING 90 Tab 4    metFORMIN (GLUCOPHAGE) 500 mg tablet Take 1 Tab by mouth daily (with breakfast). 90 Tab 4    Blood-Glucose Meter misc Use to test glucose once daily, E 11.9   Relion meter please 1 Each 0    RELION PRIME TEST STRIPS strip USE 1 TEST STRIP EACH TIME TO TEST GLUCOSE 2 TIMES A  Strip 11         ALLERGIES:  Allergies   Allergen Reactions    Doxycycline Rash         REVIEW OF SYSTEMS:  10 point ROS reviewed with patient. Please see scanned document under media. PHYSICAL EXAM:  Vital Signs:   Visit Vitals  /70   Pulse 72   Resp 18   SpO2 98%        General Medical Exam:  General:  Well appearing, comfortable, in no apparent distress. Eyes/ENT: see cranial nerve examination. Neck: No masses appreciated. Full range of motion without tenderness. Respiratory:  Clear to auscultation, good air entry bilaterally. Cardiac:  Regular rate and rhythm, no murmur. GI:  Soft, non-tender, non-distended abdomen. Bowel sounds normal. No masses, organomegaly. Extremities:  No deformities, edema, or skin discoloration. Skin:  No rashes or lesions. Neurological:  · Mental Status:  Alert and oriented to person, place, and time with fluent speech.    · Cranial Nerves:   CNII/III/IV/VI: visual fields full to confrontation, EOMI, PERRL, no ptosis or nystagmus. CN V: Facial sensation intact bilaterally, masseter 5/5   CN VII: Facial muscles symmetric and strong   CN VIII: Hears finger rub well bilaterally, intact vestibular function   CN IX/X: Normal palatal movement   CN XI: Full strength shoulder shrug bilaterally   CN XII: Tongue protrusion full and midline without fasciculation or atrophy  · Motor: Normal tone and muscle bulk with no pronator drift. Individual muscle group testing:  Shoulder abduction:   Left:5/5   Right : 5/5    Shoulder adduction:   Left:5/5   Right : 5/5    Elbow flexion:      Left:5/5   Right : 5/5  Elbow extension:    Left:5/5   Right : 5/5   Wrist flexion:    Left:5/5   Right : 5/5  Wrist extension:    Left:5/5   Right : 5/5  Arm pronation:   Left:5/5   Right : 5/5  Arm supination:   Left:5/5   Right : 5/5    Finger flexion:    Left:5/5   Right : 5/5    Finger extension:   Left:5/5   Right : 5/5   Finger abduction:  Left:5/5   Right : 5/5   Finger adduction:   Left:5/5   Right : 5/5  Hip flexion:     Left:5/5   Right : 5/5         Hip extension:   Left:5/5   Right : 5/5    Knee flexion:    Left:5/5   Right : 5/5    Knee extension:   Left:5/5   Right : 5/5    Dorsiflexion:     Left:5/5   Right : 5/5  Plantar flexion:    Left:5/5   Right : 5/5      · MSRs: No crossed adductors or clonus. RIGHT  LEFT   Brachioradialis 2+ 2+   Biceps 2+ 2+   Triceps 2+ 2+   Knee 2+ 2+   Achilles 0 0        Plantar response Downward Downward          · Sensation: Decreased vibration to the R ankle and L toes, reduced pinprick in a proximal to distal gradient, otherwise normal and symmetric perception of temperature, light touch, proprioception; (+) Romberg. · Coordination: No dysmetria. Normal rapid alternating movements; finger-to-nose and heel-to- shin testing are within normal limits. No tremor, bradykinesia or rigidity.    · Gait: Normal native gait.        ASSESSMENT:      ICD-10-CM ICD-9-CM    1. Polyneuropathy  G62.9 356.9 pregabalin (LYRICA) 75 mg capsule      EMG LIMITED      TSH 3RD GENERATION      METHYLMALONIC ACID      FERRITIN      GAMMOPATHY EVAL, SPEP/GIANLUCA, IG QT/FLC   2. RLS (restless legs syndrome)  G25.81 333.94 pregabalin (LYRICA) 75 mg capsule      TSH 3RD GENERATION      METHYLMALONIC ACID      FERRITIN   3. Dyskinesia, drug-induced  G24.01 781.3      E980.5    4. Complaints of memory disturbance  R41.3 780.93 REFERRAL TO NEUROPSYCHOLOGY   5. Depression with anxiety  F41.8 300.4 REFERRAL TO NEUROPSYCHOLOGY      REFERRAL TO PSYCHIATRY   80year old pleasant female referred for evaluation of gait instability/falls, lower extremity paresthesias, RLS, recently noted atypical movements, perceived cognitive deficits in the setting of comorbid anxiety/depression. 1. Imbalance/falls with lower extremity paresthesias-Examination c/w length-dependent polyneuropathy. Suspect related to DM. Will proceed with laboratory investigations to assist with identification of alternative reversible metabolic derangements which may be contributing. Electrodiagnostic testing will be completed for diagnostic confirmation, establish a baseline. Continue PT for gait training/safety and observe fall precautions. 2. RLS-Will check Ferritin levels to exclude iron deficiency contributing to symptoms. Wean off Requip secondary to associated dyskinesias. Start Lyrica 75mg BID with subsequent titration if necessary. Discussed potential for fatigue on medication. 3. Cognitive impairment-previously evaluated with neuropsychologic testing showing no evidence of an evolving dementia process, likely exacerbated by underlying mood disorders. Will repeat testing to assess for interval change/progression.      4. Anxiety/depression-Psychiatry referral provided per patient/family request.       PLAN:  · TSH, MMA, Ferritin, GIANLUCA  · EMG PN protocol  · Repeat Neuropsychologic testing   · Wean off of Requip, start Lyrica 75mg BID  · Referral for Psychiatry to address anxiety/depression    Follow-up and Dispositions    · Return in about 2 months (around 8/24/2021). I have discussed the diagnosis with the patient today and the intended plan as seen in the above orders with both the patient as well as referring provider and/or PCP via electronic correspondence. The patient has received an after-visit summary and questions were answered concerning future plans. I have discussed medication side effects and warnings with the patient as well. The duration of this appointment visit was 60 minutes spent on interview, examination, review of records/labs, counseling, explanation of diagnosis, planning of further management, documentation and coordination of care. Ether Marcelino Echavarria DO  Staff Neurologist  Diplomate, American Board of Psychiatry & Neurology       CC Referring provider:  Abena Blum MD  [address]  [facility],  [address] [place] [address]

## 2021-06-24 NOTE — PATIENT INSTRUCTIONS
[facility]   Statement to Patients  April 1, 2014      In an effort to ensure the large volume of patient prescription refills is processed in the most efficient and expeditious manner, we are asking our patients to assist us by calling your Pharmacy for all prescription refills, this will include also your  Mail Order Pharmacy. The pharmacy will contact our office electronically to continue the refill process. Please do not wait until the last minute to call your pharmacy. We need at least 48 hours (2days) to fill prescriptions. We also encourage you to call your pharmacy before going to  your prescription to make sure it is ready. With regard to controlled substance prescription refill requests (narcotic refills) that need to be picked up at our office, we ask your cooperation by providing us with at least 72 hours (3days) notice that you will need a refill. We will not refill narcotic prescription refill requests after 4:00pm on any weekday, Monday through Thursday, or after 2:00pm on Fridays, or on the weekends. We encourage everyone to explore another way of getting your prescription refill request processed using [employer], our patient web portal through our electronic medical record system. [employer] is an efficient and effective way to communicate your medication request directly to the office and  downloadable as an abram on your smart phone . [employer] also features a review functionality that allows you to view your medication list as well as leave messages for your physician. Are you ready to get connected? If so please review the attatched instructions or speak to any of our staff to get you set up right away! Thank you so much for your cooperation. Should you have any questions please contact our Practice Administrator.     The Physicians and Staff,  [address] [place] [address] Neurology Clinic

## 2021-06-25 ENCOUNTER — VIRTUAL VISIT (OUTPATIENT)
Dept: FAMILY MEDICINE CLINIC | Age: 86
End: 2021-06-25
Payer: MEDICARE

## 2021-06-25 DIAGNOSIS — F32.9 REACTIVE DEPRESSION: Primary | ICD-10-CM

## 2021-06-25 PROCEDURE — G2025 DIS SITE TELE SVCS RHC/FQHC: HCPCS | Performed by: INTERNAL MEDICINE

## 2021-06-25 NOTE — PROGRESS NOTES
2:45PM: Attempted to call patient. No answer- [facility]  3:20PM: Left message for patient to call office to completed visit -[phone].

## 2021-06-25 NOTE — PROGRESS NOTES
Gila Mendosa is a 80 y.o. female evaluated via telephone on 6/25/2021. Consent:  She and/or health care decision maker is aware that that she may receive a bill for this telephone service, depending on her insurance coverage, and has provided verbal consent to proceed: Yes    A/P:  1. Reactive depression  She has been referred to psychiatry who will perform neuropsych testing and arrange for management of her pharmacotherapy for anxiety and depression. HPI: This 42-year-old woman has been evaluated by Dr. Matthias Alford in neurology yesterday for restless legs, anxiety and depression. It is anticipated that she will eventually migrate from Zoloft to a different antidepressant. Referral has been placed to psychiatry through neurology who is also arrange for EMG and blood testing. I affirm this is a Patient Initiated Episode with an Established Patient who has not had a related appointment within my department in the past 7 days or scheduled within the next 24 hours. On this date 06/25/2021 I have spent 15 minutes reviewing previous notes, test results and face to face with the patient discussing the diagnosis and importance of compliance with the treatment plan as well as documenting on the day of the visit.      Note: not billable if this call serves to triage the patient into an appointment for the relevant concern      Nilam Felix MD

## 2021-06-29 LAB
ALBUMIN SERPL ELPH-MCNC: 3.7 G/DL (ref 2.9–4.4)
ALBUMIN/GLOB SERPL: 1.3 {RATIO} (ref 0.7–1.7)
ALPHA1 GLOB SERPL ELPH-MCNC: 0.2 G/DL (ref 0–0.4)
ALPHA2 GLOB SERPL ELPH-MCNC: 0.9 G/DL (ref 0.4–1)
B-GLOBULIN SERPL ELPH-MCNC: 1.1 G/DL (ref 0.7–1.3)
FERRITIN SERPL-MCNC: 84 NG/ML (ref 15–150)
GAMMA GLOB SERPL ELPH-MCNC: 0.7 G/DL (ref 0.4–1.8)
GLOBULIN SER CALC-MCNC: 2.9 G/DL (ref 2.2–3.9)
Lab: NORMAL
M PROTEIN SERPL ELPH-MCNC: NORMAL G/DL
METHYLMALONATE SERPL-SCNC: 172 NMOL/L (ref 0–378)
PLEASE NOTE, 011150: NORMAL
PROT SERPL-MCNC: 6.6 G/DL (ref 6–8.5)
TSH SERPL DL<=0.005 MIU/L-ACNC: 2.48 UIU/ML (ref 0.45–4.5)

## 2021-06-30 ENCOUNTER — TELEPHONE (OUTPATIENT)
Dept: NEUROLOGY | Age: 86
End: 2021-06-30

## 2021-07-01 ENCOUNTER — TRANSCRIBE ORDER (OUTPATIENT)
Dept: SCHEDULING | Age: 86
End: 2021-07-01

## 2021-07-01 DIAGNOSIS — Z12.31 VISIT FOR SCREENING MAMMOGRAM: Primary | ICD-10-CM

## 2021-07-04 ENCOUNTER — APPOINTMENT (OUTPATIENT)
Dept: GENERAL RADIOLOGY | Age: 86
DRG: 602 | End: 2021-07-04
Attending: EMERGENCY MEDICINE
Payer: MEDICARE

## 2021-07-04 ENCOUNTER — HOSPITAL ENCOUNTER (INPATIENT)
Age: 86
LOS: 3 days | Discharge: HOME HEALTH | DRG: 602 | End: 2021-07-07
Attending: EMERGENCY MEDICINE | Admitting: INTERNAL MEDICINE
Payer: MEDICARE

## 2021-07-04 ENCOUNTER — APPOINTMENT (OUTPATIENT)
Dept: CT IMAGING | Age: 86
DRG: 602 | End: 2021-07-04
Attending: EMERGENCY MEDICINE
Payer: MEDICARE

## 2021-07-04 DIAGNOSIS — G24.9 DYSKINESIA: ICD-10-CM

## 2021-07-04 DIAGNOSIS — L03.116 CELLULITIS OF LEFT LEG: ICD-10-CM

## 2021-07-04 DIAGNOSIS — R65.10 SIRS (SYSTEMIC INFLAMMATORY RESPONSE SYNDROME) (HCC): Primary | ICD-10-CM

## 2021-07-04 DIAGNOSIS — E11.9 TYPE 2 DIABETES MELLITUS WITHOUT COMPLICATION, WITHOUT LONG-TERM CURRENT USE OF INSULIN (HCC): ICD-10-CM

## 2021-07-04 LAB
ALBUMIN SERPL-MCNC: 3.3 G/DL (ref 3.5–5)
ALBUMIN/GLOB SERPL: 0.9 {RATIO} (ref 1.1–2.2)
ALP SERPL-CCNC: 78 U/L (ref 45–117)
ALT SERPL-CCNC: 28 U/L (ref 12–78)
ANION GAP SERPL CALC-SCNC: 9 MMOL/L (ref 5–15)
APPEARANCE UR: ABNORMAL
AST SERPL-CCNC: 31 U/L (ref 15–37)
BACTERIA URNS QL MICRO: NEGATIVE /HPF
BASOPHILS # BLD: 0.1 K/UL (ref 0–0.1)
BASOPHILS NFR BLD: 1 % (ref 0–1)
BILIRUB SERPL-MCNC: 0.6 MG/DL (ref 0.2–1)
BILIRUB UR QL: NEGATIVE
BUN SERPL-MCNC: 30 MG/DL (ref 6–20)
BUN/CREAT SERPL: 37 (ref 12–20)
CALCIUM SERPL-MCNC: 9.4 MG/DL (ref 8.5–10.1)
CHLORIDE SERPL-SCNC: 103 MMOL/L (ref 97–108)
CO2 SERPL-SCNC: 26 MMOL/L (ref 21–32)
COLOR UR: ABNORMAL
CREAT SERPL-MCNC: 0.81 MG/DL (ref 0.55–1.02)
DIFFERENTIAL METHOD BLD: ABNORMAL
EOSINOPHIL # BLD: 0.3 K/UL (ref 0–0.4)
EOSINOPHIL NFR BLD: 2 % (ref 0–7)
EPITH CASTS URNS QL MICRO: ABNORMAL /LPF
ERYTHROCYTE [DISTWIDTH] IN BLOOD BY AUTOMATED COUNT: 12.7 % (ref 11.5–14.5)
GLOBULIN SER CALC-MCNC: 3.6 G/DL (ref 2–4)
GLUCOSE BLD STRIP.AUTO-MCNC: 290 MG/DL (ref 65–117)
GLUCOSE SERPL-MCNC: 295 MG/DL (ref 65–100)
GLUCOSE UR STRIP.AUTO-MCNC: 500 MG/DL
HCT VFR BLD AUTO: 39.9 % (ref 35–47)
HGB BLD-MCNC: 13.1 G/DL (ref 11.5–16)
HGB UR QL STRIP: NEGATIVE
IMM GRANULOCYTES # BLD AUTO: 0 K/UL (ref 0–0.04)
IMM GRANULOCYTES NFR BLD AUTO: 0 % (ref 0–0.5)
KETONES UR QL STRIP.AUTO: 15 MG/DL
LACTATE SERPL-SCNC: 1.2 MMOL/L (ref 0.4–2)
LEUKOCYTE ESTERASE UR QL STRIP.AUTO: NEGATIVE
LYMPHOCYTES # BLD: 1.6 K/UL (ref 0.8–3.5)
LYMPHOCYTES NFR BLD: 13 % (ref 12–49)
MCH RBC QN AUTO: 29.7 PG (ref 26–34)
MCHC RBC AUTO-ENTMCNC: 32.8 G/DL (ref 30–36.5)
MCV RBC AUTO: 90.5 FL (ref 80–99)
MONOCYTES # BLD: 1 K/UL (ref 0–1)
MONOCYTES NFR BLD: 8 % (ref 5–13)
NEUTS SEG # BLD: 9.5 K/UL (ref 1.8–8)
NEUTS SEG NFR BLD: 76 % (ref 32–75)
NITRITE UR QL STRIP.AUTO: NEGATIVE
NRBC # BLD: 0 K/UL (ref 0–0.01)
NRBC BLD-RTO: 0 PER 100 WBC
PH UR STRIP: 5.5 [PH] (ref 5–8)
PLATELET # BLD AUTO: 281 K/UL (ref 150–400)
PMV BLD AUTO: 10.8 FL (ref 8.9–12.9)
POTASSIUM SERPL-SCNC: 4.2 MMOL/L (ref 3.5–5.1)
PROT SERPL-MCNC: 6.9 G/DL (ref 6.4–8.2)
PROT UR STRIP-MCNC: NEGATIVE MG/DL
RBC # BLD AUTO: 4.41 M/UL (ref 3.8–5.2)
RBC #/AREA URNS HPF: ABNORMAL /HPF (ref 0–5)
SERVICE CMNT-IMP: ABNORMAL
SODIUM SERPL-SCNC: 138 MMOL/L (ref 136–145)
SP GR UR REFRACTOMETRY: 1.02 (ref 1–1.03)
TROPONIN I SERPL-MCNC: <0.05 NG/ML
UA: UC IF INDICATED,UAUC: ABNORMAL
UROBILINOGEN UR QL STRIP.AUTO: 0.2 EU/DL (ref 0.2–1)
WBC # BLD AUTO: 12.4 K/UL (ref 3.6–11)
WBC URNS QL MICRO: ABNORMAL /HPF (ref 0–4)

## 2021-07-04 PROCEDURE — 70450 CT HEAD/BRAIN W/O DYE: CPT

## 2021-07-04 PROCEDURE — 93005 ELECTROCARDIOGRAM TRACING: CPT

## 2021-07-04 PROCEDURE — 82962 GLUCOSE BLOOD TEST: CPT

## 2021-07-04 PROCEDURE — 74011250637 HC RX REV CODE- 250/637: Performed by: EMERGENCY MEDICINE

## 2021-07-04 PROCEDURE — 80053 COMPREHEN METABOLIC PANEL: CPT

## 2021-07-04 PROCEDURE — 77030019903 HC CATH URET INT BARD -A

## 2021-07-04 PROCEDURE — 65270000029 HC RM PRIVATE

## 2021-07-04 PROCEDURE — 85025 COMPLETE CBC W/AUTO DIFF WBC: CPT

## 2021-07-04 PROCEDURE — 84484 ASSAY OF TROPONIN QUANT: CPT

## 2021-07-04 PROCEDURE — 36415 COLL VENOUS BLD VENIPUNCTURE: CPT

## 2021-07-04 PROCEDURE — 96360 HYDRATION IV INFUSION INIT: CPT

## 2021-07-04 PROCEDURE — 51701 INSERT BLADDER CATHETER: CPT

## 2021-07-04 PROCEDURE — 74011250636 HC RX REV CODE- 250/636: Performed by: EMERGENCY MEDICINE

## 2021-07-04 PROCEDURE — 99285 EMERGENCY DEPT VISIT HI MDM: CPT

## 2021-07-04 PROCEDURE — 81001 URINALYSIS AUTO W/SCOPE: CPT

## 2021-07-04 PROCEDURE — 73502 X-RAY EXAM HIP UNI 2-3 VIEWS: CPT

## 2021-07-04 PROCEDURE — 96361 HYDRATE IV INFUSION ADD-ON: CPT

## 2021-07-04 PROCEDURE — 87040 BLOOD CULTURE FOR BACTERIA: CPT

## 2021-07-04 PROCEDURE — 74011000258 HC RX REV CODE- 258: Performed by: EMERGENCY MEDICINE

## 2021-07-04 PROCEDURE — 71045 X-RAY EXAM CHEST 1 VIEW: CPT

## 2021-07-04 PROCEDURE — 83605 ASSAY OF LACTIC ACID: CPT

## 2021-07-04 RX ORDER — METFORMIN HYDROCHLORIDE 500 MG/1
500 TABLET ORAL
Status: DISCONTINUED | OUTPATIENT
Start: 2021-07-05 | End: 2021-07-07 | Stop reason: HOSPADM

## 2021-07-04 RX ORDER — ONDANSETRON 2 MG/ML
4 INJECTION INTRAMUSCULAR; INTRAVENOUS
Status: DISCONTINUED | OUTPATIENT
Start: 2021-07-04 | End: 2021-07-07 | Stop reason: HOSPADM

## 2021-07-04 RX ORDER — ROPINIROLE 1 MG/1
1 TABLET, FILM COATED ORAL ONCE
Status: COMPLETED | OUTPATIENT
Start: 2021-07-04 | End: 2021-07-04

## 2021-07-04 RX ORDER — LORAZEPAM 2 MG/ML
0.5 INJECTION INTRAMUSCULAR
Status: COMPLETED | OUTPATIENT
Start: 2021-07-04 | End: 2021-07-04

## 2021-07-04 RX ORDER — SERTRALINE HYDROCHLORIDE 100 MG/1
100 TABLET, FILM COATED ORAL DAILY
Status: DISCONTINUED | OUTPATIENT
Start: 2021-07-05 | End: 2021-07-07 | Stop reason: HOSPADM

## 2021-07-04 RX ORDER — SODIUM CHLORIDE 0.9 % (FLUSH) 0.9 %
5-10 SYRINGE (ML) INJECTION AS NEEDED
Status: DISCONTINUED | OUTPATIENT
Start: 2021-07-04 | End: 2021-07-07 | Stop reason: HOSPADM

## 2021-07-04 RX ORDER — ACETAMINOPHEN 325 MG/1
650 TABLET ORAL
Status: DISCONTINUED | OUTPATIENT
Start: 2021-07-04 | End: 2021-07-07 | Stop reason: HOSPADM

## 2021-07-04 RX ORDER — SODIUM CHLORIDE 9 MG/ML
100 INJECTION, SOLUTION INTRAVENOUS CONTINUOUS
Status: DISCONTINUED | OUTPATIENT
Start: 2021-07-04 | End: 2021-07-05

## 2021-07-04 RX ADMIN — SODIUM CHLORIDE 3 G: 900 INJECTION INTRAVENOUS at 18:49

## 2021-07-04 RX ADMIN — LORAZEPAM 0.5 MG: 2 INJECTION INTRAMUSCULAR; INTRAVENOUS at 21:00

## 2021-07-04 RX ADMIN — PREGABALIN 75 MG: 50 CAPSULE ORAL at 22:21

## 2021-07-04 RX ADMIN — ACETAMINOPHEN 650 MG: 325 TABLET ORAL at 20:15

## 2021-07-04 RX ADMIN — SODIUM CHLORIDE 1000 ML: 9 INJECTION, SOLUTION INTRAVENOUS at 16:40

## 2021-07-04 RX ADMIN — SODIUM CHLORIDE 809 ML: 9 INJECTION, SOLUTION INTRAVENOUS at 17:23

## 2021-07-04 RX ADMIN — ROPINIROLE HYDROCHLORIDE 1 MG: 1 TABLET, FILM COATED ORAL at 22:21

## 2021-07-04 RX ADMIN — SODIUM CHLORIDE 100 ML/HR: 9 INJECTION, SOLUTION INTRAVENOUS at 22:21

## 2021-07-04 NOTE — ED PROVIDER NOTES
[facility]  EMERGENCY DEPARTMENT HISTORY AND PHYSICAL EXAM         Date of Service: 7/4/2021   Patient Name: Kerry Ruiz   YOB: 1935  Medical Record Number: [id number]    History of Presenting Illness     Chief Complaint   Patient presents with    Fall        History Provided By:  patient    Additional History:   Kerry Ruiz is a 80 y.o. female who presents ambulatory to the ED with cc of several falls over the last several days, with skin tears of the RUE and bruises to the left orbit. No LOC. Pt has a H/O RLS, followed by Neurology, was recently changes from Requip to Lyrica; the falls have increased since beginning Lyrica. She was noted on ED presentation to be febrile and tachycardic, C/W SIRS vs sepsis. She has a H/O frequent UTIs, though has no acute symptoms. Denies N/V/D, CP, SOB, respiratory complaints. No recent sick exposure. There are no other complaints, changes or physical findings at this time.     Primary Care Provider: Anika Rivera MD   Specialist:    Past History     Past Medical History:   Past Medical History:   Diagnosis Date    Anxiety     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    bursitis    Depression     Diabetes ([facility] [address]     GERD (gastroesophageal reflux disease)     H/O hand fracture     Hyperlipidemia     Menopause     Osteopenia     Recurrent UTI     RLS (restless legs syndrome)         Past Surgical History:   Past Surgical History:   Procedure Laterality Date    HX APPENDECTOMY      HX COLONOSCOPY  2010    HX DILATION AND CURETTAGE      HX TONSIL AND ADENOIDECTOMY          Family History:   Family History   Problem Relation Age of Onset    Cancer Mother         breast/ lung    Cancer Father         prostate    Migraines Father     Cancer Sister         breast    Heart Disease Maternal Grandmother         Social History:   Social History     Tobacco Use    Smoking status: Former Smoker Packs/day: 0.50     Years: 20.00     Pack years: 10.00     Types: Cigarettes     Quit date: 1960     Years since quittin.0    Smokeless tobacco: Never Used   Vaping Use    Vaping Use: Never used   Substance Use Topics    Alcohol use: Yes     Alcohol/week: 3.0 standard drinks     Types: 3 Glasses of wine per week    Drug use: Yes     Types: Prescription        Allergies: Allergies   Allergen Reactions    Doxycycline Rash        Review of Systems   Review of Systems   Constitutional: Positive for fever. Negative for appetite change and chills. HENT: Negative for congestion. Eyes: Negative for visual disturbance. Respiratory: Negative for cough, shortness of breath and wheezing. Cardiovascular: Negative for chest pain, palpitations and leg swelling. Gastrointestinal: Negative for abdominal pain. Genitourinary: Negative for dysuria, frequency and urgency. Musculoskeletal: Negative for back pain, joint swelling, myalgias and neck stiffness. Skin: Negative for rash. Neurological: Positive for tremors and weakness. Negative for dizziness, syncope and headaches. Hematological: Negative for adenopathy. Psychiatric/Behavioral: Negative for behavioral problems and dysphoric mood. Physical Exam  Physical Exam  Vitals and nursing note reviewed. Constitutional:       General: She is not in acute distress. Appearance: She is well-developed. HENT:      Head: Normocephalic. Comments: Contusion left lateral orbit  Eyes:      General: No scleral icterus. Extraocular Movements: Extraocular movements intact. Conjunctiva/sclera: Conjunctivae normal.      Pupils: Pupils are equal, round, and reactive to light. Cardiovascular:      Rate and Rhythm: Regular rhythm. Tachycardia present. Heart sounds: No murmur heard. No gallop. Pulmonary:      Effort: Pulmonary effort is normal. No respiratory distress. Breath sounds: No stridor. No wheezing or rales. Abdominal:      General: Bowel sounds are normal. There is no distension. Palpations: Abdomen is soft. There is no mass. Tenderness: There is no abdominal tenderness. There is no guarding or rebound. Musculoskeletal:         General: Normal range of motion. Cervical back: Normal range of motion and neck supple. Lymphadenopathy:      Cervical: No cervical adenopathy. Skin:     General: Skin is warm and dry. Findings: No erythema or rash. Comments: Multiple skin tears BLE   Neurological:      Mental Status: She is alert and oriented to person, place, and time. Cranial Nerves: No cranial nerve deficit. Coordination: Coordination normal.      Comments: Uncontrolled continuous movement of BLE         Medical Decision Making   I am the first provider for this patient. I reviewed the vital signs, available nursing notes, past medical history, past surgical history, family history and social history. Old Medical Records: PCP notes     Provider Notes:   DDX: UTI, sepsis, contusions, skin tears, RLS     ED Course:  3:59 PM   Initial assessment performed. The patients presenting problems have been discussed, and they are in agreement with the care plan formulated and outlined with them. I have encouraged them to ask questions as they arise throughout their visit. Progress Notes:  6:37 PM  Workup shows elevated WBC, normal lactate, but remains with low grade fever and tachycardia (improved). Will admit. Bennett Olsen MD      6:38 PM  Bennett Olsen MD spoke with Dr. Stephan Hdz, Consult for Hospitalist. Discussed available diagnostic tests and clinical findings. He is in agreement with care plans as outlined. He/she will admit.       Procedures:   Procedures    Diagnostic Study Results   Labs -      Recent Results (from the past 12 hour(s))   GLUCOSE, POC    Collection Time: 07/04/21  3:45 PM   Result Value Ref Range    Glucose (POC) 290 (H) 65 - 117 mg/dL    Performed by [facility] [place]    METABOLIC PANEL, COMPREHENSIVE    Collection Time: 07/04/21  3:50 PM   Result Value Ref Range    Sodium 138 136 - 145 mmol/L    Potassium 4.2 3.5 - 5.1 mmol/L    Chloride 103 97 - 108 mmol/L    CO2 26 21 - 32 mmol/L    Anion gap 9 5 - 15 mmol/L    Glucose 295 (H) 65 - 100 mg/dL    BUN 30 (H) 6 - 20 MG/DL    Creatinine 0.81 0.55 - 1.02 MG/DL    BUN/Creatinine ratio 37 (H) 12 - 20      GFR est AA >60 >60 ml/min/1.73m2    GFR est non-AA >60 >60 ml/min/1.73m2    Calcium 9.4 8.5 - 10.1 MG/DL    Bilirubin, total 0.6 0.2 - 1.0 MG/DL    ALT (SGPT) 28 12 - 78 U/L    AST (SGOT) 31 15 - 37 U/L    Alk. phosphatase 78 45 - 117 U/L    Protein, total 6.9 6.4 - 8.2 g/dL    Albumin 3.3 (L) 3.5 - 5.0 g/dL    Globulin 3.6 2.0 - 4.0 g/dL    A-G Ratio 0.9 (L) 1.1 - 2.2     CBC WITH AUTOMATED DIFF    Collection Time: 07/04/21  3:50 PM   Result Value Ref Range    WBC 12.4 (H) 3.6 - 11.0 K/uL    RBC 4.41 3.80 - 5.20 M/uL    HGB 13.1 11.5 - 16.0 g/dL    HCT 39.9 35.0 - 47.0 %    MCV 90.5 80.0 - 99.0 FL    MCH 29.7 26.0 - 34.0 PG    MCHC 32.8 30.0 - 36.5 g/dL    RDW 12.7 11.5 - 14.5 %    PLATELET 289 191 - 383 K/uL    MPV 10.8 8.9 - 12.9 FL    NRBC 0.0 0  WBC    ABSOLUTE NRBC 0.00 0.00 - 0.01 K/uL    NEUTROPHILS 76 (H) 32 - 75 %    LYMPHOCYTES 13 12 - 49 %    MONOCYTES 8 5 - 13 %    EOSINOPHILS 2 0 - 7 %    BASOPHILS 1 0 - 1 %    IMMATURE GRANULOCYTES 0 0.0 - 0.5 %    ABS. NEUTROPHILS 9.5 (H) 1.8 - 8.0 K/UL    ABS. LYMPHOCYTES 1.6 0.8 - 3.5 K/UL    ABS. MONOCYTES 1.0 0.0 - 1.0 K/UL    ABS. EOSINOPHILS 0.3 0.0 - 0.4 K/UL    ABS. BASOPHILS 0.1 0.0 - 0.1 K/UL    ABS. IMM.  GRANS. 0.0 0.00 - 0.04 K/UL    DF AUTOMATED     TROPONIN I    Collection Time: 07/04/21  3:50 PM   Result Value Ref Range    Troponin-I, Qt. <0.05 <0.05 ng/mL   LACTIC ACID    Collection Time: 07/04/21  3:50 PM   Result Value Ref Range    Lactic acid 1.2 0.4 - 2.0 MMOL/L   EKG, 12 LEAD, INITIAL    Collection Time: 07/04/21  4:40 PM   Result Value Ref Range    Ventricular Rate 97 BPM    Atrial Rate 97 BPM    P-R Interval 154 ms    QRS Duration 84 ms    Q-T Interval 386 ms    QTC Calculation (Bezet) 490 ms    Calculated P Axis 60 degrees    Calculated R Axis -65 degrees    Calculated T Axis 10 degrees    Diagnosis       Normal sinus rhythm  Left anterior fascicular block  Minimal voltage criteria for LVH, may be normal variant  Nonspecific ST abnormality  Prolonged QT  Abnormal ECG  No previous ECGs available     URINALYSIS W/ REFLEX CULTURE    Collection Time: 07/04/21  5:00 PM    Specimen: Urine   Result Value Ref Range    Color YELLOW/STRAW      Appearance CLOUDY (A) CLEAR      Specific gravity 1.025 1.003 - 1.030      pH (UA) 5.5 5.0 - 8.0      Protein Negative NEG mg/dL    Glucose 500 (A) NEG mg/dL    Ketone 15 (A) NEG mg/dL    Bilirubin Negative NEG      Blood Negative NEG      Urobilinogen 0.2 0.2 - 1.0 EU/dL    Nitrites Negative NEG      Leukocyte Esterase Negative NEG      WBC 0-4 0 - 4 /hpf    RBC 0-5 0 - 5 /hpf    Epithelial cells FEW FEW /lpf    Bacteria Negative NEG /hpf    UA:UC IF INDICATED CULTURE NOT INDICATED BY UA RESULT CNI         Radiologic Studies -  The following have been ordered and reviewed:  XR CHEST PORT   Final Result   No acute cardiopulmonary process        CT Results  (Last 48 hours)    None        CXR Results  (Last 48 hours)               07/04/21 1657  XR CHEST PORT Final result    Impression:  No acute cardiopulmonary process       Narrative:  EXAM: XR CHEST PORT       INDICATION: Eval for Infiltrate       COMPARISON: 12/29/2020       FINDINGS: A portable AP radiograph of the chest was obtained at 1646 hours. The   patient is on a cardiac monitor. The lungs are clear. The cardiac and   mediastinal contours and pulmonary vascularity are normal.  The bones and soft   tissues are grossly within normal limits. Vital Signs-Reviewed the patient's vital signs.    Patient Vitals for the past 12 hrs:   Temp Pulse Resp BP SpO2   07/04/21 1800  99 (!) 34 (!) 122/58 97 %   07/04/21 1745  100 28 109/66 96 %   07/04/21 1730  100 28 (!) 110/59 96 %   07/04/21 1547    (!) 141/69 96 %   07/04/21 1545 100.1 °F (37.8 °C) (!) 103 14 (!) 141/69 95 %       EKG interpretation: (Preliminary)  Rhythm: normal sinus rhythm; and regular . Rate (approx.): 97; Axis: normal; HI interval: normal; QRS interval: normal ; ST/T wave: non-specific changes; Other findings: abnormal ekg. Medications Given in the ED:  Medications   sodium chloride (NS) flush 5-10 mL (has no administration in time range)   ampicillin-sulbactam (UNASYN) 3 g in 0.9% sodium chloride (MBP/ADV) 100 mL MBP (has no administration in time range)   sodium chloride 0.9 % bolus infusion 1,000 mL (0 mL IntraVENous IV Completed 7/4/21 1722)     Followed by   sodium chloride 0.9 % bolus infusion 809 mL (0 mL IntraVENous IV Completed 7/4/21 1828)       Diagnosis:  Clinical Impression:   1. SIRS (systemic inflammatory response syndrome) (HCC)    2. Cellulitis of left leg    3. Dyskinesia         Plan:  1:   Follow-up Information    None         2:   Current Discharge Medication List        Return to ED if worse. Disposition:  Admit  _______________________________   Attestations: This note was performed by Digna Pineda MD in its entirety.   _______________________________

## 2021-07-04 NOTE — ED TRIAGE NOTES
Arrived POV with daughter c/o falls last night, pt has poor recall of events and describes Juan Ramon a spell and not having control of my body\" while standing in front of the sink but daughter didn't find out until 1300 today. Denies LOC.  Started new medicine(lyrica) yestrday

## 2021-07-05 ENCOUNTER — APPOINTMENT (OUTPATIENT)
Dept: GENERAL RADIOLOGY | Age: 86
DRG: 602 | End: 2021-07-05
Attending: FAMILY MEDICINE
Payer: MEDICARE

## 2021-07-05 ENCOUNTER — APPOINTMENT (OUTPATIENT)
Dept: CT IMAGING | Age: 86
DRG: 602 | End: 2021-07-05
Attending: INTERNAL MEDICINE
Payer: MEDICARE

## 2021-07-05 LAB
ALBUMIN SERPL-MCNC: 2.5 G/DL (ref 3.5–5)
ALBUMIN/GLOB SERPL: 0.8 {RATIO} (ref 1.1–2.2)
ALP SERPL-CCNC: 64 U/L (ref 45–117)
ALT SERPL-CCNC: 26 U/L (ref 12–78)
ANION GAP SERPL CALC-SCNC: 9 MMOL/L (ref 5–15)
AST SERPL-CCNC: 24 U/L (ref 15–37)
BASOPHILS # BLD: 0.1 K/UL (ref 0–0.1)
BASOPHILS NFR BLD: 1 % (ref 0–1)
BILIRUB SERPL-MCNC: 0.4 MG/DL (ref 0.2–1)
BNP SERPL-MCNC: 622 PG/ML (ref 0–450)
BUN SERPL-MCNC: 17 MG/DL (ref 6–20)
BUN/CREAT SERPL: 27 (ref 12–20)
CALCIUM SERPL-MCNC: 8 MG/DL (ref 8.5–10.1)
CHLORIDE SERPL-SCNC: 108 MMOL/L (ref 97–108)
CO2 SERPL-SCNC: 25 MMOL/L (ref 21–32)
CREAT SERPL-MCNC: 0.62 MG/DL (ref 0.55–1.02)
D DIMER PPP FEU-MCNC: 2.07 MG/L FEU (ref 0–0.65)
DIFFERENTIAL METHOD BLD: ABNORMAL
EOSINOPHIL # BLD: 0.5 K/UL (ref 0–0.4)
EOSINOPHIL NFR BLD: 4 % (ref 0–7)
ERYTHROCYTE [DISTWIDTH] IN BLOOD BY AUTOMATED COUNT: 13 % (ref 11.5–14.5)
GLOBULIN SER CALC-MCNC: 3 G/DL (ref 2–4)
GLUCOSE BLD STRIP.AUTO-MCNC: 120 MG/DL (ref 65–117)
GLUCOSE BLD STRIP.AUTO-MCNC: 144 MG/DL (ref 65–117)
GLUCOSE SERPL-MCNC: 199 MG/DL (ref 65–100)
HCT VFR BLD AUTO: 35.4 % (ref 35–47)
HGB BLD-MCNC: 11.5 G/DL (ref 11.5–16)
IMM GRANULOCYTES # BLD AUTO: 0 K/UL (ref 0–0.04)
IMM GRANULOCYTES NFR BLD AUTO: 0 % (ref 0–0.5)
LYMPHOCYTES # BLD: 1.2 K/UL (ref 0.8–3.5)
LYMPHOCYTES NFR BLD: 12 % (ref 12–49)
MAGNESIUM SERPL-MCNC: 1.7 MG/DL (ref 1.6–2.4)
MCH RBC QN AUTO: 30 PG (ref 26–34)
MCHC RBC AUTO-ENTMCNC: 32.5 G/DL (ref 30–36.5)
MCV RBC AUTO: 92.4 FL (ref 80–99)
MONOCYTES # BLD: 0.8 K/UL (ref 0–1)
MONOCYTES NFR BLD: 8 % (ref 5–13)
NEUTS SEG # BLD: 7.9 K/UL (ref 1.8–8)
NEUTS SEG NFR BLD: 75 % (ref 32–75)
NRBC # BLD: 0 K/UL (ref 0–0.01)
NRBC BLD-RTO: 0 PER 100 WBC
PHOSPHATE SERPL-MCNC: 2.5 MG/DL (ref 2.6–4.7)
PLATELET # BLD AUTO: 238 K/UL (ref 150–400)
PMV BLD AUTO: 9.9 FL (ref 8.9–12.9)
POTASSIUM SERPL-SCNC: 3.8 MMOL/L (ref 3.5–5.1)
PROT SERPL-MCNC: 5.5 G/DL (ref 6.4–8.2)
RBC # BLD AUTO: 3.83 M/UL (ref 3.8–5.2)
SERVICE CMNT-IMP: ABNORMAL
SERVICE CMNT-IMP: ABNORMAL
SODIUM SERPL-SCNC: 142 MMOL/L (ref 136–145)
TROPONIN I SERPL-MCNC: 0.05 NG/ML
WBC # BLD AUTO: 10.4 K/UL (ref 3.6–11)

## 2021-07-05 PROCEDURE — 74011000258 HC RX REV CODE- 258: Performed by: INTERNAL MEDICINE

## 2021-07-05 PROCEDURE — 85025 COMPLETE CBC W/AUTO DIFF WBC: CPT

## 2021-07-05 PROCEDURE — 74011250636 HC RX REV CODE- 250/636: Performed by: EMERGENCY MEDICINE

## 2021-07-05 PROCEDURE — 74011000258 HC RX REV CODE- 258: Performed by: EMERGENCY MEDICINE

## 2021-07-05 PROCEDURE — 77030040393 HC DRSG OPTIFOAM GENT MDII -B

## 2021-07-05 PROCEDURE — 93005 ELECTROCARDIOGRAM TRACING: CPT

## 2021-07-05 PROCEDURE — 65270000029 HC RM PRIVATE

## 2021-07-05 PROCEDURE — 94640 AIRWAY INHALATION TREATMENT: CPT

## 2021-07-05 PROCEDURE — 74011250637 HC RX REV CODE- 250/637: Performed by: EMERGENCY MEDICINE

## 2021-07-05 PROCEDURE — 74011250636 HC RX REV CODE- 250/636: Performed by: INTERNAL MEDICINE

## 2021-07-05 PROCEDURE — 36415 COLL VENOUS BLD VENIPUNCTURE: CPT

## 2021-07-05 PROCEDURE — 83880 ASSAY OF NATRIURETIC PEPTIDE: CPT

## 2021-07-05 PROCEDURE — 83735 ASSAY OF MAGNESIUM: CPT

## 2021-07-05 PROCEDURE — 80053 COMPREHEN METABOLIC PANEL: CPT

## 2021-07-05 PROCEDURE — 85379 FIBRIN DEGRADATION QUANT: CPT

## 2021-07-05 PROCEDURE — 82962 GLUCOSE BLOOD TEST: CPT

## 2021-07-05 PROCEDURE — 71045 X-RAY EXAM CHEST 1 VIEW: CPT

## 2021-07-05 PROCEDURE — 77030029684 HC NEB SM VOL KT MONA -A

## 2021-07-05 PROCEDURE — 84484 ASSAY OF TROPONIN QUANT: CPT

## 2021-07-05 PROCEDURE — 77010033678 HC OXYGEN DAILY

## 2021-07-05 PROCEDURE — 72192 CT PELVIS W/O DYE: CPT

## 2021-07-05 PROCEDURE — 84100 ASSAY OF PHOSPHORUS: CPT

## 2021-07-05 PROCEDURE — 94664 DEMO&/EVAL PT USE INHALER: CPT

## 2021-07-05 PROCEDURE — 74011000250 HC RX REV CODE- 250

## 2021-07-05 RX ORDER — INSULIN LISPRO 100 [IU]/ML
INJECTION, SOLUTION INTRAVENOUS; SUBCUTANEOUS
Status: DISCONTINUED | OUTPATIENT
Start: 2021-07-05 | End: 2021-07-07 | Stop reason: HOSPADM

## 2021-07-05 RX ORDER — MAGNESIUM SULFATE 100 %
4 CRYSTALS MISCELLANEOUS AS NEEDED
Status: DISCONTINUED | OUTPATIENT
Start: 2021-07-05 | End: 2021-07-07 | Stop reason: HOSPADM

## 2021-07-05 RX ORDER — IPRATROPIUM BROMIDE AND ALBUTEROL SULFATE 2.5; .5 MG/3ML; MG/3ML
3 SOLUTION RESPIRATORY (INHALATION)
Status: COMPLETED | OUTPATIENT
Start: 2021-07-05 | End: 2021-07-05

## 2021-07-05 RX ORDER — IPRATROPIUM BROMIDE AND ALBUTEROL SULFATE 2.5; .5 MG/3ML; MG/3ML
SOLUTION RESPIRATORY (INHALATION)
Status: COMPLETED
Start: 2021-07-05 | End: 2021-07-05

## 2021-07-05 RX ORDER — SERTRALINE HYDROCHLORIDE 100 MG/1
100 TABLET, FILM COATED ORAL DAILY
Status: DISCONTINUED | OUTPATIENT
Start: 2021-07-05 | End: 2021-07-05

## 2021-07-05 RX ORDER — DEXTROSE 50 % IN WATER (D50W) INTRAVENOUS SYRINGE
25-50 AS NEEDED
Status: DISCONTINUED | OUTPATIENT
Start: 2021-07-05 | End: 2021-07-07 | Stop reason: HOSPADM

## 2021-07-05 RX ORDER — ENOXAPARIN SODIUM 100 MG/ML
40 INJECTION SUBCUTANEOUS EVERY 24 HOURS
Status: DISCONTINUED | OUTPATIENT
Start: 2021-07-05 | End: 2021-07-07 | Stop reason: HOSPADM

## 2021-07-05 RX ADMIN — PREGABALIN 75 MG: 50 CAPSULE ORAL at 18:24

## 2021-07-05 RX ADMIN — SODIUM CHLORIDE 3 G: 900 INJECTION INTRAVENOUS at 12:27

## 2021-07-05 RX ADMIN — SERTRALINE 100 MG: 100 TABLET, FILM COATED ORAL at 08:44

## 2021-07-05 RX ADMIN — CEFTRIAXONE SODIUM 1 G: 1 INJECTION, POWDER, FOR SOLUTION INTRAMUSCULAR; INTRAVENOUS at 18:24

## 2021-07-05 RX ADMIN — SODIUM CHLORIDE 100 ML/HR: 9 INJECTION, SOLUTION INTRAVENOUS at 08:47

## 2021-07-05 RX ADMIN — ACETAMINOPHEN 650 MG: 325 TABLET ORAL at 10:45

## 2021-07-05 RX ADMIN — ENOXAPARIN SODIUM 40 MG: 40 INJECTION SUBCUTANEOUS at 16:37

## 2021-07-05 RX ADMIN — METFORMIN HYDROCHLORIDE 500 MG: 500 TABLET ORAL at 08:44

## 2021-07-05 RX ADMIN — SODIUM CHLORIDE 3 G: 900 INJECTION INTRAVENOUS at 00:02

## 2021-07-05 RX ADMIN — IPRATROPIUM BROMIDE AND ALBUTEROL SULFATE 3 ML: .5; 3 SOLUTION RESPIRATORY (INHALATION) at 22:38

## 2021-07-05 RX ADMIN — IPRATROPIUM BROMIDE AND ALBUTEROL SULFATE 3 ML: 2.5; .5 SOLUTION RESPIRATORY (INHALATION) at 22:38

## 2021-07-05 RX ADMIN — ACETAMINOPHEN 650 MG: 325 TABLET ORAL at 18:24

## 2021-07-05 RX ADMIN — SODIUM CHLORIDE 3 G: 900 INJECTION INTRAVENOUS at 05:15

## 2021-07-05 RX ADMIN — PREGABALIN 75 MG: 50 CAPSULE ORAL at 08:44

## 2021-07-05 NOTE — H&P
Hospitalist Admission Note    NAME: Ani Gandara   :  1935   MRN:  [id number]     Date/Time:  2021 1:11 PM    Patient PCP: Rylee Nguyen MD  ______________________________________________________________________  Given the patient's current clinical presentation, I have a high level of concern for decompensation if discharged from the emergency department. Complex decision making was performed, which includes reviewing the patient's available past medical records, laboratory results, and x-ray films. My assessment of this patient's clinical condition and my plan of care is as follows. Assessment / Plan:  Fever, low grade  Possible cellulitis of LLE  UA bland  unasyn 3g q6h  BCx pending    Movement Disorder  Frequent Falls with recent head trauma  Peripheral Neuropathy, etiology unknown (DM?)  CT head no acute path  CT pelvis no acute path  Follows with Movement Disorder Specialist (recently established) with work-up still in progress  Gets physical therapy as outpt  Will return home with daughters for a while before going back to her home  lyrica 75mg qd  PT/OT consulted    DM  Hold metformin  accuchecks achs  LDSS    Anxiety/Depression  zoloft 100mg qd    GERD  pepcid prn    HLD  Not on Rx      Code Status: Full  Surrogate Decision Maker: sister    DVT Prophylaxis: lovenox  GI Prophylaxis: not indicated    Baseline: ambulatory      Subjective:   CHIEF COMPLAINT: frequent falls    HISTORY OF PRESENT ILLNESS:     Joanne Mclaughlin is a 80 y.o.  female who presents with frequent falls. Patient has a pmh as below. Has had a movement disorder for over a decade. Was managed by her PCP and x2 other Neurologists. Was on Requip for a long time. Recently established with a Movement Disorder Specialist.  Weaned off requip. Daughters feel \"our mom is returning\" after discontinuing Requip. Has had at least 4 falls requiring hospitalization this year.   Frequently gets skin tears that turn into cellulitis requiring ABX and one time pt was septic. Recently pt fell 2-3 times and hit her head without LOC. CT head no acute path. XR hip no path although in pain. CT pelvis no acute path. Multiple skin tears none of which appear infected. Does have a warm LLE with mild erythema. We were asked to admit for work up and evaluation of the above problems. Past Medical History:   Diagnosis Date    Anxiety     Degenerative joint disease (DJD) of hip 2013    bursitis    Depression     Diabetes (HCC)     GERD (gastroesophageal reflux disease)     H/O hand fracture     Hyperlipidemia     Menopause     Osteopenia     Recurrent UTI     RLS (restless legs syndrome)         Past Surgical History:   Procedure Laterality Date    HX APPENDECTOMY      HX COLONOSCOPY      HX DILATION AND CURETTAGE      HX TONSIL AND ADENOIDECTOMY         Social History     Tobacco Use    Smoking status: Former Smoker     Packs/day: 0.50     Years: 20.00     Pack years: 10.00     Types: Cigarettes     Quit date: 1960     Years since quittin.0    Smokeless tobacco: Never Used   Substance Use Topics    Alcohol use: Yes     Alcohol/week: 3.0 standard drinks     Types: 3 Glasses of wine per week        Family History   Problem Relation Age of Onset    Cancer Mother         breast/ lung    Cancer Father         prostate    Migraines Father     Cancer Sister         breast    Heart Disease Maternal Grandmother      Allergies   Allergen Reactions    Doxycycline Rash        Prior to Admission medications    Medication Sig Start Date End Date Taking? Authorizing Provider   cyanocobalamin (Vitamin B-12) 1,000 mcg tablet Take 1,000 mcg by mouth daily. Yes Provider, Historical   rOPINIRole (REQUIP) 1 mg tablet Wean off medication. Take 2mg BID x 3 days, then 1mg BID x 3 days, then 1 mg qhs x 3 days, then discontinue.  21  Yes Tre Diaz, DO   pregabalin (LYRICA) 75 mg capsule Take 1 Capsule by mouth two (2) times a day. Max Daily Amount: 150 mg. 6/24/21  Yes Bola Donnelly DO   sertraline (ZOLOFT) 100 mg tablet Take 1 Tab by mouth daily. 5/3/21  Yes Taya Peñaloza NP   glimepiride (AMARYL) 2 mg tablet TAKE 1 TABLET BY MOUTH IN THE MORNING 8/29/20  Yes Paige Lawson MD   metFORMIN (GLUCOPHAGE) 500 mg tablet Take 1 Tab by mouth daily (with breakfast). 6/19/20  Yes Michael Diamond MD   Blood-Glucose Meter misc Use to test glucose once daily, E 11.9   Relion meter please 11/12/18  Yes Michael Diamond MD   RELION PRIME TEST STRIPS strip USE 1 TEST STRIP EACH TIME TO TEST GLUCOSE 2 TIMES A DAY 8/30/18  Yes Paige Lawson MD   OTHER Indications: vitamin d spray    Provider, Historical       REVIEW OF SYSTEMS:     I am not able to complete the review of systems because:    The patient is intubated and sedated    The patient has altered mental status due to his acute medical problems    The patient has baseline aphasia from prior stroke(s)    The patient has baseline dementia and is not reliable historian    The patient is in acute medical distress and unable to provide information           Total of 12 systems reviewed as follows:       POSITIVE= underlined text  Negative = text not underlined  General:  fever, chills, sweats, generalized weakness, weight loss/gain,      loss of appetite   Eyes:    blurred vision, eye pain, loss of vision, double vision  ENT:    rhinorrhea, pharyngitis   Respiratory:   cough, sputum production, SOB, ALFORD, wheezing, pleuritic pain   Cardiology:   chest pain, palpitations, orthopnea, PND, edema, syncope   Gastrointestinal:  abdominal pain , N/V, diarrhea, dysphagia, constipation, bleeding   Genitourinary:  frequency, urgency, dysuria, hematuria, incontinence   Muskuloskeletal :  arthralgia, myalgia, back pain  Hematology:  easy bruising, nose or gum bleeding, lymphadenopathy   Dermatological: rash, ulceration, pruritis, color change / jaundice  Endocrine:   hot flashes or polydipsia   Neurological:  headache, dizziness, confusion, focal weakness, paresthesia,     Speech difficulties, memory loss, gait difficulty  Psychological: Feelings of anxiety, depression, agitation    Objective:   VITALS:    Visit Vitals  /60 (BP 1 Location: Right arm, BP Patient Position: At rest)   Pulse 90   Temp 98.5 °F (36.9 °C)   Resp 20   Ht 5' 1\" (1.549 m)   Wt 62.8 kg (138 lb 6.4 oz)   SpO2 94%   BMI 26.15 kg/m²       PHYSICAL EXAM:    General:    Alert, cooperative, no distress, appears stated age. Legs in constant movement. HEENT: Atraumatic, anicteric sclerae, pink conjunctivae     No oral ulcers, mucosa moist, throat clear, dentition fair  Neck:  Supple, symmetrical,  thyroid: non tender  Lungs:   Clear to auscultation bilaterally. No Wheezing or Rhonchi. No rales. Chest wall:  No tenderness  No Accessory muscle use. Heart:   Regular  rhythm,  No  murmur   No edema  Abdomen:   Soft, non-tender. Not distended. Bowel sounds normal  Extremities: No cyanosis. No clubbing,      Skin turgor normal, Capillary refill normal, Radial dial pulse 2+  Skin:     Not pale. Not Jaundiced  No rashes. Multiples skin tears (do not appear infected) on R arm. Warm, red LLE. Psych:  Good insight. Not depressed. Not anxious or agitated. Neurologic: EOMs intact. No facial asymmetry. No aphasia or slurred speech. Symmetrical strength, Sensation grossly intact.  Alert and oriented X 4.     _______________________________________________________________________  Care Plan discussed with:    Comments   Patient x    Family  x    RN x    Care Manager x                   Consultant:      _______________________________________________________________________  Expected  Disposition:   Home with Family x   HH/PT/OT/RN    SNF/LTC    BUDDY    ________________________________________________________________________  TOTAL TIME:  61 Minutes    Critical Care Provided     Minutes non procedure based      Comments    x Reviewed previous records   >50% of visit spent in counseling and coordination of care x Discussion with patient and/or family and questions answered       ________________________________________________________________________  Signed: [place] Edward,     Procedures: see electronic medical records for all procedures/Xrays and details which were not copied into this note but were reviewed prior to creation of Plan. LAB DATA REVIEWED:    Recent Results (from the past 24 hour(s))   GLUCOSE, POC    Collection Time: 07/04/21  3:45 PM   Result Value Ref Range    Glucose (POC) 290 (H) 65 - 117 mg/dL    Performed by Achilles Lesch    CULTURE, BLOOD    Collection Time: 07/04/21  3:50 PM    Specimen: Blood   Result Value Ref Range    Special Requests: NO SPECIAL REQUESTS      Culture result: NO GROWTH AFTER 18 HOURS     METABOLIC PANEL, COMPREHENSIVE    Collection Time: 07/04/21  3:50 PM   Result Value Ref Range    Sodium 138 136 - 145 mmol/L    Potassium 4.2 3.5 - 5.1 mmol/L    Chloride 103 97 - 108 mmol/L    CO2 26 21 - 32 mmol/L    Anion gap 9 5 - 15 mmol/L    Glucose 295 (H) 65 - 100 mg/dL    BUN 30 (H) 6 - 20 MG/DL    Creatinine 0.81 0.55 - 1.02 MG/DL    BUN/Creatinine ratio 37 (H) 12 - 20      GFR est AA >60 >60 ml/min/1.73m2    GFR est non-AA >60 >60 ml/min/1.73m2    Calcium 9.4 8.5 - 10.1 MG/DL    Bilirubin, total 0.6 0.2 - 1.0 MG/DL    ALT (SGPT) 28 12 - 78 U/L    AST (SGOT) 31 15 - 37 U/L    Alk.  phosphatase 78 45 - 117 U/L    Protein, total 6.9 6.4 - 8.2 g/dL    Albumin 3.3 (L) 3.5 - 5.0 g/dL    Globulin 3.6 2.0 - 4.0 g/dL    A-G Ratio 0.9 (L) 1.1 - 2.2     CBC WITH AUTOMATED DIFF    Collection Time: 07/04/21  3:50 PM   Result Value Ref Range    WBC 12.4 (H) 3.6 - 11.0 K/uL    RBC 4.41 3.80 - 5.20 M/uL    HGB 13.1 11.5 - 16.0 g/dL    HCT 39.9 35.0 - 47.0 %    MCV 90.5 80.0 - 99.0 FL    MCH 29.7 26.0 - 34.0 PG    MCHC 32.8 30.0 - 36.5 g/dL    RDW 12.7 11.5 - 14.5 %    PLATELET 281 150 - 400 K/uL    MPV 10.8 8.9 - 12.9 FL    NRBC 0.0 0  WBC    ABSOLUTE NRBC 0.00 0.00 - 0.01 K/uL    NEUTROPHILS 76 (H) 32 - 75 %    LYMPHOCYTES 13 12 - 49 %    MONOCYTES 8 5 - 13 %    EOSINOPHILS 2 0 - 7 %    BASOPHILS 1 0 - 1 %    IMMATURE GRANULOCYTES 0 0.0 - 0.5 %    ABS. NEUTROPHILS 9.5 (H) 1.8 - 8.0 K/UL    ABS. LYMPHOCYTES 1.6 0.8 - 3.5 K/UL    ABS. MONOCYTES 1.0 0.0 - 1.0 K/UL    ABS. EOSINOPHILS 0.3 0.0 - 0.4 K/UL    ABS. BASOPHILS 0.1 0.0 - 0.1 K/UL    ABS. IMM.  GRANS. 0.0 0.00 - 0.04 K/UL    DF AUTOMATED     TROPONIN I    Collection Time: 07/04/21  3:50 PM   Result Value Ref Range    Troponin-I, Qt. <0.05 <0.05 ng/mL   LACTIC ACID    Collection Time: 07/04/21  3:50 PM   Result Value Ref Range    Lactic acid 1.2 0.4 - 2.0 MMOL/L   CULTURE, BLOOD    Collection Time: 07/04/21  4:00 PM    Specimen: Blood   Result Value Ref Range    Special Requests: NO SPECIAL REQUESTS      Culture result: NO GROWTH AFTER 18 HOURS     EKG, 12 LEAD, INITIAL    Collection Time: 07/04/21  4:40 PM   Result Value Ref Range    Ventricular Rate 97 BPM    Atrial Rate 97 BPM    P-R Interval 154 ms    QRS Duration 84 ms    Q-T Interval 386 ms    QTC Calculation (Bezet) 490 ms    Calculated P Axis 60 degrees    Calculated R Axis -65 degrees    Calculated T Axis 10 degrees    Diagnosis       Normal sinus rhythm  Left anterior fascicular block  Minimal voltage criteria for LVH, may be normal variant  Nonspecific ST abnormality  Prolonged QT  Abnormal ECG  No previous ECGs available     URINALYSIS W/ REFLEX CULTURE    Collection Time: 07/04/21  5:00 PM    Specimen: Urine   Result Value Ref Range    Color YELLOW/STRAW      Appearance CLOUDY (A) CLEAR      Specific gravity 1.025 1.003 - 1.030      pH (UA) 5.5 5.0 - 8.0      Protein Negative NEG mg/dL    Glucose 500 (A) NEG mg/dL    Ketone 15 (A) NEG mg/dL    Bilirubin Negative NEG      Blood Negative NEG      Urobilinogen 0.2 0.2 - 1.0 EU/dL    Nitrites Negative NEG Leukocyte Esterase Negative NEG      WBC 0-4 0 - 4 /hpf    RBC 0-5 0 - 5 /hpf    Epithelial cells FEW FEW /lpf    Bacteria Negative NEG /hpf    UA:UC IF INDICATED CULTURE NOT INDICATED BY UA RESULT CNI     CBC WITH AUTOMATED DIFF    Collection Time: 07/05/21 11:07 AM   Result Value Ref Range    WBC 10.4 3.6 - 11.0 K/uL    RBC 3.83 3.80 - 5.20 M/uL    HGB 11.5 11.5 - 16.0 g/dL    HCT 35.4 35.0 - 47.0 %    MCV 92.4 80.0 - 99.0 FL    MCH 30.0 26.0 - 34.0 PG    MCHC 32.5 30.0 - 36.5 g/dL    RDW 13.0 11.5 - 14.5 %    PLATELET 961 621 - 482 K/uL    MPV 9.9 8.9 - 12.9 FL    NRBC 0.0 0  WBC    ABSOLUTE NRBC 0.00 0.00 - 0.01 K/uL    NEUTROPHILS 75 32 - 75 %    LYMPHOCYTES 12 12 - 49 %    MONOCYTES 8 5 - 13 %    EOSINOPHILS 4 0 - 7 %    BASOPHILS 1 0 - 1 %    IMMATURE GRANULOCYTES 0 0.0 - 0.5 %    ABS. NEUTROPHILS 7.9 1.8 - 8.0 K/UL    ABS. LYMPHOCYTES 1.2 0.8 - 3.5 K/UL    ABS. MONOCYTES 0.8 0.0 - 1.0 K/UL    ABS. EOSINOPHILS 0.5 (H) 0.0 - 0.4 K/UL    ABS. BASOPHILS 0.1 0.0 - 0.1 K/UL    ABS. IMM. GRANS. 0.0 0.00 - 0.04 K/UL    DF AUTOMATED     METABOLIC PANEL, COMPREHENSIVE    Collection Time: 07/05/21 11:07 AM   Result Value Ref Range    Sodium 142 136 - 145 mmol/L    Potassium 3.8 3.5 - 5.1 mmol/L    Chloride 108 97 - 108 mmol/L    CO2 25 21 - 32 mmol/L    Anion gap 9 5 - 15 mmol/L    Glucose 199 (H) 65 - 100 mg/dL    BUN 17 6 - 20 MG/DL    Creatinine 0.62 0.55 - 1.02 MG/DL    BUN/Creatinine ratio 27 (H) 12 - 20      GFR est AA >60 >60 ml/min/1.73m2    GFR est non-AA >60 >60 ml/min/1.73m2    Calcium 8.0 (L) 8.5 - 10.1 MG/DL    Bilirubin, total 0.4 0.2 - 1.0 MG/DL    ALT (SGPT) 26 12 - 78 U/L    AST (SGOT) 24 15 - 37 U/L    Alk.  phosphatase 64 45 - 117 U/L    Protein, total 5.5 (L) 6.4 - 8.2 g/dL    Albumin 2.5 (L) 3.5 - 5.0 g/dL    Globulin 3.0 2.0 - 4.0 g/dL    A-G Ratio 0.8 (L) 1.1 - 2.2     MAGNESIUM    Collection Time: 07/05/21 11:07 AM   Result Value Ref Range    Magnesium 1.7 1.6 - 2.4 mg/dL   PHOSPHORUS Collection Time: 07/05/21 11:07 AM   Result Value Ref Range    Phosphorus 2.5 (L) 2.6 - 4.7 MG/DL

## 2021-07-05 NOTE — PROGRESS NOTES
Report received from PAPA Sosa RN. Pt will be receiving IV Ativan and going to radiology for a head CT prior to coming to her room. Moses Rose RN reports that pt fell earlier today at home & pt is now making \"odd comments\" and possibly  hallucinating. ER MD states pt can come to the floor prior to receiving CT results. Nsp sup B. Throckmorton RN made aware of the above.

## 2021-07-05 NOTE — ED NOTES
Pts daughters at bedside report that pt is saying \"odd things\" such as asking to get jewelery out of the jewelery box. Assessed pt and found her to be A/Ox4. Made Dr Hans Elias aware. CT ordered.

## 2021-07-05 NOTE — PROGRESS NOTES
Problem: Falls - Risk of  Goal: *Absence of Falls  Description: Document Lou Pillow Fall Risk and appropriate interventions in the flowsheet.   Outcome: Progressing Towards Goal  Note: Fall Risk Interventions:  Mobility Interventions: Bed/chair exit alarm, Communicate number of staff needed for ambulation/transfer, OT consult for ADLs, Patient to call before getting OOB, PT Consult for mobility concerns    Mentation Interventions: Bed/chair exit alarm, Adequate sleep, hydration, pain control    Medication Interventions: Assess postural VS orthostatic hypotension, Bed/chair exit alarm, Patient to call before getting OOB    Elimination Interventions: Bed/chair exit alarm, Call light in reach, Patient to call for help with toileting needs    History of Falls Interventions: Bed/chair exit alarm, Room close to nurse's station         Problem: Patient Education: Go to Patient Education Activity  Goal: Patient/Family Education  Outcome: Progressing Towards Goal     Problem: General Medical Care Plan  Goal: *Vital signs within specified parameters  Outcome: Progressing Towards Goal  Goal: *Absence of infection signs and symptoms  Outcome: Progressing Towards Goal  Goal: *Optimal pain control at patient's stated goal  Outcome: Progressing Towards Goal  Goal: *Skin integrity maintained  Outcome: Progressing Towards Goal  Goal: *Optimize nutritional status  Outcome: Progressing Towards Goal  Goal: *Anxiety reduced or absent  Outcome: Progressing Towards Goal     Problem: Patient Education: Go to Patient Education Activity  Goal: Patient/Family Education  Outcome: Progressing Towards Goal

## 2021-07-05 NOTE — ED NOTES
TRANSFER - OUT REPORT:    Verbal report given to Hang Nunez (R) RN(name) on [address]  being transferred to MSU(unit) for routine progression of care       Report consisted of patients Situation, Background, Assessment and   Recommendations(SBAR). Information from the following report(s) SBAR, ED Summary, MAR, Recent Results and Med Rec Status was reviewed with the receiving nurse. Lines:   Peripheral IV 07/04/21 Left Antecubital (Active)   Site Assessment Clean, dry, & intact 07/04/21 1605   Phlebitis Assessment 0 07/04/21 1605   Infiltration Assessment 0 07/04/21 1605   Dressing Status Clean, dry, & intact 07/04/21 1605   Dressing Type 4 X 4;Transparent 07/04/21 1605   Hub Color/Line Status Green 07/04/21 1605        Opportunity for questions and clarification was provided.       Patient transported with:

## 2021-07-05 NOTE — PROGRESS NOTES
Care Management Interventions  PCP Verified by CM: Yes Lowell Dailey MD)  Last Visit to PCP: 06/25/21  Palliative Care Criteria Met (RRAT>21 & CHF Dx)?: No (No MD order)  Mode of Transport at Discharge: Other (see comment) (Daughter TRUDY)  Transition of Care Consult (CM Consult): Discharge Planning  Physical Therapy Consult: Yes  Occupational Therapy Consult: Yes  Speech Therapy Consult: No  Current Support Network: Lives Alone  Confirm Follow Up Transport: Family  The Plan for Transition of Care is Related to the Following Treatment Goals : Treat SIRS  Discharge Location  Discharge Placement: Home    Patent lives at home alone. She does NOT use any DME. Is usually very independent. She has used [facility] in the past but now uses [facility] about once a week. Daughter Dawna Calixto and Immanuel Siegel in the room. Very supportive. Raegan Farris lives just a few miles down the road from her mother. Per family, patient has chronic restless leg syndrome. They state the main reason they brought patient in is because she could NOT put any weight on her R leg and she had an episode where she couldn't speak correctly. Patient has been seeing a new neurologist who attempted to wean her off her requip medication. She is now on Lyrica. Patient was going to be discharged today after blood draw (if results were okay) but per Raegan Farris, patient is NO longer being discharged today. Patient has an advanced care directive but it is NOT on file Raegan Farris stated she could bring it in. Care management information given to patient and daughters. Instructed them to contact CM if they had any questions or concerns.        Reason for Admission:  SIRS                      RUR Score:          11%           Plan for utilizing home health:      Most likely     PCP: First and Last name:  Sade Burrows MD     Name of Practice: [facility]    Are you a current patient: Yes/No: Yes   Approximate date of last visit: 6/25/21    Can you participate in a virtual visit with your PCP: No                     Current Advanced Directive/Advance Care Plan: Full Code      Healthcare Decision Maker:   Click here to complete [employer] including selection of the Healthcare Decision Maker Relationship (ie \"Primary\")             Primary Decision Maker: Catalino Nicholson - Daughter - [phone]    Secondary Decision Maker: Austyn Kim - Daughter - [phone]                  Transition of Care Plan:                Home when medically stable.

## 2021-07-05 NOTE — PROGRESS NOTES
Spiritual Care Assessment/Progress Note  Kuldeep Alberto      NAME: Ani Gandara      MRN: [id number]  AGE: 80 y.o. SEX: female  Taoism Affiliation: Beari   Language: English     7/5/2021     Total Time (in minutes): 12     Spiritual Assessment begun in [facility] through conversation with:         [x]Patient        [] Family    [] Friend(s)              Spiritual beliefs: (Please include comment if needed)     [x] Identifies with a virgil tradition:         [] Supported by a virgil community:            [] Claims no spiritual orientation:           [] Seeking spiritual identity:                [] Adheres to an individual form of spirituality:           [] Not able to assess:                           Identified resources for coping:      [] Prayer                               [] Music                  [] Guided Imagery     [x] Family/friends                 [] Pet visits     [] Devotional reading                         [] Unknown     [] Other:                                           Interventions offered during this visit: (See comments for more details)    Patient Interventions: Affirmation of emotions/emotional suffering, Affirmation of virgil, Iconic (affirming the presence of God/Higher Power), Initial/Spiritual assessment, patient floor, Prayer (actual), Prayer (assurance of)     Family/Friend(s):  Affirmation of emotions/emotional suffering, Affirmation of virgil, Iconic (affirming the presence of God/Higher Power), Prayer (assurance of), Prayer (actual)     Plan of Care:     [x] Support spiritual and/or cultural needs    [] Support AMD and/or advance care planning process      [] Support grieving process   [] Coordinate Rites and/or Rituals    [] Coordination with community clergy   [] No spiritual needs identified at this time   [] Detailed Plan of Care below (See Comments)  [] Make referral to Music Therapy  [] Make referral to Pet Therapy     [] Make referral to Addiction services  [] Make referral to [facility]  [] Make referral to Spiritual Care Partner  [] No future visits requested        [] Follow up upon further referrals     Comments:Initial spiritual assessment in Rm 126. Patient/family shared about her medical issues. Provided empathic listening and spiritual support. [place] of  Availability.   [address] [place] [address]

## 2021-07-06 ENCOUNTER — APPOINTMENT (OUTPATIENT)
Dept: CT IMAGING | Age: 86
DRG: 602 | End: 2021-07-06
Attending: FAMILY MEDICINE
Payer: MEDICARE

## 2021-07-06 PROBLEM — E27.8 ADRENAL MASS (HCC): Status: ACTIVE | Noted: 2021-07-06

## 2021-07-06 PROBLEM — K86.89 PANCREATIC MASS: Status: ACTIVE | Noted: 2021-07-06

## 2021-07-06 PROBLEM — I50.43 ACUTE ON CHRONIC COMBINED SYSTOLIC AND DIASTOLIC ACC/AHA STAGE C CONGESTIVE HEART FAILURE (HCC): Status: ACTIVE | Noted: 2021-07-06
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
## 2021-07-06 PROBLEM — I35.0 AORTIC STENOSIS: Chronic | Status: ACTIVE | Noted: 2021-07-06

## 2021-07-06 PROBLEM — I05.0 MITRAL STENOSIS: Status: ACTIVE | Noted: 2021-07-06

## 2021-07-06 LAB
ALBUMIN SERPL-MCNC: 3 G/DL (ref 3.5–5)
ALBUMIN/GLOB SERPL: 0.8 {RATIO} (ref 1.1–2.2)
ALP SERPL-CCNC: 77 U/L (ref 45–117)
ALT SERPL-CCNC: 27 U/L (ref 12–78)
ANION GAP SERPL CALC-SCNC: 11 MMOL/L (ref 5–15)
AST SERPL-CCNC: 28 U/L (ref 15–37)
BASOPHILS # BLD: 0 K/UL (ref 0–0.1)
BASOPHILS NFR BLD: 0 % (ref 0–1)
BILIRUB SERPL-MCNC: 0.4 MG/DL (ref 0.2–1)
BUN SERPL-MCNC: 13 MG/DL (ref 6–20)
BUN/CREAT SERPL: 20 (ref 12–20)
CALCIUM SERPL-MCNC: 8.8 MG/DL (ref 8.5–10.1)
CHLORIDE SERPL-SCNC: 103 MMOL/L (ref 97–108)
CO2 SERPL-SCNC: 26 MMOL/L (ref 21–32)
CREAT SERPL-MCNC: 0.66 MG/DL (ref 0.55–1.02)
DIFFERENTIAL METHOD BLD: ABNORMAL
EOSINOPHIL # BLD: 0.3 K/UL (ref 0–0.4)
EOSINOPHIL NFR BLD: 2 % (ref 0–7)
ERYTHROCYTE [DISTWIDTH] IN BLOOD BY AUTOMATED COUNT: 12.9 % (ref 11.5–14.5)
GLOBULIN SER CALC-MCNC: 3.6 G/DL (ref 2–4)
GLUCOSE BLD STRIP.AUTO-MCNC: 132 MG/DL (ref 65–117)
GLUCOSE BLD STRIP.AUTO-MCNC: 171 MG/DL (ref 65–117)
GLUCOSE BLD STRIP.AUTO-MCNC: 175 MG/DL (ref 65–117)
GLUCOSE BLD STRIP.AUTO-MCNC: 296 MG/DL (ref 65–117)
GLUCOSE SERPL-MCNC: 203 MG/DL (ref 65–100)
HCT VFR BLD AUTO: 40.7 % (ref 35–47)
HGB BLD-MCNC: 12.9 G/DL (ref 11.5–16)
IMM GRANULOCYTES # BLD AUTO: 0 K/UL (ref 0–0.04)
IMM GRANULOCYTES NFR BLD AUTO: 0 % (ref 0–0.5)
LYMPHOCYTES # BLD: 1.2 K/UL (ref 0.8–3.5)
LYMPHOCYTES NFR BLD: 9 % (ref 12–49)
MAGNESIUM SERPL-MCNC: 1.7 MG/DL (ref 1.6–2.4)
MCH RBC QN AUTO: 29.7 PG (ref 26–34)
MCHC RBC AUTO-ENTMCNC: 31.7 G/DL (ref 30–36.5)
MCV RBC AUTO: 93.6 FL (ref 80–99)
MONOCYTES # BLD: 1 K/UL (ref 0–1)
MONOCYTES NFR BLD: 7 % (ref 5–13)
NEUTS SEG # BLD: 11.1 K/UL (ref 1.8–8)
NEUTS SEG NFR BLD: 82 % (ref 32–75)
NRBC # BLD: 0 K/UL (ref 0–0.01)
NRBC BLD-RTO: 0 PER 100 WBC
PHOSPHATE SERPL-MCNC: 3.2 MG/DL (ref 2.6–4.7)
PLATELET # BLD AUTO: 113 K/UL (ref 150–400)
PMV BLD AUTO: 11.9 FL (ref 8.9–12.9)
POTASSIUM SERPL-SCNC: 3.6 MMOL/L (ref 3.5–5.1)
PROT SERPL-MCNC: 6.6 G/DL (ref 6.4–8.2)
RBC # BLD AUTO: 4.35 M/UL (ref 3.8–5.2)
RBC MORPH BLD: ABNORMAL
SERVICE CMNT-IMP: ABNORMAL
SODIUM SERPL-SCNC: 140 MMOL/L (ref 136–145)
WBC # BLD AUTO: 13.6 K/UL (ref 3.6–11)

## 2021-07-06 PROCEDURE — 71275 CT ANGIOGRAPHY CHEST: CPT

## 2021-07-06 PROCEDURE — 82962 GLUCOSE BLOOD TEST: CPT

## 2021-07-06 PROCEDURE — 97116 GAIT TRAINING THERAPY: CPT

## 2021-07-06 PROCEDURE — 65270000029 HC RM PRIVATE

## 2021-07-06 PROCEDURE — 36415 COLL VENOUS BLD VENIPUNCTURE: CPT

## 2021-07-06 PROCEDURE — 74011250636 HC RX REV CODE- 250/636: Performed by: INTERNAL MEDICINE

## 2021-07-06 PROCEDURE — 74011250636 HC RX REV CODE- 250/636: Performed by: FAMILY MEDICINE

## 2021-07-06 PROCEDURE — 74011250637 HC RX REV CODE- 250/637: Performed by: EMERGENCY MEDICINE

## 2021-07-06 PROCEDURE — 74011000636 HC RX REV CODE- 636: Performed by: INTERNAL MEDICINE

## 2021-07-06 PROCEDURE — 74011636637 HC RX REV CODE- 636/637: Performed by: INTERNAL MEDICINE

## 2021-07-06 PROCEDURE — 83735 ASSAY OF MAGNESIUM: CPT

## 2021-07-06 PROCEDURE — 97530 THERAPEUTIC ACTIVITIES: CPT

## 2021-07-06 PROCEDURE — 85025 COMPLETE CBC W/AUTO DIFF WBC: CPT

## 2021-07-06 PROCEDURE — 80053 COMPREHEN METABOLIC PANEL: CPT

## 2021-07-06 PROCEDURE — 74011000258 HC RX REV CODE- 258: Performed by: INTERNAL MEDICINE

## 2021-07-06 PROCEDURE — 84100 ASSAY OF PHOSPHORUS: CPT

## 2021-07-06 PROCEDURE — 74011250637 HC RX REV CODE- 250/637: Performed by: INTERNAL MEDICINE

## 2021-07-06 PROCEDURE — 97165 OT EVAL LOW COMPLEX 30 MIN: CPT

## 2021-07-06 PROCEDURE — 97162 PT EVAL MOD COMPLEX 30 MIN: CPT

## 2021-07-06 RX ORDER — FUROSEMIDE 20 MG/1
20 TABLET ORAL ONCE
Status: COMPLETED | OUTPATIENT
Start: 2021-07-06 | End: 2021-07-06

## 2021-07-06 RX ORDER — FUROSEMIDE 10 MG/ML
40 INJECTION INTRAMUSCULAR; INTRAVENOUS
Status: COMPLETED | OUTPATIENT
Start: 2021-07-06 | End: 2021-07-06

## 2021-07-06 RX ADMIN — FUROSEMIDE 40 MG: 10 INJECTION, SOLUTION INTRAMUSCULAR; INTRAVENOUS at 00:43

## 2021-07-06 RX ADMIN — CEFTRIAXONE SODIUM 1 G: 1 INJECTION, POWDER, FOR SOLUTION INTRAMUSCULAR; INTRAVENOUS at 17:02

## 2021-07-06 RX ADMIN — IOPAMIDOL 100 ML: 755 INJECTION, SOLUTION INTRAVENOUS at 01:41

## 2021-07-06 RX ADMIN — PREGABALIN 75 MG: 50 CAPSULE ORAL at 09:00

## 2021-07-06 RX ADMIN — SERTRALINE 100 MG: 100 TABLET, FILM COATED ORAL at 09:01

## 2021-07-06 RX ADMIN — ENOXAPARIN SODIUM 40 MG: 40 INJECTION SUBCUTANEOUS at 14:40

## 2021-07-06 RX ADMIN — FUROSEMIDE 20 MG: 20 TABLET ORAL at 19:32

## 2021-07-06 RX ADMIN — INSULIN LISPRO 3 UNITS: 100 INJECTION, SOLUTION INTRAVENOUS; SUBCUTANEOUS at 11:54

## 2021-07-06 RX ADMIN — PREGABALIN 75 MG: 50 CAPSULE ORAL at 17:02

## 2021-07-06 NOTE — PROGRESS NOTES
Nursing called daughter, Dari Jeff, to give update on pts condition. Pt now feeling better with O2 at 2 liters. RR=20 and non labored. Awaiting CXR results.

## 2021-07-06 NOTE — PROGRESS NOTES
Problem: Mobility Impaired (Adult and Pediatric)  Goal: *Acute Goals and Plan of Care (Insert Text)  Description: FUNCTIONAL STATUS PRIOR TO ADMISSION: Pt was living alone, 2 step entry. She is noted in chart to have movement disorder/restless leg syndrome and has been followed by neurology as well as currently attending OPPT. Pt states she amb with no device although has cane and QC. She states she does have a hx of falls but is vague about how often. She does state that she was able to do her own ADLs/IADLs PTA. She reports that prompting admission - she was standing in bathroom, lost her balance backwards and hit her head. She stated she tried to get up and fell 2 more times feeling as if she had not control over her body. Being worked up for cause; mention of recent change from Requip to Lyrica after which there was an increase in falls. CT neg, no noted MRI brain. HOME SUPPORT PRIOR TO ADMISSION: The patient lived alone with dtr to provide assistance. Physical Therapy Goals  Initiated 7/6/2021  1. Patient will move from supine to sit and sit to supine , scoot up and down, and roll side to side in bed with independence within 7 day(s). 2.  Patient will transfer from bed to chair and chair to bed with modified independence using the least restrictive device within 7 day(s). 3.  Patient will perform sit to stand with modified independence within 7 day(s). 4.  Patient will ambulate with modified independence for 150 feet with the least restrictive device within 7 day(s). 5.  Patient will ascend/descend 2 stairs with handrail(s) with supervision/set-up within 7 day(s).       Outcome: Progressing Towards Goal   physical Therapy TREATMENT    Patient: Christiana Wiggins (71 y.o. female)  Date: 7/6/2021  Diagnosis: SIRS (systemic inflammatory response syndrome) ([facility] [R65.10] Fall from ground level       Precautions:     Chart, physical therapy assessment, plan of care and goals were reviewed. ASSESSMENT:  Pt is willning to participate, with daughters at bedside. Daughter requesting light session, due to concerns about heart conditioned. Pt transitions to EOB without assist. Unable to comprehend 2 part instruction and physically cued to scoot for foot flat/ seated position. Pt standing with CGA and completes 2 trials of standing marches. Posterior leaning continues to occur, with no immediate correction. Physical assit is needed to avoid posterior LOB. Pt will need hands on for all OOB. HR range f  BPM throughout session. Daughter were communicating with MD at end of session. Pt was returned to bed, alarm was set, CB/phone in reach. Progression toward goals:  []      Improving appropriately and progressing toward goals  [x]      Improving slowly and progressing toward goals  []      Not making progress toward goals and plan of care will be adjusted     PLAN:  Patient continues to benefit from skilled intervention to address the above impairments. Continue treatment per established plan of care. Discharge Recommendations:  Kuldeep Torres or To Be Determined  Further Equipment Recommendations for Discharge:  gait belt and rolling walker     SUBJECTIVE:   Patient stated I'm gonna try .     OBJECTIVE DATA SUMMARY:   Critical Behavior:  Orientation Level: Oriented X4  Functional Mobility Training:  Bed Mobility:  Rolling: Supervision  Supine to Sit: Supervision  Sit to Supine: Supervision  Scooting: Supervision  Transfers:  Sit to Stand: Contact guard assistance  Stand to Sit: Contact guard assistance  Bed to Chair: Contact guard assistance  Balance:  Sitting: Intact  Standing: Intact  Standing - Static: Fair;Poor  Standing - Dynamic : Fair;Poor  Ambulation/Gait Training:  Distance (ft): 0 Feet (ft) (2x 1.5 min standing marches)  Assistive Device: Gait belt;Walker, rolling  Ambulation - Level of Assistance: Contact guard assistance;Minimal assistance  Gait Abnormalities: Decreased step clearance;Trunk sway increased; Path deviations  Base of Support: Center of gravity altered  Speed/Lissa: Slow  Step Length: Right shortened;Left shortened  Therapeutic Exercises:   Standing march (2 x 1.5 min)  Pain:  Pain Scale 1: Numeric (0 - 10)  Pain in: 2 R knee  Pain out: 2  Activity Tolerance:   Fair   Please refer to the flowsheet for vital signs taken during this treatment.   After treatment:   [] Patient left in no apparent distress sitting up in chair  [x] Patient left in no apparent distress in bed  [x] Call bell left within reach  [x] Nursing notified  [x] Caregiver present  [x] Bed alarm activated      Nakita Ricardo, SUKHDEV   Time Calculation: 29 mins

## 2021-07-06 NOTE — PROGRESS NOTES
Bedside shift change report given to Suzanna Cowart RN (oncoming nurse) by Meghan Millan RN (offgoing nurse). Report included the following information Intake/Output and Recent Results.

## 2021-07-06 NOTE — PROGRESS NOTES
Temp 99.1 orally. Some insp and exp wheezing noted bilat in upper lobes. RA sats 92%. Nursing supervisor aware and will contact ER MD for follow up.

## 2021-07-06 NOTE — PROGRESS NOTES
Problem: Falls - Risk of  Goal: *Absence of Falls  Description: Document Luana Conway Fall Risk and appropriate interventions in the flowsheet.   Outcome: Progressing Towards Goal  Note: Fall Risk Interventions:  Mobility Interventions: Bed/chair exit alarm    Mentation Interventions: Bed/chair exit alarm    Medication Interventions: Bed/chair exit alarm    Elimination Interventions: Call light in reach    History of Falls Interventions: Bed/chair exit alarm         Problem: Patient Education: Go to Patient Education Activity  Goal: Patient/Family Education  Outcome: Progressing Towards Goal     Problem: General Medical Care Plan  Goal: *Vital signs within specified parameters  Outcome: Progressing Towards Goal  Goal: *Labs within defined limits  Outcome: Progressing Towards Goal  Goal: *Absence of infection signs and symptoms  Outcome: Progressing Towards Goal  Goal: *Optimal pain control at patient's stated goal  Outcome: Progressing Towards Goal  Goal: *Skin integrity maintained  Outcome: Progressing Towards Goal

## 2021-07-06 NOTE — PROGRESS NOTES
[facility]  Hospitalist Progress Note    NAME: Brandy Loza   :  1935   MRN:  [id number]     Total duration of encounter: 2 days      Interim Hospital Summary: 80 y.o. female who presented on 2021 with Fall from ground level. She has a past medical history of Anxiety, Degenerative joint disease (DJD) of hip (2013), Depression, Diabetes ([address] GERD (gastroesophageal reflux disease), H/O hand fracture, Hyperlipidemia, Menopause, Osteopenia, Recurrent UTI, and RLS (restless legs syndrome). Pt has been followed for chronic movement disorder. She has recently been seen by Dr. Linda Camarena, Neurology and adjustments in tx advised. She has weened off Requip and started on Lyrica - 75mg qd advanced to bid. Family has noted a different tremor / jerking since. F/u is scheduled for . Pt admitted following a fall in bathroom striking her head. ED w/u noted for SIRS / Low grade fever. Subjective:     Chief Complaint / Reason for Physician Visit  \"no c/o\". Discussed with RN   No HA  Family has noted some AMS / odd comments to them  Now has a different type of jerking ?     Episode of worsening SOB last evening tx by Dr. Cain Johnson for CHF / Pulm Edema  CT neg for PT, but noted for nodule on pancrease and adrenal    Review of Systems:  Symptom Y/N Comments  Symptom Y/N Comments   Fever/Chills n   Chest Pain n    Poor Appetite n   Edema n    Cough n   Abdominal Pain n    Sputum n   Joint Pain y  L hip   SOB/ALFORD n   Pruritis/Rash n    Nausea/vomit n   Tolerating PT/OT     Diarrhea n   Tolerating Diet y    Constipation n   Other         Current Facility-Administered Medications:     enoxaparin (LOVENOX) injection 40 mg, 40 mg, SubCUTAneous, Q24H, Seipp, James, DO, 40 mg at 21 1440    glucose chewable tablet 16 g, 4 Tablet, Oral, PRN, Seipp, James, DO    dextrose (D50W) injection syrg 12.5-25 g, 25-50 mL, IntraVENous, PRN, Seipp, James, DO    insulin lispro (HUMALOG) injection, , SubCUTAneous, AC&HS, Seipp, James, DO, 3 Units at 07/06/21 1154    cefTRIAXone (ROCEPHIN) 1 g in 0.9% sodium chloride (MBP/ADV) 50 mL MBP, 1 g, IntraVENous, Q24H, Seipp, James, DO, Last Rate: 100 mL/hr at 07/05/21 1824, 1 g at 07/05/21 1824    sodium chloride (NS) flush 5-10 mL, 5-10 mL, IntraVENous, PRN, Danika Turner MD    acetaminophen (TYLENOL) tablet 650 mg, 650 mg, Oral, Q6H PRN, Jalen Pfeiffer MD, 650 mg at 07/05/21 1824    ondansetron (ZOFRAN) injection 4 mg, 4 mg, IntraVENous, Q4H PRN, Danika Turner MD    metFORMIN (GLUCOPHAGE) tablet 500 mg, 500 mg, Oral, DAILY WITH BREAKFAST, Danika Turner MD, 500 mg at 07/05/21 0844    pregabalin (LYRICA) capsule 75 mg, 75 mg, Oral, BID, Danika Turner MD, 75 mg at 07/06/21 0900    sertraline (ZOLOFT) tablet 100 mg, 100 mg, Oral, DAILY, Danika Turner MD, 100 mg at 07/06/21 0901    Objective:     VITALS:   Patient Vitals for the past 12 hrs:   Temp Pulse Resp BP SpO2   07/06/21 1501 99.5 °F (37.5 °C) 92 20 135/65 98 %   07/06/21 0950  (!) 106  (!) 141/67 97 %   07/06/21 0945    (!) 153/67    07/06/21 0900  (!) 108  (!) 154/69 95 %   07/06/21 0804 99 °F (37.2 °C) 92 18 (!) 145/69 98 %   07/06/21 0800     98 %   07/06/21 0550 99.1 °F (37.3 °C) 88 20  95 %   07/06/21 0436 100 °F (37.8 °C) (!) 101 20 131/66 95 %       Intake/Output Summary (Last 24 hours) at 7/6/2021 1615  Last data filed at 7/6/2021 0653  Gross per 24 hour   Intake 500 ml   Output 3375 ml   Net -2875 ml        PHYSICAL EXAM:  General: WD, WN. Alert, cooperative, no acute distress    EENT:  EOMI. Anicteric sclerae. MMM  Resp:  CTA bilaterally, no wheezing or rales. No accessory muscle use  CV:  Regular  Rhythm, 3/6 NADIR to neck  GI:  Soft, Non distended, Non tender. +Bowel sounds  Neurologic:  Alert and oriented X 3, normal speech, nonfocal  Psych:   Not anxious nor agitated  Skin:  No rashes.   No jaundice    LABS:  I reviewed today's most current labs and imaging studies. Pertinent labs include:  Recent Labs     07/06/21  0546 07/05/21  1107 07/04/21  1550   WBC 13.6* 10.4 12.4*   HGB 12.9 11.5 13.1   HCT 40.7 35.4 39.9   * 238 281     Recent Labs     07/06/21  0546 07/05/21  1107 07/04/21  1550    142 138   K 3.6 3.8 4.2    108 103   CO2 26 25 26   * 199* 295*   BUN 13 17 30*   CREA 0.66 0.62 0.81   CA 8.8 8.0* 9.4   MG 1.7 1.7  --    PHOS 3.2 2.5*  --    ALB 3.0* 2.5* 3.3*   TBILI 0.4 0.4 0.6   ALT 27 26 28     CULTURES:  pBC 7/4: NG x 2d    RADIOLOGY:  CT HEAD 7/4:  FINDINGS:   Ventricles:  Midline, no hydrocephalus. Intracranial Hemorrhage:  None. Brain Parenchyma/Brainstem:  Normal for age. Basal Cisterns:  Normal.   Paranasal Sinuses:  Visualized sinuses are clear. Soft Tissues:  No significant soft tissue swelling. Osseous Structures:  No acute fracture. IMPRESSION:  No acute traumatic injury. CT Pelvis 7/5:  The bladder and uterus are unremarkable. There is no evidence of acute intrapelvic process.   The bones are osteopenic. Spondylosis is seen in the lower lumbar spine. The  bilateral sacral iliac joints show mild degenerative change. Moderate  degenerative changes present in the pubic symphysis. There is moderate bilateral  hip osteoarthritis. No acute fracture or dislocation. No evidence of a destructive osseous lesion.   IMPRESSION:  No evidence of acute process. CTA Chest 7/6:  1. No acute pulmonary embolus. 2. Pulmonary edema and small pleural effusions. 3. Indeterminate 18 mm left adrenal nodule and 19 mm pancreatic head lesion. pCXR 7/4:   A portable AP radiograph of the chest was obtained at 1646 hours. The  patient is on a cardiac monitor. The lungs are clear.  The cardiac and  mediastinal contours and pulmonary vascularity are normal.  The bones and soft  tissues are grossly within normal limits.    IMPRESSION:  No acute cardiopulmonary process    R HIP 7/4: No acute process. Bilateral hip osteoarthritis right greater than left. pCXR 7/5:  AP portable view of the chest demonstrates a normal cardiomediastinal  silhouette. Development of moderate pulmonary edema. No significant effusion. No  pneumothorax. The osseous structures are unremarkable.   IMPRESSION:  Moderate pulmonary edema. EKG 7/4: NSR 97 bpm, LAD, LVH, NSSTC, Prolonged QT    ECHO 12/30/20  · LV: Estimated LVEF is >70%. Visually measured ejection fraction. Normal cavity size, wall thickness and systolic function (ejection fraction normal). Mild (grade 1) left ventricular diastolic dysfunction. · AV: Aortic valve leaflet calcification present. Aortic valve mean gradient is 13.7 mmHg. Aortic valve area is 1.3 cm2. Mild aortic valve stenosis is present. Mild aortic valve regurgitation is present. · MV: Mitral valve leaflet calcification with reduced excursion. Moderate mitral annular calcification. Mitral valve mean gradient is 5.8 mmHg. Moderate mitral valve stenosis is present. Procedures: see electronic medical records for all procedures/Xrays and details which were not copied into this note but were reviewed prior to creation of Plan.         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 stage C congestive heart failure ([address] (7/6/2021)  Developed CHF last evening on IVF NS 100cc/hr x 2d  Responded to Lasix well  Crackles on exam, repeat low dose tonight  ECHO noted Dec 2020 with AS / [address]  Plan Cardiology referral       Type 2 diabetes mellitus without complication ([facility] (88/48/7200)    Diabetic peripheral neuropathy associated with type 2 diabetes mellitus ([facility] (1/24/2017)  Holding Glucophage with recent contrast  Cont CC Humalog  Has A1c was 6.9      Mixed hyperlipidemia (8/2/2017)  No current tx      Cerebral microvascular disease (1/24/2017)  Noted on Head CT  Could account for confusion concerns        ______________________________________________________________________  SAFETY:   Code Status:Full  DVT prophylaxis:Lovenox  Stress Ulcer prophylaxis: Not Indicated  Bladder catheter:no  Family Contact Info:  Primary Emergency Contact: Noreen Rucker, Ramy Phone: [phone]  Bedded: [facility] Room 126/01  Disposition: TBD, likely home when stable  Admission status:  Inpatient    Reviewed most current lab test results and cultures  YES  Reviewed most current radiology test results   YES  Review and summation of old records today    NO  Reviewed patient's current orders and MAR    YES  PMH/SH reviewed - no change compared to H&P    Care Plan discussed with:                                   Comments  Patient x     Family  x 2 daughters   RN x     Care Manager  x     Consultant                           Multidiciplinary team rounds were held today with , nursing, pharmacist and clinical coordinator. Patient's plan of care was discussed; medications were reviewed and discharge planning was addressed.         ____________________________________________    Total NON Critical Care TIME: 45   Minutes        Comments   >50% of visit spent in counseling and coordination of care   x      Signed: Lobo Stokes MD  [facility] Hospitalist  [phone]

## 2021-07-06 NOTE — PROGRESS NOTES
At 2000, pt complained of \"wheezing\" and lung sounds had bilateral wheezing; fluids were stopped per doctor orders and pt sat up in chair; pt got back in bed at 2045; troponin and D-dimer labs ordered and drawn by 2115 and EKG order and completed by 2130; at 2200, pt complained of severe shortness of breath and wheezing with O2 at 82; 2 liters of oxygen applied doctor notified, respiratory called for nebulizer treatment; pt O2 increase to 95 after Oxygen applied;  Chest Xray ordered; daughter called with update per nursing

## 2021-07-06 NOTE — PROGRESS NOTES
Problem: Self Care Deficits Care Plan (Adult)  Goal: *Acute Goals and Plan of Care (Insert Text)  Description:   FUNCTIONAL STATUS PRIOR TO ADMISSION: Patient was independent and active without use of DME.     HOME SUPPORT: The patient lived alone with paid yard help to provide assistance. Occupational Therapy Goals  Initiated 7/6/2021  1. Patient will perform toilet transfer with modified independence within 7   day(s). 2.  Patient will perform bathing with modified independence within 7 day(s). 3.  Patient will perform lower body dressing with modified independence within 7 day(s). 4.  Patient will perform all aspects of toileting with modified independence within 7 day(s). 5.  Patient will participate in upper extremity therapeutic exercise/activities with modified independence for 10 minutes within 7 day(s). Outcome: Progressing Towards Goal   OCCUPATIONAL THERAPY EVALUATION  Patient: Ani Gandara (31 y.o. female)  Date: 7/6/2021  Primary Diagnosis: SIRS (systemic inflammatory response syndrome) ([facility]) [R65.10]        Precautions: fall       ASSESSMENT  Based on the objective data described below, the patient presents with SOB, pulmonary edema Vs PE; poor balance. Pt has been falling at home per her report. She demonstrates overall adequate BUE AROM, strength and coordination however he balance is off as she starts to fall back and is unaware she is off balance until too late. .    Current Level of Function Impacting Discharge (ADLs/self-care): CGA for transfers including toilet, and standing tasks for ADL. Functional Outcome Measure: The patient scored Total: 50/100 on the Barthel Index outcome measure which is indicative of moderate impaired ability to care for basic self needs/dependency on others; inferred moderate dependency on others for instrumental ADLs.         Other factors to consider for discharge: lives alone, does own cooking and cleaning     Patient will benefit from skilled therapy intervention to address the above noted impairments. PLAN :  Recommendations and Planned Interventions: self care training, functional mobility training, therapeutic exercise, therapeutic activities, and endurance activities    Frequency/Duration: Patient will be followed by occupational therapy 3-5 x weel to address goals. Recommendation for discharge: (in order for the patient to meet his/her long term goals)  Therapy up to 5 days/week in SNF setting    This discharge recommendation:  Has been made in collaboration with the attending provider and/or case management    IF patient discharges home will need the following DME: none       SUBJECTIVE:   Patient stated I need to get my .     OBJECTIVE DATA SUMMARY:   HISTORY:   Past Medical History:   Diagnosis Date    Anxiety     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    bursitis    Depression     Diabetes ([facility] [address]     GERD (gastroesophageal reflux disease)     H/O hand fracture     Hyperlipidemia     Menopause     Osteopenia     Recurrent UTI     RLS (restless legs syndrome)      Past Surgical History:   Procedure Laterality Date    HX APPENDECTOMY      HX COLONOSCOPY  2010    HX DILATION AND CURETTAGE      HX TONSIL AND ADENOIDECTOMY         Expanded or extensive additional review of patient history:     Home Situation  Home Environment: Private residence  # Steps to Enter: 2  Rails to Enter: Yes  Hand Rails : Bilateral  One/Two Story Residence: One story  Living Alone: Yes  Support Systems: Other (comments) (paid yard help)  Patient Expects to be Discharged to[de-identified] House  Current DME Used/Available at Home: Cane, quad, [employer] [place], straight, Grab bars (bars by toilet and shower)  Tub or Shower Type: Tub/Shower combination (uses tub mostly)    Hand dominance: Right    EXAMINATION OF PERFORMANCE DEFICITS:  Cognitive/Behavioral Status:     Orientation Level: Oriented X4     Perception: Appears intact          Skin: bandages noted on arms pt reports from bumps from falling    Edema: no noted edema    Hearing: Auditory  Auditory Impairment: None    Vision/Perceptual:         Appears intact although pt feels she needs to see eye doctor as vision has changed     Diplopia: No         Corrective Lenses: Glasses    Range of Motion:    AROM: Within functional limits    Strength:    Strength: Generally decreased, functional (nor particular focal deficits noted)    Coordination:  Coordination: Within functional limits  Fine Motor Skills-Upper: Left Intact; Right Intact         Tone & Sensation:    Tone: Normal  Sensation: Impaired (restless leg syndrome)       Balance:  Sitting: Intact  Standing: Impaired  Standing - Static: Fair;Poor; Other (comment) (intermit post lean; no ankle/hip strategy; limitedly aware )  Standing - Dynamic : Fair;Poor; Other (comment) (intermittent post lean; limited balance rxn)    Functional Mobility and Transfers for ADLs:  Bed Mobility:  Rolling: Independent  Supine to Sit: Supervision  Sit to Supine: Supervision  Scooting: Supervision    Transfers:  Sit to Stand: Contact guard assistance  Stand to Sit: Contact guard assistance  Bed to Chair: Contact guard assistance  Bathroom Mobility: Contact guard assistance  Toilet Transfer : Contact guard assistance;Assist x1  Assistive Device : Gait Belt    ADL Assessment:  Feeding: Independent         Bathing: Stand-by assistance    Upper Body Dressing: Setup    Lower Body Dressing: Setup;Stand-by assistance (in standing)    Toileting: Stand by assistance    ADL Intervention and task modifications:  Feeding  Feeding Assistance: Set-up    Grooming  Brushing/Combing Hair: Independent    Lower Body Dressing Assistance  Socks: Independent  Leg Crossed Method Used: Yes    Toileting  Bladder Hygiene: Modified independent  Bowel Hygiene: Modified indpendent    Cognitive Retraining  Following Commands:  Follows one step commands/directions    Functional Measure:  Barthel Index:    Bathing: 0  Bladder: 5  Bowels: 10  Groomin  Dressin  Feeding: 10  Mobility: 0  Stairs: 0  Toilet Use: 5  Transfer (Bed to Chair and Back): 10  Total: 50/100        The Barthel ADL Index: Guidelines  1. The index should be used as a record of what a patient does, not as a record of what a patient could do. 2. The main aim is to establish degree of independence from any help, physical or verbal, however minor and for whatever reason. 3. The need for supervision renders the patient not independent. 4. A patient's performance should be established using the best available evidence. Asking the patient, friends/relatives and nurses are the usual sources, but direct observation and common sense are also important. However direct testing is not needed. 5. Usually the patient's performance over the preceding 24-48 hours is important, but occasionally longer periods will be relevant. 6. Middle categories imply that the patient supplies over 50 per cent of the effort. 7. Use of aids to be independent is allowed. Rebecca Lujan., Barthel, D.W. (1941). Functional evaluation: the Barthel Index. [facility] (14)2. HÉCTOR Barclay, Roland Tyler., Yolanda Gamble., [place], [address] [place] [address] (). Measuring the change indisability after inpatient rehabilitation; comparison of the responsiveness of the Barthel Index and Functional [place] Measure. Journal of Neurology, Neurosurgery, and Psychiatry, 66(4), 739-130. Maddi Lowery, N.J.A, TK MosherJ.KERI, & Wing العلي MMaryA. (2004.) Assessment of post-stroke quality of life in cost-effectiveness studies: The usefulness of the Barthel Index and the EuroQoL-5D.  Quality of Life Research, 15, 619-57         Occupational Therapy Evaluation Charge Determination   History Examination Decision-Making   LOW Complexity : Brief history review  LOW Complexity : 1-3 performance deficits relating to physical, cognitive , or psychosocial skils that result in activity limitations and / or participation restrictions  LOW Complexity : No comorbidities that affect functional and no verbal or physical assistance needed to complete eval tasks       Based on the above components, the patient evaluation is determined to be of the following complexity level: LOW   Pain Rating:  3/10 in right knee    Activity Tolerance:   Fair and Poor    After treatment patient left in no apparent distress:    Sitting in chair and Call bell within reach    COMMUNICATION/EDUCATION:   The patients plan of care was discussed with: Physical therapist.     Patient/family have participated as able in goal setting and plan of care. This patients plan of care is appropriate for delegation to DIONICIO.     Thank you for this referral.  Yaya Pan, OT  Time Calculation: 35 mins

## 2021-07-06 NOTE — PROGRESS NOTES
Problem: Mobility Impaired (Adult and Pediatric)  Goal: *Acute Goals and Plan of Care (Insert Text)  Description: FUNCTIONAL STATUS PRIOR TO ADMISSION: Pt was living alone, 2 step entry. She is noted in chart to have movement disorder/restless leg syndrome and has been followed by neurology as well as currently attending OPPT. Pt states she amb with no device although has cane and QC. She states she does have a hx of falls but is vague about how often. She does state that she was able to do her own ADLs/IADLs PTA. She reports that prompting admission - she was standing in bathroom, lost her balance backwards and hit her head. She stated she tried to get up and fell 2 more times feeling as if she had not control over her body. Being worked up for cause; mention of recent change from [place] to [place] after which there was an increase in falls. CT neg, no noted MRI brain. HOME SUPPORT PRIOR TO ADMISSION: The patient lived alone with dtr to provide assistance. Physical Therapy Goals  Initiated 7/6/2021  1. Patient will move from supine to sit and sit to supine , scoot up and down, and roll side to side in bed with independence within 7 day(s). 2.  Patient will transfer from bed to chair and chair to bed with modified independence using the least restrictive device within 7 day(s). 3.  Patient will perform sit to stand with modified independence within 7 day(s). 4.  Patient will ambulate with modified independence for 150 feet with the least restrictive device within 7 day(s). 5.  Patient will ascend/descend 2 stairs with handrail(s) with supervision/set-up within 7 day(s).       Outcome: Not Met   PHYSICAL THERAPY EVALUATION  Patient: Seamus Yost (84 y.o. female)  Date: 7/6/2021  Primary Diagnosis: SIRS (systemic inflammatory response syndrome) ([facility]) [R65.10]        Precautions: fall       ASSESSMENT  Based on the objective data described below, the patient presents with hx as noted above in prior functional status adm with fall, head injury and multiple falls after, now with fair to poor balance, high fall risk and limitations in functional mobility and gait below her baseline independence. Current Level of Function Impacting Discharge (mobility/balance): Pt is able to come to EOB on her own but uses an abdominal curl with straining; she shows good sitting balance. She stands with CGA but then during static standing exhibits significant posterior lean and LOB with delayed and very limited balance reaction, mod A to recover. No dizziness, VSS. Awareness of loss of balance also delayed with need for cues to attend to recovery. She attempts to use UEs to pull herself forward on any object or person vs effective ankle or hip strategy. Once stable, she amb into BR and around room with CGA but intermittently with the post lean. Upon turning in [place], pt fully losing her balance backward with full assist needed to recover. Pt with limited insight into her deficits, placed on alarm and educated in need to call nurse for getting up, aware of call bell. Shortly after therapy left room, alarm going off. Pt found having gotten out of chair over foot rest of recliner attempting to get back into bed. Re-educated by PT and RN. Pt may benefit from trial of RW next session, however question how consistently pt would remember or agree to use it. Functional Outcome Measure: The patient scored 14/28 on the tinetti outcome measure which is indicative of high fall risk. Other factors to consider for discharge: pt lives alone; she is in current need of 24 hr supervision and assist; high fall risk. Decreased insight and decreased safety awareness     Patient will benefit from skilled therapy intervention to address the above noted impairments.        PLAN :  Recommendations and Planned Interventions: bed mobility training, transfer training, gait training, therapeutic exercises, neuromuscular re-education, patient and family training/education, and therapeutic activities      Frequency/Duration: Patient will be followed by physical therapy:  4-5x/week, 1-2x/day    Recommendation for discharge: (in order for the patient to meet his/her long term goals)  Therapy up to 5 days/week in SNF setting    This discharge recommendation:  Has been made in collaboration with the attending provider and/or case management    IF patient discharges home will need the following DME: rolling walker - to be determined         SUBJECTIVE:   Patient stated Oh now I can feel I'm falling back (after cueing and LOB).     OBJECTIVE DATA SUMMARY:   HISTORY:    Past Medical History:   Diagnosis Date    Anxiety     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    bursitis    Depression     Diabetes ([facility] [address]     GERD (gastroesophageal reflux disease)     H/O hand fracture     Hyperlipidemia     Menopause     Osteopenia     Recurrent UTI     RLS (restless legs syndrome)      Past Surgical History:   Procedure Laterality Date    HX APPENDECTOMY      HX COLONOSCOPY  2010    HX DILATION AND CURETTAGE      HX TONSIL AND ADENOIDECTOMY         Personal factors and/or comorbidities impacting plan of care: hx movement disorder/restless leg syndrome    Home Situation  Home Environment: Private residence  # Steps to Enter: 2  Rails to Enter: Yes  Hand Rails : Bilateral  One/Two Story Residence: One story  Living Alone: Yes  Support Systems: Other (comments) (paid yard help)  Patient Expects to be Discharged to[de-identified] House  Current DME Used/Available at Home: Cane, quad, [place], straight, Grab bars (bars by toilet and shower)  Tub or Shower Type: Tub/Shower combination (uses tub mostly)    EXAMINATION/PRESENTATION/DECISION MAKING:   Critical Behavior:     Orientation Level: Oriented X4        Hearing:   Auditory  Auditory Impairment: None    Range Of Motion:  AROM: Within functional limits                       Strength:    Strength: Generally decreased, functional (nor particular focal deficits noted)                    Tone & Sensation:   Tone: Normal              Sensation: Impaired (restless leg syndrome)               Coordination:  Coordination: Within functional limits  Vision:   Diplopia: No  Corrective Lenses: Glasses  Functional Mobility:  Bed Mobility:  Rolling: Independent  Supine to Sit: Supervision  Sit to Supine: Supervision  Scooting: Supervision  Transfers:  Sit to Stand: Contact guard assistance  Stand to Sit: Contact guard assistance        Bed to Chair: Contact guard assistance              Balance:   Sitting: Intact  Standing: Impaired  Standing - Static: Fair;Poor; Other (comment) (intermit post lean; no ankle/hip strategy; limitedly aware )  Standing - Dynamic : Fair;Poor; Other (comment) (intermittent post lean; limited balance rxn)  Ambulation/Gait Training:  Distance (ft): 25 Feet (ft)  Assistive Device: Gait belt  Ambulation - Level of Assistance: Contact guard assistance;Minimal assistance; Other (comment); Assist x1 (increased to mod if begins to lose balance)        Gait Abnormalities: Decreased step clearance;Trunk sway increased; Path deviations        Base of Support: Widened;Center of gravity altered; Other (comment) (COG posteriorly shifted)     Speed/Lissa: Slow  Step Length: Right shortened;Left shortened                  Functional Measure:  Tinetti test:    Sitting Balance: 1  Arises: 2  Attempts to Rise: 2  Immediate Standing Balance: 0  Standing Balance: 0  Nudged: 0  Eyes Closed: 0  Turn 360 Degrees - Continuous/Discontinuous: 0  Turn 360 Degrees - Steady/Unsteady: 0  Sitting Down: 1  Balance Score: 6 Balance total score  Indication of Gait: 1  R Step Length/Height: 1  L Step Length/Height: 1  R Foot Clearance: 1  L Foot Clearance: 1  Step Symmetry: 1  Step Continuity: 1  Path: 1  Trunk: 0  Walking Time: 0  Gait Score: 8 Gait total score  Total Score: 14/28 Overall total score         Tinetti Tool Score Risk of Falls  <19 = High Fall Risk  19-24 = Moderate Fall Risk  25-28 = Low Fall Risk  Christine SHAY. Performance-Oriented Assessment of Mobility Problems in Elderly Patients. [place] 66; [id number]. (Scoring Description: PT Bulletin Feb. 10, 1993)    Older adults: Ji Flores et al, 2009; n = 1000 [facility] elderly evaluated with ABC, MELISSA, ADL, and IADL)  · Mean MELISSA score for males aged 69-68 years = 26.21(3.40)  · Mean MELISSA score for females age 69-68 years = 25.16(4.30)  · Mean MELISSA score for males over 80 years = 23.29(6.02)  · Mean MELISSA score for females over 80 years = 17.20(8.32)           Physical Therapy Evaluation Charge Determination   History Examination Presentation Decision-Making   HIGH Complexity :3+ comorbidities / personal factors will impact the outcome/ POC  HIGH Complexity : 4+ Standardized tests and measures addressing body structure, function, activity limitation and / or participation in recreation  MEDIUM Complexity : Evolving with changing characteristics  MEDIUM Complexity : FOTO score of 26-74      Based on the above components, the patient evaluation is determined to be of the following complexity level: MEDIUM    Pain Rating:  No c/o    Activity Tolerance:   Fair    After treatment patient left in no apparent distress:   Sitting in chair, Call bell within reach, and Bed / chair alarm activated    COMMUNICATION/EDUCATION:   The patients plan of care was discussed with: Occupational therapist, Registered nurse, and Case management. Fall prevention education was provided and the patient/caregiver indicated understanding., Patient/family have participated as able in goal setting and plan of care. , and Patient/family agree to work toward stated goals and plan of care.     Thank you for this referral.  Destiney Martinez, PT   Time Calculation: 36 mins

## 2021-07-06 NOTE — PROGRESS NOTES
Follow up visit with patient in Rm 126  Provided empathic listening and spiritual support  [address] Paging [phone]

## 2021-07-06 NOTE — PROGRESS NOTES
Bedside shift change report given to WAYLON Christine RN (oncoming nurse) by Joi Contreras RN (offgoing nurse). Report included the following information Kardex.

## 2021-07-06 NOTE — PROGRESS NOTES
ED MD CROSS COVER    CTSP for hypoxia and difficulty breathing. SpO2 82 % on RA, whereas she was 95-97% on admission. No chest pain. T 98.0     Bp 124/70           HR 88              RR 18          94% on 2 liters    Exam Sitting on side of bed with 2 liters oxygen. Comfortable    HEENT: AT/NC, MMM           Neck: supple, NT         Cor: RRR s M/R/G      Lungs: wheezing L> R lower lobes and anteriorly bilaterally. Abd: NT,       Extr: No edema. Neuro: alert, appropriate      Labs. Recent Results (from the past 12 hour(s))   GLUCOSE, POC    Collection Time: 07/05/21  4:21 PM   Result Value Ref Range    Glucose (POC) 120 (H) 65 - 117 mg/dL    Performed by [address] [place] [address], POC    Collection Time: 07/05/21  9:11 PM   Result Value Ref Range    Glucose (POC) 144 (H) 65 - 117 mg/dL    Performed by Jp Back    D DIMER    Collection Time: 07/05/21  9:25 PM   Result Value Ref Range    D-dimer 2.07 (H) 0.00 - 0.65 mg/L FEU   TROPONIN I    Collection Time: 07/05/21  9:25 PM   Result Value Ref Range    Troponin-I, Qt. 0.05 (H) <0.05 ng/mL   NT-PRO BNP    Collection Time: 07/05/21  9:25 PM   Result Value Ref Range    NT pro- (H) 0 - 450 PG/ML   EKG, 12 LEAD, INITIAL    Collection Time: 07/05/21  9:55 PM   Result Value Ref Range    Ventricular Rate 101 BPM    Atrial Rate 101 BPM    P-R Interval 162 ms    QRS Duration 82 ms    Q-T Interval 386 ms    QTC Calculation (Bezet) 500 ms    Calculated P Axis 51 degrees    Calculated R Axis -63 degrees    Calculated T Axis 64 degrees    Diagnosis       Sinus tachycardia with occasional premature ventricular complexes  Left anterior fascicular block  Abnormal ECG  When compared with ECG of 04-JUL-2021 16:40,  MANUAL COMPARISON REQUIRED, DATA IS UNCONFIRMED           CT PELV WO CONT    Result Date: 7/5/2021  No evidence of acute process. XR CHEST PORT    Result Date: 7/5/2021  Moderate pulmonary edema.       Imp: SOB, Hypoxia: Pulmonary edema vs PE    Plan: CTA chest.   Lasix once she returns to floor from CT.       Scott Leyva MD

## 2021-07-06 NOTE — PROGRESS NOTES
Pt's daughter Mercedez Poole ([phone]) updated on patients current status. Mercedez Poole states she will be in to see her mom after work today.

## 2021-07-07 ENCOUNTER — TELEPHONE (OUTPATIENT)
Dept: NEUROLOGY | Age: 86
End: 2021-07-07

## 2021-07-07 VITALS
HEART RATE: 92 BPM | TEMPERATURE: 99.2 F | WEIGHT: 138.4 LBS | SYSTOLIC BLOOD PRESSURE: 126 MMHG | BODY MASS INDEX: 26.13 KG/M2 | RESPIRATION RATE: 16 BRPM | OXYGEN SATURATION: 96 % | DIASTOLIC BLOOD PRESSURE: 68 MMHG | HEIGHT: 61 IN

## 2021-07-07 LAB
ALBUMIN SERPL-MCNC: 2.9 G/DL (ref 3.5–5)
ALBUMIN/GLOB SERPL: 0.8 {RATIO} (ref 1.1–2.2)
ALP SERPL-CCNC: 70 U/L (ref 45–117)
ALT SERPL-CCNC: 28 U/L (ref 12–78)
ANION GAP SERPL CALC-SCNC: 10 MMOL/L (ref 5–15)
AST SERPL-CCNC: 21 U/L (ref 15–37)
ATRIAL RATE: 101 BPM
BASOPHILS # BLD: 0 K/UL (ref 0–0.1)
BASOPHILS NFR BLD: 0 % (ref 0–1)
BILIRUB SERPL-MCNC: 0.7 MG/DL (ref 0.2–1)
BUN SERPL-MCNC: 11 MG/DL (ref 6–20)
BUN/CREAT SERPL: 16 (ref 12–20)
CALCIUM SERPL-MCNC: 8.8 MG/DL (ref 8.5–10.1)
CALCULATED P AXIS, ECG09: 51 DEGREES
CALCULATED R AXIS, ECG10: -63 DEGREES
CALCULATED T AXIS, ECG11: 64 DEGREES
CHLORIDE SERPL-SCNC: 103 MMOL/L (ref 97–108)
CO2 SERPL-SCNC: 28 MMOL/L (ref 21–32)
CREAT SERPL-MCNC: 0.67 MG/DL (ref 0.55–1.02)
DIAGNOSIS, 93000: NORMAL
DIFFERENTIAL METHOD BLD: ABNORMAL
EOSINOPHIL # BLD: 0.2 K/UL (ref 0–0.4)
EOSINOPHIL NFR BLD: 1 % (ref 0–7)
ERYTHROCYTE [DISTWIDTH] IN BLOOD BY AUTOMATED COUNT: 12.7 % (ref 11.5–14.5)
GLOBULIN SER CALC-MCNC: 3.8 G/DL (ref 2–4)
GLUCOSE BLD STRIP.AUTO-MCNC: 174 MG/DL (ref 65–117)
GLUCOSE BLD STRIP.AUTO-MCNC: 223 MG/DL (ref 65–117)
GLUCOSE SERPL-MCNC: 198 MG/DL (ref 65–100)
HCT VFR BLD AUTO: 38 % (ref 35–47)
HGB BLD-MCNC: 13.2 G/DL (ref 11.5–16)
IMM GRANULOCYTES # BLD AUTO: 0.1 K/UL (ref 0–0.04)
IMM GRANULOCYTES NFR BLD AUTO: 0 % (ref 0–0.5)
LYMPHOCYTES # BLD: 1 K/UL (ref 0.8–3.5)
LYMPHOCYTES NFR BLD: 8 % (ref 12–49)
MAGNESIUM SERPL-MCNC: 1.9 MG/DL (ref 1.6–2.4)
MCH RBC QN AUTO: 31.6 PG (ref 26–34)
MCHC RBC AUTO-ENTMCNC: 34.7 G/DL (ref 30–36.5)
MCV RBC AUTO: 90.9 FL (ref 80–99)
MONOCYTES # BLD: 1.1 K/UL (ref 0–1)
MONOCYTES NFR BLD: 8 % (ref 5–13)
NEUTS SEG # BLD: 10.6 K/UL (ref 1.8–8)
NEUTS SEG NFR BLD: 83 % (ref 32–75)
NRBC # BLD: 0 K/UL (ref 0–0.01)
NRBC BLD-RTO: 0 PER 100 WBC
P-R INTERVAL, ECG05: 162 MS
PHOSPHATE SERPL-MCNC: 3.2 MG/DL (ref 2.6–4.7)
PLATELET # BLD AUTO: 287 K/UL (ref 150–400)
PMV BLD AUTO: 10.5 FL (ref 8.9–12.9)
POTASSIUM SERPL-SCNC: 3.3 MMOL/L (ref 3.5–5.1)
PROT SERPL-MCNC: 6.7 G/DL (ref 6.4–8.2)
Q-T INTERVAL, ECG07: 386 MS
QRS DURATION, ECG06: 82 MS
QTC CALCULATION (BEZET), ECG08: 500 MS
RBC # BLD AUTO: 4.18 M/UL (ref 3.8–5.2)
SERVICE CMNT-IMP: ABNORMAL
SERVICE CMNT-IMP: ABNORMAL
SODIUM SERPL-SCNC: 141 MMOL/L (ref 136–145)
VENTRICULAR RATE, ECG03: 101 BPM
WBC # BLD AUTO: 13 K/UL (ref 3.6–11)

## 2021-07-07 PROCEDURE — 36415 COLL VENOUS BLD VENIPUNCTURE: CPT

## 2021-07-07 PROCEDURE — 97116 GAIT TRAINING THERAPY: CPT

## 2021-07-07 PROCEDURE — 74011250636 HC RX REV CODE- 250/636: Performed by: INTERNAL MEDICINE

## 2021-07-07 PROCEDURE — 97535 SELF CARE MNGMENT TRAINING: CPT

## 2021-07-07 PROCEDURE — 85025 COMPLETE CBC W/AUTO DIFF WBC: CPT

## 2021-07-07 PROCEDURE — 80053 COMPREHEN METABOLIC PANEL: CPT

## 2021-07-07 PROCEDURE — 97530 THERAPEUTIC ACTIVITIES: CPT

## 2021-07-07 PROCEDURE — 74011636637 HC RX REV CODE- 636/637: Performed by: INTERNAL MEDICINE

## 2021-07-07 PROCEDURE — 83735 ASSAY OF MAGNESIUM: CPT

## 2021-07-07 PROCEDURE — 82962 GLUCOSE BLOOD TEST: CPT

## 2021-07-07 PROCEDURE — 74011250637 HC RX REV CODE- 250/637: Performed by: EMERGENCY MEDICINE

## 2021-07-07 PROCEDURE — 84100 ASSAY OF PHOSPHORUS: CPT

## 2021-07-07 PROCEDURE — 74011250637 HC RX REV CODE- 250/637: Performed by: INTERNAL MEDICINE

## 2021-07-07 RX ORDER — POTASSIUM CHLORIDE 750 MG/1
10 TABLET, FILM COATED, EXTENDED RELEASE ORAL 2 TIMES DAILY
Status: DISCONTINUED | OUTPATIENT
Start: 2021-07-07 | End: 2021-07-07 | Stop reason: HOSPADM

## 2021-07-07 RX ORDER — GLIMEPIRIDE 2 MG/1
1 TABLET ORAL
Qty: 90 TABLET | Refills: 4 | Status: SHIPPED
Start: 2021-07-07 | End: 2021-07-22 | Stop reason: ALTCHOICE

## 2021-07-07 RX ORDER — POTASSIUM CHLORIDE 750 MG/1
10 TABLET, FILM COATED, EXTENDED RELEASE ORAL 2 TIMES DAILY
Qty: 6 TABLET | Refills: 0 | Status: SHIPPED | OUTPATIENT
Start: 2021-07-07 | End: 2021-07-10

## 2021-07-07 RX ADMIN — SERTRALINE 100 MG: 100 TABLET, FILM COATED ORAL at 08:19

## 2021-07-07 RX ADMIN — ENOXAPARIN SODIUM 40 MG: 40 INJECTION SUBCUTANEOUS at 14:02

## 2021-07-07 RX ADMIN — INSULIN LISPRO 2 UNITS: 100 INJECTION, SOLUTION INTRAVENOUS; SUBCUTANEOUS at 11:30

## 2021-07-07 RX ADMIN — ACETAMINOPHEN 650 MG: 325 TABLET ORAL at 03:20

## 2021-07-07 RX ADMIN — POTASSIUM CHLORIDE 10 MEQ: 750 TABLET, FILM COATED, EXTENDED RELEASE ORAL at 14:02

## 2021-07-07 RX ADMIN — PREGABALIN 75 MG: 50 CAPSULE ORAL at 08:17

## 2021-07-07 RX ADMIN — ACETAMINOPHEN 650 MG: 325 TABLET ORAL at 08:17

## 2021-07-07 RX ADMIN — INSULIN LISPRO 2 UNITS: 100 INJECTION, SOLUTION INTRAVENOUS; SUBCUTANEOUS at 06:44

## 2021-07-07 NOTE — PROGRESS NOTES
Bedside and Verbal shift change report given to Maddy Trimble RN (oncoming nurse) by Alaina Santiago RN (offgoing nurse). Report included the following information SBAR, Kardex and Recent Results.

## 2021-07-07 NOTE — PROGRESS NOTES
Transition of Care (BRITTNI) Plan:  Patient is being discharged home. Daughter is in agreement with the plan about discharge. Requested [facility]. Contacted [facility] and made the referral.    BRITT[facility] Transportation:      How is patient being transported at discharge? POV/ Daughter    When?  7/7/21    Is transport scheduled? NA    Follow-up appointment and transportation:    PCP? Richard Denise MD  7/8/21  At 4:20PM    Specialist? Raven Pompa MD Neuro  7/9/21 appt. Already made. Time/Date? See above     Who is transporting to the follow-up appointment? Daughter    Is transport for follow up appointment scheduled? NA    Communication plan (with patient/family): Who is being called? Patient's daughter    What number(s) is to be used? [phone] or [phone]    What service provider is calling for [facility] services? PCP office, Home health agency    When are they calling? Probably 24 - 48 hours prior to appointment      Click here to [address] including selection of the Healthcare Decision Maker Relationship (ie \"Primary\")  @[email or web address]    Patient's daughter is named the patient's Primary Healthcare decision maker    Care Management Interventions  PCP Verified by CM: Yes Richard Denise MD)  Last Visit to PCP: 06/25/21  Palliative Care Criteria Met (RRAT>21 & CHF Dx)?: No (No MD order)  Mode of Transport at Discharge:  Other (see comment) (Daughter POV)  Transition of Care Consult (CM Consult): Discharge Planning  Physical Therapy Consult: Yes  Occupational Therapy Consult: Yes  Speech Therapy Consult: No  Current Support Network: Lives Alone  Confirm Follow Up Transport: Family  The Plan for Transition of Care is Related to the Following Treatment Goals : Treat SIRS  Name of the Patient Representative Who was Provided with a Choice of Provider and Agrees with the Discharge Plan: pATIENT  Freedom of Choice List was Provided with Basic Dialogue that Supports the Patient's Individualized Plan of Care/Goals, Treatment Preferences and Shares the Quality Data Associated with the Providers?: Yes   Resource Information Provided?: No  Discharge Location  Discharge Placement: Home with home health ([address])

## 2021-07-07 NOTE — PROGRESS NOTES
Problem: Mobility Impaired (Adult and Pediatric)  Goal: *Acute Goals and Plan of Care (Insert Text)  Description: FUNCTIONAL STATUS PRIOR TO ADMISSION: Pt was living alone, 2 step entry. She is noted in chart to have movement disorder/restless leg syndrome and has been followed by neurology as well as currently attending OPPT. Pt states she amb with no device although has cane and QC. She states she does have a hx of falls but is vague about how often. She does state that she was able to do her own ADLs/IADLs PTA. She reports that prompting admission - she was standing in bathroom, lost her balance backwards and hit her head. She stated she tried to get up and fell 2 more times feeling as if she had not control over her body. Being worked up for cause; mention of recent change from Requip to Lyrica after which there was an increase in falls. CT neg, no noted MRI brain. HOME SUPPORT PRIOR TO ADMISSION: The patient lived alone with dtr to provide assistance. Physical Therapy Goals  Initiated 7/6/2021  1. Patient will move from supine to sit and sit to supine , scoot up and down, and roll side to side in bed with independence within 7 day(s). 2.  Patient will transfer from bed to chair and chair to bed with modified independence using the least restrictive device within 7 day(s). 3.  Patient will perform sit to stand with modified independence within 7 day(s). 4.  Patient will ambulate with modified independence for 150 feet with the least restrictive device within 7 day(s). 5.  Patient will ascend/descend 2 stairs with handrail(s) with supervision/set-up within 7 day(s).       Outcome: Progressing Towards Goal     PHYSICAL THERAPY TREATMENT  Patient: Ani Gandara (37 y.o. female)  Date: 7/7/2021  Diagnosis: SIRS (systemic inflammatory response syndrome) ([facility] [R65.10] Fall from ground level       Precautions:  Falls; skin  Chart, physical therapy assessment, plan of care and goals were reviewed. ASSESSMENT  Patient continues with skilled PT services and is progressing towards goals. She demonstrates significantly improved balance, ambulates increased distance using RW, and progresses to stair training. She tolerates treatment without complaints. Pt's daughter present throughout encounter and engages in hands-on mobility training with patient. She states intent to care for patient in her home and follow-up with pt's primary neurologist this Friday. Current Level of Function Impacting Discharge (mobility/balance): CGA    Other factors to consider for discharge: none additional         PLAN :  Patient continues to benefit from skilled intervention to address the above impairments. Continue treatment per established plan of care. to address goals. Recommendation for discharge: (in order for the patient to meet his/her long term goals)  Physical therapy at least 2 days/week in the home AND ensure assist and/or supervision for safety with all functional mobility. Daughter aware. This discharge recommendation:  Has been made in collaboration with the attending provider and/or case management    IF patient discharges home will need the following DME: discussed options for bedside commode use; daughter educated regarding RW adjustment (affirms owning RW). SUBJECTIVE:   Patient presents with flat affect but agreeable to PT. She reports feeling somewhat better today than yesterday. Pt received supine, agreeable to PT and cleared by RN. OBJECTIVE DATA SUMMARY:   Critical Behavior:     Orientation Level: Oriented to person     Safety/Judgement: Decreased awareness of need for assistance    Functional Mobility Training:  Bed Mobility:     Supine to Sit: Additional time;Supervision; Adaptive equipment (+bed rail)     Scooting:  Additional time;Supervision        Transfers:  Sit to Stand: Contact guard assistance;Stand-by assistance; cues and education for UE placement 3/5 trials  Stand to Sit: Contact guard assistance;Stand-by assistance                             Balance:  Sitting: Intact  Standing: With support  Standing - Static: Good  Standing - Dynamic : Good  Ambulation/Gait Training:  Distance (ft): 200 Feet (ft)  Assistive Device: Walker, rolling;Gait belt  Ambulation - Level of Assistance: Contact guard assistance;Stand-by assistance        Gait Abnormalities: Decreased step clearance              Speed/Lissa: Pace decreased (<100 feet/min)                     No loss of balance; appropriate RW techniques. Discussed proper RW techniques with pt's daughter. Stairs:  Number of Stairs Trained: 8 (4 stairs with PT, 4 stairs with daughter)  Stairs - Level of Assistance: Contact guard assistance   Rail Use: Both      Pain Rating:  Denies pain    Activity Tolerance:   Good; SpO2 94% with activity using room air    After treatment patient left in no apparent distress:   Sitting in chair, Call bell within reach, Bed / chair alarm activated, and Caregiver / family present    COMMUNICATION/COLLABORATION:   The patients plan of care was discussed with: Registered nurse, Case management, and Rehabilitation technician.      Juana Dailey, PT, DPT   Time Calculation: 34 mins

## 2021-07-07 NOTE — PROGRESS NOTES
Dr. Daina Mc called and he has discuss patient disposition with patient and daughter. Will be discharged today. Patient ambulated to bathroom, steady with just minimal assistance. Dexi performed, insulin coverage per order and MAR.

## 2021-07-07 NOTE — DISCHARGE INSTRUCTIONS
Will take Potassium for 3 days - due to recent treatment with Lasix in the hosptal  May need f/u with Cardiology to assist with tx of CHF a/w AS and MS  Need f/u of CT changes noted in the Pancrease and Adrenal gland  Keep f/u with Dr. Cyndie Powell for Movement Disorder  Follow temp several time daily for the next [address], [address] from [address] [facility],[address]:    After general anesthesia or intravenous sedation, for 24 hours or while taking prescription Narcotics:  · Limit your activities  · Do not drive and operate hazardous machinery  · Do not make important personal or business decisions  · Do  not drink alcoholic beverages  · If you have not urinated within 8 hours after discharge, please contact your surgeon on call. Report the following to your surgeon:  · Excessive pain, swelling, redness or odor of or around the surgical area  · Temperature over 100.5  · Nausea and vomiting lasting longer than 4 hours or if unable to take medications  · Any signs of decreased circulation or nerve impairment to extremity: change in color, persistent  numbness, tingling, coldness or increase pain  · Any questions    What to do at Home:  Recommended activity: Activity as tolerated,     If you experience any of the following symptoms return of symptoms, please follow up with PCP or return to ED. *  Please give a list of your current medications to your Primary Care Provider. *  Please update this list whenever your medications are discontinued, doses are      changed, or new medications (including over-the-counter products) are added. *  Please carry medication information at all times in case of emergency situations. These are general instructions for a healthy lifestyle:    No smoking/ No tobacco products/ Avoid exposure to second hand smoke  Surgeon General's Warning:  Quitting smoking now greatly reduces serious risk to your health.     Obesity, smoking, and sedentary lifestyle greatly increases your risk for illness    A healthy diet, regular physical exercise & weight monitoring are important for maintaining a healthy lifestyle    You may be retaining fluid if you have a history of heart failure or if you experience any of the following symptoms:  Weight gain of 3 pounds or more overnight or 5 pounds in a week, increased swelling in our hands or feet or shortness of breath while lying flat in bed. Please call your doctor as soon as you notice any of these symptoms; do not wait until your next office visit. The discharge information has been reviewed with the patient and caregiver. The patient and caregiver verbalized understanding. Discharge medications reviewed with the patient and caregiver and appropriate educational materials and side effects teaching were provided.   ___________________________________________________________________________________________________________________________________

## 2021-07-07 NOTE — PROGRESS NOTES
Problem: Self Care Deficits Care Plan (Adult)  Goal: *Acute Goals and Plan of Care (Insert Text)  Description:   FUNCTIONAL STATUS PRIOR TO ADMISSION: Patient was independent and active without use of DME.     HOME SUPPORT: The patient lived alone with paid yard help to provide assistance. Occupational Therapy Goals  Initiated 7/6/2021  1. Patient will perform toilet transfer with modified independence within 7   day(s). 2.  Patient will perform bathing with modified independence within 7 day(s). 3.  Patient will perform lower body dressing with modified independence within 7 day(s). 4.  Patient will perform all aspects of toileting with modified independence within 7 day(s). 5.  Patient will participate in upper extremity therapeutic exercise/activities with modified independence for 10 minutes within 7 day(s). Outcome: Progressing Towards Goal  OCCUPATIONAL THERAPY TREATMENT  Patient: Rhona Guzmán (57 y.o. female)  Date: 7/7/2021  Diagnosis: SIRS (systemic inflammatory response syndrome) ([facility] [R65.10] Fall from ground level       Precautions:    Chart, occupational therapy assessment, plan of care, and goals were reviewed. ASSESSMENT  Patient continues with skilled OT services and is progressing towards goals. Pt's balance is better today. Still needs CGA but is able to perform ADL tasks in bathroom and standing without losing balance today. Able to get socks on/off, wash feet without getting dizzy. Current Level of Function Impacting Discharge (ADLs): CGA in standing    Other factors to consider for discharge: lives alone         PLAN :  Patient continues to benefit from skilled intervention to address the above impairments. Continue treatment per established plan of care to address goals.     Recommend with staff: use bathroom instead of BSC    Recommend next OT session: endurance tasks    Recommendation for discharge: (in order for the patient to meet his/her long term goals)  Occupational therapy at least 2 days/week in the home AND ensure assist and/or supervision for safety with ambulation/transfers    This discharge recommendation:  Has been made in collaboration with the attending provider and/or case management    IF patient discharges home will need the following DME: none       SUBJECTIVE:   Patient stated my daughter is coming to visit.     OBJECTIVE DATA SUMMARY:   Cognitive/Behavioral Status:     Orientation Level: Oriented to person     Safety/Judgement: Decreased awareness of need for assistance    Functional Mobility and Transfers for ADLs:    Transfers:     Functional Transfers  Bathroom Mobility: Contact guard assistance  Toilet Transfer : Contact guard assistance       Balance:  Sitting: Intact  Standing: Impaired  Standing - Static: Constant support; Fair  Standing - Dynamic : Constant support; Fair    ADL Intervention:  Feeding  Feeding Assistance: Set-up    Grooming  Position Performed: Standing  Washing Hands: Contact guard assistance  Brushing Teeth: Contact guard assistance  Shaving: Contact guard assistance    Upper Body Bathing  Bathing Assistance: Set-up  Position Performed: Seated in chair    Lower Body Bathing  Lower Body : Set-up; Supervision  Position Performed: Seated in chair    Upper [address]: Minimum  assistance    Lower Body Dressing Assistance  Socks: Independent    Toileting  Bladder Hygiene: Modified independent  Clothing Management: Modified independent    Cognitive Retraining  Following Commands: Follows one step commands/directions  Safety/Judgement: Decreased awareness of need for assistance    Pain:  2/10 R knee    Activity Tolerance:   Good    After treatment patient left in no apparent distress:   Sitting in chair, Call bell within reach, and Bed / chair alarm activated    COMMUNICATION/COLLABORATION:   The patients plan of care was discussed with: Physical therapist and Case management.      Krishan Mathew, OT  Time Calculation: 30 mins  2

## 2021-07-07 NOTE — DISCHARGE SUMMARY
[facility]  Hospitalist Discharge Summary    Patient ID:  Luis Garcia  [id number]  80 y.o.  1935    PCP on record: Mary Jo Dominguez MD    Admit date: 7/4/2021  Discharge date and time: 7/7/2021     DISCHARGE DIAGNOSIS:    Principal Problem:    Fall from ground level (7/6/2021)    Active Problems:    SIRS (systemic inflammatory response syndrome) ([address] (7/4/2021)    Aortic stenosis (7/6/2021)    Mitral stenosis (7/6/2021)    Acute on chronic combined systolic and diastolic ACC/AHA stage C congestive heart failure ([address] (7/6/2021)    Type 2 diabetes mellitus without complication ([address] (14/11/6591)    Diabetic peripheral neuropathy associated with type 2 diabetes mellitus ([address] (1/24/2017)    Mixed hyperlipidemia (8/2/2017)    Cerebral microvascular disease (1/24/2017)    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  female who presents with frequent falls.      Patient has a pmh as below. Has had a movement disorder for over a decade. Was managed by her PCP and x2 other Neurologists. Was on Requip for a long time. Recently established with a Movement Disorder Specialist.  Weaned off requip. Daughters feel \"our mom is returning\" after discontinuing Requip. Has had at least 4 falls requiring hospitalization this year. Frequently gets skin tears that turn into cellulitis requiring ABX and one time pt was septic.     Recently pt fell 2-3 times and hit her head without LOC. CT head no acute path. XR hip no path although in pain. CT pelvis no acute path. Multiple skin tears none of which appear infected. Does have a warm LLE with mild erythema.  ______________________________________________________________________  DISCHARGE SUMMARY/HOSPITAL COURSE:  for full details see H&P, daily progress notes, labs, consult notes.      Pt has been followed for chronic movement disorder. She has recently been seen by Dr. Anatoliy Willson, Neurology and adjustments in tx advised. She has weened off Requip and started on Lyrica - 75mg qd advanced to bid. Family has noted a different tremor / jerking since. F/u is scheduled for Fri 7/9.       Pt admitted following a fall in bathroom striking her head. ED w/u noted for SIRS / Low grade fever. Principal Problem:    Fall from ground level (7/6/2021)  Recurrent Fall history  A/w Muscular Movement disorder  Recent medication changes - ie Lyrica  Uncertain a/w low grade fevers  Has Neuro f/u on Fri 7/9  Tx abrasions L forearm and skin tears to R forearm - Bacitracin / Bandaids  Telemetry monitoring w/o significant arrhythmia  Complication of medication, chronic movement disorder  Needs assistance and further PT/OT to avoid falls     Active Problems:    SIRS (systemic inflammatory response syndrome) (HCC) (7/4/2021)  Low grade temps  WBC remained stable in 12-13K range  BC neg x 3d  Pt was tx with Unasyn x 2d, followed by Rocephin x 2d - stopped w/o source or documented need for antibiotics  Family to monitor temps for next week - return for elevation in Temp to 101       Aortic stenosis (7/6/2021)    Mitral stenosis (7/6/2021)    Acute on chronic combined systolic and diastolic ACC/AHA stage C congestive heart failure ([address] (7/6/2021)  Developed CHF Monday evening on IVF NS 100cc/hr x 2d  Responded to Lasix and Oxygen.   Mild elevation in BNP  Crackles on exam, may have component of fibrosis as well  ECHO noted Dec 2020 with AS / MS  Plan Cardiology referral for treatment adjustments  Drop in K+ tx with Replacement KDur 10meg bid x 6 doses       Type 2 diabetes mellitus without complication ([address] (22/56/6984)    Diabetic peripheral neuropathy associated with type 2 diabetes mellitus ([address] (1/24/2017)  Holding Glucophage with recent contrast  Resume Glucophage 500mg daily  Cont CC Humalog - only given 3-4 units per day  Has A1c was 6.9  Resume tx Amaryl at 1mg daily before resuming 2mg to avoid hypoglycemia       Mixed hyperlipidemia (8/2/2017)  No current tx, h/o allergies       Cerebral microvascular disease (1/24/2017)  Noted on Head CT  Could account for confusion concerns  Consider tx Lipids / ASA routine       Pancreatic mass    Adrenal  Mass  Findings on CTA chest done for SOB, concern for PE  With age / chronic illness suggest f/u scan in 3-4 months  Pt would not be a surgical candidate for aggressive tx of cancer     _______________________________________________________________________  Patient seen and examined by me on discharge day. Pertinent Findings:  Visit Vitals  /70 (BP Patient Position: Sitting)   Pulse 93   Temp 99.1 °F (37.3 °C)   Resp 18   Ht 5' 1\" (1.549 m)   Wt 62.8 kg (138 lb 6.4 oz)   SpO2 95%   BMI 26.15 kg/m²     Gen:    Not in distress  Chest: Nonlabored respiration, Clear lungs  CVS:   Irreg Irreg rhythm with 3/6 harsh sys ejection murmur.   No edema  Abd:  Soft, not distended, not tender  Neuro:  Alert, nonfocal, weak    LABS:  Results for Luba Trujillod (MRN [id number]) as of 7/7/2021 15:19   1/4/2021 06:08 7/4/2021 15:45 7/5/2021 16:21 7/5/2021 21:11 7/6/2021 06:13 7/6/2021 11:08 7/6/2021 16:55 7/6/2021 21:31 7/7/2021 06:36 7/7/2021 11:58   GLU -  (H) 290 (H) 120 (H) 144 (H) 175 (H) 296 (H) 132 (H) 171 (H) 174 (H) 223 (H)     Results for Luba Trujillod (MRN [id number]) as of 7/7/2021 15:19   7/4/2021 15:50 7/5/2021 11:07 7/6/2021 05:46 7/7/2021 05:20   WBC 12.4 (H) 10.4 13.6 (H) 13.0 (H)   NRBC 0.0 0.0 0.0 0.0   RBC 4.41 3.83 4.35 4.18   HGB 13.1 11.5 12.9 13.2   HCT 39.9 35.4 40.7 38.0   MCV 90.5 92.4 93.6 90.9   MCH 29.7 30.0 29.7 31.6   MCHC 32.8 32.5 31.7 34.7   RDW 12.7 13.0 12.9 12.7   PLATELET 978 491 658 (L) 287   MPV 10.8 9.9 11.9 10.5   NEUTROPHILS 76 (H) 75 82 (H) 83 (H)   LYMPHOCYTE 13 12 9 (L) 8 (L)   MONOCYTES 8 8 7 8   EOSINOPHILS 2 4 2 1     Results for CARNEAL, Solomon Fung (MRN [id number]) as of 7/7/2021 15:19   7/4/2021 17:00   Color YELLOW/STRAW   Appearance CLOUDY (A)   Specific gravity 1.025   pH (UA) 5.5   Protein Negative   Glucose 500 (A)   Ketone 15 (A)   Blood Negative   Bilirubin Negative   Urobilinogen 0.2   Nitrites Negative   Leukocyte Esterase Negative   Epithelial cells FEW   WBC 0-4   RBC 0-5   Bacteria Negative     Results for Tejal Alejandre (MRN [id number]) as of 7/7/2021 15:19   7/5/2021 21:25   D-dimer 2.07 (H)     Results for Tejal Alejandre (MRN [id number]) as of 7/7/2021 15:19   7/4/2021 15:50 7/5/2021 11:07 7/5/2021 21:25 7/6/2021 05:46 7/7/2021 05:20   Sodium 138 142  140 141   Potassium 4.2 3.8  3.6 3.3 (L)   Chloride 103 108  103 103   CO2 26 25  26 28   Anion gap 9 9  11 10   Glucose 295 (H) 199 (H)  203 (H) 198 (H)   BUN 30 (H) 17  13 11   Creatinine 0.81 0.62  0.66 0.67   BUN/Cr ratio 37 (H) 27 (H)  20 16   Calcium 9.4 8.0 (L)  8.8 8.8   Phosphorus  2.5 (L)  3.2 3.2   Magnesium  1.7  1.7 1.9   GFR  non-AA >60 >60  >60 >60   Bilirubin, total 0.6 0.4  0.4 0.7   Protein, total 6.9 5.5 (L)  6.6 6.7   Albumin 3.3 (L) 2.5 (L)  3.0 (L) 2.9 (L)   Globulin 3.6 3.0  3.6 3.8   A-G Ratio 0.9 (L) 0.8 (L)  0.8 (L) 0.8 (L)   ALT 28 26  27 28   AST 31 24  28 21   Alk. phos 78 64  77 70   Lactic acid 1.2       Troponin-I, Qt. <0.05  0.05 (H)     NT pro-BNP   622 (H)       Results for Tejal Alejandre (MRN [id number]) as of 7/7/2021 15:19   6/24/2021 14:06   TSH 2.480     CULTURES:  pBC 7/4: NG x 2d     RADIOLOGY:  CT HEAD 7/4:  FINDINGS:   Ventricles:  Midline, no hydrocephalus.    Intracranial Hemorrhage:  None.    Brain Parenchyma/Brainstem:  Normal for age. Basal Cisterns:  Normal.   Paranasal Sinuses:  Visualized sinuses are clear. Soft Tissues:  No significant soft tissue swelling. Osseous Structures:  No acute fracture. IMPRESSION:  No acute traumatic injury.     CT Pelvis 7/5:  The bladder and uterus are unremarkable.  There is no evidence of acute intrapelvic process.   The bones are osteopenic. Spondylosis is seen in the lower lumbar spine. The  bilateral sacral iliac joints show mild degenerative change. Moderate  degenerative changes present in the pubic symphysis. There is moderate bilateral  hip osteoarthritis. No acute fracture or dislocation. No evidence of a destructive osseous lesion.   IMPRESSION:  No evidence of acute process.      CTA Chest 7/6:  1. No acute pulmonary embolus. 2. Pulmonary edema and small pleural effusions. 3. Indeterminate 18 mm left adrenal nodule and 19 mm pancreatic head lesion.     pCXR 7/4:   A portable AP radiograph of the chest was obtained at 1646 hours. The  patient is on a cardiac monitor. The lungs are clear. The cardiac and  mediastinal contours and pulmonary vascularity are normal.  The bones and soft  tissues are grossly within normal limits.    IMPRESSION:  No acute cardiopulmonary process     R HIP 7/4: No acute process. Bilateral hip osteoarthritis right greater than left.     pCXR 7/5:  AP portable view of the chest demonstrates a normal cardiomediastinal  silhouette. Development of moderate pulmonary edema. No significant effusion. No  pneumothorax. The osseous structures are unremarkable.   IMPRESSION:  Moderate pulmonary edema.     EKG 7/4: NSR 97 bpm, LAD, LVH, NSSTC, Prolonged QT     ECHO 12/30/20 (DONE PTA)  · LV: Estimated LVEF is >70%. Visually measured ejection fraction. Normal cavity size, wall thickness and systolic function (ejection fraction normal). Mild (grade 1) left ventricular diastolic dysfunction. · AV: Aortic valve leaflet calcification present. Aortic valve mean gradient is 13.7 mmHg. Aortic valve area is 1.3 cm2. Mild aortic valve stenosis is present. Mild aortic valve regurgitation is present. · MV: Mitral valve leaflet calcification with reduced excursion. Moderate mitral annular calcification. Mitral valve mean gradient is 5.8 mmHg.  Moderate mitral valve stenosis is present.    _______________________________________________________________________  DISCHARGE MEDICATIONS:   Current Discharge Medication List      START taking these medications    Details   potassium chloride SR (KLOR-CON 10) 10 mEq tablet Take 1 Tablet by mouth two (2) times a day for 3 days. Qty: 6 Tablet, Refills: 0  Start date: 7/7/2021, End date: 7/10/2021         CONTINUE these medications which have CHANGED    Details   glimepiride (AMARYL) 2 mg tablet Take 0.5 Tablets by mouth every morning. Qty: 90 Tablet, Refills: 4  Start date: 7/7/2021    Associated Diagnoses: Type 2 diabetes mellitus without complication, without long-term current use of insulin ([address]         CONTINUE these medications which have NOT CHANGED    Details   cyanocobalamin (Vitamin B-12) 1,000 mcg tablet Take 1,000 mcg by mouth daily. pregabalin (LYRICA) 75 mg capsule Take 1 Capsule by mouth two (2) times a day. Max Daily Amount: 150 mg.  Qty: 60 Capsule, Refills: 2    Associated Diagnoses: Polyneuropathy; RLS (restless legs syndrome)      sertraline (ZOLOFT) 100 mg tablet Take 1 Tab by mouth daily. Qty: 90 Tab, Refills: 4    Associated Diagnoses: Anxiety      metFORMIN (GLUCOPHAGE) 500 mg tablet Take 1 Tab by mouth daily (with breakfast).   Qty: 90 Tab, Refills: 4      Blood-Glucose Meter misc Use to test glucose once daily, E 11.9   Relion meter please  Qty: 1 Each, Refills: 0      RELION PRIME TEST STRIPS strip USE 1 TEST STRIP EACH TIME TO TEST GLUCOSE 2 TIMES A DAY  Qty: 100 Strip, Refills: 11    Comments: Please consider 90 day supplies to promote better adherence      OTHER Indications: vitamin d spray         STOP taking these medications       rOPINIRole (REQUIP) 1 mg tablet Comments:   Reason for Stopping:               My Recommended  Diet: Diabetic  Activity: Up only with assistance  Wound Care: Bacitracin /  Bandaids to skin tears on R forearm  Follow-up labs: BMP at f/u appt to check K+,  Plan f/u CT of Pancrease / Adrenal in 3-4 months    ______________________________________________________________________  DISPOSITION:    Home with Family: x   Home with HH/PT/OT/RN:    SNF/LTC:    BUDDY:    OTHER:        Condition at Discharge:  Stable  _____________________________________________________________________  Follow up with:   PCP : Mary Jo Dominguez MD  Follow-up Information     Follow up With Specialties Details Why Contact Info    Mary Jo Domignuez MD Internal Medicine In 1 week  [phone]  [address], [address] [address], DO Neurology  Keep appt planned for Friday 7/9 [phone] [place] [address]  [phone]          Total time in minutes spent coordinating this discharge (includes going over instructions, follow-up, prescriptions, and preparing report for sign off to her PCP) :35 minutes    Signed:  Savi Pride MD  [facility] Hospitalist  [phone]

## 2021-07-07 NOTE — TELEPHONE ENCOUNTER
----- Message from [facility] sent at 7/7/2021 10:27 AM EDT -----  Regarding: /Telephone     Caller's first and last name:Kim-juan  Reason for call: med inquiry  Callback required yes/no and why:Yes  Best contact number(s):[phone]  Details to clarify the request:Kim states that since she was tapered off of ropinerole and put on Lyrica her mother has been falling a lot all of a sudden. Her mother also had to go seek medical attention due to her immobility issues with her legs.

## 2021-07-07 NOTE — PROGRESS NOTES
Problem: Falls - Risk of  Goal: *Absence of Falls  Description: Document [place] Fall Risk and appropriate interventions in the flowsheet.   Outcome: Progressing Towards Goal  Note: Fall Risk Interventions:  Mobility Interventions: Bed/chair exit alarm    Mentation Interventions: Bed/chair exit alarm    Medication Interventions: Bed/chair exit alarm    Elimination Interventions: Call light in reach    History of Falls Interventions: Bed/chair exit alarm

## 2021-07-07 NOTE — ROUTINE PROCESS
Discharge instructions reviewed with pt and her dgt  Personal belongings returned: pt  Home meds returned: na  To front entrance via w/c  Discharged home with  dgt  @ [id number]

## 2021-07-07 NOTE — PROGRESS NOTES
Problem: Falls - Risk of  Goal: *Absence of Falls  Description: Document Chip Armor Fall Risk and appropriate interventions in the flowsheet. Outcome: Resolved/Met     Problem: Patient Education: Go to Patient Education Activity  Goal: Patient/Family Education  Outcome: Resolved/Met     Problem: Falls - Risk of  Goal: *Absence of Falls  Description: Document [place] Fall Risk and appropriate interventions in the flowsheet.   Outcome: Resolved/Met  Note: Fall Risk Interventions:  Mobility Interventions: Bed/chair exit alarm, Patient to call before getting OOB    Mentation Interventions: Bed/chair exit alarm    Medication Interventions: Bed/chair exit alarm, Patient to call before getting OOB    Elimination Interventions: Call light in reach, Patient to call for help with toileting needs    History of Falls Interventions: Bed/chair exit alarm         Problem: Patient Education: Go to Patient Education Activity  Goal: Patient/Family Education  Outcome: Resolved/Met     Problem: General Medical Care Plan  Goal: *Vital signs within specified parameters  Outcome: Resolved/Met  Goal: *Labs within defined limits  Outcome: Resolved/Met  Goal: *Absence of infection signs and symptoms  Outcome: Resolved/Met  Goal: *Optimal pain control at patient's stated goal  Outcome: Resolved/Met  Goal: *Skin integrity maintained  Outcome: Resolved/Met  Goal: *Fluid volume balance  Outcome: Resolved/Met  Goal: *Optimize nutritional status  Outcome: Resolved/Met  Goal: *Anxiety reduced or absent  Outcome: Resolved/Met  Goal: *Progressive mobility and function (eg: ADL's)  Outcome: Resolved/Met     Problem: Patient Education: Go to Patient Education Activity  Goal: Patient/Family Education  Outcome: Resolved/Met     Problem: Patient Education: Go to Patient Education Activity  Goal: Patient/Family Education  Outcome: Resolved/Met     Problem: Patient Education: Go to Patient Education Activity  Goal: Patient/Family Education  Outcome: Resolved/Met

## 2021-07-07 NOTE — TELEPHONE ENCOUNTER
Spoke with Berkley Torres, informed her per -May discontinue Lyrica and monitor for symptomatic improvement. Please keep us updated. She verbalized understanding.

## 2021-07-08 ENCOUNTER — VIRTUAL VISIT (OUTPATIENT)
Dept: FAMILY MEDICINE CLINIC | Age: 86
End: 2021-07-08
Payer: MEDICARE

## 2021-07-08 DIAGNOSIS — R93.89 ABNORMAL CAT SCAN: Primary | ICD-10-CM

## 2021-07-08 DIAGNOSIS — I67.89 CEREBRAL MICROVASCULAR DISEASE: ICD-10-CM

## 2021-07-08 DIAGNOSIS — G25.81 RLS (RESTLESS LEGS SYNDROME): ICD-10-CM

## 2021-07-08 PROCEDURE — G8427 DOCREV CUR MEDS BY ELIG CLIN: HCPCS | Performed by: INTERNAL MEDICINE

## 2021-07-08 PROCEDURE — 99496 TRANSJ CARE MGMT HIGH F2F 7D: CPT | Performed by: INTERNAL MEDICINE

## 2021-07-08 NOTE — PROGRESS NOTES
Luke Mason is a 80 y.o. female evaluated via telephone on 7/8/2021. Consent:  She and/or health care decision maker is aware that that she may receive a bill for this telephone service, depending on her insurance coverage, and has provided verbal consent to proceed: Yes    A/P:  1. Abnormal CAT scan  Multiple abnormalities noted incidentally on CT angiogram while hospitalized which include 2 pericarinal lesions, left adrenal lesion and pancreatic lesion. These lesions are small, less than 2 cm in diameter. There would be substantial risk to attempt a biopsy of any of them at this point. We could consider a PET scan. Her overall condition though currently is quite poor as she has a little resiliency. My preference would be to simply watch and wait for a period of 3 months and then consider reimaging with CT at that time. We will arrange for an in person appointment in 2 weeks. 2. Cerebral microvascular disease  Her altered mental status may be a product of several things including sundowning due to recent hospitalization, medication withdrawal, medication on boarding, possible infection or hypoglycemia or some combination thereof. It will be important for her to reside in a stable environment for the next couple of weeks and hopefully she will reconstitute. It is certainly possible that she may not and she may require additional help at home particularly at night or consider residential assistance at [facility] living in [place] or similar facility. She has follow-up scheduled with Blanca Johnson, her neurologist.    3. RLS (restless legs syndrome)  This is a tough symptom for her and it is clear that medications have not helped and have probably created more problems than her original diagnosis. I would favor gradual weaning of her Requip with the eventual goal of stopping it completely. I am not sure that she will tolerate Lyrica.     This is a transition of care encounter. [facility] to [facility] [place] [facility] 4, 2021 with discharge on July 7, 2021. Discharge diagnosis included treatment for congestive heart failure, altered mental status, suspected SIRS. HPI: Jocy Dietz was hospitalized recently after a ground-level fall and initially she was suspected of having SIRS and was volume resuscitated with IV fluids. She eventually developed shortness of breath and was evaluated for the possibility of pulmonary embolism. Her CTA did not reveal a clot but suggested congestive heart failure and pulmonary edema with additional incidental findings of nodules around the [place], pancreas, and left adrenal gland. These were all less than 2 cm in diameter. She demonstrated symptoms of altered mental status. Since returning home with her family, she has not slept well and has gotten up in the middle of the night and urinated on the floor or in the closet. I affirm this is a Patient Initiated Episode with an Established Patient who has not had a related appointment within my department in the past 7 days or scheduled within the next 24 hours. On this date 07/08/2021 I have spent 50 minutes reviewing previous notes, test results and face to face with the patient discussing the diagnosis and importance of compliance with the treatment plan as well as documenting on the day of the visit.      Note: not billable if this call serves to triage the patient into an appointment for the relevant concern      Fela Rae MD

## 2021-07-09 ENCOUNTER — TELEPHONE (OUTPATIENT)
Dept: NEUROLOGY | Age: 86
End: 2021-07-09

## 2021-07-09 NOTE — TELEPHONE ENCOUNTER
Spoke with Ale Dunn, she states patient's legs are still having involuntary movements like she did in the hospital. Constant all the time. Not as severe but still noticeable. She wanted to know what the plan of action is. EMG needs to be rescheduled, she is requesting a call back to schedule- [phone].

## 2021-07-10 LAB
BACTERIA SPEC CULT: NORMAL
BACTERIA SPEC CULT: NORMAL
SERVICE CMNT-IMP: NORMAL
SERVICE CMNT-IMP: NORMAL

## 2021-07-12 ENCOUNTER — CLINICAL SUPPORT (OUTPATIENT)
Dept: FAMILY MEDICINE CLINIC | Age: 86
End: 2021-07-12
Payer: MEDICARE

## 2021-07-12 ENCOUNTER — TELEPHONE (OUTPATIENT)
Dept: NEUROLOGY | Age: 86
End: 2021-07-12

## 2021-07-12 DIAGNOSIS — R35.0 FREQUENCY OF URINATION: Primary | ICD-10-CM

## 2021-07-12 LAB
BILIRUB UR QL STRIP: NEGATIVE
GLUCOSE UR-MCNC: NEGATIVE MG/DL
KETONES P FAST UR STRIP-MCNC: NEGATIVE MG/DL
PH UR STRIP: 5.5 [PH] (ref 4.6–8)
PROT UR QL STRIP: NEGATIVE
SP GR UR STRIP: 1.02 (ref 1–1.03)
UA UROBILINOGEN AMB POC: NORMAL (ref 0.2–1)
URINALYSIS CLARITY POC: CLEAR
URINALYSIS COLOR POC: YELLOW
URINE BLOOD POC: NEGATIVE
URINE LEUKOCYTES POC: NEGATIVE
URINE NITRITES POC: NEGATIVE

## 2021-07-12 PROCEDURE — 81003 URINALYSIS AUTO W/O SCOPE: CPT | Performed by: INTERNAL MEDICINE

## 2021-07-12 NOTE — PROGRESS NOTES
Chief Complaint   Patient presents with    Urinary Frequency     [place] request that POC UA be completed Daughter made aware of NEG UA     Results for orders placed or performed in visit on 07/12/21   AMB POC URINALYSIS DIP STICK AUTO W/O MICRO   Result Value Ref Range    Color (UA POC) Yellow     Clarity (UA POC) Clear     Glucose (UA POC) Negative Negative    Bilirubin (UA POC) Negative Negative    Ketones (UA POC) Negative Negative    Specific gravity (UA POC) 1.025 1.001 - 1.035    Blood (UA POC) Negative Negative    pH (UA POC) 5.5 4.6 - 8.0    Protein (UA POC) Negative Negative    Urobilinogen (UA POC) 0.2 mg/dL 0.2 - 1    Nitrites (UA POC) Negative Negative    Leukocyte esterase (UA POC) Negative Negative

## 2021-07-12 NOTE — TELEPHONE ENCOUNTER
----- Message from Mela Kelsey sent at 7/12/2021  3:50 PM EDT -----  Regarding: Dr. Crystal Vides  Patient return call    Caller's first and last name and relationship (if not the patient):   Ariadne Chaves, Daughter      Best contact number(s):  [phone]      Whose call is being returned:   Marilynn Small call from last Friday, concerning r/s pt's EMG cancelled on 07/09/21 and severe withdrawals after she stopped taking meds sending pt to ER in [place], [place]      Details to clarify the request:      Mela Kelsey

## 2021-07-13 LAB
ATRIAL RATE: 97 BPM
CALCULATED P AXIS, ECG09: 60 DEGREES
CALCULATED R AXIS, ECG10: -65 DEGREES
CALCULATED T AXIS, ECG11: 10 DEGREES
DIAGNOSIS, 93000: NORMAL
P-R INTERVAL, ECG05: 154 MS
Q-T INTERVAL, ECG07: 386 MS
QRS DURATION, ECG06: 84 MS
QTC CALCULATION (BEZET), ECG08: 490 MS
VENTRICULAR RATE, ECG03: 97 BPM

## 2021-07-13 NOTE — TELEPHONE ENCOUNTER
Spoke with Yasmin Mcburney, she states patient's symptoms have improved since last week. She is applying a CBD patch to help, and another patch to help her sleep. Informed her we need to get patient in sooner per , she states she can bring patient next week just not on Friday.  Will add patient to cancellation list.

## 2021-07-16 NOTE — INTERDISCIPLINARY ROUNDS
IDR Team; MD, [facility], occupational therapy, Nursing Supervisor, and  Pharmacy, met to review patient's plan of care. Discussed goals, interventions, barriers and progress. Team will continue to monitor progress and report any concerns to the physician and care management as indicated. Transition of Care Plan:Per OT, patient's balance is way off and patient is not aware of it. She uses a walker. Therapy recommends SNF. Eligible midnights include 7/4, 7/5, 7/6. Eligible for skilled care 7/8 if medically stable. Had drop in o2 sats last night. CTA and xray ordered by ED physician last night. She received one time dose of lasix. Still on o2.
IDR Team; MD, Nursing, Care Manager, Physical therapy, Nursing Supervisor, Pharmacy and Dietician, met to review patient's plan of care. Discussed goals, interventions, barriers and progress. Team will continue to monitor progress and report any concerns to the physician and care management as indicated. Transition of Care Plan:  Per nurse, patient is still having symptoms of restless leg syndrome. Hasn't slept in 2 days. She is receiving rocephin for SIRS. Per PT/OT, her balance is much better. OT thinks she could use at least a week with skilled care.
99.2

## 2021-07-19 ENCOUNTER — TELEPHONE (OUTPATIENT)
Dept: NEUROLOGY | Age: 86
End: 2021-07-19

## 2021-07-19 NOTE — TELEPHONE ENCOUNTER
Patient's daughter calling to see if there is an update about getting her mom in on Wednesday, 7/21. Please call.

## 2021-07-20 NOTE — PROGRESS NOTES
Claudio Granados is a 80 y.o. female evaluated via telephone on 7/22/2021. Consent:  She and/or health care decision maker is aware that that she may receive a bill for this telephone service, depending on her insurance coverage, and has provided verbal consent to proceed: Yes    ASSESSMENT AND PLAN:    1. Delirium  She has cleared. 2. Diabetic peripheral neuropathy associated with type 2 diabetes mellitus ([address]  Discontinue Glimepiride--she may be hypoglycemic    3. RLS (restless legs syndrome)  Dramatic improvement with Pramipexole last night. Slept all night and awoke refreshed. No orders of the defined types were placed in this encounter. HPI  81 yo with recent hospitalization for confusion, suspected SIRS, ALFORD, and dehydration. Since returning home with her family, she demonstrated AMS and sundowning behaviors. For a detailed summary of her recent history, refer to the 7/08/21 clinic notes. She has gradually cleared since her 7/8/21 visit. CBD has helped. Seen by Dr Pricila Payne who started Pramipexole last night and she slept the entire night. She will occasionally have periods of sweating and mild distress. I affirm this is a Patient Initiated Episode with an Established Patient who has not had a related appointment within my department in the past 7 days or scheduled within the next 24 hours. On this date 07/22/2021 I have spent 30 minutes reviewing previous notes, test results and face to face with the patient discussing the diagnosis and importance of compliance with the treatment plan as well as documenting on the day of the visit.      Note: not billable if this call serves to triage the patient into an appointment for the relevant concern      Heather Ruggiero MD

## 2021-07-21 ENCOUNTER — TELEPHONE (OUTPATIENT)
Dept: NEUROLOGY | Age: 86
End: 2021-07-21

## 2021-07-21 ENCOUNTER — OFFICE VISIT (OUTPATIENT)
Dept: NEUROLOGY | Age: 86
End: 2021-07-21
Payer: MEDICARE

## 2021-07-21 VITALS
SYSTOLIC BLOOD PRESSURE: 124 MMHG | OXYGEN SATURATION: 95 % | DIASTOLIC BLOOD PRESSURE: 84 MMHG | RESPIRATION RATE: 18 BRPM | HEART RATE: 65 BPM

## 2021-07-21 DIAGNOSIS — R41.3 COMPLAINTS OF MEMORY DISTURBANCE: ICD-10-CM

## 2021-07-21 DIAGNOSIS — R26.81 GAIT INSTABILITY: ICD-10-CM

## 2021-07-21 DIAGNOSIS — G62.9 POLYNEUROPATHY: ICD-10-CM

## 2021-07-21 DIAGNOSIS — G25.81 RLS (RESTLESS LEGS SYNDROME): Primary | ICD-10-CM

## 2021-07-21 PROCEDURE — G9717 DOC PT DX DEP/BP F/U NT REQ: HCPCS | Performed by: PSYCHIATRY & NEUROLOGY

## 2021-07-21 PROCEDURE — G8536 NO DOC ELDER MAL SCRN: HCPCS | Performed by: PSYCHIATRY & NEUROLOGY

## 2021-07-21 PROCEDURE — G8419 CALC BMI OUT NRM PARAM NOF/U: HCPCS | Performed by: PSYCHIATRY & NEUROLOGY

## 2021-07-21 PROCEDURE — 99214 OFFICE O/P EST MOD 30 MIN: CPT | Performed by: PSYCHIATRY & NEUROLOGY

## 2021-07-21 PROCEDURE — 1111F DSCHRG MED/CURRENT MED MERGE: CPT | Performed by: PSYCHIATRY & NEUROLOGY

## 2021-07-21 PROCEDURE — G8427 DOCREV CUR MEDS BY ELIG CLIN: HCPCS | Performed by: PSYCHIATRY & NEUROLOGY

## 2021-07-21 PROCEDURE — 1090F PRES/ABSN URINE INCON ASSESS: CPT | Performed by: PSYCHIATRY & NEUROLOGY

## 2021-07-21 PROCEDURE — 1101F PT FALLS ASSESS-DOCD LE1/YR: CPT | Performed by: PSYCHIATRY & NEUROLOGY

## 2021-07-21 RX ORDER — PRAMIPEXOLE DIHYDROCHLORIDE 0.25 MG/1
0.25 TABLET ORAL
Qty: 30 TABLET | Refills: 2 | Status: SHIPPED | OUTPATIENT
Start: 2021-07-21 | End: 2021-10-20 | Stop reason: SDUPTHER

## 2021-07-21 NOTE — PROGRESS NOTES
Neurology Clinic Follow up Note    Patient ID:  Cuca Canchola  [id number]  41 y.o.  1935      Ms. Catalina Post is here for follow up today of  Chief Complaint   Patient presents with    Neurologic Problem          Last Appointment With Me:  6/24/2021     \"Odalis JIM Post is a 80 y.o.  female presenting for evaluation of atypical movements. These were initially noted by her physical therapist who is following her for chronic imbalance/falls. PT reported intermittent myoclonic jerking of the lower extremities b/l L>R with passive movements. Denies similar symptoms into the upper extremities. Family has also noted intermittent mouth movements and truncal swaying when patient is seated. She has a previous diagnosis of RLS on ropinirole over the past 15 years per pt/family. H/O depression/anxiety but no prior exposure to antipsychotics per pt/family. She describes a need to move the legs with LE paresthesias/numbness affecting sleep. Ropinirole does appear to be beneficial for RLS symptoms. She has tried gabapentin in the past but did not tolerate the medication. Other ROS: memory impairment-no signs of dementia on prior neuropsychologic testing per family, previously felt to be secondary to anxiety/depression. +Gait instability/falls. Denies tremors. \"    Interval History:   Patient weaned off of Requip and started Lyrica for her RLS symptoms. She noted some worsening imbalance/falls on medication and subsequently discontinued. Side effects did improve after discontinuation of medication, although she has noted some hot flashes possibly secondary to medication withdrawal.   She has since started a CBD patch with some improvement in LE movements/jerkings. She feels some of these symptoms are anxiety related. She endorses R>L flailing of the legs in the evenings. She reports intermittent numbness/tingling into the legs b/l. EMG is pending. PMHx/ PSHx/ FHx/ SHx:  Reviewed and unchanged previous visit. Past Medical History:   Diagnosis Date    Anxiety     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    bursitis    Depression     Diabetes (HCC)     GERD (gastroesophageal reflux disease)     H/O hand fracture     Hyperlipidemia     Menopause     Osteopenia     Recurrent UTI     RLS (restless legs syndrome)          ROS:  Comprehensive review of systems negative except for as noted above. Objective:       Meds:  Current Outpatient Medications   Medication Sig Dispense Refill    OTHER 50 mg. CBD patch apply every 8 hours daily.  OTHER [id number] Sleep patch QHS. Apply half patch at bed time.  glimepiride (AMARYL) 2 mg tablet Take 0.5 Tablets by mouth every morning. 90 Tablet 4    cyanocobalamin (Vitamin B-12) 1,000 mcg tablet Take 1,000 mcg by mouth daily.  sertraline (ZOLOFT) 100 mg tablet Take 1 Tab by mouth daily. 90 Tab 4    OTHER Indications: vitamin d spray      metFORMIN (GLUCOPHAGE) 500 mg tablet Take 1 Tab by mouth daily (with breakfast). 90 Tab 4    Blood-Glucose Meter misc Use to test glucose once daily, E 11.9   Relion meter please 1 Each 0    RELION PRIME TEST STRIPS strip USE 1 TEST STRIP EACH TIME TO TEST GLUCOSE 2 TIMES A  Strip 11       Exam:  Visit Vitals  /84   Pulse 65   Resp 18   SpO2 95%     NEUROLOGICAL EXAM:  General: Awake, alert, speech fluent  CN: PERRL, EOMI without nystagmus, VFF to confrontation, facial sensation and strength are normal and symmetric, hearing is intact to finger rub bilaterally, palate and tongue movements are intact and symmetric. Motor: Normal tone, bulk and strength bilaterally. Reflexes: Deferred  Coordination: FNF, LUBNA, HTS intact. Sensation: LT intact throughout.   Gait: Slightly unsteady    LABS  Component      Latest Ref Rng & Units 6/24/2021 6/24/2021 6/24/2021           2:06 PM  2:06 PM  2:06 PM   Protein, total      6.0 - 8.5 g/dL      Albumin      2.9 - 4.4 g/dL      Alpha-1-globulin      0.0 - 0.4 g/dL ALPHA-2 GLOBULIN      0.4 - 1.0 g/dL      Beta globulin      0.7 - 1.3 g/dL      Gamma globulin      0.4 - 1.8 g/dL      M-Guillermo      Not Observed g/dL      Globulin, total      2.2 - 3.9 g/dL      A/G ratio      0.7 - 1.7      Please note            METHYLMALONIC ACID, SERUM      0 - 378 nmol/L   172   Disclaimer         Comment   TSH      0.450 - 4.500 uIU/mL  2.480    Ferritin      15 - 150 ng/mL 84       Component      Latest Ref Rng & Units 6/24/2021           2:06 PM   Protein, total      6.0 - 8.5 g/dL 6.6   Albumin      2.9 - 4.4 g/dL 3.7   Alpha-1-globulin      0.0 - 0.4 g/dL 0.2   ALPHA-2 GLOBULIN      0.4 - 1.0 g/dL 0.9   Beta globulin      0.7 - 1.3 g/dL 1.1   Gamma globulin      0.4 - 1.8 g/dL 0.7   M-Guillermo      Not Observed g/dL Not Observed   Globulin, total      2.2 - 3.9 g/dL 2.9   A/G ratio      0.7 - 1.7 1.3   Please note       Comment   METHYLMALONIC ACID, SERUM      0 - 378 nmol/L    Disclaimer          TSH      0.450 - 4.500 uIU/mL    Ferritin      15 - 150 ng/mL      IMAGING:  MRI Results (most recent):  No results found for this or any previous visit. Assessment:     Encounter Diagnoses     ICD-10-CM ICD-9-CM   1. RLS (restless legs syndrome)  G25.81 333.94   2. Polyneuropathy  G62.9 356.9   3. Gait instability  R26.81 781.2   4. Complaints of memory disturbance  R41.3 66.93     80year old pleasant female referred for evaluation of gait instability/falls, lower extremity paresthesias, RLS, recently noted atypical movements, perceived cognitive deficits in the setting of comorbid anxiety/depression. 1. Imbalance/falls with lower extremity paresthesias-Initial examination c/w length-dependent polyneuropathy. Suspect related to DM. No evidence of contributing metabolic derangements on recent laboratory investigations. Electrodiagnostic testing is pending. 2. RLS-Weaned off of Requip secondary to associated dyskinesias.  Start Lyrica but did not tolerate this due to side effects. Also with h/o intolerance to gabapentin in the past. Will start pramipexole low dose and monitor for symptomatic improvement. Discussed potential for fatigue, impulsiveness on medication. 3. Cognitive impairment-previously evaluated with neuropsychologic testing showing no evidence of an evolving dementia process, likely exacerbated by underlying mood disorders. Will repeat testing to assess for interval change/progression. 4. Anxiety/depression-Psychiatry referral provided per patient/family request.     Plan:   · EMG PN protocol pending  · Start Pramipexole 0.25mg qhs for RLS  · Repeat Neuropsychologic testing pending  · Discussed fall precautions    Follow-up and Dispositions    · Return in about 8 weeks (around 9/15/2021). I have discussed the diagnosis with the patient today and the intended plan as seen in the above orders with both the patient as well as referring provider and/or PCP via electronic correspondence. The patient has received an after-visit summary and questions were answered concerning future plans. I have discussed medication side effects and warnings with the patient as well.       Signed:  Walter Puentes DO  7/21/2021

## 2021-07-21 NOTE — PATIENT INSTRUCTIONS
[facility]   Statement to Patients  April 1, 2014      In an effort to ensure the large volume of patient prescription refills is processed in the most efficient and expeditious manner, we are asking our patients to assist us by calling your Pharmacy for all prescription refills, this will include also your  Mail Order Pharmacy. The pharmacy will contact our office electronically to continue the refill process. Please do not wait until the last minute to call your pharmacy. We need at least 48 hours (2days) to fill prescriptions. We also encourage you to call your pharmacy before going to  your prescription to make sure it is ready. With regard to controlled substance prescription refill requests (narcotic refills) that need to be picked up at our office, we ask your cooperation by providing us with at least 72 hours (3days) notice that you will need a refill. We will not refill narcotic prescription refill requests after 4:00pm on any weekday, Monday through Thursday, or after 2:00pm on Fridays, or on the weekends. We encourage everyone to explore another way of getting your prescription refill request processed using [employer], our patient web portal through our electronic medical record system. [employer] is an efficient and effective way to communicate your medication request directly to the office and  downloadable as an abram on your smart phone . [employer] also features a review functionality that allows you to view your medication list as well as leave messages for your physician. Are you ready to get connected? If so please review the attatched instructions or speak to any of our staff to get you set up right away! Thank you so much for your cooperation. Should you have any questions please contact our Practice Administrator.     The Physicians and Staff,  [facility]

## 2021-07-22 ENCOUNTER — VIRTUAL VISIT (OUTPATIENT)
Dept: FAMILY MEDICINE CLINIC | Age: 86
End: 2021-07-22
Payer: MEDICARE

## 2021-07-22 DIAGNOSIS — G25.81 RLS (RESTLESS LEGS SYNDROME): ICD-10-CM

## 2021-07-22 DIAGNOSIS — E11.42 DIABETIC PERIPHERAL NEUROPATHY ASSOCIATED WITH TYPE 2 DIABETES MELLITUS (HCC): ICD-10-CM

## 2021-07-22 DIAGNOSIS — R41.0 DELIRIUM: Primary | ICD-10-CM

## 2021-07-22 PROCEDURE — G2025 DIS SITE TELE SVCS RHC/FQHC: HCPCS | Performed by: INTERNAL MEDICINE

## 2021-07-28 ENCOUNTER — HOSPITAL ENCOUNTER (OUTPATIENT)
Dept: MAMMOGRAPHY | Age: 86
Discharge: HOME | End: 2021-07-28
Attending: INTERNAL MEDICINE
Payer: MEDICARE

## 2021-07-28 DIAGNOSIS — Z12.31 VISIT FOR SCREENING MAMMOGRAM: ICD-10-CM

## 2021-07-28 PROCEDURE — 77063 BREAST TOMOSYNTHESIS BI: CPT

## 2021-08-03 ENCOUNTER — TELEPHONE (OUTPATIENT)
Dept: FAMILY MEDICINE CLINIC | Age: 86
End: 2021-08-03

## 2021-08-03 NOTE — TELEPHONE ENCOUNTER
----- Message from Gregoroi Chand sent at 8/3/2021  3:29 PM EDT -----  Regarding: Dr. Briones Situ: [phone]  General Message/Vendor Calls    Caller's first and last name:Misa Jiamintas      Reason for call:Please call back to get a verbal ok for three more skilled nurses and a discharge. Callback required yes/no and why:Yes, confirm.        Best contact number(s):[phone]      Details to clarify the request:n/a      Gregorio Chand

## 2021-08-06 ENCOUNTER — OFFICE VISIT (OUTPATIENT)
Dept: NEUROLOGY | Age: 86
End: 2021-08-06
Payer: MEDICARE

## 2021-08-06 DIAGNOSIS — G62.9 POLYNEUROPATHY: Primary | ICD-10-CM

## 2021-08-06 PROCEDURE — 95886 MUSC TEST DONE W/N TEST COMP: CPT | Performed by: PSYCHIATRY & NEUROLOGY

## 2021-08-06 PROCEDURE — 95910 NRV CNDJ TEST 7-8 STUDIES: CPT | Performed by: PSYCHIATRY & NEUROLOGY

## 2021-08-06 NOTE — PROGRESS NOTES
[address] [facility]  [address] [facility], [address] [place] [address]  Phone [phone] Fax [phone]  Test Date:  2021    Patient: Carmen Jennings : 1935 Physician: Rafael Silva. Harriet Kennedy DO   Sex: Female Height: 5' 1\" Ref Phys: Rafael Silva. Harriet NehaDO carlos   ID#: [id number] Weight: 138 lbs. Technician: Nelly Seth     Patient Complaints:  Lower extremity paresthesias; skin sensation disturbance    NCV & EMG Findings:  Evaluation of the left peroneal motor and the right peroneal motor nerves showed no response (Ankle) and no response (B Fib). The left tibial motor nerve showed reduced amplitude (1.7 mV) and decreased conduction velocity (Knee-Ankle, 31 m/s). The right tibial motor nerve showed reduced amplitude (1.4 mV). The left Sup Peroneal sensory and the right Sup Peroneal sensory nerves showed no response (14 cm) and no response (Site 2). The left sural sensory and the right sural sensory nerves showed no response (Calf) and no response (Site 2). All left vs. right side differences were within normal limits. Needle evaluation of the right extensor digitorum brevis muscle showed increased motor unit amplitude, increased motor unit duration, rapid recruitment, and very decreased interference pattern. The right abductor hallucis muscle showed increased motor unit amplitude, increased motor unit duration, slightly increased polyphasic potentials, rapid recruitment, and very decreased interference pattern. The right gastroc muscle showed increased motor unit amplitude and increased motor unit duration. All remaining muscles (as indicated in the following table) showed no evidence of electrical instability. Impression:  Extensive electrodiagnostic examination of the right lower extremity and additional nerve conduction studies of the left lower extremity reveals the followin.  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 2. No evidence of a right lumbosacral motor radiculopathy. ___________________________  Rahul Dukes, DO        Nerve Conduction Studies  Anti Sensory Summary Table     Stim Site NR Peak (ms) Norm Peak (ms) P-T Amp (µV) Norm P-T Amp Onset (ms) Site1 Site2 Delta-P (ms) Dist (cm) Fred (m/s) Norm Fred (m/s)   Left Sup Peroneal Anti Sensory (Ant Lat Mall)  30.6°C   14 cm NR  <4.4  >5.0  14 cm Ant Lat Mall  14.0  >32   Site 2 NR              Right Sup Peroneal Anti Sensory (Ant Lat Mall)  29.1°C   14 cm NR  <4.4  >5.0  14 cm Ant Lat Mall  14.0  >32   Site 2 NR              Left Sural Anti Sensory (Lat Mall)  31°C   Calf NR  <4.0  >5.0  Calf Lat Mall  14.0  >35   Site 2 NR              Right Sural Anti Sensory (Lat Mall)  29.7°C   Calf NR  <4.0  >5.0  Calf Lat Mall  14.0  >35   Site 2 NR                Motor Summary Table     Stim Site NR Onset (ms) Norm Onset (ms) O-P Amp (mV) Norm O-P Amp Site1 Site2 Delta-0 (ms) Dist (cm) Fred (m/s) Norm Fred (m/s)   Left Peroneal Motor (Ext Dig Brev)  30.8°C   Ankle NR  <6.1  >2.5 B Fib Ankle  26.0  >38   B Fib NR     Poplt B Fib  10.0  >40   Poplt    16.4  0.9          Right Peroneal Motor (Ext Dig Brev)  21.6°C   Ankle NR  <6.1  >2.5 B Fib Ankle  29.0  >38   B Fib NR     Poplt B Fib  10.0  >40   Poplt    16.1  1.1          Left Tibial Motor (Abd Lyles Brev)  31.1°C   Ankle    5.9 <6.1 1.7 >3.0 Knee Ankle 10.8 34.0 31 >35   Knee    16.7  1.4          Right Tibial Motor (Abd Lyles Brev)  25.6°C   Ankle    5.4 <6.1 1.4 >3.0 Knee Ankle 12.5 0.0  >35   Knee    17.9  0.6            EMG     Side Muscle Nerve Root Ins Act Fibs Psw Amp Dur Poly Recrt Int Tressia Manzanilla Comment   Right Ext Dig Brev Dp Br Peronel L5, S1 Nml Nml Nml Incr >12ms 0 Rapid 25%    Right AbdHallucis MedPlantar S1-2 Nml Nml Nml Incr >12ms 1+ Rapid 25%    Right PostTibialis Tibial L5, S1 Nml Nml Nml Nml Nml 0 Nml Nml    Right Gastroc Tibial S1-2 Nml Nml Nml Incr >12ms 0 Nml Nml    Right AntTibialis Dp Br Peronel L4-5 Nml Nml Nml Nml Nml 0 Nml Nml    Right RectFemoris Femoral L2-4 Nml Nml Nml Nml Nml 0 Nml Nml          Waveforms:

## 2021-08-16 ENCOUNTER — TELEPHONE (OUTPATIENT)
Dept: FAMILY MEDICINE CLINIC | Age: 86
End: 2021-08-16

## 2021-08-16 NOTE — TELEPHONE ENCOUNTER
----- Message from Roxanne Tolentino sent at 8/16/2021 11:26 AM EDT -----  Regarding: Dr. Yeimi Weiss: [phone]  General Message/Vendor Calls    Caller's first and last name:Pt      Reason for call:Pt would like a call back to discuss changing a medication, Zoloft       Callback required yes/no and why:Yes discuss.        Best contact number(s):[phone]      Details to clarify the request:n/a      Roxanne Tolentino

## 2021-08-16 NOTE — TELEPHONE ENCOUNTER
----- Message from Rosina Gutierrez sent at 8/16/2021  9:10 AM EDT -----  Regarding: Dr. Sabrina Carreno and Nurse Donald/TElephone  Contact: [phone]  General Message/Vendor Calls    Caller's first and last name: Angela Gregory, daughter. Reason for call:Pt would like a call back from Dr. Sabrina Carreno or nurse to discuss concerns of increasing dosage of the pt's medication. The pt will be calling later today to discuss changes with her anxiety and depression medication. Callback required yes/no and why:Yes, discuss.        Best contact number(s):[phone]      Details to clarify the request:n/a      Rosina Gutierrez

## 2021-08-24 ENCOUNTER — TELEPHONE (OUTPATIENT)
Dept: FAMILY MEDICINE CLINIC | Age: 86
End: 2021-08-24

## 2021-08-24 NOTE — TELEPHONE ENCOUNTER
----- Message from Boca will sent at 8/24/2021 10:50 AM EDT -----  Regarding: /Telephone     Caller's first and last name:MargyVanessa [facility]  Reason for call:Update on patient  Callback required yes/no and why:No  Best contact number(s):[phone]  Details to clarify the request:Margy states the pt last blood sugar reading was 418 , she has a dry cough and she also has a stye on her L eye.

## 2021-08-25 NOTE — PROGRESS NOTES
Ms. Luis Dawson is a 80 y.o. female who is here for evaluation of   Chief Complaint   Patient presents with    Diabetes     Log shows average 200+, received report of 418 readings on 8/24- Reports increased dark chocolate and nuts that night    Anxiety     Wants to increase anxiety medicaitons.  Eye Problem     C/O (B) stye    Insomnia     Reports poor sleeping habits - Decreased energy   . ASSESSMENT AND PLAN:    1. Diabetic peripheral neuropathy associated with type 2 diabetes mellitus ([address]  Due for labs    2. Reactive depression  May be able to reduce Sertaline. Awaiting results from neuro. 3. Essential hypertension  At goal    4. Hypokalemia  She has a known left adrenal nodule. Will check renin/bill ratio and K+. Orders Placed This Encounter    HEMOGLOBIN A1C WITH EAG     Standing Status:   Future     Standing Expiration Date:   0/03/9367    METABOLIC PANEL, BASIC     Standing Status:   Future     Standing Expiration Date:   8/31/2021    MICROALBUMIN, UR, RAND W/ MICROALB/CREAT RATIO     Standing Status:   Future     Standing Expiration Date:   8/31/2021    ALDOSTERONE/RENIN RATIO     Standing Status:   Future     Standing Expiration Date:   8/31/2021     DIABETES FOOT EXAM           HPI  81 yo  with diabetic peripheral neuropathy and recurrent LE cellulitis, recent hypokalemia (3.3) and HTN, arrives for interval assessment and to discuss her depression medications. Recent CTA in July 2021 noted a 19 mm lesion in the head of her pancreas and an 18 mm left adrenal nodule. ROS:  Denies  fever, chills, cough, chest pain, SOB,  nausea, vomiting, or diarrhea. Denies wt loss, wt gain, hemoptysis, hematochezia or melena.     Physical Examination:    Visit Vitals  /66 (BP 1 Location: Left upper arm, BP Patient Position: Sitting, BP Cuff Size: Adult long)   Pulse 86   Temp 97.5 °F (36.4 °C) (Temporal)   Resp 16   Ht 5' 1\" (1.549 m)   Wt 135 lb 6.4 oz (61.4 kg)   SpO2 95%   BMI 25.58 kg/m²      General:  Alert, cooperative, no distress. Head:  Normocephalic, without obvious abnormality, atraumatic. Eyes:  Conjunctivae:  Red on the left on the lower lid. Pupils equal, round, reactive to light. Extraocular movements intact. Lungs:   Clear to auscultation bilaterally. Chest wall:  No tenderness or deformity. Cardiac:  RRR 3/6 NADIR   Abdomen:   Soft, non-tender. Bowel sounds normal. No masses. No organomegaly. Extremities: Extremities normal, atraumatic, no cyanosis or edema. Pulses: 2+ and symmetric all extremities. Skin: Skin color, texture, turgor normal. No rashes or lesions. Lymph nodes: Cervical, supraclavicular, and axillary nodes normal.   Neurologic: CNII-XII intact. Normal strength, sensation, and reflexes throughout. On this date 08/26/2021 I have spent 30 minutes reviewing previous notes, test results and face to face with the patient discussing the diagnosis and importance of compliance with the treatment plan as well as documenting on the day of the visit.     Sulaiman Parekh MD FACP    (signed electronically) on 8/26/2021 at 10:34 AM

## 2021-08-26 ENCOUNTER — OFFICE VISIT (OUTPATIENT)
Dept: FAMILY MEDICINE CLINIC | Age: 86
End: 2021-08-26
Payer: MEDICARE

## 2021-08-26 VITALS
SYSTOLIC BLOOD PRESSURE: 110 MMHG | DIASTOLIC BLOOD PRESSURE: 66 MMHG | HEART RATE: 86 BPM | BODY MASS INDEX: 25.57 KG/M2 | RESPIRATION RATE: 16 BRPM | HEIGHT: 61 IN | OXYGEN SATURATION: 95 % | WEIGHT: 135.4 LBS | TEMPERATURE: 97.5 F

## 2021-08-26 DIAGNOSIS — I10 ESSENTIAL HYPERTENSION: ICD-10-CM

## 2021-08-26 DIAGNOSIS — H01.005 BLEPHARITIS OF LEFT LOWER EYELID, UNSPECIFIED TYPE: ICD-10-CM

## 2021-08-26 DIAGNOSIS — F32.9 REACTIVE DEPRESSION: ICD-10-CM

## 2021-08-26 DIAGNOSIS — E87.6 HYPOKALEMIA: ICD-10-CM

## 2021-08-26 DIAGNOSIS — E11.42 DIABETIC PERIPHERAL NEUROPATHY ASSOCIATED WITH TYPE 2 DIABETES MELLITUS (HCC): Primary | ICD-10-CM

## 2021-08-26 PROCEDURE — 99214 OFFICE O/P EST MOD 30 MIN: CPT | Performed by: INTERNAL MEDICINE

## 2021-08-26 RX ORDER — ERYTHROMYCIN 5 MG/G
0.1 OINTMENT OPHTHALMIC EVERY 6 HOURS
Qty: 4 TUBE | Refills: 0 | Status: SHIPPED | OUTPATIENT
Start: 2021-08-26 | End: 2021-09-05

## 2021-08-26 NOTE — PROGRESS NOTES
Humza Carson is a 80 y.o. female presenting for/with:    Chief Complaint   Patient presents with    Diabetes     Log shows average 200+, received report of 418 readings on 8/24- Reports increased dark chocolate and nuts that night    Anxiety     Wants to increase anxiety medicaitons.  Eye Problem     C/O (B) stye    Insomnia     Reports poor sleeping habits - Decreased energy       Visit Vitals  /66 (BP 1 Location: Left upper arm, BP Patient Position: Sitting, BP Cuff Size: Adult long)   Pulse 86   Temp 97.5 °F (36.4 °C) (Temporal)   Resp 16   Ht 5' 1\" (1.549 m)   Wt 135 lb 6.4 oz (61.4 kg)   SpO2 95%   BMI 25.58 kg/m²     Pain Scale: 0 - No pain/10  Pain Location:     1. Have you been to the ER, urgent care clinic since your last visit? Hospitalized since your last visit? NO    2. Have you seen or consulted any other health care providers outside of the [address] [place] [address] since your last visit? Include any pap smears or colon screening.  NO    Symptom review:  NO  Fever   NO  Shaking chills  NO  Cough  NO  Body aches  NO  Coughing up blood  NO  Chest congestion  NO  Chest pain  NO  Shortness of breath  NO  Profound Loss of smell/taste  NO  Nausea/Vomiting   NO  Loose stool/Diarrhea  NO  any skin issues    Patient Risk Factors Reviewed as follows:  NO  have you been in Close contact with confirmed COVID19 patient   NO  History of recent travel to affected geographical areas within the past 14 days  NO  COPD  NO  Active Cancer/Leukemia/Lymphoma/Chemotherapy  NO  Oral steroid use  NO  Pregnant  YES  Diabetes Mellitus  YES  Heart disease  NO  Asthma  NO Health care worker at home  [address] care worker  NO Is there a Pregnant Woman in the home  NO Dialysis pt in the home   NO a large number of people living in the home    Learning Assessment 5/3/2021   PRIMARY LEARNER Patient   PRIMARY LANGUAGE ENGLISH   LEARNER PREFERENCE PRIMARY READING     -   ANSWERED BY patient   RELATIONSHIP SELF Fall Risk Assessment, last 12 mths 5/3/2021   Able to walk? Yes   Fall in past 12 months? 1   Do you feel unsteady? 0   Are you worried about falling 0   Is TUG test greater than 12 seconds? 0   Is the gait abnormal? 0   Number of falls in past 12 months 2   Fall with injury? 1       3 most recent PHQ Screens 8/26/2021   PHQ Not Done -   Little interest or pleasure in doing things More than half the days   Feeling down, depressed, irritable, or hopeless More than half the days   Total Score PHQ 2 4   Trouble falling or staying asleep, or sleeping too much Nearly every day   Feeling tired or having little energy Nearly every day   Poor appetite, weight loss, or overeating More than half the days   Feeling bad about yourself - or that you are a failure or have let yourself or your family down Not at all   Trouble concentrating on things such as school, work, reading, or watching TV Several days   Moving or speaking so slowly that other people could have noticed; or the opposite being so fidgety that others notice Several days     Abuse Screening Questionnaire 5/3/2021   Do you ever feel afraid of your partner? N   Are you in a relationship with someone who physically or mentally threatens you? N   Is it safe for you to go home?  Y       ADL Assessment 5/3/2021   Feeding yourself No Help Needed   Getting from bed to chair No Help Needed   Getting dressed No Help Needed   Bathing or showering No Help Needed   Walk across the room (includes cane/walker) No Help Needed   Using the telphone No Help Needed   Taking your medications No Help Needed   Preparing meals No Help Needed   Managing money (expenses/bills) No Help Needed   Moderately strenuous housework (laundry) No Help Needed   Shopping for personal items (toiletries/medicines) No Help Needed   Shopping for groceries No Help Needed   Driving No Help Needed   Climbing a flight of stairs No Help Needed   Getting to places beyond walking distances No Help Needed

## 2021-08-27 LAB
ANION GAP SERPL CALC-SCNC: 5 MMOL/L (ref 5–15)
BUN SERPL-MCNC: 24 MG/DL (ref 6–20)
BUN/CREAT SERPL: 32 (ref 12–20)
CALCIUM SERPL-MCNC: 9 MG/DL (ref 8.5–10.1)
CHLORIDE SERPL-SCNC: 107 MMOL/L (ref 97–108)
CO2 SERPL-SCNC: 28 MMOL/L (ref 21–32)
CREAT SERPL-MCNC: 0.74 MG/DL (ref 0.55–1.02)
CREAT UR-MCNC: 86.9 MG/DL
EST. AVERAGE GLUCOSE BLD GHB EST-MCNC: 194 MG/DL
GLUCOSE SERPL-MCNC: 292 MG/DL (ref 65–100)
HBA1C MFR BLD: 8.4 % (ref 4–5.6)
MICROALBUMIN UR-MCNC: 1.54 MG/DL
MICROALBUMIN/CREAT UR-RTO: 18 MG/G (ref 0–30)
POTASSIUM SERPL-SCNC: 4.7 MMOL/L (ref 3.5–5.1)
SODIUM SERPL-SCNC: 140 MMOL/L (ref 136–145)

## 2021-09-01 LAB
ALDOST SERPL-MCNC: 7.1 NG/DL (ref 0–30)
ALDOST/RENIN PLAS-RTO: 3.6 {RATIO} (ref 0–30)
RENIN PLAS-CCNC: 1.97 NG/ML/HR (ref 0.17–5.38)

## 2021-09-13 ENCOUNTER — TELEPHONE (OUTPATIENT)
Dept: FAMILY MEDICINE CLINIC | Age: 86
End: 2021-09-13

## 2021-09-13 ENCOUNTER — HOSPITAL ENCOUNTER (OUTPATIENT)
Dept: LAB | Age: 86
Discharge: HOME | End: 2021-09-13

## 2021-09-13 ENCOUNTER — OFFICE VISIT (OUTPATIENT)
Dept: FAMILY MEDICINE CLINIC | Age: 86
End: 2021-09-13
Payer: MEDICARE

## 2021-09-13 VITALS
HEIGHT: 61 IN | TEMPERATURE: 97.9 F | BODY MASS INDEX: 24.92 KG/M2 | DIASTOLIC BLOOD PRESSURE: 68 MMHG | WEIGHT: 132 LBS | SYSTOLIC BLOOD PRESSURE: 106 MMHG | HEART RATE: 71 BPM

## 2021-09-13 DIAGNOSIS — Z23 ENCOUNTER FOR IMMUNIZATION: ICD-10-CM

## 2021-09-13 DIAGNOSIS — D48.5 NEOPLASM OF UNCERTAIN BEHAVIOR OF SKIN: Primary | ICD-10-CM

## 2021-09-13 PROCEDURE — 90694 VACC AIIV4 NO PRSRV 0.5ML IM: CPT | Performed by: FAMILY MEDICINE

## 2021-09-13 PROCEDURE — 11301 SHAVE SKIN LESION 0.6-1.0 CM: CPT | Performed by: FAMILY MEDICINE

## 2021-09-13 PROCEDURE — G0008 ADMIN INFLUENZA VIRUS VAC: HCPCS | Performed by: FAMILY MEDICINE

## 2021-09-13 NOTE — TELEPHONE ENCOUNTER
----- Message from Rachel Hollins sent at 9/13/2021 12:07 PM EDT -----  Regarding: Dr. Edil Ritchie: [phone]  General Message/Vendor Calls    Caller's first and last name:Pt      Reason for call:Pt would like her after visit summary to be updated to the pt's right arm.        Callback required yes/no and why:no      Best contact number(s):[phone]      Details to clarify the request:n/a      Rachel Hollins

## 2021-09-13 NOTE — PATIENT INSTRUCTIONS
Cuts: Care Instructions  Your Care Instructions  A cut can happen anywhere on your body. Stitches, staples, skin adhesives, or pieces of tape called Steri-Strips are sometimes used to keep the edges of a cut together and help it heal. Steri-Strips can be used by themselves or with stitches or staples. Sometimes cuts are left open. If the cut went deep and through the skin, the doctor may have closed the cut in two layers. A deeper layer of stitches brings the deep part of the cut together. These stitches will dissolve and don't need to be removed. The upper layer closure, which could be stitches, staples, Steri-Strips, or adhesive, is what you see on the cut. A cut is often covered by a bandage. The doctor has checked you carefully, but problems can develop later. If you notice any problems or new symptoms, get medical treatment right away. Follow-up care is a key part of your treatment and safety. Be sure to make and go to all appointments, and call your doctor if you are having problems. It's also a good idea to know your test results and keep a list of the medicines you take. How can you care for yourself at home? If a cut is open or closed  · Prop up the sore area on a pillow anytime you sit or lie down during the next 3 days. Try to keep it above the level of your heart. This will help reduce swelling. · Keep the cut dry for the first 24 to 48 hours. After this, you can shower if your doctor okays it. Pat the cut dry. · Don't soak the cut, such as in a bathtub. Your doctor will tell you when it's safe to get the cut wet. · After the first 24 to 48 hours, clean the cut with soap and water 2 times a day unless your doctor gives you different instructions. ? Don't use hydrogen peroxide or alcohol, which can slow healing. ? You may cover the cut with a thin layer of petroleum jelly and a nonstick bandage.   ? If the doctor put a bandage over the cut, put on a new bandage after cleaning the cut or if the bandage gets wet or dirty. · Avoid any activity that could cause your cut to reopen. · Be safe with medicines. Read and follow all instructions on the label. ? If the doctor gave you a prescription medicine for pain, take it as prescribed. ? If you are not taking a prescription pain medicine, ask your doctor if you can take an over-the-counter medicine. If the cut is closed with stitches, staples, or Steri-Strips  · Follow the above instructions for open or closed cuts. · Do not remove the stitches or staples on your own. Your doctor will tell you when to come back to have the stitches or staples removed. · Leave Steri-Strips on until they fall off. If the cut is closed with a skin adhesive  · Follow the above instructions for open or closed cuts. · Leave the skin adhesive on your skin until it falls off on its own. This may take 5 to 10 days. · Do not scratch, rub, or pick at the adhesive. · Do not put the sticky part of a bandage directly on the adhesive. · Do not put any kind of ointment, cream, or lotion over the area. This can make the adhesive fall off too soon. Do not use hydrogen peroxide or alcohol, which can slow healing. When should you call for help? Call your doctor now or seek immediate medical care if:    · You have new pain, or your pain gets worse.     · The skin near the cut is cold or pale or changes color.     · You have tingling, weakness, or numbness near the cut.     · The cut starts to bleed, and blood soaks through the bandage. Oozing small amounts of blood is normal.     · You have trouble moving the area near the cut.     · You have symptoms of infection, such as:  ? Increased pain, swelling, warmth, or redness around the cut.  ? Red streaks leading from the cut.  ? Pus draining from the cut.  ? A fever. Watch closely for changes in your health, and be sure to contact your doctor if:    · The cut reopens.     · You do not get better as expected.    Where can you learn more?  Go to http://www.gray.com/  Enter M735 in the search box to learn more about \"Cuts: Care Instructions. \"  Current as of: February 26, 2020               Content Version: 12.8  © 2006-2021 Healthwise, [facility]. Care instructions adapted under license by [employer] (which disclaims liability or warranty for this information). If you have questions about a medical condition or this instruction, always ask your healthcare professional. [address] any warranty or liability for your use of this information.

## 2021-09-13 NOTE — PROGRESS NOTES
Juma Holguin is a 80 y.o. female    HPI:  Symptoms include volcano lesion R lateral elbow x1mo, rapidly enlarging, occ painful. Evaluation to date: seen previously and thought to have SCC. Here for removal.              Physical Examination: General appearance - alert, well appearing, and in no distress  Mental status - alert, oriented to person, place, and time  Eyes - pupils equal and reactive, extraocular eye movements intact  ENT - bilateral external ears and nose normal. Normal lips  Extremities - peripheral pulses normal, no pedal edema, no clubbing or cyanosis. R arm with 10mm erythematous hyperkeratotic papule with central keratin plug. A/P:  \"volcano lesion\" R lateral arm  Discussed options. 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 Pt elected shave bx today. Chart reviewed for the following:   Gregg Zamora MD, have reviewed the History, Physical and updated the Allergic reactions for Odalis JIM Carneal     TIME OUT performed immediately prior to start of procedure:   IMargarita MD, have performed the following reviews on Juma Holguin prior to the start of the procedure:            * Patient was identified by name and date of birth   * Agreement on procedure being performed was verified  * Risks and Benefits explained to the patient  * Procedure site verified and marked as necessary  * Patient was positioned for comfort  * Consent was verified  * Pain level 0 pre-procedure. 11:45 AM    Procedure: Shave biopsy. Consent: given. Details: Clean technique. LA with 1% lidocaine with epi 1ml with good LA and bernice. Lesion shaved with slight scoop and placed in formalin for lab analysis. Hemostasis with drysol and direct pressure. Complications: none. EBL: none. Disp: A+O, ambulatory without difficulty. Infection precautions given. Pain level in goal control. Further mgmt per path, if POS margins, plan local excision.

## 2021-09-13 NOTE — PROGRESS NOTES
.After obtaining consent, and per orders of Dr. Olya Jacobson, injection of Fluad given by Choco Celeste LPN. Patient instructed to remain in clinic for 20 minutes afterwards, and to report any adverse reaction to me immediately.

## 2021-09-13 NOTE — PROGRESS NOTES
Chief Complaint   Patient presents with    Blood sugar problem     +     1. Have you been to the ER, urgent care clinic since your last visit? Hospitalized since your last visit? No    2. Have you seen or consulted any other health care providers outside of the [facility] since your last visit? Include any pap smears or colon screening. No    Identified pt with two pt identifiers(name and ). Reviewed record in preparation for visit and have obtained necessary documentation.     Symptom review: Covis    NO  Fever   NO  Shaking chills  NO  Cough  NO Headaches  NO  Body aches  NO  Coughing up blood  NO  Chest congestion  NO  Chest pain  NO  Shortness of breath  NO  Profound Loss of smell/taste  NO  Nausea/Vomiting   NO  Loose stool/Diarrhea  NO  any skin issues Patient calling for cipro, flagyl and something for diarrhea for diverticulitis flare up sent to his pharm. He has a follow up appointment Monday 10/1/18.

## 2021-09-15 NOTE — PROGRESS NOTES
Mild skin cancer seen. PT needs a 40min surgical slot with me for excision with margin in the next couple weeks. Please arrange.

## 2021-09-21 ENCOUNTER — TELEPHONE (OUTPATIENT)
Dept: FAMILY MEDICINE CLINIC | Age: 86
End: 2021-09-21

## 2021-09-21 NOTE — TELEPHONE ENCOUNTER
Angeline Najma (daughter) would like mother to go to hospital to have scrape & burn on spot. She stated that you told them that she could have to more aggressive treatment. Please advise on what to do. Darina Phoenix phone # [phone]

## 2021-09-22 ENCOUNTER — TELEPHONE (OUTPATIENT)
Dept: FAMILY MEDICINE CLINIC | Age: 86
End: 2021-09-22

## 2021-09-22 DIAGNOSIS — C44.92 SQUAMOUS CELL CARCINOMA OF SKIN: Primary | ICD-10-CM

## 2021-09-24 NOTE — TELEPHONE ENCOUNTER
Patient would like to have procedure done at another office that can perform a more aggressive form of treatments as you explained.

## 2021-10-01 NOTE — TELEPHONE ENCOUNTER
----- Message from Wilnette Riedel sent at 10/1/2021  1:13 PM EDT -----  Regarding: Dr. Sydney Bagley (if not patient):      Relationship of caller (if not patient): Jeremy Hardy (Daughter)      Best contact number(s):[phone]      Name of medication and dosage if known: pramipexole (MIRAPEX) 0.25 mg tablet [[id number]]    Order Details      Is patient out of this medication (yes/no):  Not yet       Pharmacy name: [address] [place] [address] listed in chart? (yes/no): Yes   Pharmacy phone number: Phone:  [phone]  Fax:  [phone]       Details to clarify the request: Refill needed       Wilnette Riedel

## 2021-10-01 NOTE — TELEPHONE ENCOUNTER
----- Message from Madeline Golden sent at 10/1/2021  1:16 PM EDT -----  Regarding: Dr. Caro Szymanski  General Message/Vendor Calls    Caller's first and last name: Daughter      Reason for call: Schedule conflict      Callback required yes/no and why: Yes schedule conflict      Best contact number(s): [phone]      Details to clarify the request: The patients daughter said that the patient has testing on Oct 7, 2021 and a appointment with Dr. Marisol Li on Oct 6, 2021. Would it be better to have the follow up after the testing? They live 2 hours away also and don't want to travel 2 hours back to back since her mom is older.        Madeline Golden

## 2021-10-04 ENCOUNTER — HOSPITAL ENCOUNTER (OUTPATIENT)
Dept: LAB | Age: 86
Discharge: HOME | End: 2021-10-04

## 2021-10-04 ENCOUNTER — OFFICE VISIT (OUTPATIENT)
Dept: FAMILY MEDICINE CLINIC | Age: 86
End: 2021-10-04
Payer: MEDICARE

## 2021-10-04 VITALS
HEIGHT: 61 IN | DIASTOLIC BLOOD PRESSURE: 78 MMHG | OXYGEN SATURATION: 96 % | WEIGHT: 133.8 LBS | HEART RATE: 78 BPM | RESPIRATION RATE: 18 BRPM | SYSTOLIC BLOOD PRESSURE: 122 MMHG | BODY MASS INDEX: 25.26 KG/M2 | TEMPERATURE: 97.9 F

## 2021-10-04 DIAGNOSIS — C44.622 SQUAMOUS CELL CANCER OF SKIN OF FOREARM, RIGHT: Primary | ICD-10-CM

## 2021-10-04 PROCEDURE — 11603 EXC TR-EXT MAL+MARG 2.1-3 CM: CPT | Performed by: FAMILY MEDICINE

## 2021-10-04 NOTE — PATIENT INSTRUCTIONS
Cuts Closed With Stitches: Care Instructions  Your Care Instructions  A cut can happen anywhere on your body. The doctor used stitches to close the cut. Using stitches also helps the cut heal and reduces scarring. Sometimes pieces of tape called Steri-Strips are put over the stitches. If the cut went deep and through the skin, the doctor may have put in two layers of stitches. The deeper layer brings the deep part of the cut together. These stitches will dissolve and don't need to be removed. The stitches in the upper layer are the ones you see on the cut. You will probably have a bandage over the stitches. You will need to have the stitches removed, usually in 7 to 14 days. The doctor has checked you carefully, but problems can develop later. If you notice any problems or new symptoms, get medical treatment right away. Follow-up care is a key part of your treatment and safety. Be sure to make and go to all appointments, and call your doctor if you are having problems. It's also a good idea to know your test results and keep a list of the medicines you take. How can you care for yourself at home? · Keep the cut dry for the first 24 to 48 hours. After this, you can shower if your doctor okays it. Pat the cut dry. · Don't soak the cut, such as in a bathtub. Your doctor will tell you when it's safe to get the cut wet. · If your doctor told you how to care for your cut, follow your doctor's instructions. If you did not get instructions, follow this general advice:  ? After the first 24 to 48 hours, wash around the cut with clean water 2 times a day. Don't use hydrogen peroxide or alcohol, which can slow healing. ? You may cover the cut with a thin layer of petroleum jelly, such as Vaseline, and a nonstick bandage. ? Apply more petroleum jelly and replace the bandage as needed. · Prop up the sore area on a pillow anytime you sit or lie down during the next 3 days.  Try to keep it above the level of your heart. This will help reduce swelling. · Avoid any activity that could cause your cut to reopen. · Do not remove the stitches on your own. Your doctor will tell you when to come back to have the stitches removed. · Leave Steri-Strips on until they fall off. · Be safe with medicines. Read and follow all instructions on the label. ? If the doctor gave you a prescription medicine for pain, take it as prescribed. ? If you are not taking a prescription pain medicine, ask your doctor if you can take an over-the-counter medicine. When should you call for help? Call your doctor now or seek immediate medical care if:    · You have new pain, or your pain gets worse.     · The skin near the cut is cold or pale or changes color.     · You have tingling, weakness, or numbness near the cut.     · The cut starts to bleed, and blood soaks through the bandage. Oozing small amounts of blood is normal.     · You have trouble moving the area near the cut.     · You have symptoms of infection, such as:  ? Increased pain, swelling, warmth, or redness around the cut.  ? Red streaks leading from the cut.  ? Pus draining from the cut.  ? A fever. Watch closely for changes in your health, and be sure to contact your doctor if:    · The cut reopens.     · You do not get better as expected. Where can you learn more? Go to http://www.gray.com/  Enter R217 in the search box to learn more about \"Cuts Closed With Stitches: Care Instructions. \"  Current as of: July 1, 2021               Content Version: 13.0  © 0101-2333 [employer]. Care instructions adapted under license by [employer] (which disclaims liability or warranty for this information). If you have questions about a medical condition or this instruction, always ask your healthcare professional. [address] any warranty or liability for your use of this information.

## 2021-10-04 NOTE — PROGRESS NOTES
Chief Complaint   Patient presents with    Other     Cancer removal     Pt here for excision of SCC R elbow. Chart reviewed for the following:   Randy Batista MD, have reviewed the History, Physical and updated the Allergic reactions for Odalis Pineda     TIME OUT performed immediately prior to start of procedure:   ILissa MD, have performed the following reviews on [address] prior to the start of the procedure:            * Patient was identified by name and date of birth   * Agreement on procedure being performed was verified  * Risks and Benefits explained to the patient  * Procedure site verified and marked as necessary  * Patient was positioned for comfort  * Consent was verified  * Pain level 0 pre-procedure. 12:54 PM      Procedure: Excision Squamous cell ca. Consent: signed, on chart. Sterile prep and technique, cap, gloves. Local anesthesia with 1ml of 2% lidocaine with epi with good anesthesia. 2cm specimen removed from area of the lesion, getting a full 3mm margin from all apparent tumor. Full thickness specimen removed, placed in formalin and sent to pathology for review. TAgged at the proximal margin. Closed with 4-0 nylon running stitch with good approximation, eversion, and hemostatis. Disposition: a+o, ambulatory. Pt tolerated well. EBL: 2ml. Complications: none. Infection precautions given. Pain level was in goal control. 1. Have you been to the ER, urgent care clinic since your last visit? Hospitalized since your last visit? No    2. Have you seen or consulted any other health care providers outside of the [address] [place] [address] since your last visit? Include any pap smears or colon screening. No    Identified pt with two pt identifiers(name and ). Reviewed record in preparation for visit and have obtained necessary documentation.     Symptom review:    NO  Fever   NO  Shaking chills  NO  Cough  NO Headaches  NO  Body aches  NO  Coughing up blood  NO  Chest congestion  NO  Chest pain  NO  Shortness of breath  NO  Profound Loss of smell/taste  NO  Nausea/Vomiting   NO  Loose stool/Diarrhea  NO  any skin issues

## 2021-10-05 ENCOUNTER — TELEPHONE (OUTPATIENT)
Dept: NEUROLOGY | Age: 86
End: 2021-10-05

## 2021-10-05 NOTE — TELEPHONE ENCOUNTER
Left message for daughter, appointment should be 1040am not 1050am on Thursday. If she needs anything further to call back.

## 2021-10-06 ENCOUNTER — TELEPHONE (OUTPATIENT)
Dept: FAMILY MEDICINE CLINIC | Age: 86
End: 2021-10-06

## 2021-10-06 ENCOUNTER — TELEPHONE (OUTPATIENT)
Dept: NEUROLOGY | Age: 86
End: 2021-10-06

## 2021-10-07 ENCOUNTER — TELEPHONE (OUTPATIENT)
Dept: NEUROLOGY | Age: 86
End: 2021-10-07

## 2021-10-07 RX ORDER — PRAMIPEXOLE DIHYDROCHLORIDE 0.25 MG/1
0.25 TABLET ORAL
Qty: 30 TABLET | Refills: 0 | OUTPATIENT
Start: 2021-10-07

## 2021-10-07 NOTE — TELEPHONE ENCOUNTER
Spoke with Marsha Nunn, informed her I would place patient on the cancellation list and call in a refill for patient's pramipexole.

## 2021-10-07 NOTE — TELEPHONE ENCOUNTER
----- Message from Benoit Elizalde sent at 10/7/2021 11:23 AM EDT -----  Regarding: / telephone  Medication Refill    Caller (if not patient): nadia nirmal       Relationship of caller (if not patient): daughter      Best contact number(s): [phone]      Name of medication and dosage if known: pramipexole . 25 mg      Is patient out of this medication (yes/no): yes      Pharmacy name: [address] [place] [address] listed in chart? (yes/no): yes  Pharmacy phone [phone]      Details to clarify the request: Pt can not wait any longer and will need a refill today . Pt daughter has been sending messages for a week regarding refilling the medication and has not hear anything back. Pt appt was cancelled for 10/07/2021  by the office and has not been called to reschedule from [facility] and will need an appt.       Benoit Elizalde

## 2021-10-18 ENCOUNTER — OFFICE VISIT (OUTPATIENT)
Dept: FAMILY MEDICINE CLINIC | Age: 86
End: 2021-10-18

## 2021-10-18 ENCOUNTER — PATIENT MESSAGE (OUTPATIENT)
Dept: FAMILY MEDICINE CLINIC | Age: 86
End: 2021-10-18

## 2021-10-18 VITALS
SYSTOLIC BLOOD PRESSURE: 122 MMHG | HEIGHT: 61 IN | HEART RATE: 85 BPM | WEIGHT: 132.8 LBS | RESPIRATION RATE: 16 BRPM | BODY MASS INDEX: 25.07 KG/M2 | OXYGEN SATURATION: 98 % | TEMPERATURE: 98.3 F | DIASTOLIC BLOOD PRESSURE: 78 MMHG

## 2021-10-18 DIAGNOSIS — E11.9 TYPE 2 DIABETES MELLITUS WITHOUT COMPLICATION, WITHOUT LONG-TERM CURRENT USE OF INSULIN (HCC): ICD-10-CM

## 2021-10-18 DIAGNOSIS — L85.8 KERATOACANTHOMA OF FOREARM: Primary | ICD-10-CM

## 2021-10-18 DIAGNOSIS — Z48.02 VISIT FOR SUTURE REMOVAL: ICD-10-CM

## 2021-10-18 DIAGNOSIS — H91.90 HEARING LOSS, UNSPECIFIED HEARING LOSS TYPE, UNSPECIFIED LATERALITY: Primary | ICD-10-CM

## 2021-10-18 RX ORDER — METFORMIN HYDROCHLORIDE 500 MG/1
500 TABLET ORAL 2 TIMES DAILY WITH MEALS
Qty: 90 TABLET | Refills: 0
Start: 2021-10-18 | End: 2021-11-29 | Stop reason: SDUPTHER

## 2021-10-18 NOTE — PROGRESS NOTES
Chief Complaint   Patient presents with    Suture Removal     Right Arm    Diabetes     Sugars running over 300 the last couple of days     Pt here for suture removal from her keratoacanthoma removal 2 wk ago. Brief Exam: Wound well approximated. Visit Vitals  /78   Pulse 85   Temp 98.3 °F (36.8 °C) (Oral)   Resp 16   Ht 5' 1\" (1.549 m)   Wt 132 lb 12.8 oz (60.2 kg)   SpO2 98%   BMI 25.09 kg/m²     Wt Readings from Last 3 Encounters:   10/18/21 132 lb 12.8 oz (60.2 kg)   10/04/21 133 lb 12.8 oz (60.7 kg)   21 132 lb (59.9 kg)       A/P:  SCC/Keratoacanthoma  Clear margins. Sutures removed. Steri-strips applied. DM2  With hyperglycemia. GFR is good. Boost metformin to 1 tab BID. See DR. Lawson for followup in next 2-3 weeks. F/U PRN    1. Have you been to the ER, urgent care clinic since your last visit? Hospitalized since your last visit? No    2. Have you seen or consulted any other health care providers outside of the [address] [place] [address] since your last visit? Include any pap smears or colon screening. No    Identified pt with two pt identifiers(name and ). Reviewed record in preparation for visit and have obtained necessary documentation.     Symptom review:    NO  Fever   NO  Shaking chills  NO  Cough  NO Headaches  NO  Body aches  NO  Coughing up blood  NO  Chest congestion  NO  Chest pain  NO  Shortness of breath  NO  Profound Loss of smell/taste  NO  Nausea/Vomiting   NO  Loose stool/Diarrhea  NO  any skin issues

## 2021-10-18 NOTE — Clinical Note
Pt came by for suture removal visit and mentioned a cognitive testing appt in OCT with DR. Moses Diaz . I didn't see that in the chart, could you please CC that to me or Dr. Jennie Das (PCP)? Thanks.  Frank

## 2021-10-18 NOTE — TELEPHONE ENCOUNTER
From: Tabitha Triana  To: Camelia Martinez MD  Sent: 10/18/2021 4:18 PM EDT  Subject: Referral Tyron Brown. Moms hearing is declining. I have an appointment for her on November 7th for testing. They need a script from you, if you would be so kind as to fax something over. Thanks!! [facility] - [address] [place] [address]. Have a great week.  Marisol Velasquez

## 2021-10-20 ENCOUNTER — OFFICE VISIT (OUTPATIENT)
Dept: NEUROLOGY | Age: 86
End: 2021-10-20
Payer: MEDICARE

## 2021-10-20 VITALS
SYSTOLIC BLOOD PRESSURE: 120 MMHG | HEART RATE: 87 BPM | WEIGHT: 132.4 LBS | HEIGHT: 61 IN | BODY MASS INDEX: 25 KG/M2 | OXYGEN SATURATION: 98 % | DIASTOLIC BLOOD PRESSURE: 70 MMHG

## 2021-10-20 DIAGNOSIS — G25.81 RLS (RESTLESS LEGS SYNDROME): ICD-10-CM

## 2021-10-20 DIAGNOSIS — G31.84 MILD COGNITIVE IMPAIRMENT: ICD-10-CM

## 2021-10-20 DIAGNOSIS — E11.42 DIABETIC PERIPHERAL NEUROPATHY ASSOCIATED WITH TYPE 2 DIABETES MELLITUS (HCC): Primary | ICD-10-CM

## 2021-10-20 PROCEDURE — G8419 CALC BMI OUT NRM PARAM NOF/U: HCPCS | Performed by: PSYCHIATRY & NEUROLOGY

## 2021-10-20 PROCEDURE — 1101F PT FALLS ASSESS-DOCD LE1/YR: CPT | Performed by: PSYCHIATRY & NEUROLOGY

## 2021-10-20 PROCEDURE — G8536 NO DOC ELDER MAL SCRN: HCPCS | Performed by: PSYCHIATRY & NEUROLOGY

## 2021-10-20 PROCEDURE — 3052F HG A1C>EQUAL 8.0%<EQUAL 9.0%: CPT | Performed by: PSYCHIATRY & NEUROLOGY

## 2021-10-20 PROCEDURE — 99215 OFFICE O/P EST HI 40 MIN: CPT | Performed by: PSYCHIATRY & NEUROLOGY

## 2021-10-20 PROCEDURE — 1090F PRES/ABSN URINE INCON ASSESS: CPT | Performed by: PSYCHIATRY & NEUROLOGY

## 2021-10-20 PROCEDURE — G9717 DOC PT DX DEP/BP F/U NT REQ: HCPCS | Performed by: PSYCHIATRY & NEUROLOGY

## 2021-10-20 PROCEDURE — G8427 DOCREV CUR MEDS BY ELIG CLIN: HCPCS | Performed by: PSYCHIATRY & NEUROLOGY

## 2021-10-20 RX ORDER — PRAMIPEXOLE DIHYDROCHLORIDE 0.25 MG/1
TABLET ORAL
Qty: 90 TABLET | Refills: 5 | Status: SHIPPED | OUTPATIENT
Start: 2021-10-20 | End: 2022-04-22 | Stop reason: SDUPTHER

## 2021-10-20 NOTE — PROGRESS NOTES
Neurology Clinic Follow up Note    Patient ID:  Unique Turner  [id number]  47 y.o.  1935      Ms. Jessy Rivera is here for follow up today of  Chief Complaint   Patient presents with    Follow-up     RLS and refill on medication          Last Appointment With Me:  7/21/2021    \"Odalis JIM Rivera is a 80 y.o.  female presenting for evaluation of atypical movements. These were initially noted by her physical therapist who is following her for chronic imbalance/falls. PT reported intermittent myoclonic jerking of the lower extremities b/l L>R with passive movements. Denies similar symptoms into the upper extremities. Family has also noted intermittent mouth movements and truncal swaying when patient is seated. She has a previous diagnosis of RLS on ropinirole over the past 15 years per pt/family. H/O depression/anxiety but no prior exposure to antipsychotics per pt/family. She describes a need to move the legs with LE paresthesias/numbness affecting sleep. Ropinirole does appear to be beneficial for RLS symptoms. She has tried gabapentin in the past but did not tolerate the medication. Other ROS: memory impairment-no signs of dementia on prior neuropsychologic testing per family, previously felt to be secondary to anxiety/depression. +Gait instability/falls. Denies tremors. \"    Interval History:   Patient returns for f/u of RLS, DM related polyneuropathy confirmed on recent EMG and MCI per recent neuropsychologic testing. RLS symptoms have progressed since last visit. She is experiencing symptoms both in the AM and PM. She describes this as a sensation of needing to move the limbs. Walking tends to improve symptoms. She has been taking pramipexole BID instead of qhs recently. She is restless in the evenings intermittently due to RLS symptoms. In regards to her peripheral neuropathy, she denies recent falls. Glucose is severely uncontrolled. Metformin was recently increased to address her uncontrolled DM. She has completed repeat neuropsychologic testing recently which showed evidence of MCI. She reports some short term recall deficits, ongoing, non-progressive. Her daughter reports some worsening anxiety. She is having some difficulty recalling places she has been before, needing more assistance with medications (daughter is loading her pill box). Finances are handled by family. She is driving only short distances. No recent issues with navigation/MVAs. PMHx/ PSHx/ FHx/ SHx:  Reviewed and unchanged previous visit. Past Medical History:   Diagnosis Date    Anxiety     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    bursitis    Depression     Diabetes (HCC)     GERD (gastroesophageal reflux disease)     H/O hand fracture     Hyperlipidemia     Menopause     Osteopenia     Recurrent UTI     RLS (restless legs syndrome)          ROS:  Comprehensive review of systems negative except for as noted above. Objective:       Meds:  Current Outpatient Medications   Medication Sig Dispense Refill    metFORMIN (GLUCOPHAGE) 500 mg tablet Take 1 Tablet by mouth two (2) times daily (with meals). 90 Tablet 0    OTHER 50 mg. CBD patch apply every 8 hours daily.  pramipexole (MIRAPEX) 0.25 mg tablet Take 1 Tablet by mouth nightly. 30 Tablet 2    cyanocobalamin (Vitamin B-12) 1,000 mcg tablet Take 1,000 mcg by mouth daily.  sertraline (ZOLOFT) 100 mg tablet Take 1 Tab by mouth daily.  90 Tab 4    OTHER Indications: vitamin d spray      Blood-Glucose Meter misc Use to test glucose once daily, E 11.9   Relion meter please 1 Each 0    RELION PRIME TEST STRIPS strip USE 1 TEST STRIP EACH TIME TO TEST GLUCOSE 2 TIMES A  Strip 11       Exam:  Visit Vitals  /70   Pulse 87   Ht 5' 1\" (1.549 m)   Wt 132 lb 6.4 oz (60.1 kg)   SpO2 98%   BMI 25.02 kg/m²     NEUROLOGICAL EXAM:  General: Awake, alert, speech fluent  CN: PERRL, EOMI without nystagmus, VFF to confrontation, facial sensation and strength are normal and symmetric, hearing is intact to finger rub bilaterally, palate and tongue movements are intact and symmetric. Motor: Normal tone, bulk and strength bilaterally. Reflexes: Deferred  Coordination: FNF, LUBNA, HTS intact. Sensation: LT intact throughout. Gait: Slightly unsteady    LABS  Component      Latest Ref Rng & Units 2021           2:06 PM  2:06 PM  2:06 PM   Protein, total      6.0 - 8.5 g/dL      Albumin      2.9 - 4.4 g/dL      Alpha-1-globulin      0.0 - 0.4 g/dL      ALPHA-2 GLOBULIN      0.4 - 1.0 g/dL      Beta globulin      0.7 - 1.3 g/dL      Gamma globulin      0.4 - 1.8 g/dL      M-Guillermo      Not Observed g/dL      Globulin, total      2.2 - 3.9 g/dL      A/G ratio      0.7 - 1.7      Please note            METHYLMALONIC ACID, SERUM      0 - 378 nmol/L   172   Disclaimer         Comment   TSH      0.450 - 4.500 uIU/mL  2.480    Ferritin      15 - 150 ng/mL 84       Component      Latest Ref Rng & Units 2021           2:06 PM   Protein, total      6.0 - 8.5 g/dL 6.6   Albumin      2.9 - 4.4 g/dL 3.7   Alpha-1-globulin      0.0 - 0.4 g/dL 0.2   ALPHA-2 GLOBULIN      0.4 - 1.0 g/dL 0.9   Beta globulin      0.7 - 1.3 g/dL 1.1   Gamma globulin      0.4 - 1.8 g/dL 0.7   M-Guillermo      Not Observed g/dL Not Observed   Globulin, total      2.2 - 3.9 g/dL 2.9   A/G ratio      0.7 - 1.7 1.3   Please note       Comment   METHYLMALONIC ACID, SERUM      0 - 378 nmol/L    Disclaimer          TSH      0.450 - 4.500 uIU/mL    Ferritin      15 - 150 ng/mL      IMAGING:  MRI Results (most recent):  No results found for this or any previous visit. EMG  Impression:  Extensive electrodiagnostic examination of the right lower extremity and additional nerve conduction studies of the left lower extremity reveals the followin.  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 2. No evidence of a right lumbosacral motor radiculopathy. Assessment:     Encounter Diagnoses     ICD-10-CM ICD-9-CM   1. Diabetic peripheral neuropathy associated with type 2 diabetes mellitus (HCC)  E11.42 250.60     357.2   2. RLS (restless legs syndrome)  G25.81 333.94   3. Mild cognitive impairment  G31.84 35.83     80year old [place] female referred for evaluation of gait instability/falls, lower extremity paresthesias, RLS, cognitive deficits in the setting of comorbid anxiety/depression. 1. Imbalance/falls with lower extremity paresthesias-Initial examination c/w length-dependent polyneuropathy. Suspect related to uncontrolled DM. No evidence of contributing metabolic derangements on recent laboratory investigations. EMG confirmed a generalized length-dependent polyneuropathy, axon loss and moderate in degree electrically. Discussed need for strict glucose control to prevent further progression. This may contribute to gait instability. 2. RLS-Weaned off of Requip secondary to associated dyskinesias. Start Lyrica but did not tolerate this due to side effects. Also with h/o intolerance to gabapentin in the past. Currently maintained on pramipexole. We discussed titration of medication to further assist with worsening RLS symptoms. She will monitor for any associated fatigue or impulsivity on medication. 3. Cognitive impairment-previously evaluated with neuropsychologic testing showing no evidence of an evolving dementia process, likely exacerbated by underlying mood disorders. Repeat neuropsychologic testing was recently completed suggestive of possible mild cognitive impairment with recommendations for repeat testing in 12 months.      4. Anxiety/depression-Psychiatry referral previously provided per patient/family request.     Plan:   · Increase Pramipexole to 0.25mg in the afternoon and 0.5mg qhs for RLS  · Discussed strict glucose control to prevent progression of DM related polyneuropathy  · Discussed need for ongoing assistance with medications/finances and limiting driving to short distances only due to MCI. Will plan for repeat neuropsychologic testing in 1 year. Follow-up and Dispositions    · Return in about 6 months (around 4/20/2022). I have discussed the diagnosis with the patient today and the intended plan as seen in the above orders with both the patient as well as referring provider and/or PCP via electronic correspondence. The patient has received an after-visit summary and questions were answered concerning future plans. I have discussed medication side effects and warnings with the patient as well. The duration of this appointment visit was 40 minutes spent on interview, examination, review of records, counseling, explanation of diagnosis, planning of further management, documentation and coordination of care.     Signed:  Pamela Pires DO  10/20/2021

## 2021-11-14 NOTE — PROGRESS NOTES
Ms. Bonnie Sorto is a 80 y.o. female who is here for evaluation of   Chief Complaint   Patient presents with    Diabetes     Reports glucose approx 200s daily     Fatigue     C/O \"feeling tired\" all the time    Sweats     C/O continous sweating    Bladder Infection     C/O frequency - Urine from home brought home    . ASSESSMENT AND PLAN:    She has a UTI and we will treat with fosfomycin today. Good Rx coupon provided  The family will decide together whether or not to repeat the CT scan and we will have them follow-up in December  Her sweats may be due to her recent UTI. 1. Diabetic peripheral neuropathy associated with type 2 diabetes mellitus ([facility] [address]  Given her age, a reasonable A1c target would be about 8.5. We will check the A1c today. 2. Essential hypertension  Blood pressure is at goal  3. Anxiety  Neuropsychiatric report from Dr. Cristela Cruz was reviewed with the patient and her family today. They are already taking over the financial affairs and she is no longer driving  4. Vitamin B12 deficiency  Check next visit      Orders Placed This Encounter    CULTURE, URINE     Standing Status:   Future     Standing Expiration Date:   11/19/2022    AMB POC URINALYSIS DIP STICK AUTO W/O MICRO    fosfomycin (MONUROL) 3 gram pack oral packet     Sig: Take 1 Packet by mouth once for 1 dose. Dispense:  1 Packet     Refill:  0           HPI 49-year-old  with longstanding diabetic neuropathy, anxiety, depression borderline B12 levels who was last seen by Dr. Addy Skinner, neurology, about 3 weeks ago for movement symptoms. After a thorough investigation, it was felt that many of her symptoms were due to poorly controlled diabetes. Her A1c most recently in August was 8.4 although previously she was approximately 6.8. She is using pramipexole 0.25 mg during the day and 0.5 mg at bedtime. Neuropsychiatric testing with Dr. Cristela Cruz was reviewed.   He advised that she no longer drive and have supervision with all activities which involve her finances. Anxiety and depression have been lifelong symptoms and have been variably managed with counseling and medication. B12-she has previously been replacing this orally and at times with injections. 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 ROS:  Denies fever, chills, cough, chest pain, SOB,  nausea, vomiting, or diarrhea. Denies wt loss, wt gain, hemoptysis, hematochezia or melena. There is some suspicion about recurrent UTI and her urine today is positive. Culture is pending    Physical Examination:    Visit Vitals  /76 (BP 1 Location: Left upper arm, BP Patient Position: Sitting, BP Cuff Size: Adult long)   Pulse 86   Temp 98 °F (36.7 °C) (Temporal)   Resp 17   Ht 5' 1\" (1.549 m)   Wt 129 lb 6.4 oz (58.7 kg)   SpO2 97%   BMI 24.45 kg/m²      General:  Alert, cooperative, no distress. Head:  Normocephalic, without obvious abnormality, atraumatic. Eyes:  Conjunctivae/corneas clear. Pupils equal, round, reactive to light. Extraocular movements intact. Lungs:   Clear to auscultation bilaterally. Chest wall:  No tenderness or deformity. Cardiac:  RRR   Abdomen:   Soft, non-tender. Bowel sounds normal. No masses. No organomegaly. Extremities: Extremities normal, atraumatic, no cyanosis or edema. Pulses: 2+ and symmetric all extremities. Skin: Skin color, texture, turgor normal. No rashes or lesions. Lymph nodes: Cervical, supraclavicular, and axillary nodes normal.   Neurologic: CNII-XII intact. Normal strength, sensation, and reflexes throughout. On this date 11/19/2021 I have spent 30 minutes reviewing previous notes, test results and face to face with the patient discussing the diagnosis and importance of compliance with the treatment plan as well as documenting on the day of the visit.     Lawanda Barber MD FACP    (signed electronically) on 11/19/2021 at 10:12 AM

## 2021-11-19 ENCOUNTER — OFFICE VISIT (OUTPATIENT)
Dept: FAMILY MEDICINE CLINIC | Age: 86
End: 2021-11-19
Payer: MEDICARE

## 2021-11-19 VITALS
BODY MASS INDEX: 24.43 KG/M2 | OXYGEN SATURATION: 97 % | WEIGHT: 129.4 LBS | SYSTOLIC BLOOD PRESSURE: 128 MMHG | RESPIRATION RATE: 17 BRPM | TEMPERATURE: 98 F | DIASTOLIC BLOOD PRESSURE: 76 MMHG | HEIGHT: 61 IN | HEART RATE: 86 BPM

## 2021-11-19 DIAGNOSIS — I10 ESSENTIAL HYPERTENSION: ICD-10-CM

## 2021-11-19 DIAGNOSIS — E53.8 VITAMIN B12 DEFICIENCY: ICD-10-CM

## 2021-11-19 DIAGNOSIS — E11.42 DIABETIC PERIPHERAL NEUROPATHY ASSOCIATED WITH TYPE 2 DIABETES MELLITUS (HCC): Primary | ICD-10-CM

## 2021-11-19 DIAGNOSIS — R35.0 FREQUENCY OF URINATION: ICD-10-CM

## 2021-11-19 DIAGNOSIS — F41.9 ANXIETY: ICD-10-CM

## 2021-11-19 LAB
BILIRUB UR QL STRIP: NEGATIVE
GLUCOSE UR-MCNC: NEGATIVE MG/DL
KETONES P FAST UR STRIP-MCNC: NEGATIVE MG/DL
PH UR STRIP: 7 [PH] (ref 4.6–8)
PROT UR QL STRIP: NEGATIVE
SP GR UR STRIP: 1.02 (ref 1–1.03)
UA UROBILINOGEN AMB POC: NORMAL (ref 0.2–1)
URINALYSIS CLARITY POC: NORMAL
URINALYSIS COLOR POC: YELLOW
URINE BLOOD POC: NORMAL
URINE LEUKOCYTES POC: NORMAL
URINE NITRITES POC: POSITIVE

## 2021-11-19 PROCEDURE — 99214 OFFICE O/P EST MOD 30 MIN: CPT | Performed by: INTERNAL MEDICINE

## 2021-11-19 RX ORDER — GRANULES FOR ORAL 3 G/1
3 POWDER ORAL ONCE
Qty: 1 PACKET | Refills: 0 | Status: SHIPPED | OUTPATIENT
Start: 2021-11-19 | End: 2021-11-19

## 2021-11-19 NOTE — PROGRESS NOTES
Jazmin Marie is a 80 y.o. female presenting for/with:    Chief Complaint   Patient presents with    Diabetes     Reports glucose approx 200s daily     Fatigue     C/O \"feeling tired\" all the time    Sweats     C/O continous sweating    Bladder Infection     C/O frequency - Urine from home brought home        Visit Vitals  /76 (BP 1 Location: Left upper arm, BP Patient Position: Sitting, BP Cuff Size: Adult long)   Pulse 86   Temp 98 °F (36.7 °C) (Temporal)   Resp 17   Ht 5' 1\" (1.549 m)   Wt 129 lb 6.4 oz (58.7 kg)   SpO2 97%   BMI 24.45 kg/m²     Pain Scale: 0 - No pain/10  Pain Location:     1. Have you been to the ER, urgent care clinic since your last visit? Hospitalized since your last visit? NO    2. Have you seen or consulted any other health care providers outside of the [address] [place] [address] since your last visit? Include any pap smears or colon screening. YES    Symptom review:  NO  Fever   NO  Shaking chills  NO  Cough  NO  Body aches  NO  Coughing up blood  NO  Chest congestion  NO  Chest pain  NO  Shortness of breath  NO  Profound Loss of smell/taste  NO  Nausea/Vomiting   NO  Loose stool/Diarrhea  NO  any skin issues    Patient Risk Factors Reviewed as follows:  NO  have you been in Close contact with confirmed COVID19 patient   NO  History of recent travel to affected geographical areas within the past 14 days  NO  COPD  NO  Active Cancer/Leukemia/Lymphoma/Chemotherapy  NO  Oral steroid use  NO  Pregnant  YES  Diabetes Mellitus  YES  Heart disease  NO  Asthma  YES Health care worker at home  [address] care worker  NO Is there a Pregnant Woman in the home  NO Dialysis pt in the home   NO a large number of people living in the home    Learning Assessment 10/18/2021   PRIMARY LEARNER Patient   PRIMARY LANGUAGE ENGLISH   LEARNER PREFERENCE PRIMARY READING     -   ANSWERED BY patient   RELATIONSHIP SELF     Fall Risk Assessment, last 12 mths 10/18/2021   Able to walk?  Yes   Fall in past 12 months? 1   Do you feel unsteady? 1   Are you worried about falling 1   Is TUG test greater than 12 seconds? -   Is the gait abnormal? 0   Number of falls in past 12 months 2   Fall with injury? -       3 most recent PHQ Screens 11/19/2021   PHQ Not Done -   Little interest or pleasure in doing things Several days   Feeling down, depressed, irritable, or hopeless Several days   Total Score PHQ 2 2   Trouble falling or staying asleep, or sleeping too much -   Feeling tired or having little energy -   Poor appetite, weight loss, or overeating -   Feeling bad about yourself - or that you are a failure or have let yourself or your family down -   Trouble concentrating on things such as school, work, reading, or watching TV -   Moving or speaking so slowly that other people could have noticed; or the opposite being so fidgety that others notice -     Abuse Screening Questionnaire 10/18/2021   Do you ever feel afraid of your partner? N   Are you in a relationship with someone who physically or mentally threatens you? N   Is it safe for you to go home?  Y       ADL Assessment 10/18/2021   Feeding yourself No Help Needed   Getting from bed to chair No Help Needed   Getting dressed No Help Needed   Bathing or showering No Help Needed   Walk across the room (includes cane/walker) No Help Needed   Using the telphone No Help Needed   Taking your medications Help Needed   Preparing meals No Help Needed   Managing money (expenses/bills) Help Needed   Moderately strenuous housework (laundry) Help Needed   Shopping for personal items (toiletries/medicines) Help Needed   Shopping for groceries Help Needed   Driving Help Needed   Climbing a flight of stairs Help Needed   Getting to places beyond walking distances Help Needed      Advance Care Planning 10/4/2021   Patient's Healthcare Decision Maker is: Legal Next of Kin   Confirm Advance Directive Yes, not on file   Patient Would Like to Complete Advance Directive No Results for orders placed or performed in visit on 11/19/21   AMB POC URINALYSIS DIP STICK AUTO W/O MICRO   Result Value Ref Range    Color (UA POC) Yellow     Clarity (UA POC) Turbid     Glucose (UA POC) Negative Negative    Bilirubin (UA POC) Negative Negative    Ketones (UA POC) Negative Negative    Specific gravity (UA POC) 1.020 1.001 - 1.035    Blood (UA POC) Trace Negative    pH (UA POC) 7.0 4.6 - 8.0    Protein (UA POC) Negative Negative    Urobilinogen (UA POC) 0.2 mg/dL 0.2 - 1    Nitrites (UA POC) Positive Negative    Leukocyte esterase (UA POC) 1+ Negative

## 2021-11-22 ENCOUNTER — PATIENT MESSAGE (OUTPATIENT)
Dept: FAMILY MEDICINE CLINIC | Age: 86
End: 2021-11-22

## 2021-11-22 DIAGNOSIS — E11.9 TYPE 2 DIABETES MELLITUS WITHOUT COMPLICATION, WITHOUT LONG-TERM CURRENT USE OF INSULIN (HCC): ICD-10-CM

## 2021-11-22 LAB
BACTERIA SPEC CULT: ABNORMAL
CC UR VC: ABNORMAL
EST. AVERAGE GLUCOSE BLD GHB EST-MCNC: 203 MG/DL
HBA1C MFR BLD: 8.7 % (ref 4–5.6)
SERVICE CMNT-IMP: ABNORMAL

## 2021-11-22 NOTE — PROGRESS NOTES
Sent a Mychart to daughter concerning urine results. Waiting for a response as to what action to take concerning treatment if symptomatic.

## 2021-11-23 ENCOUNTER — TELEPHONE (OUTPATIENT)
Dept: FAMILY MEDICINE CLINIC | Age: 86
End: 2021-11-23

## 2021-11-23 DIAGNOSIS — N39.0 URINARY TRACT INFECTION DUE TO KLEBSIELLA SPECIES: Primary | ICD-10-CM

## 2021-11-23 DIAGNOSIS — B96.89 URINARY TRACT INFECTION DUE TO KLEBSIELLA SPECIES: Primary | ICD-10-CM

## 2021-11-23 RX ORDER — AMOXICILLIN AND CLAVULANATE POTASSIUM 875; 125 MG/1; MG/1
1 TABLET, FILM COATED ORAL 2 TIMES DAILY
Qty: 10 TABLET | Refills: 0 | Status: SHIPPED | OUTPATIENT
Start: 2021-11-23 | End: 2021-11-28

## 2021-11-23 NOTE — TELEPHONE ENCOUNTER
Spoke with patient's daughter concerning Urine culture. Stated patient was having UTI symptoms and would like medication sent to [facility].

## 2021-11-29 RX ORDER — METFORMIN HYDROCHLORIDE 500 MG/1
500 TABLET ORAL 2 TIMES DAILY WITH MEALS
Qty: 90 TABLET | Refills: 0 | Status: SHIPPED | OUTPATIENT
Start: 2021-11-29 | End: 2021-12-30 | Stop reason: DRUGHIGH

## 2021-11-29 NOTE — TELEPHONE ENCOUNTER
From: Unique Turner  To: Niraj Noe MD  Sent: 11/22/2021 1:33 PM EST  Subject: Metformin refill/ x2 a day    Hemaryjo Tapia, We left the office without an updated prescription for Moms Metformin. Current script is 1x a day and it was upped to 2x a day. [facility] [place] please. Thanks! I see her A1C came back 8.7. I know you are ok with that, but yikes! Glad for the extra Metformin. Happy Thanksgiving to you and your family. Heidi Lopez

## 2021-12-13 ENCOUNTER — TELEPHONE (OUTPATIENT)
Dept: FAMILY MEDICINE CLINIC | Age: 86
End: 2021-12-13

## 2021-12-13 NOTE — TELEPHONE ENCOUNTER
Patient called reporting that her zoloft is no longer effective. Patient already has appt set up with PCP on 12/30 at 1130. Patient agrees to keep appt and will discuss depression options at that time.

## 2021-12-19 NOTE — PROGRESS NOTES
Ms. Jessy Rivera is a 80 y.o. female who is here for evaluation of   Chief Complaint   Patient presents with    Diabetes    Restless Leg Syndrome    Depression     wants to change medication   . ASSESSMENT AND PLAN:    1. Type 2 diabetes mellitus with hyperglycemia, without long-term current use of insulin (HCC)  Well with Metformin. We will renew this today  2. RLS (restless legs syndrome)  Pramipexole working well  3. Reactive depression  Discontinue Zoloft and begin Wellbutrin  4. Abnormal CT scan-schedule repeat CT scan for interval assessment of pancreatic lesion and adrenal lesion. Orders Placed This Encounter    AMB POC URINALYSIS DIP STICK MANUAL W/O MICRO       78-year-old woman who lives in [place], [place] who has diabetes, restless leg syndrome, anxiety and reactive depression. She returns today for interval assessment of these chronic issues. 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 She is interested in an interval imaging assessment of these lesions. HPI arrives today also with complaints of depression and anxiety. Would like to switch from Zoloft to something different. Lab Results   Component Value Date/Time    Hemoglobin A1c 8.7 (H) 11/19/2021 12:00 PM         ROS:  Denies  fever, chills, cough, chest pain, SOB,  nausea, vomiting, or diarrhea. Denies wt loss, wt gain, hemoptysis, hematochezia or melena. Physical Examination:    Visit Vitals  /60   Pulse 90   Temp 97.3 °F (36.3 °C) (Temporal)   Resp 18   Ht 5' 1\" (1.549 m)   Wt 125 lb 9.6 oz (57 kg)   SpO2 97%   BMI 23.73 kg/m²      General:  Alert, cooperative, no distress. Head:  Normocephalic, without obvious abnormality, atraumatic. Eyes:  Conjunctivae/corneas clear. Pupils equal, round, reactive to light. Extraocular movements intact. Lungs:   Clear to auscultation bilaterally.    Chest wall:  No tenderness or deformity. Cardiac:  RRR   Abdomen:   Soft, non-tender. Bowel sounds normal. No masses. No organomegaly. Extremities: Extremities normal, atraumatic, no cyanosis or edema. Pulses: 2+ and symmetric all extremities. Skin: Skin color, texture, turgor normal. No rashes or lesions. Lymph nodes: Cervical, supraclavicular, and axillary nodes normal.   Neurologic: CNII-XII intact. Normal strength, sensation, and reflexes throughout. On this date 12/30/2021 I have spent 30 minutes reviewing previous notes, test results and face to face with the patient discussing the diagnosis and importance of compliance with the treatment plan as well as documenting on the day of the visit.     Malika Rhodes MD FACP    (signed electronically) on 12/30/2021 at 11:04 AM

## 2021-12-30 ENCOUNTER — OFFICE VISIT (OUTPATIENT)
Dept: FAMILY MEDICINE CLINIC | Age: 86
End: 2021-12-30
Payer: MEDICARE

## 2021-12-30 VITALS
WEIGHT: 125.6 LBS | BODY MASS INDEX: 23.71 KG/M2 | TEMPERATURE: 97.3 F | DIASTOLIC BLOOD PRESSURE: 60 MMHG | SYSTOLIC BLOOD PRESSURE: 120 MMHG | HEART RATE: 90 BPM | RESPIRATION RATE: 18 BRPM | OXYGEN SATURATION: 97 % | HEIGHT: 61 IN

## 2021-12-30 DIAGNOSIS — R35.0 FREQUENCY OF URINATION: ICD-10-CM

## 2021-12-30 DIAGNOSIS — E11.65 TYPE 2 DIABETES MELLITUS WITH HYPERGLYCEMIA, WITHOUT LONG-TERM CURRENT USE OF INSULIN (HCC): Primary | ICD-10-CM

## 2021-12-30 DIAGNOSIS — G25.81 RLS (RESTLESS LEGS SYNDROME): ICD-10-CM

## 2021-12-30 DIAGNOSIS — R93.89 ABNORMAL CAT SCAN: ICD-10-CM

## 2021-12-30 DIAGNOSIS — F32.9 REACTIVE DEPRESSION: ICD-10-CM

## 2021-12-30 LAB
BILIRUB UR QL STRIP: NEGATIVE
GLUCOSE UR-MCNC: NEGATIVE MG/DL
KETONES P FAST UR STRIP-MCNC: NEGATIVE MG/DL
PH UR STRIP: 6 [PH] (ref 4.6–8)
PROT UR QL STRIP: NEGATIVE
SP GR UR STRIP: 1.01 (ref 1–1.03)
UA UROBILINOGEN AMB POC: NORMAL (ref 0.2–1)
URINALYSIS CLARITY POC: NORMAL
URINALYSIS COLOR POC: YELLOW
URINE BLOOD POC: NEGATIVE
URINE LEUKOCYTES POC: NORMAL
URINE NITRITES POC: NEGATIVE

## 2021-12-30 PROCEDURE — 81002 URINALYSIS NONAUTO W/O SCOPE: CPT | Performed by: INTERNAL MEDICINE

## 2021-12-30 PROCEDURE — 99214 OFFICE O/P EST MOD 30 MIN: CPT | Performed by: INTERNAL MEDICINE

## 2021-12-30 RX ORDER — METFORMIN HYDROCHLORIDE 500 MG/1
500 TABLET, EXTENDED RELEASE ORAL 2 TIMES DAILY
Qty: 180 TABLET | Refills: 4 | Status: SHIPPED | OUTPATIENT
Start: 2021-12-30 | End: 2022-08-02 | Stop reason: SDUPTHER

## 2021-12-30 RX ORDER — BUPROPION HYDROCHLORIDE 150 MG/1
150 TABLET ORAL DAILY
Qty: 90 TABLET | Refills: 4 | Status: SHIPPED | OUTPATIENT
Start: 2021-12-30

## 2021-12-30 NOTE — PROGRESS NOTES
Urine culture order entered per POC results    Results for orders placed or performed in visit on 12/30/21   AMB POC URINALYSIS DIP STICK MANUAL W/O MICRO   Result Value Ref Range    Color (UA POC) Yellow     Clarity (UA POC) Cloudy     Glucose (UA POC) Negative Negative    Bilirubin (UA POC) Negative Negative    Ketones (UA POC) Negative Negative    Specific gravity (UA POC) 1.010 1.001 - 1.035    Blood (UA POC) Negative Negative    pH (UA POC) 6.0 4.6 - 8.0    Protein (UA POC) Negative Negative    Urobilinogen (UA POC) 0.2 mg/dL 0.2 - 1    Nitrites (UA POC) Negative Negative    Leukocyte esterase (UA POC) 2+ Negative

## 2021-12-30 NOTE — PROGRESS NOTES
Basia Rios is a 80 y.o. female presenting for/with:    Chief Complaint   Patient presents with    Diabetes    Restless Leg Syndrome    Depression     wants to change medication       Visit Vitals  /60   Pulse 90   Temp 97.3 °F (36.3 °C) (Temporal)   Resp 18   Ht 5' 1\" (1.549 m)   Wt 125 lb 9.6 oz (57 kg)   SpO2 97%   BMI 23.73 kg/m²     Pain Scale: 0 - No pain/10  Pain Location:     1. Have you been to the ER, urgent care clinic since your last visit? Hospitalized since your last visit? NO    2. Have you seen or consulted any other health care providers outside of the [address] [place] [address] since your last visit? Include any pap smears or colon screening. YES (audiology, eye)    Symptom review:  NO  Fever   NO  Shaking chills  NO  Cough  NO  Body aches  NO  Coughing up blood  NO  Chest congestion  NO  Chest pain  NO  Shortness of breath  NO  Profound Loss of smell/taste  NO  Nausea/Vomiting   NO  Loose stool/Diarrhea  NO  any skin issues    Patient Risk Factors Reviewed as follows:  NO  have you been in Close contact with confirmed COVID19 patient   NO  History of recent travel to affected geographical areas within the past 14 days  NO  COPD  NO  Active Cancer/Leukemia/Lymphoma/Chemotherapy  NO  Oral steroid use  NO  Pregnant  YES  Diabetes Mellitus  NO  Heart disease  NO  Asthma  NO Health care worker at home  NO Health care worker  NO Is there a Pregnant Woman in the home  NO Dialysis pt in the home   NO a large number of people living in the home    Learning Assessment 10/18/2021   PRIMARY LEARNER Patient   PRIMARY LANGUAGE ENGLISH   LEARNER PREFERENCE PRIMARY READING     -   ANSWERED BY patient   RELATIONSHIP SELF     Fall Risk Assessment, last 12 mths 12/30/2021   Able to walk? Yes   Fall in past 12 months? 1   Do you feel unsteady? 0   Are you worried about falling 0   Is TUG test greater than 12 seconds?  0   Is the gait abnormal? 0   Number of falls in past 12 months 2   Fall with injury? 1       3 most recent PHQ Screens 11/19/2021   PHQ Not Done -   Little interest or pleasure in doing things Several days   Feeling down, depressed, irritable, or hopeless Several days   Total Score PHQ 2 2   Trouble falling or staying asleep, or sleeping too much -   Feeling tired or having little energy -   Poor appetite, weight loss, or overeating -   Feeling bad about yourself - or that you are a failure or have let yourself or your family down -   Trouble concentrating on things such as school, work, reading, or watching TV -   Moving or speaking so slowly that other people could have noticed; or the opposite being so fidgety that others notice -     Abuse Screening Questionnaire 12/30/2021   Do you ever feel afraid of your partner? N   Are you in a relationship with someone who physically or mentally threatens you? N   Is it safe for you to go home? Y       ADL Assessment 12/30/2021   Feeding yourself No Help Needed   Getting from bed to chair No Help Needed   Getting dressed No Help Needed   Bathing or showering No Help Needed   Walk across the room (includes cane/walker) No Help Needed   Using the telphone No Help Needed   Taking your medications Help Needed   Preparing meals Help Needed   Managing money (expenses/bills) Help Needed   Moderately strenuous housework (laundry) Help Needed   Shopping for personal items (toiletries/medicines) Help Needed   Shopping for groceries Help Needed   Driving Help Needed   Climbing a flight of stairs Help Needed   Getting to places beyond walking distances Help Needed      No advanced directives on file. Verified emergency contact.        Results for orders placed or performed in visit on 12/30/21   AMB POC URINALYSIS DIP STICK MANUAL W/O MICRO   Result Value Ref Range    Color (UA POC) Yellow     Clarity (UA POC) Cloudy     Glucose (UA POC) Negative Negative    Bilirubin (UA POC) Negative Negative    Ketones (UA POC) Negative Negative    Specific gravity (UA POC) 1.010 1.001 - 1.035    Blood (UA POC) Negative Negative    pH (UA POC) 6.0 4.6 - 8.0    Protein (UA POC) Negative Negative    Urobilinogen (UA POC) 0.2 mg/dL 0.2 - 1    Nitrites (UA POC) Negative Negative    Leukocyte esterase (UA POC) 2+ Negative

## 2022-01-02 LAB
BACTERIA SPEC CULT: ABNORMAL
CC UR VC: ABNORMAL
SERVICE CMNT-IMP: ABNORMAL

## 2022-01-04 DIAGNOSIS — R35.0 FREQUENCY OF URINATION: Primary | ICD-10-CM

## 2022-01-04 DIAGNOSIS — B96.89 URINARY TRACT INFECTION DUE TO KLEBSIELLA SPECIES: ICD-10-CM

## 2022-01-04 DIAGNOSIS — N39.0 URINARY TRACT INFECTION DUE TO KLEBSIELLA SPECIES: ICD-10-CM

## 2022-01-04 RX ORDER — LEVOFLOXACIN 500 MG/1
500 TABLET, FILM COATED ORAL DAILY
Qty: 10 TABLET | Refills: 0 | Status: SHIPPED | OUTPATIENT
Start: 2022-01-04 | End: 2022-01-14

## 2022-01-07 PROBLEM — I50.43 ACUTE ON CHRONIC COMBINED SYSTOLIC AND DIASTOLIC ACC/AHA STAGE C CONGESTIVE HEART FAILURE (HCC): Status: RESOLVED | Noted: 2021-07-06 | Resolved: 2022-01-07

## 2022-01-07 NOTE — PROGRESS NOTES
Ms. King Nuñez is a 80 y.o. female who is here for evaluation of   Chief Complaint   Patient presents with    Urinary Frequency     follow up   . ASSESSMENT AND PLAN: Her mental status is remarkably improved. Whether this is from the Wellbutrin or the recent successful treatment of UTI remains unclear. In the future if she presents with altered mental status or very depressed mood for more than 3 or 4 days consider checking urinalysis. 1. Type 2 diabetes mellitus with hyperglycemia, without long-term current use of insulin ([facility])  Glucose readings are stable. 2. Adrenal mass ([address]  For unclear reasons, her CT scan was not scheduled at her last appointment. We will reschedule this today and anticipate that the study be concluded within the next week. 3. Reactive depression  Fairly dramatic improvement in her overall mood and state of mind with the initiation of Wellbutrin  mg daily. Continue this and reevaluate in March. Orders Placed This Encounter    AMB POC URINALYSIS DIP STICK AUTO W/O MICRO           HPI 59-year-old woman who recently had a medication change with respect to her depression treatment. Currently on Wellbutrin 150 mg a day. Returns for interval assessment since that change 2 weeks ago. 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  A follow-up CT was scheduled. Apparently this was not scheduled. ROS:  Denies  fever, chills, cough, chest pain, SOB,  nausea, vomiting, or diarrhea. Denies wt loss, wt gain, hemoptysis, hematochezia or melena. Physical Examination:    Visit Vitals  /60 (BP 1 Location: Left arm, BP Patient Position: Sitting, BP Cuff Size: Adult long)   Pulse 94   Temp 98.5 °F (36.9 °C) (Temporal)   Resp 17   Ht 5' 1\" (1.549 m)   Wt 126 lb 3.2 oz (57.2 kg)   SpO2 98%   BMI 23.85 kg/m²      General:  Alert, cooperative, no distress. Head:  Normocephalic, without obvious abnormality, atraumatic. Eyes:  Conjunctivae/corneas clear. Pupils equal, round, reactive to light. Extraocular movements intact. Lungs:   Clear to auscultation bilaterally. Chest wall:  No tenderness or deformity. Cardiac:  RRR without MG   Abdomen:   Soft, non-tender. Bowel sounds normal. No masses. No organomegaly. Extremities: Extremities normal, atraumatic, no cyanosis or edema. Pulses: 2+ and symmetric all extremities. Skin: Skin color, texture, turgor normal. No rashes or lesions. Lymph nodes: Cervical, supraclavicular, and axillary nodes normal.   Neurologic: CNII-XII intact. Normal strength, sensation, and reflexes throughout. On this date 01/13/2022 I have spent 30 minutes reviewing previous notes, test results and face to face with the patient discussing the diagnosis and importance of compliance with the treatment plan as well as documenting on the day of the visit.     Alva Zavala MD FACP    (signed electronically) on 1/13/2022 at 4:20 PM

## 2022-01-13 ENCOUNTER — OFFICE VISIT (OUTPATIENT)
Dept: FAMILY MEDICINE CLINIC | Age: 87
End: 2022-01-13
Payer: MEDICARE

## 2022-01-13 ENCOUNTER — TELEPHONE (OUTPATIENT)
Dept: FAMILY MEDICINE CLINIC | Age: 87
End: 2022-01-13

## 2022-01-13 VITALS
TEMPERATURE: 98.5 F | DIASTOLIC BLOOD PRESSURE: 60 MMHG | OXYGEN SATURATION: 98 % | WEIGHT: 126.2 LBS | RESPIRATION RATE: 17 BRPM | BODY MASS INDEX: 23.83 KG/M2 | HEART RATE: 94 BPM | SYSTOLIC BLOOD PRESSURE: 110 MMHG | HEIGHT: 61 IN

## 2022-01-13 DIAGNOSIS — R35.0 URINARY FREQUENCY: ICD-10-CM

## 2022-01-13 DIAGNOSIS — E11.65 TYPE 2 DIABETES MELLITUS WITH HYPERGLYCEMIA, WITHOUT LONG-TERM CURRENT USE OF INSULIN (HCC): Primary | ICD-10-CM

## 2022-01-13 DIAGNOSIS — R93.3 ABNORMAL FINDINGS ON DIAGNOSTIC IMAGING OF OTHER PARTS OF DIGESTIVE TRACT: ICD-10-CM

## 2022-01-13 DIAGNOSIS — F32.9 REACTIVE DEPRESSION: ICD-10-CM

## 2022-01-13 DIAGNOSIS — E27.8 ADRENAL MASS (HCC): ICD-10-CM

## 2022-01-13 LAB
BILIRUB UR QL STRIP: NEGATIVE
GLUCOSE UR-MCNC: NEGATIVE MG/DL
KETONES P FAST UR STRIP-MCNC: NORMAL MG/DL
PH UR STRIP: 5 [PH] (ref 4.6–8)
PROT UR QL STRIP: NEGATIVE
SP GR UR STRIP: 1.01 (ref 1–1.03)
UA UROBILINOGEN AMB POC: NORMAL (ref 0.2–1)
URINALYSIS CLARITY POC: NORMAL
URINALYSIS COLOR POC: YELLOW
URINE BLOOD POC: NEGATIVE
URINE LEUKOCYTES POC: NORMAL
URINE NITRITES POC: NEGATIVE

## 2022-01-13 PROCEDURE — 81003 URINALYSIS AUTO W/O SCOPE: CPT | Performed by: INTERNAL MEDICINE

## 2022-01-13 PROCEDURE — 99214 OFFICE O/P EST MOD 30 MIN: CPT | Performed by: INTERNAL MEDICINE

## 2022-01-13 NOTE — PROGRESS NOTES
Chief Complaint   Patient presents with    Urinary Frequency     follow up     3 most recent PHQ Screens 1/13/2022   PHQ Not Done -   Little interest or pleasure in doing things Several days   Feeling down, depressed, irritable, or hopeless Several days   Total Score PHQ 2 2   Trouble falling or staying asleep, or sleeping too much -   Feeling tired or having little energy -   Poor appetite, weight loss, or overeating -   Feeling bad about yourself - or that you are a failure or have let yourself or your family down -   Trouble concentrating on things such as school, work, reading, or watching TV -   Moving or speaking so slowly that other people could have noticed; or the opposite being so fidgety that others notice -     Learning Assessment 1/13/2022   PRIMARY LEARNER Patient   PRIMARY LANGUAGE ENGLISH   LEARNER PREFERENCE PRIMARY READING     -   ANSWERED BY patient   RELATIONSHIP SELF     Fall Risk Assessment, last 12 mths 1/13/2022   Able to walk? Yes   Fall in past 12 months? 1   Do you feel unsteady? 0   Are you worried about falling 0   Is TUG test greater than 12 seconds? 0   Is the gait abnormal? 0   Number of falls in past 12 months 2   Fall with injury? 1     Abuse Screening Questionnaire 1/13/2022   Do you ever feel afraid of your partner? N   Are you in a relationship with someone who physically or mentally threatens you? N   Is it safe for you to go home?  Y     ADL Assessment 1/13/2022   Feeding yourself No Help Needed   Getting from bed to chair No Help Needed   Getting dressed No Help Needed   Bathing or showering No Help Needed   Walk across the room (includes cane/walker) No Help Needed   Using the telphone No Help Needed   Taking your medications No Help Needed   Preparing meals Help Needed   Managing money (expenses/bills) Help Needed   Moderately strenuous housework (laundry) Help Needed   Shopping for personal items (toiletries/medicines) Help Needed   Shopping for groceries Help Needed Driving Help Needed   Climbing a flight of stairs Help Needed   Getting to places beyond walking distances Help Needed     1. Have you been to the ER, urgent care clinic since your last visit? Hospitalized since your last visit? No    2. Have you seen or consulted any other health care providers outside of the [address] [place] [address] [place] since your last visit? Include any pap smears or colon screening. No      Chief Complaint   Patient presents with    Urinary Frequency     follow up         Visit Vitals  /60 (BP 1 Location: Left arm, BP Patient Position: Sitting, BP Cuff Size: Adult long)   Pulse 94   Temp 98.5 °F (36.9 °C) (Temporal)   Resp 17   Ht 5' 1\" (1.549 m)   Wt 126 lb 3.2 oz (57.2 kg)   SpO2 98%   BMI 23.85 kg/m²       Pain Scale: 0 - No pain/10  Pain Location:     Odalis Pineda is a 80 y.o. female presenting for/with:    Urinary Frequency (follow up)      Symptom review:    NO  Fever   NO  Shaking chills  NO  Cough  NO  Body aches  NO  Coughing up blood  NO  Chest congestion  NO  Chest pain  NO  Shortness of breath  NO  Profound Loss of smell/taste  NO  Nausea/Vomiting   NO  Loose stool/Diarrhea  NO  any skin issues    Patient Risk Factors Reviewed as follows:  NO  have you been in Close contact with confirmed COVID19 patient   NO  History of recent travel to affected geographical areas within the past 14 days  NO  COPD  NO  Active Cancer/Leukemia/Lymphoma/Chemotherapy  NO  Oral steroid use  NO  Pregnant  NO  Diabetes Mellitus  NO  Heart disease  NO  Asthma  NO Health care worker at home  NO Health care worker  NO Is there a Pregnant Woman in the home  NO Dialysis pt in the home   NO a large number of people living in the home    Recent Travel Screening and Travel History documentation     Travel Screening     Question   Response    In the last month, have you been in contact with someone who was confirmed or suspected to have Coronavirus / COVID-19?   No / Unsure    Have you had a COVID-19 viral test in the last 14 days? No    Do you have any of the following new or worsening symptoms? None of these    Have you traveled internationally or domestically in the last month?   No      Travel History   Travel since 12/13/21    No documented travel since 12/13/21       Results for orders placed or performed in visit on 01/13/22   AMB POC URINALYSIS DIP STICK AUTO W/O MICRO   Result Value Ref Range    Color (UA POC) Yellow     Clarity (UA POC) Cloudy     Glucose (UA POC) Negative Negative    Bilirubin (UA POC) Negative Negative    Ketones (UA POC) Trace Negative    Specific gravity (UA POC) 1.010 1.001 - 1.035    Blood (UA POC) Negative Negative    pH (UA POC) 5.0 4.6 - 8.0    Protein (UA POC) Negative Negative    Urobilinogen (UA POC) 0.2 mg/dL 0.2 - 1    Nitrites (UA POC) Negative Negative    Leukocyte esterase (UA POC) Trace Negative

## 2022-01-13 NOTE — TELEPHONE ENCOUNTER
Pt scheduled for CT scan on 01/20/2022. Arrival time 8:30am for 10:15 scan. Pt aware NPO 4 hrs prior to scan, no pain meds and only water until after scan. LVM with daughter with detailed information.

## 2022-01-20 ENCOUNTER — HOSPITAL ENCOUNTER (OUTPATIENT)
Dept: CT IMAGING | Age: 87
Discharge: HOME | End: 2022-01-20
Attending: INTERNAL MEDICINE
Payer: MEDICARE

## 2022-01-20 DIAGNOSIS — E27.8 ADRENAL MASS (HCC): ICD-10-CM

## 2022-01-20 DIAGNOSIS — R93.3 ABNORMAL FINDINGS ON DIAGNOSTIC IMAGING OF OTHER PARTS OF DIGESTIVE TRACT: ICD-10-CM

## 2022-01-20 PROCEDURE — 74176 CT ABD & PELVIS W/O CONTRAST: CPT

## 2022-02-24 NOTE — PROGRESS NOTES
Ms. Will Puente is a 80 y.o. female who is here for evaluation of   Chief Complaint   Patient presents with    Diabetes     2 month follow up    Restless Leg Syndrome    Depression    Urgency    Allergies     eyes pink/puffy, red area on cheek   . ASSESSMENT AND PLAN:    1. Type 2 diabetes mellitus with hyperglycemia, without long-term current use of insulin ([facility])  We will check labs next visit  2. Adrenal mass ([facility])  Unchanged based on CT  3. RLS (restless legs syndrome)  4. Reactive depression  Clinically stable  5. Conjunctivitis, facial rash, hoarseness-could she have COVID? Allergies due to mold? Checking Covid test today. Eye hygiene measures discussed with patient including Johnsons no tears shampoo every morning to the eyelashes, TobraDex eyedrops for the next several days, and Hytone hydrocortisone 2.5% cream to the right malar area. It would be best if she could move from her current environment to a safer  1 and the daughter will make sure that this will happen. Orders Placed This Encounter    AMB POC URINALYSIS DIP STICK MANUAL W/O MICRO           HPI 49-year-old woman who has a history of type 2 diabetes, stable adrenal mass, restless leg syndrome and reactive depression. Remains on metformin and bupropion in addition to Mirapex. Arrives for interval assessment. Over the last several days she has noted itching in her eyes and hoarseness in her voice. Her current house has mold problems. Recently recovering from UTI. Would like to leave a sample today to see if her symptoms have been eradicated by her recent second course of antibiotics. ROS:  Denies  fever, chills, cough, chest pain, SOB,  nausea, vomiting, or diarrhea. Denies wt loss, wt gain, hemoptysis, hematochezia or melena.     Physical Examination:    Visit Vitals  /74   Pulse 96   Temp 97.8 °F (36.6 °C) (Temporal)   Resp 20   Ht 5' 1\" (1.549 m)   Wt 131 lb 3.2 oz (59.5 kg)   SpO2 97%   BMI 24.79 kg/m² General:  Alert, cooperative, no distress. She is hoarse. Head:  Normocephalic, without obvious abnormality, atraumatic. Eyes:   Bilateral conjunctival erythema and injection. . Pupils equal, round, reactive to light. Extraocular movements intact. Lungs:   Clear to auscultation bilaterally. Chest wall:  No tenderness or deformity. Cardiac:  RRR   Abdomen:   Soft, non-tender. Bowel sounds normal. No masses. No organomegaly. Extremities: Extremities normal, atraumatic, no cyanosis or edema. Pulses: 2+ and symmetric all extremities. Skin: Skin color, texture, turgor normal. No rashes or lesions. Lymph nodes: Cervical, supraclavicular, and axillary nodes normal.   Neurologic: CNII-XII intact. Normal strength, sensation, and reflexes throughout. On this date 03/10/2022 I have spent 30 minutes reviewing previous notes, test results and face to face with the patient discussing the diagnosis and importance of compliance with the treatment plan as well as documenting on the day of the visit.     Joann Begum MD FACP    (signed electronically) on 3/10/2022 at 5:26 PM

## 2022-03-10 ENCOUNTER — OFFICE VISIT (OUTPATIENT)
Dept: FAMILY MEDICINE CLINIC | Age: 87
End: 2022-03-10
Payer: MEDICARE

## 2022-03-10 ENCOUNTER — TELEPHONE (OUTPATIENT)
Dept: FAMILY MEDICINE CLINIC | Age: 87
End: 2022-03-10

## 2022-03-10 VITALS
HEIGHT: 61 IN | TEMPERATURE: 97.8 F | BODY MASS INDEX: 24.77 KG/M2 | SYSTOLIC BLOOD PRESSURE: 136 MMHG | OXYGEN SATURATION: 97 % | DIASTOLIC BLOOD PRESSURE: 74 MMHG | HEART RATE: 96 BPM | RESPIRATION RATE: 20 BRPM | WEIGHT: 131.2 LBS

## 2022-03-10 DIAGNOSIS — E11.65 TYPE 2 DIABETES MELLITUS WITH HYPERGLYCEMIA, WITHOUT LONG-TERM CURRENT USE OF INSULIN (HCC): Primary | ICD-10-CM

## 2022-03-10 DIAGNOSIS — F32.9 REACTIVE DEPRESSION: ICD-10-CM

## 2022-03-10 DIAGNOSIS — R35.0 FREQUENCY OF URINATION: ICD-10-CM

## 2022-03-10 DIAGNOSIS — G25.81 RLS (RESTLESS LEGS SYNDROME): ICD-10-CM

## 2022-03-10 DIAGNOSIS — L25.9 CONTACT DERMATITIS, UNSPECIFIED CONTACT DERMATITIS TYPE, UNSPECIFIED TRIGGER: ICD-10-CM

## 2022-03-10 DIAGNOSIS — H10.33 ACUTE CONJUNCTIVITIS OF BOTH EYES, UNSPECIFIED ACUTE CONJUNCTIVITIS TYPE: ICD-10-CM

## 2022-03-10 DIAGNOSIS — U07.1 COVID-19: ICD-10-CM

## 2022-03-10 DIAGNOSIS — E27.8 ADRENAL MASS (HCC): ICD-10-CM

## 2022-03-10 DIAGNOSIS — Z20.822 ENCOUNTER FOR SCREENING LABORATORY TESTING FOR SEVERE ACUTE RESPIRATORY SYNDROME CORONAVIRUS 2 (SARS-COV-2): ICD-10-CM

## 2022-03-10 LAB
BILIRUB UR QL STRIP: NEGATIVE
GLUCOSE UR-MCNC: NEGATIVE MG/DL
KETONES P FAST UR STRIP-MCNC: NEGATIVE MG/DL
PH UR STRIP: 5.5 [PH] (ref 4.6–8)
PROT UR QL STRIP: NEGATIVE
SARS-COV-2 POC: NEGATIVE
SP GR UR STRIP: 1.02 (ref 1–1.03)
UA UROBILINOGEN AMB POC: NORMAL (ref 0.2–1)
URINALYSIS CLARITY POC: NORMAL
URINALYSIS COLOR POC: YELLOW
URINE BLOOD POC: NORMAL
URINE LEUKOCYTES POC: NORMAL
URINE NITRITES POC: POSITIVE

## 2022-03-10 PROCEDURE — 99214 OFFICE O/P EST MOD 30 MIN: CPT | Performed by: INTERNAL MEDICINE

## 2022-03-10 PROCEDURE — 81002 URINALYSIS NONAUTO W/O SCOPE: CPT | Performed by: INTERNAL MEDICINE

## 2022-03-10 PROCEDURE — 87426 SARSCOV CORONAVIRUS AG IA: CPT | Performed by: INTERNAL MEDICINE

## 2022-03-10 RX ORDER — NITROFURANTOIN 25; 75 MG/1; MG/1
100 CAPSULE ORAL 2 TIMES DAILY
Qty: 10 CAPSULE | Refills: 0 | Status: SHIPPED | OUTPATIENT
Start: 2022-03-10 | End: 2022-05-02 | Stop reason: ALTCHOICE

## 2022-03-10 RX ORDER — HYDROCORTISONE 25 MG/G
CREAM TOPICAL 2 TIMES DAILY
Qty: 30 G | Refills: 0 | Status: SHIPPED | OUTPATIENT
Start: 2022-03-10

## 2022-03-10 RX ORDER — TOBRAMYCIN AND DEXAMETHASONE 3; 1 MG/ML; MG/ML
1 SUSPENSION/ DROPS OPHTHALMIC
Qty: 5 ML | Refills: 1 | Status: SHIPPED | OUTPATIENT
Start: 2022-03-10 | End: 2022-03-20

## 2022-03-10 NOTE — PROGRESS NOTES
Iver Favre is a 80 y.o. female presenting for/with:    Chief Complaint   Patient presents with    Diabetes     2 month follow up    Restless Leg Syndrome    Depression    Urgency    Allergies     eyes pink/puffy, red area on cheek       Visit Vitals  /74   Pulse 96   Temp 97.8 °F (36.6 °C) (Temporal)   Resp 20   Ht 5' 1\" (1.549 m)   Wt 131 lb 3.2 oz (59.5 kg)   SpO2 97%   BMI 24.79 kg/m²     Pain Scale: 0 - No pain/10  Pain Location:     1. Have you been to the ER, urgent care clinic since your last visit? Hospitalized since your last visit? NO    2. Have you seen or consulted any other health care providers outside of the [address] [place] [address] since your last visit? Include any pap smears or colon screening. YES (audiologist)    Symptom review:  NO  Fever   NO  Shaking chills  NO  Cough  NO  Body aches  NO  Coughing up blood  NO  Chest congestion  NO  Chest pain  NO  Shortness of breath  NO  Profound Loss of smell/taste  NO  Nausea/Vomiting   NO  Loose stool/Diarrhea  YES  any skin issues    Patient Risk Factors Reviewed as follows:  NO  have you been in Close contact with confirmed COVID19 patient   NO  History of recent travel to affected geographical areas within the past 14 days  NO  COPD  NO  Active Cancer/Leukemia/Lymphoma/Chemotherapy  NO  Oral steroid use  NO  Pregnant  YES  Diabetes Mellitus  NO  Heart disease  NO  Asthma  NO Health care worker at home  [address] care worker  NO Is there a Pregnant Woman in the home  NO Dialysis pt in the home   No a large number of people living in the home    Learning Assessment 1/13/2022   PRIMARY LEARNER Patient   PRIMARY LANGUAGE ENGLISH   LEARNER PREFERENCE PRIMARY READING     -   ANSWERED BY patient   RELATIONSHIP SELF     Fall Risk Assessment, last 12 mths 3/10/2022   Able to walk? Yes   Fall in past 12 months? 1   Do you feel unsteady? 0   Are you worried about falling 0   Is TUG test greater than 12 seconds?  -   Is the gait abnormal? 0 Number of falls in past 12 months 2   Fall with injury? 1       3 most recent PHQ Screens 3/10/2022   PHQ Not Done -   Little interest or pleasure in doing things Several days   Feeling down, depressed, irritable, or hopeless Several days   Total Score PHQ 2 2   Trouble falling or staying asleep, or sleeping too much -   Feeling tired or having little energy -   Poor appetite, weight loss, or overeating -   Feeling bad about yourself - or that you are a failure or have let yourself or your family down -   Trouble concentrating on things such as school, work, reading, or watching TV -   Moving or speaking so slowly that other people could have noticed; or the opposite being so fidgety that others notice -     Abuse Screening Questionnaire 3/10/2022   Do you ever feel afraid of your partner? N   Are you in a relationship with someone who physically or mentally threatens you? N   Is it safe for you to go home? Y       ADL Assessment 3/10/2022   Feeding yourself No Help Needed   Getting from bed to chair No Help Needed   Getting dressed No Help Needed   Bathing or showering No Help Needed   Walk across the room (includes cane/walker) No Help Needed   Using the telphone No Help Needed   Taking your medications Help Needed   Preparing meals Help Needed   Managing money (expenses/bills) Help Needed   Moderately strenuous housework (laundry) No Help Needed   Shopping for personal items (toiletries/medicines) No Help Needed   Shopping for groceries No Help Needed   Driving Help Needed   Climbing a flight of stairs Help Needed   Getting to places beyond walking distances Help Needed      No advanced directives on file. Verified emergency contact.

## 2022-03-10 NOTE — TELEPHONE ENCOUNTER
Pt's daughter Theda Goodpasture notified of results. I did let her know we will send urine to lab and that you will send script to pharmacy. Theda Goodpasture asks that you send abx to the [facility] in [place], she will pick it up in the morning. She also said thank you very much.

## 2022-03-14 LAB
BACTERIA SPEC CULT: ABNORMAL
CC UR VC: ABNORMAL
SERVICE CMNT-IMP: ABNORMAL

## 2022-03-18 PROBLEM — F32.A DEPRESSION: Status: ACTIVE | Noted: 2017-08-02

## 2022-03-18 PROBLEM — K86.89 PANCREATIC MASS: Status: ACTIVE | Noted: 2021-07-06

## 2022-03-18 PROBLEM — I35.0 AORTIC STENOSIS: Status: ACTIVE | Noted: 2021-07-06

## 2022-03-18 PROBLEM — E11.42 DIABETIC PERIPHERAL NEUROPATHY ASSOCIATED WITH TYPE 2 DIABETES MELLITUS (HCC): Status: ACTIVE | Noted: 2017-01-24

## 2022-03-18 PROBLEM — I65.23 STENOSIS OF BOTH INTERNAL CAROTID ARTERIES: Status: ACTIVE | Noted: 2017-01-24

## 2022-03-19 PROBLEM — L02.416 CELLULITIS AND ABSCESS OF LEFT LEG: Status: ACTIVE | Noted: 2020-12-29

## 2022-03-19 PROBLEM — R65.10 SIRS (SYSTEMIC INFLAMMATORY RESPONSE SYNDROME) (HCC): Status: ACTIVE | Noted: 2021-07-04

## 2022-03-19 PROBLEM — E53.8 B12 DEFICIENCY: Status: ACTIVE | Noted: 2017-01-24
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
## 2022-03-19 PROBLEM — E78.2 MIXED HYPERLIPIDEMIA: Status: ACTIVE | Noted: 2017-08-02

## 2022-03-19 PROBLEM — L03.90 CELLULITIS: Status: ACTIVE | Noted: 2020-12-29

## 2022-03-19 PROBLEM — E55.9 VITAMIN D DEFICIENCY: Status: ACTIVE | Noted: 2017-01-24

## 2022-03-19 PROBLEM — L03.116 CELLULITIS AND ABSCESS OF LEFT LEG: Status: ACTIVE | Noted: 2020-12-29

## 2022-03-19 PROBLEM — L20.9 ATOPIC DERMATITIS: Status: ACTIVE | Noted: 2021-01-02

## 2022-03-19 PROBLEM — I05.0 MITRAL STENOSIS: Status: ACTIVE | Noted: 2021-07-06

## 2022-03-19 PROBLEM — E27.8 ADRENAL MASS (HCC): Status: ACTIVE | Noted: 2021-07-06

## 2022-03-20 PROBLEM — R41.3 MEMORY DIFFICULTIES: Status: ACTIVE | Noted: 2017-01-24

## 2022-03-20 PROBLEM — R01.1 SYSTOLIC MURMUR: Status: ACTIVE | Noted: 2020-12-29

## 2022-03-20 PROBLEM — I67.89 CEREBRAL MICROVASCULAR DISEASE: Status: ACTIVE | Noted: 2017-01-24

## 2022-04-11 ENCOUNTER — OFFICE VISIT (OUTPATIENT)
Dept: FAMILY MEDICINE CLINIC | Age: 87
End: 2022-04-11
Payer: MEDICARE

## 2022-04-11 VITALS
BODY MASS INDEX: 25 KG/M2 | HEIGHT: 61 IN | WEIGHT: 132.4 LBS | RESPIRATION RATE: 17 BRPM | HEART RATE: 88 BPM | OXYGEN SATURATION: 98 % | SYSTOLIC BLOOD PRESSURE: 100 MMHG | DIASTOLIC BLOOD PRESSURE: 65 MMHG | TEMPERATURE: 97.8 F

## 2022-04-11 DIAGNOSIS — R35.0 URINARY FREQUENCY: Primary | ICD-10-CM

## 2022-04-11 DIAGNOSIS — S80.851A: ICD-10-CM

## 2022-04-11 LAB
BILIRUB UR QL STRIP: NEGATIVE
GLUCOSE UR-MCNC: NORMAL MG/DL
KETONES P FAST UR STRIP-MCNC: NORMAL MG/DL
PH UR STRIP: 5.5 [PH] (ref 4.6–8)
PROT UR QL STRIP: NEGATIVE
SP GR UR STRIP: 1.03 (ref 1–1.03)
UA UROBILINOGEN AMB POC: NORMAL (ref 0.2–1)
URINALYSIS CLARITY POC: NORMAL
URINALYSIS COLOR POC: NORMAL
URINE BLOOD POC: NORMAL
URINE LEUKOCYTES POC: NORMAL
URINE NITRITES POC: NEGATIVE

## 2022-04-11 PROCEDURE — 81003 URINALYSIS AUTO W/O SCOPE: CPT | Performed by: INTERNAL MEDICINE

## 2022-04-11 PROCEDURE — 99214 OFFICE O/P EST MOD 30 MIN: CPT | Performed by: INTERNAL MEDICINE

## 2022-04-11 RX ORDER — NITROFURANTOIN 25; 75 MG/1; MG/1
100 CAPSULE ORAL 2 TIMES DAILY
Qty: 10 CAPSULE | Refills: 0 | Status: SHIPPED | OUTPATIENT
Start: 2022-04-11 | End: 2022-04-14 | Stop reason: ALTCHOICE

## 2022-04-11 NOTE — PROGRESS NOTES
Vera Monroe is a 80 y.o. female presenting for/with:    Chief Complaint   Patient presents with    Leg Injury     right leg non healing wound       Visit Vitals  /65 (BP 1 Location: Right arm, BP Patient Position: Sitting, BP Cuff Size: Adult long)   Pulse 88   Temp 97.8 °F (36.6 °C) (Temporal)   Resp 17   Ht 5' 1\" (1.549 m)   Wt 132 lb 6.4 oz (60.1 kg)   SpO2 98%   BMI 25.02 kg/m²     Pain Scale: 0 - No pain/10  Pain Location:       3 most recent PHQ Screens 4/11/2022   PHQ Not Done -   Little interest or pleasure in doing things Several days   Feeling down, depressed, irritable, or hopeless Several days   Total Score PHQ 2 2   Trouble falling or staying asleep, or sleeping too much -   Feeling tired or having little energy -   Poor appetite, weight loss, or overeating -   Feeling bad about yourself - or that you are a failure or have let yourself or your family down -   Trouble concentrating on things such as school, work, reading, or watching TV -   Moving or speaking so slowly that other people could have noticed; or the opposite being so fidgety that others notice -     Learning Assessment 4/11/2022   PRIMARY LEARNER Patient   PRIMARY LANGUAGE ENGLISH   LEARNER PREFERENCE PRIMARY READING     -   ANSWERED BY pt   RELATIONSHIP SELF     Fall Risk Assessment, last 12 mths 4/11/2022   Able to walk? Yes   Fall in past 12 months? 1   Do you feel unsteady? 0   Are you worried about falling 0   Is TUG test greater than 12 seconds? 0   Is the gait abnormal? 0   Number of falls in past 12 months 2   Fall with injury? 1     Abuse Screening Questionnaire 4/11/2022   Do you ever feel afraid of your partner? N   Are you in a relationship with someone who physically or mentally threatens you? N   Is it safe for you to go home?  Y     ADL Assessment 4/11/2022   Feeding yourself No Help Needed   Getting from bed to chair No Help Needed   Getting dressed No Help Needed   Bathing or showering Help Needed   Walk across the room (includes cane/walker) Help Needed   Using the telphone Help Needed   Taking your medications Help Needed   Preparing meals Help Needed   Managing money (expenses/bills) Help Needed   Moderately strenuous housework (laundry) Help Needed   Shopping for personal items (toiletries/medicines) Help Needed   Shopping for groceries Help Needed   Driving Help Needed   Climbing a flight of stairs Help Needed   Getting to places beyond walking distances Help Needed       1. \"Have you been to the ER, urgent care clinic since your last visit? Hospitalized since your last visit? \" No    2. \"Have you seen or consulted any other health care providers outside of the [address] [place] [address] since your last visit? \" No     3. For patients aged 39-70: Has the patient had a colonoscopy / FIT/ Cologuard? No      If the patient is female:    4. For patients aged 41-77: Has the patient had a mammogram within the past 2 years? No      5. For patients aged 21-65: Has the patient had a pap smear?  No          Symptom review:    NO  Fever   NO  Shaking chills  NO  Cough  NO  Body aches  NO  Coughing up blood  NO  Chest congestion  NO  Chest pain  NO  Shortness of breath  NO  Profound Loss of smell/taste  NO  Nausea/Vomiting   NO  Loose stool/Diarrhea  NO  any skin issues    Patient Risk Factors Reviewed as follows:  NO  have you been in Close contact with confirmed COVID19 patient   NO  History of recent travel to affected geographical areas within the past 14 days  NO  COPD  NO  Active Cancer/Leukemia/Lymphoma/Chemotherapy  NO  Oral steroid use  NO  Pregnant  YES Diabetes Mellitus  NO  Heart disease  NO  Asthma  NO Health care worker at home  NO Health care worker  NO Is there a Pregnant Woman in the home  NO Dialysis pt in the home   NO a large number of people living in the home  Recent Travel Screening and Travel History documentation     Travel Screening     Question   Response    In the last 10 days, have you been in contact with someone who was confirmed or suspected to have Coronavirus/COVID-19? No / Unsure    Have you had a COVID-19 viral test in the last 10 days? No    Do you have any of the following new or worsening symptoms? None of these    Have you traveled internationally or domestically in the last month?   No      Travel History   Travel since 03/11/22    No documented travel since 03/11/22       Results for orders placed or performed in visit on 04/11/22   AMB POC URINALYSIS DIP STICK AUTO W/O MICRO   Result Value Ref Range    Color (UA POC) Dark Yellow     Clarity (UA POC) Cloudy     Glucose (UA POC) 2+ Negative    Bilirubin (UA POC) Negative Negative    Ketones (UA POC) Trace Negative    Specific gravity (UA POC) 1.030 1.001 - 1.035    Blood (UA POC) Trace Negative    pH (UA POC) 5.5 4.6 - 8.0    Protein (UA POC) Negative Negative    Urobilinogen (UA POC) 0.2 mg/dL 0.2 - 1    Nitrites (UA POC) Negative Negative    Leukocyte esterase (UA POC) 1+ Negative             Advance Care Planning 4/11/2022   Patient's Healthcare Decision Maker is: Legal Next of Kin   Confirm Advance Directive None   Patient Would Like to Complete Advance Directive No

## 2022-04-11 NOTE — PROGRESS NOTES
Ms. Marissa Estrada is a 80 y.o. female who is here for evaluation of   Chief Complaint   Patient presents with    Leg Injury     right leg non healing wound   . ASSESSMENT AND PLAN:    1. Urinary frequency  Sending urine for culture today  - nitrofurantoin, macrocrystal-monohydrate, (Macrobid) 100 mg capsule; Take 1 Capsule by mouth two (2) times a day for 5 days. Dispense: 10 Capsule; Refill: 0    2. Superficial foreign body of right lower extremity without major open wound without infection, initial encounter  This wound does not appear infected and we will continue to watch it. Vaseline only. If symptoms worsen return to clinic      Orders Placed This Encounter    nitrofurantoin, macrocrystal-monohydrate, (Macrobid) 100 mg capsule     Sig: Take 1 Capsule by mouth two (2) times a day for 5 days. Dispense:  10 Capsule     Refill:  0           HPI 55-year-old woman who arrives today with a right leg wound which she presents for inspection to determine whether or not she needs antibiotics and also for symptoms of urinary frequency. ROS:  Denies  fever, chills, cough, chest pain, SOB,  nausea, vomiting, or diarrhea. Denies wt loss, wt gain, hemoptysis, hematochezia or melena. Physical Examination:    Visit Vitals  /65 (BP 1 Location: Right arm, BP Patient Position: Sitting, BP Cuff Size: Adult long)   Pulse 88   Temp 97.8 °F (36.6 °C) (Temporal)   Resp 17   Ht 5' 1\" (1.549 m)   Wt 132 lb 6.4 oz (60.1 kg)   SpO2 98%   BMI 25.02 kg/m²      General:  Alert, cooperative, no distress. Head:  Normocephalic, without obvious abnormality, atraumatic. Eyes:  Conjunctivae/corneas clear. Pupils equal, round, reactive to light. Extraocular movements intact. Lungs:   Clear to auscultation bilaterally. Chest wall:  No tenderness or deformity. Cardiac:  RRR   Abdomen:   Soft, non-tender. Bowel sounds normal. No masses. No organomegaly.    Extremities:     Extremities normal, atraumatic, no cyanosis or edema.   Pulses: 2+ and symmetric all extremities. Skin: Skin color, texture, turgor normal. No rashes or lesions. Lymph nodes: Cervical, supraclavicular, and axillary nodes normal.   Neurologic:  Somewhat tired appearing but moves all extremities and walks without assistance. Speech is fluent     On this date 04/11/2022 I have spent 30 minutes reviewing previous notes, test results and face to face with the patient discussing the diagnosis and importance of compliance with the treatment plan as well as documenting on the day of the visit.     Zack Ashby MD FACP    (signed electronically) on 4/11/2022 at 2:55 PM

## 2022-04-14 DIAGNOSIS — R35.0 URINARY FREQUENCY: Primary | ICD-10-CM

## 2022-04-14 LAB
BACTERIA SPEC CULT: ABNORMAL
CC UR VC: ABNORMAL
SERVICE CMNT-IMP: ABNORMAL

## 2022-04-14 RX ORDER — CIPROFLOXACIN 250 MG/1
250 TABLET, FILM COATED ORAL EVERY 12 HOURS
Qty: 10 TABLET | Refills: 0 | Status: SHIPPED | OUTPATIENT
Start: 2022-04-14 | End: 2022-06-20 | Stop reason: SDUPTHER

## 2022-04-22 ENCOUNTER — OFFICE VISIT (OUTPATIENT)
Dept: NEUROLOGY | Age: 87
End: 2022-04-22
Payer: MEDICARE

## 2022-04-22 VITALS
OXYGEN SATURATION: 98 % | DIASTOLIC BLOOD PRESSURE: 70 MMHG | SYSTOLIC BLOOD PRESSURE: 110 MMHG | RESPIRATION RATE: 18 BRPM

## 2022-04-22 DIAGNOSIS — G25.81 RLS (RESTLESS LEGS SYNDROME): ICD-10-CM

## 2022-04-22 DIAGNOSIS — G31.84 MILD COGNITIVE IMPAIRMENT: ICD-10-CM

## 2022-04-22 DIAGNOSIS — E11.42 DIABETIC PERIPHERAL NEUROPATHY ASSOCIATED WITH TYPE 2 DIABETES MELLITUS (HCC): Primary | ICD-10-CM

## 2022-04-22 PROCEDURE — G8419 CALC BMI OUT NRM PARAM NOF/U: HCPCS | Performed by: PSYCHIATRY & NEUROLOGY

## 2022-04-22 PROCEDURE — 1090F PRES/ABSN URINE INCON ASSESS: CPT | Performed by: PSYCHIATRY & NEUROLOGY

## 2022-04-22 PROCEDURE — G8427 DOCREV CUR MEDS BY ELIG CLIN: HCPCS | Performed by: PSYCHIATRY & NEUROLOGY

## 2022-04-22 PROCEDURE — G9717 DOC PT DX DEP/BP F/U NT REQ: HCPCS | Performed by: PSYCHIATRY & NEUROLOGY

## 2022-04-22 PROCEDURE — 99214 OFFICE O/P EST MOD 30 MIN: CPT | Performed by: PSYCHIATRY & NEUROLOGY

## 2022-04-22 PROCEDURE — 1101F PT FALLS ASSESS-DOCD LE1/YR: CPT | Performed by: PSYCHIATRY & NEUROLOGY

## 2022-04-22 PROCEDURE — G8536 NO DOC ELDER MAL SCRN: HCPCS | Performed by: PSYCHIATRY & NEUROLOGY

## 2022-04-22 RX ORDER — PRAMIPEXOLE DIHYDROCHLORIDE 0.25 MG/1
TABLET ORAL
Qty: 90 TABLET | Refills: 5 | Status: SHIPPED | OUTPATIENT
Start: 2022-04-22

## 2022-04-22 NOTE — PATIENT INSTRUCTIONS
[facility]   Statement to Patients  April 1, 2014      In an effort to ensure the large volume of patient prescription refills is processed in the most efficient and expeditious manner, we are asking our patients to assist us by calling your Pharmacy for all prescription refills, this will include also your  Mail Order Pharmacy. The pharmacy will contact our office electronically to continue the refill process. Please do not wait until the last minute to call your pharmacy. We need at least 48 hours (2days) to fill prescriptions. We also encourage you to call your pharmacy before going to  your prescription to make sure it is ready. With regard to controlled substance prescription refill requests (narcotic refills) that need to be picked up at our office, we ask your cooperation by providing us with at least 72 hours (3days) notice that you will need a refill. We will not refill narcotic prescription refill requests after 4:00pm on any weekday, Monday through Thursday, or after 2:00pm on Fridays, or on the weekends. We encourage everyone to explore another way of getting your prescription refill request processed using [employer], our patient web portal through our electronic medical record system. [employer] is an efficient and effective way to communicate your medication request directly to the office and  downloadable as an abram on your smart phone . [employer] also features a review functionality that allows you to view your medication list as well as leave messages for your physician. Are you ready to get connected? If so please review the attatched instructions or speak to any of our staff to get you set up right away! Thank you so much for your cooperation. Should you have any questions please contact our Practice Administrator.     The Physicians and Staff,  [facility]

## 2022-04-22 NOTE — PROGRESS NOTES
Neurology Clinic Follow up Note    Patient ID:  Eris Pacheco  [id number]  12 y.o.  1935      Ms. Mehul Christian is here for follow up today of  Chief Complaint   Patient presents with    Memory Loss          Last Appointment With Me:  10/20/2021    \"Odalis Pineda is presenting for evaluation of atypical movements. These were initially noted by her physical therapist who is following her for chronic imbalance/falls. PT reported intermittent myoclonic jerking of the lower extremities b/l L>R with passive movements. Denies similar symptoms into the upper extremities. Family has also noted intermittent mouth movements and truncal swaying when patient is seated. She has a previous diagnosis of RLS on ropinirole over the past 15 years per pt/family. H/O depression/anxiety but no prior exposure to antipsychotics per pt/family. She describes a need to move the legs with LE paresthesias/numbness affecting sleep. Ropinirole does appear to be beneficial for RLS symptoms. She has tried gabapentin in the past but did not tolerate the medication. Other ROS: memory impairment-no signs of dementia on prior neuropsychologic testing per family, previously felt to be secondary to anxiety/depression. +Gait instability/falls. Denies tremors. \"    Interval History:   Patient returns for f/u of RLS, DM related polyneuropathy confirmed on recent EMG and MCI. RLS symptoms are stable since last visit. She describes this as a sensation of needing to move the limbs. Walking tends to improve symptoms. She has been taking pramipexole AM dose intermittently, qhs dose consistently. She feels medication is helpful- she reports sleeping well. Denies significant side effects with medication. In regards to her peripheral neuropathy, she did suffer a recent fall which patient does not completely recall. She suffered some superficial injuries to the RLE. Denies significant paresthesias. HbA1C>8 on last check.      She has completed neuropsychologic testing last 10/2021 which showed evidence of MCI. She reports some short term recall deficits, ongoing, non-progressive. She is receiving assistance with medications (daughter is loading her pill box). Finances are handled by family. She is no longer driving. Family has noted some decline in cognitive function, particular recall. PMHx/ PSHx/ FHx/ SHx:  Reviewed and unchanged previous visit. Past Medical History:   Diagnosis Date    Anxiety     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    bursitis    Depression     Diabetes (HCC)     GERD (gastroesophageal reflux disease)     H/O hand fracture     Hyperlipidemia     Menopause     Osteopenia     Recurrent UTI     RLS (restless legs syndrome)          ROS:  Comprehensive review of systems negative except for as noted above. Objective:       Meds:  Current Outpatient Medications   Medication Sig Dispense Refill    hydrocortisone (HYTONE) 2.5 % topical cream Apply  to affected area two (2) times a day. use thin layer 30 g 0    nitrofurantoin, macrocrystal-monohydrate, (Macrobid) 100 mg capsule Take 1 Capsule by mouth two (2) times a day. 10 Capsule 0    metFORMIN ER (GLUCOPHAGE XR) 500 mg tablet Take 1 Tablet by mouth two (2) times a day. Indications: type 2 diabetes mellitus 180 Tablet 4    buPROPion XL (WELLBUTRIN XL) 150 mg tablet Take 1 Tablet by mouth daily. Indications: major depressive disorder 90 Tablet 4    pramipexole (MIRAPEX) 0.25 mg tablet Take 0.25mg in the afternoon and 0.50 qhs. 90 Tablet 5    OTHER 50 mg. CBD patch apply every 8 hours daily.  cyanocobalamin (Vitamin B-12) 1,000 mcg tablet Take 1,000 mcg by mouth daily.       OTHER Indications: vitamin d spray      Blood-Glucose Meter misc Use to test glucose once daily, E 11.9   Relion meter please 1 Each 0    RELION PRIME TEST STRIPS strip USE 1 TEST STRIP EACH TIME TO TEST GLUCOSE 2 TIMES A  Strip 11       Exam:  Visit Vitals  /70 Resp 18   SpO2 98%     NEUROLOGICAL EXAM:  General: Awake, alert, speech fluent. Oriented to date, place, self. CN: PERRL, EOMI without nystagmus, VFF to confrontation, facial sensation and strength are normal and symmetric, hearing is intact to finger rub bilaterally, palate and tongue movements are intact and symmetric. Motor: Normal tone, bulk and strength bilaterally. Reflexes: Deferred  Coordination: FNF, LUBNA, HTS intact. Sensation: LT intact throughout. Gait: Steady    LABS  Component      Latest Ref Rng & Units 6/24/2021 6/24/2021 6/24/2021           2:06 PM  2:06 PM  2:06 PM   Protein, total      6.0 - 8.5 g/dL      Albumin      2.9 - 4.4 g/dL      Alpha-1-globulin      0.0 - 0.4 g/dL      ALPHA-2 GLOBULIN      0.4 - 1.0 g/dL      Beta globulin      0.7 - 1.3 g/dL      Gamma globulin      0.4 - 1.8 g/dL      M-Guillermo      Not Observed g/dL      Globulin, total      2.2 - 3.9 g/dL      A/G ratio      0.7 - 1.7      Please note            METHYLMALONIC ACID, SERUM      0 - 378 nmol/L   172   Disclaimer         Comment   TSH      0.450 - 4.500 uIU/mL  2.480    Ferritin      15 - 150 ng/mL 84       Component      Latest Ref Rng & Units 6/24/2021           2:06 PM   Protein, total      6.0 - 8.5 g/dL 6.6   Albumin      2.9 - 4.4 g/dL 3.7   Alpha-1-globulin      0.0 - 0.4 g/dL 0.2   ALPHA-2 GLOBULIN      0.4 - 1.0 g/dL 0.9   Beta globulin      0.7 - 1.3 g/dL 1.1   Gamma globulin      0.4 - 1.8 g/dL 0.7   M-Guillermo      Not Observed g/dL Not Observed   Globulin, total      2.2 - 3.9 g/dL 2.9   A/G ratio      0.7 - 1.7 1.3   Please note       Comment   METHYLMALONIC ACID, SERUM      0 - 378 nmol/L    Disclaimer          TSH      0.450 - 4.500 uIU/mL    Ferritin      15 - 150 ng/mL      IMAGING:  MRI Results (most recent):  No results found for this or any previous visit.     EMG  Impression:  Extensive electrodiagnostic examination of the right lower extremity and additional nerve conduction studies of the left lower extremity reveals the followin. 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 2. No evidence of a right lumbosacral motor radiculopathy. Assessment:     Encounter Diagnoses     ICD-10-CM ICD-9-CM   1. Diabetic peripheral neuropathy associated with type 2 diabetes mellitus (HCC)  E11.42 250.60     357.2   2. RLS (restless legs syndrome)  G25.81 333.94   3. Mild cognitive impairment  G31.84 35.83     80year old pleasant female referred for evaluation of gait instability/falls, lower extremity paresthesias, RLS, cognitive deficits in the setting of comorbid anxiety/depression. 1. Imbalance/falls with lower extremity paresthesias-Initial examination c/w length-dependent polyneuropathy. Suspect related to uncontrolled DM. No evidence of contributing metabolic derangements on recent laboratory investigations. EMG confirmed a generalized length-dependent polyneuropathy, axon loss and moderate in degree electrically. Discussed need for strict glucose control to prevent further progression. This may contribute to gait instability. 2. RLS-Weaned off of Requip secondary to associated dyskinesias. Start Lyrica but did not tolerate this due to side effects. Also with h/o intolerance to gabapentin in the past. Currently maintained on pramipexole and doing much better. She will monitor for any associated fatigue or impulsivity on medication. 3. Cognitive impairment-Family reports some interval decline, although patient denies. Repeat neuropsychologic testing was completed 10/7/21 suggestive of possible mild cognitive impairment with recommendations for repeat testing in 12 months.     Plan:   · Pramipexole 0.25mg in the AM and 0.5mg qhs for RLS  · Discussed strict glucose control to prevent progression of DM related polyneuropathy  · Discussed need for ongoing assistance with medications/finances due to MCI. Will plan for repeat neuropsychologic testing prior to next follow up (~10/2022)       Follow-up and Dispositions    · Return in about 6 months (around 10/22/2022). I have discussed the diagnosis with the patient today and the intended plan as seen in the above orders with both the patient as well as referring provider and/or PCP via electronic correspondence. The patient has received an after-visit summary and questions were answered concerning future plans. I have discussed medication side effects and warnings with the patient as well.       Signed:  Zandra Johnson DO  4/22/2022

## 2022-04-30 NOTE — PROGRESS NOTES
Ms. Mehul Christian is a 80 y.o. female who is here for evaluation of   Chief Complaint   Patient presents with    Annual Wellness Visit    Diabetes     Reports glucose has been varied. Ranges form 260s to 120s. This morning was 269.  Medication Evaluation     has multivitamin and B complex in patch form changes patches daily    Bladder Infection     Daughter is concerned that patient may have UTI still, requests sample   . ASSESSMENT AND PLAN: We will send urine for culture. This time she only has a trace of leukocyte Estrace. Her nitrate and blood are negative. 1. Medicare annual wellness visit, subsequent  2. Type 2 diabetes mellitus with hyperglycemia, without long-term current use of insulin ([facility])  A reasonable A1c target for this 80year-old would be between 8 and 8.5. Substantial data supports the observation of an increased all cause mortality and cardiovascular mortality in elderly patients who have been treated for lower A1c readings. 3. 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 A follow-up CT later in 2022 did not show any significant interval change. 4. Reactive depression  She remains on Wellbutrin  5. Diabetic peripheral neuropathy associated with type 2 diabetes mellitus ([facility] [address]  Continue working with diet and exercise. Orders Placed This Encounter    CULTURE, URINE     Standing Status:   Future     Number of Occurrences:   1     Standing Expiration Date:   5/2/2023    AMB POC URINALYSIS DIP STICK AUTO W/O MICRO    multivitamin (ONE A DAY) tablet     Sig: Take 1 Tablet by mouth daily. In patch form           HPI 44-year-old woman who has type 2 diabetes, vitamin deficiencies including B12 and D, and an abnormal CT with interval assessments unchanged last year, arrives for evaluation of these and other problems in addition to a subsequent annual wellness visit.     Depression has been treated with Wellbutrin and her symptoms fluctuate. She has a very supportive family and her daughters are frequently in attendance to many of her medical visits. ROS:  Denies  fever, chills, cough, chest pain, SOB,  nausea, vomiting, or diarrhea. Denies wt loss, wt gain, hemoptysis, hematochezia or melena. Physical Examination:    Visit Vitals  /74 (BP 1 Location: Left upper arm, BP Patient Position: Sitting, BP Cuff Size: Adult long)   Pulse 86   Temp 97.8 °F (36.6 °C) (Temporal)   Resp 18   Ht 5' 1\" (1.549 m)   Wt 135 lb (61.2 kg)   SpO2 98%   BMI 25.51 kg/m²      General:  Alert, cooperative, no distress. Head:  Normocephalic, without obvious abnormality, atraumatic. Eyes:  Conjunctivae/corneas clear. Pupils equal, round, reactive to light. Extraocular movements intact. Lungs:   Clear to auscultation bilaterally. Chest wall:  No tenderness or deformity. Cardiac:  RRR   Abdomen:   Soft, non-tender. Bowel sounds normal. No masses. No organomegaly. Extremities: Extremities normal, atraumatic, no cyanosis or edema. Pulses: 2+ and symmetric all extremities. Skin: Skin color, texture, turgor normal. No rashes or lesions. Lymph nodes: Cervical, supraclavicular, and axillary nodes normal.   Neurologic:  She is bradykinetic, speech is slow, walks without assistance, kyphotic, moves all extremities. On this date 05/02/2022 I have spent 30 minutes reviewing previous notes, test results and face to face with the patient discussing the diagnosis and importance of compliance with the treatment plan as well as documenting on the day of the visit.     Stephane Mccauley MD FACP    (signed electronically) on 5/2/2022 at 10:26 AM

## 2022-05-02 ENCOUNTER — OFFICE VISIT (OUTPATIENT)
Dept: FAMILY MEDICINE CLINIC | Age: 87
End: 2022-05-02
Payer: MEDICARE

## 2022-05-02 VITALS
HEART RATE: 86 BPM | SYSTOLIC BLOOD PRESSURE: 112 MMHG | DIASTOLIC BLOOD PRESSURE: 74 MMHG | BODY MASS INDEX: 25.49 KG/M2 | WEIGHT: 135 LBS | TEMPERATURE: 97.8 F | OXYGEN SATURATION: 98 % | HEIGHT: 61 IN | RESPIRATION RATE: 18 BRPM

## 2022-05-02 DIAGNOSIS — R93.89 ABNORMAL CAT SCAN: ICD-10-CM

## 2022-05-02 DIAGNOSIS — E11.42 DIABETIC PERIPHERAL NEUROPATHY ASSOCIATED WITH TYPE 2 DIABETES MELLITUS (HCC): ICD-10-CM

## 2022-05-02 DIAGNOSIS — Z00.00 MEDICARE ANNUAL WELLNESS VISIT, SUBSEQUENT: Primary | ICD-10-CM

## 2022-05-02 DIAGNOSIS — E11.65 TYPE 2 DIABETES MELLITUS WITH HYPERGLYCEMIA, WITHOUT LONG-TERM CURRENT USE OF INSULIN (HCC): ICD-10-CM

## 2022-05-02 DIAGNOSIS — N39.0 URINARY TRACT INFECTION WITHOUT HEMATURIA, SITE UNSPECIFIED: ICD-10-CM

## 2022-05-02 DIAGNOSIS — F32.9 REACTIVE DEPRESSION: ICD-10-CM

## 2022-05-02 LAB
BILIRUB UR QL STRIP: NORMAL
GLUCOSE UR-MCNC: NORMAL MG/DL
KETONES P FAST UR STRIP-MCNC: NORMAL MG/DL
PH UR STRIP: 5.5 [PH] (ref 4.6–8)
PROT UR QL STRIP: NORMAL
SP GR UR STRIP: 1.02 (ref 1–1.03)
UA UROBILINOGEN AMB POC: NORMAL (ref 0.2–1)
URINALYSIS CLARITY POC: CLEAR
URINALYSIS COLOR POC: NORMAL
URINE BLOOD POC: NEGATIVE
URINE LEUKOCYTES POC: NORMAL
URINE NITRITES POC: NEGATIVE

## 2022-05-02 PROCEDURE — G0439 PPPS, SUBSEQ VISIT: HCPCS | Performed by: INTERNAL MEDICINE

## 2022-05-02 PROCEDURE — 99214 OFFICE O/P EST MOD 30 MIN: CPT | Performed by: INTERNAL MEDICINE

## 2022-05-02 PROCEDURE — 81003 URINALYSIS AUTO W/O SCOPE: CPT | Performed by: INTERNAL MEDICINE

## 2022-05-02 RX ORDER — BISMUTH SUBSALICYLATE 262 MG
1 TABLET,CHEWABLE ORAL DAILY
COMMUNITY

## 2022-05-02 NOTE — PATIENT INSTRUCTIONS
Medicare Wellness Visit, Female     The best way to live healthy is to have a lifestyle where you eat a well-balanced diet, exercise regularly, limit alcohol use, and quit all forms of tobacco/nicotine, if applicable. Regular preventive services are another way to keep healthy. Preventive services (vaccines, screening tests, monitoring & exams) can help personalize your care plan, which helps you manage your own care. Screening tests can find health problems at the earliest stages, when they are easiest to treat. [facility] follows the current, evidence-based guidelines published by the [facility] ([facility]) when recommending preventive services for our patients. Because we follow these guidelines, sometimes recommendations change over time as research supports it. (For example, mammograms used to be recommended annually. Even though Medicare will still pay for an annual mammogram, the newer guidelines recommend a mammogram every two years for women of average risk). Of course, you and your doctor may decide to screen more often for some diseases, based on your risk and your co-morbidities (chronic disease you are already diagnosed with). Preventive services for you include:  - Medicare offers their members a free annual wellness visit, which is time for you and your primary care provider to discuss and plan for your preventive service needs. Take advantage of this benefit every year!  -All adults over the age of 72 should receive the recommended pneumonia vaccines. Current USPSTF guidelines recommend a series of two vaccines for the best pneumonia protection.   -All adults should have a flu vaccine yearly and a tetanus vaccine every 10 years.   -All adults age 48 and older should receive the shingles vaccines (series of two vaccines).       -All adults age 38-68 who are overweight should have a diabetes screening test once every three years.   -All adults born between [birth year] and 1965 should be screened once for Hepatitis C.  -Other screening tests and preventive services for persons with diabetes include: an eye exam to screen for diabetic retinopathy, a kidney function test, a foot exam, and stricter control over your cholesterol.   -Cardiovascular screening for adults with routine risk involves an electrocardiogram (ECG) at intervals determined by your doctor.   -Colorectal cancer screenings should be done for adults age 54-65 with no increased risk factors for colorectal cancer. There are a number of acceptable methods of screening for this type of cancer. Each test has its own benefits and drawbacks. Discuss with your doctor what is most appropriate for you during your annual wellness visit. The different tests include: colonoscopy (considered the best screening method), a fecal occult blood test, a fecal DNA test, and sigmoidoscopy.    -A bone mass density test is recommended when a woman turns 65 to screen for osteoporosis. This test is only recommended one time, as a screening. Some providers will use this same test as a disease monitoring tool if you already have osteoporosis. -Breast cancer screenings are recommended every other year for women of normal risk, age 54-69.  -Cervical cancer screenings for women over age 72 are only recommended with certain risk factors.      Here is a list of your current Health Maintenance items (your personalized list of preventive services) with a due date:  Health Maintenance Due   Topic Date Due    COVID-19 Vaccine (2 - Booster for [employer] series) 05/10/2021    Eye Exam  08/28/2021

## 2022-05-02 NOTE — PROGRESS NOTES
Tim Munoz is a 80 y.o. female presenting for/with:    Chief Complaint   Patient presents with    Annual Wellness Visit    Diabetes     Reports glucose has been varied. Ranges form 260s to 120s. This morning was 269.  Medication Evaluation     has multivitamin and B complex in patch form changes patches daily    Bladder Infection     Daughter is concerned that patient may have UTI still, requests sample       Visit Vitals  /74 (BP 1 Location: Left upper arm, BP Patient Position: Sitting, BP Cuff Size: Adult long)   Pulse 86   Temp 97.8 °F (36.6 °C) (Temporal)   Resp 18   Ht 5' 1\" (1.549 m)   Wt 135 lb (61.2 kg)   SpO2 98%   BMI 25.51 kg/m²     Pain Scale: 0 - No pain/10  Pain Location:     1. \"Have you been to the ER, urgent care clinic since your last visit? Hospitalized since your last visit? \" No    2. \"Have you seen or consulted any other health care providers outside of the [address] [place] [address] since your last visit? \" No     3. For patients aged 39-70: Has the patient had a colonoscopy / FIT/ Cologuard? NA - based on age      If the patient is female:    4. For patients aged 41-77: Has the patient had a mammogram within the past 2 years? NA - based on age or sex      11. For patients aged 21-65: Has the patient had a pap smear?  No      Symptom review:  NO  Fever   NO  Shaking chills  NO  Cough  NO  Body aches  NO  Coughing up blood  NO  Chest congestion  NO  Chest pain  NO  Shortness of breath  NO  Profound Loss of smell/taste  NO  Nausea/Vomiting   NO  Loose stool/Diarrhea  NO  any skin issues    Patient Risk Factors Reviewed as follows:  NO  have you been in Close contact with confirmed COVID19 patient   NO  History of recent travel to affected geographical areas within the past 14 days  NO  COPD  NO  Active Cancer/Leukemia/Lymphoma/Chemotherapy  NO  Oral steroid use  NO  Pregnant  YES  Diabetes Mellitus  YES  Heart disease  NO  Asthma  NO Health care worker at home  [address] care worker  NO Is there a Pregnant Woman in the home  NO Dialysis pt in the home   NO a large number of people living in the home    Learning Assessment 4/11/2022   PRIMARY LEARNER Patient   PRIMARY LANGUAGE ENGLISH   LEARNER PREFERENCE PRIMARY READING     -   ANSWERED BY pt   RELATIONSHIP SELF     Fall Risk Assessment, last 12 mths 5/2/2022   Able to walk? Yes   Fall in past 12 months? 0   Do you feel unsteady? 1   Are you worried about falling 1   Is TUG test greater than 12 seconds? -   Is the gait abnormal? 1   Number of falls in past 12 months 0   Fall with injury? -       3 most recent PHQ Screens 5/2/2022   PHQ Not Done -   Little interest or pleasure in doing things Several days   Feeling down, depressed, irritable, or hopeless More than half the days   Total Score PHQ 2 3   Trouble falling or staying asleep, or sleeping too much More than half the days   Feeling tired or having little energy Several days   Poor appetite, weight loss, or overeating Several days   Feeling bad about yourself - or that you are a failure or have let yourself or your family down More than half the days   Trouble concentrating on things such as school, work, reading, or watching TV Not at all   Moving or speaking so slowly that other people could have noticed; or the opposite being so fidgety that others notice Nearly every day   Thoughts of being better off dead, or hurting yourself in some way Not at all   PHQ 9 Score 12     Abuse Screening Questionnaire 5/2/2022   Do you ever feel afraid of your partner? N   Are you in a relationship with someone who physically or mentally threatens you? N   Is it safe for you to go home?  Y       ADL Assessment 5/2/2022   Feeding yourself No Help Needed   Getting from bed to chair No Help Needed   Getting dressed No Help Needed   Bathing or showering No Help Needed   Walk across the room (includes cane/walker) No Help Needed   Using the telphone No Help Needed   Taking your medications Help Needed Preparing meals Help Needed   Managing money (expenses/bills) Help Needed   Moderately strenuous housework (laundry) Help Needed   Shopping for personal items (toiletries/medicines) Help Needed   Shopping for groceries Help Needed   Driving Help Needed   Climbing a flight of stairs Help Needed   Getting to places beyond walking distances Help Needed      Advance Care Planning 5/2/2022   Patient's Healthcare Decision Maker is: Legal Next jaron Iniguez [address] Directive Yes, not on file   Patient Would Like to Complete Advance Directive -        This is the Subsequent Medicare Annual Wellness Exam, performed 12 months or more after the Initial AWV or the last Subsequent AWV    I have reviewed the patient's medical history in detail and updated the computerized patient record. Assessment/Plan   Education and counseling provided:  Are appropriate based on today's review and evaluation    1. Medicare annual wellness visit, subsequent  2. Type 2 diabetes mellitus with hyperglycemia, without long-term current use of insulin (HCC)  -     AMB POC URINALYSIS DIP STICK AUTO W/O MICRO  3. Abnormal CAT scan  4. Reactive depression  5. Diabetic peripheral neuropathy associated with type 2 diabetes mellitus ([facility] [address]  6.  Urinary tract infection without hematuria, site unspecified  -     AMB POC URINALYSIS DIP STICK AUTO W/O MICRO       Depression Risk Factor Screening     3 most recent PHQ Screens 5/2/2022   PHQ Not Done -   Little interest or pleasure in doing things Several days   Feeling down, depressed, irritable, or hopeless More than half the days   Total Score PHQ 2 3   Trouble falling or staying asleep, or sleeping too much More than half the days   Feeling tired or having little energy Several days   Poor appetite, weight loss, or overeating Several days   Feeling bad about yourself - or that you are a failure or have let yourself or your family down More than half the days   Trouble concentrating on things such as school, work, reading, or watching TV Not at all   Moving or speaking so slowly that other people could have noticed; or the opposite being so fidgety that others notice Nearly every day   Thoughts of being better off dead, or hurting yourself in some way Not at all   PHQ 9 Score 12       Alcohol & Drug Abuse Risk Screen    Do you average more than 1 drink per night or more than 7 drinks a week:  No    On any one occasion in the past three months have you have had more than 3 drinks containing alcohol:  No          Functional Ability and Level of Safety    Hearing: Hearing is good. Activities of Daily Living: The home contains: no safety equipment. Patient does total self care      Ambulation: with no difficulty     Fall Risk:  Fall Risk Assessment, last 12 mths 5/2/2022   Able to walk? Yes   Fall in past 12 months? 0   Do you feel unsteady? 1   Are you worried about falling 1   Is TUG test greater than 12 seconds? -   Is the gait abnormal? 1   Number of falls in past 12 months 0   Fall with injury?  -      Abuse Screen:  Patient is not abused       Cognitive Screening    Has your family/caregiver stated any concerns about your memory: no at baseline       Health Maintenance Due     Health Maintenance Due   Topic Date Due    COVID-19 Vaccine (2 - Booster for [employer] series) 05/10/2021    Eye Exam Retinal or Dilated  08/28/2021       Patient Care Team   Patient Care Team:  Ashley Morrow MD as PCP - General (Internal Medicine)  Ashley Morrow MD as PCP [facility]  Sneha Lopes DO as Physician (Neurology)    History     Patient Active Problem List   Diagnosis Code    Hyperlipidemia E78.5    Anxiety F41.9    Type 2 diabetes mellitus without complication ([address] T58.7    RLS (restless legs syndrome) G25.81    Diabetic peripheral neuropathy associated with type 2 diabetes mellitus ([address] E11.42    Memory difficulties R41.3    B12 deficiency E53.8    Vitamin D deficiency E55.9  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    Cerebral microvascular disease I67.89    Mixed hyperlipidemia E78.2    Depression F32. 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 90 Tablet 5    hydrocortisone (HYTONE) 2.5 % topical cream Apply  to affected area two (2) times a day. use thin layer 30 g 0    nitrofurantoin, macrocrystal-monohydrate, (Macrobid) 100 mg capsule Take 1 Capsule by mouth two (2) times a day. 10 Capsule 0    metFORMIN ER (GLUCOPHAGE XR) 500 mg tablet Take 1 Tablet by mouth two (2) times a day. Indications: type 2 diabetes mellitus 180 Tablet 4    buPROPion XL (WELLBUTRIN XL) 150 mg tablet Take 1 Tablet by mouth daily. Indications: major depressive disorder 90 Tablet 4    OTHER 50 mg. CBD patch apply every 8 hours daily.  cyanocobalamin (Vitamin B-12) 1,000 mcg tablet Take 1,000 mcg by mouth daily.       OTHER Indications: vitamin d spray      Blood-Glucose Meter misc Use to test glucose once daily, E 11.9   Relion meter please 1 Each 0    RELION PRIME TEST STRIPS strip USE 1 TEST STRIP EACH TIME TO TEST GLUCOSE 2 TIMES A  Strip 11     Allergies   Allergen Reactions    Doxycycline Rash    Sulfa (Sulfonamide Antibiotics) Unknown (comments)       Family History   Problem Relation Age of Onset    Cancer Mother         breast/ lung    Cancer Father         prostate    Migraines Father     Cancer Sister         breast    Heart Disease Maternal Grandmother      Social History     Tobacco Use    Smoking status: Former Smoker     Packs/day: 0.50     Years: 20.00     Pack years: 10.00     Types: Cigarettes     Quit date: 1960     Years since quittin.8    Smokeless tobacco: Never Used   Substance Use Topics    Alcohol use:  Yes     Alcohol/week: 3.0 standard drinks     Types: 3 Glasses of wine per week         [facility]

## 2022-05-03 LAB
BACTERIA SPEC CULT: NORMAL
CC UR VC: NORMAL
SERVICE CMNT-IMP: NORMAL

## 2022-06-14 NOTE — PROGRESS NOTES
Ms. Markie Browning is a 80 y.o. female who is here for evaluation of   Chief Complaint   Patient presents with    Urinary Frequency     Daughter reports poor intake. PT reports frequency. Painful urination at times at end of stream    Depression    Skin Problem     (R)LE (+) tenderness to touch. (+) raised area. .       ASSESSMENT AND PLAN:    1. Diabetic peripheral neuropathy associated with type 2 diabetes mellitus (HCC)  Clinically stable. May try wearing socks to bed for the neuropathy symptoms    2. Urinary frequency  Clearly has UTI. Sending for culture. Empirically treating with Cipro which worked last time  - AMB POC URINALYSIS DIP STICK AUTO W/O MICRO  - CULTURE, URINE; Future  - CULTURE, URINE  - ciprofloxacin HCl (CIPRO) 250 mg tablet; Take 1 Tablet by mouth every twelve (12) hours for 5 days. Dispense: 10 Tablet; Refill: 0    3. Reactive depression  Continue bupropion    4. Skin nodule  Will refer to Dr. Allison Mccollum for excision      Orders Placed This Encounter    CULTURE, URINE     Standing Status:   Future     Number of Occurrences:   1     Standing Expiration Date:   6/20/2023    AMB POC URINALYSIS DIP STICK AUTO W/O MICRO    ciprofloxacin HCl (CIPRO) 250 mg tablet     Sig: Take 1 Tablet by mouth every twelve (12) hours for 5 days. Dispense:  10 Tablet     Refill:  0           HPI  51-year-old woman with a history of type 2 diabetes and peripheral neuropathy associated with restless leg syndrome, depression and anxiety, and hypertension. She has a history of vitamin B12 and vitamin D deficiency and is on replacement therapy. She returns today for interval assessment. Her blood pressures have generally been well controlled over the last couple of months. Her children are doing a great job looking after her.     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  We have opted to follow this sequentially with imaging prior to any invasive diagnostic approaches. Notes that she has had some urinary symptoms with burning and frequency lately. Also complains of a nodule on the right anterior shin. ROS:  Denies  fever, chills, cough, chest pain, SOB,  nausea, vomiting, or diarrhea. Denies wt loss, wt gain, hemoptysis, hematochezia or melena. Physical Examination:    Visit Vitals  /78 (BP 1 Location: Left upper arm, BP Patient Position: Sitting, BP Cuff Size: Adult long)   Pulse 83   Temp (!) 96.6 °F (35.9 °C) (Temporal)   Resp 17   Ht 5' 1\" (1.549 m)   Wt 138 lb 3.2 oz (62.7 kg)   SpO2 99%   BMI 26.11 kg/m²      General:  Alert, cooperative, no distress. Head:  Normocephalic, without obvious abnormality, atraumatic. Eyes:  Conjunctivae/corneas clear. Pupils equal, round, reactive to light. Extraocular movements intact. Lungs:   Clear to auscultation bilaterally. Chest wall:  No tenderness or deformity. Cardiac:  RRR   Abdomen:   Soft, non-tender. Bowel sounds normal. No masses. No organomegaly. Extremities: Extremities normal, atraumatic, no cyanosis or edema. Pulses: 2+ and symmetric all extremities. Skin:                  Lymph nodes: Cervical, supraclavicular, and axillary nodes normal.   Neurologic: CNII-XII intact. Normal strength, sensation, and reflexes throughout. On this date 06/20/2022 I have spent 30 minutes reviewing previous notes, test results and face to face with the patient discussing the diagnosis and importance of compliance with the treatment plan as well as documenting on the day of the visit.     Jesse Macias MD FACP    (signed electronically) on 6/20/2022 at 2:05 PM

## 2022-06-20 ENCOUNTER — OFFICE VISIT (OUTPATIENT)
Dept: FAMILY MEDICINE CLINIC | Age: 87
End: 2022-06-20
Payer: MEDICARE

## 2022-06-20 VITALS
BODY MASS INDEX: 26.09 KG/M2 | DIASTOLIC BLOOD PRESSURE: 78 MMHG | RESPIRATION RATE: 17 BRPM | WEIGHT: 138.2 LBS | OXYGEN SATURATION: 99 % | TEMPERATURE: 96.6 F | SYSTOLIC BLOOD PRESSURE: 132 MMHG | HEIGHT: 61 IN | HEART RATE: 83 BPM

## 2022-06-20 DIAGNOSIS — E11.42 DIABETIC PERIPHERAL NEUROPATHY ASSOCIATED WITH TYPE 2 DIABETES MELLITUS (HCC): Primary | ICD-10-CM

## 2022-06-20 DIAGNOSIS — R22.9 SKIN NODULE: ICD-10-CM

## 2022-06-20 DIAGNOSIS — R35.0 URINARY FREQUENCY: ICD-10-CM

## 2022-06-20 DIAGNOSIS — F32.9 REACTIVE DEPRESSION: ICD-10-CM

## 2022-06-20 LAB
BILIRUB UR QL STRIP: NEGATIVE
GLUCOSE UR-MCNC: NEGATIVE MG/DL
KETONES P FAST UR STRIP-MCNC: NEGATIVE MG/DL
PH UR STRIP: 5.5 [PH] (ref 4.6–8)
PROT UR QL STRIP: NEGATIVE
SP GR UR STRIP: 1 (ref 1–1.03)
UA UROBILINOGEN AMB POC: NORMAL (ref 0.2–1)
URINALYSIS CLARITY POC: NORMAL
URINALYSIS COLOR POC: NORMAL
URINE BLOOD POC: NORMAL
URINE LEUKOCYTES POC: NORMAL
URINE NITRITES POC: NEGATIVE

## 2022-06-20 PROCEDURE — 81003 URINALYSIS AUTO W/O SCOPE: CPT | Performed by: INTERNAL MEDICINE

## 2022-06-20 PROCEDURE — 99214 OFFICE O/P EST MOD 30 MIN: CPT | Performed by: INTERNAL MEDICINE

## 2022-06-20 RX ORDER — CIPROFLOXACIN 250 MG/1
250 TABLET, FILM COATED ORAL EVERY 12 HOURS
Qty: 10 TABLET | Refills: 0 | Status: SHIPPED | OUTPATIENT
Start: 2022-06-20 | End: 2022-06-23 | Stop reason: ALTCHOICE

## 2022-06-20 NOTE — PROGRESS NOTES
Jayesh Rutledge is a 80 y.o. female presenting for/with:    Chief Complaint   Patient presents with    Urinary Frequency     Daughter reports poor intake. PT reports frequency. Painful urination at times at end of stream    Depression    Skin Problem     (R)LE (+) tenderness to touch. (+) raised area. Visit Vitals  /78 (BP 1 Location: Left upper arm, BP Patient Position: Sitting, BP Cuff Size: Adult long)   Pulse 83   Temp (!) 96.6 °F (35.9 °C) (Temporal)   Resp 17   Ht 5' 1\" (1.549 m)   Wt 138 lb 3.2 oz (62.7 kg)   SpO2 99%   BMI 26.11 kg/m²     Pain Scale: 0 - No pain/10  Pain Location:     1. \"Have you been to the ER, urgent care clinic since your last visit? Hospitalized since your last visit? \" No    2. \"Have you seen or consulted any other health care providers outside of the [address] [place] [address] since your last visit? \" No     3. For patients aged 39-70: Has the patient had a colonoscopy / FIT/ Cologuard? No      If the patient is female:    4. For patients aged 41-77: Has the patient had a mammogram within the past 2 years? NA - based on age or sex      11. For patients aged 21-65: Has the patient had a pap smear?  NA - based on age or sex      Symptom review:  NO  Fever   NO  Shaking chills  NO  Cough  NO  Body aches  NO  Coughing up blood  NO  Chest congestion  NO  Chest pain  NO  Shortness of breath  NO  Profound Loss of smell/taste  NO  Nausea/Vomiting   NO  Loose stool/Diarrhea  NO  any skin issues    Patient Risk Factors Reviewed as follows:  NO  have you been in Close contact with confirmed COVID19 patient   NO  History of recent travel to affected geographical areas within the past 14 days  NO  COPD  NO  Active Cancer/Leukemia/Lymphoma/Chemotherapy  NO  Oral steroid use  NO  Pregnant  YES  Diabetes Mellitus  YES  Heart disease  NO  Asthma  NO Health care worker at home  NO Health care worker  NO Is there a Pregnant Woman in the home  NO Dialysis pt in the home   NO a large number of people living in the home    Learning Assessment 4/11/2022   PRIMARY LEARNER Patient   PRIMARY LANGUAGE ENGLISH   LEARNER PREFERENCE PRIMARY READING     -   ANSWERED BY pt   RELATIONSHIP SELF     Fall Risk Assessment, last 12 mths 6/20/2022   Able to walk? Yes   Fall in past 12 months? 1   Do you feel unsteady? 1   Are you worried about falling 1   Is TUG test greater than 12 seconds? -   Is the gait abnormal? 1   Number of falls in past 12 months 1   Fall with injury? 0       3 most recent PHQ Screens 6/20/2022   PHQ Not Done -   Little interest or pleasure in doing things Several days   Feeling down, depressed, irritable, or hopeless More than half the days   Total Score PHQ 2 3   Trouble falling or staying asleep, or sleeping too much Not at all   Feeling tired or having little energy Nearly every day   Poor appetite, weight loss, or overeating More than half the days   Feeling bad about yourself - or that you are a failure or have let yourself or your family down Nearly every day   Trouble concentrating on things such as school, work, reading, or watching TV Not at all   Moving or speaking so slowly that other people could have noticed; or the opposite being so fidgety that others notice Several days   Thoughts of being better off dead, or hurting yourself in some way Not at all   PHQ 9 Score 12   How difficult have these problems made it for you to do your work, take care of your home and get along with others Somewhat difficult     Abuse Screening Questionnaire 6/20/2022   Do you ever feel afraid of your partner? N   Are you in a relationship with someone who physically or mentally threatens you? N   Is it safe for you to go home?  Y       ADL Assessment 6/20/2022   Feeding yourself No Help Needed   Getting from bed to chair No Help Needed   Getting dressed No Help Needed   Bathing or showering No Help Needed   Walk across the room (includes cane/walker) No Help Needed   Using the telphone No Help Needed   Taking your medications No Help Needed   Preparing meals No Help Needed   Managing money (expenses/bills) No Help Needed   Moderately strenuous housework (laundry) Help Needed   Shopping for personal items (toiletries/medicines) Help Needed   Shopping for groceries Help Needed   Driving Help Needed   Climbing a flight of stairs No Help Needed   Getting to places beyond walking distances Help Needed      Advance Care Planning 5/2/2022   Patient's Healthcare Decision Maker is: Legal Next of Kin   Confirm Advance Directive Yes, not on file   Patient Would Like to Complete Advance Directive -      Results for orders placed or performed in visit on 06/20/22   AMB POC URINALYSIS DIP STICK AUTO W/O MICRO   Result Value Ref Range    Color (UA POC) Dark Lisa     Clarity (UA POC) Turbid     Glucose (UA POC) Negative Negative    Bilirubin (UA POC) Negative Negative    Ketones (UA POC) Negative Negative    Specific gravity (UA POC) 1.005 1.001 - 1.035    Blood (UA POC) Trace Negative    pH (UA POC) 5.5 4.6 - 8.0    Protein (UA POC) Negative Negative    Urobilinogen (UA POC) 0.2 mg/dL 0.2 - 1    Nitrites (UA POC) Negative Negative    Leukocyte esterase (UA POC) 4+ Negative

## 2022-06-23 ENCOUNTER — TELEPHONE (OUTPATIENT)
Dept: FAMILY MEDICINE CLINIC | Age: 87
End: 2022-06-23

## 2022-06-23 LAB
BACTERIA SPEC CULT: ABNORMAL
BACTERIA SPEC CULT: ABNORMAL
CC UR VC: ABNORMAL
SERVICE CMNT-IMP: ABNORMAL

## 2022-06-23 RX ORDER — CLINDAMYCIN HYDROCHLORIDE 300 MG/1
300 CAPSULE ORAL 3 TIMES DAILY
Qty: 9 CAPSULE | Refills: 0 | Status: SHIPPED | OUTPATIENT
Start: 2022-06-23 | End: 2022-06-26

## 2022-06-23 NOTE — PROGRESS NOTES
Kevin Bruce notified by phone of lab results. Sens to Levoflox. Switching to Clinda 300 mg TID for 3 days.

## 2022-06-28 ENCOUNTER — OFFICE VISIT (OUTPATIENT)
Dept: FAMILY MEDICINE CLINIC | Age: 87
End: 2022-06-28
Payer: MEDICARE

## 2022-06-28 ENCOUNTER — HOSPITAL ENCOUNTER (OUTPATIENT)
Dept: LAB | Age: 87
Discharge: HOME | End: 2022-06-28

## 2022-06-28 VITALS
DIASTOLIC BLOOD PRESSURE: 70 MMHG | HEIGHT: 61 IN | RESPIRATION RATE: 18 BRPM | WEIGHT: 133.5 LBS | BODY MASS INDEX: 25.2 KG/M2 | TEMPERATURE: 97.5 F | HEART RATE: 77 BPM | SYSTOLIC BLOOD PRESSURE: 118 MMHG | OXYGEN SATURATION: 98 %

## 2022-06-28 DIAGNOSIS — D48.5 NEOPLASM OF UNCERTAIN BEHAVIOR OF SKIN: Primary | ICD-10-CM

## 2022-06-28 PROCEDURE — 11302 SHAVE SKIN LESION 1.1-2.0 CM: CPT | Performed by: FAMILY MEDICINE

## 2022-06-28 NOTE — PROGRESS NOTES
Luke Mason is a 80 y.o. female    HPI:  Symptoms include abn skin lesion r shin, here for shave bx. Gradually enlarging. Chart reviewed for the following:   Toni Chaudhry MD, have reviewed the History, Physical and updated the Allergic reactions for Odalis Pelletiereal     TIME OUT performed immediately prior to start of procedure:   I, Lizzeth Fletcher MD, have performed the following reviews on Luke Mason prior to the start of the procedure:            * Patient was identified by name and date of birth   * Agreement on procedure being performed was verified  * Risks and Benefits explained to the patient  * Procedure site verified and marked as necessary  * Patient was positioned for comfort  * Consent was verified  * Pain level 0 pre-procedure. 2:32 PM    Procedure: Shave biopsy. Consent: given. Details: Clean technique. LA with 2% lidocaine with epi 0.5ml with good LA and bernice. The 14mm shin Lesion shaved with slight scoop and placed in formalin for lab analysis. Hemostasis with drysol and direct pressure. Complications: none. EBL: none. Disp: A+O, ambulatory without difficulty. Infection precautions given. Pain level in goal control.

## 2022-06-28 NOTE — PATIENT INSTRUCTIONS
Cuts: Care Instructions  Overview  A cut can happen anywhere on your body. Stitches, staples, skin adhesives, or pieces of tape called Steri-Strips are sometimes used to keep the edges of a cut together and help it heal. Steri-Strips can be used by themselves or with stitches or staples. Sometimes cuts are left open. If the cut went deep and through the skin, the doctor may have closed the cut in two layers. A deeper layer of stitches brings the deep part of the cut together. These stitches will dissolve and don't need to be removed. The upper layer closure, which could be stitches, staples, Steri-Strips, or adhesive, is what you see on the cut. A cut is often covered by a bandage. The doctor has checked you carefully, but problems can develop later. If you notice any problems or new symptoms, get medical treatment right away. Follow-up care is a key part of your treatment and safety. Be sure to make and go to all appointments, and call your doctor if you are having problems. It's also a good idea to know your test results and keep a list of the medicines you take. How can you care for yourself at home? If a cut is open or closed  · Prop up the sore area on a pillow anytime you sit or lie down during the next 3 days. Try to keep it above the level of your heart. This will help reduce swelling. · Keep the cut dry for the first 24 to 48 hours. After this, you can shower if your doctor okays it. Pat the cut dry. · Don't soak the cut, such as in a bathtub. Your doctor will tell you when it's safe to get the cut wet. · After the first 24 to 48 hours, clean the cut with soap and water 2 times a day unless your doctor gives you different instructions. ? Don't use hydrogen peroxide or alcohol, which can slow healing. ? You may cover the cut with a thin layer of petroleum jelly and a nonstick bandage.   ? If the doctor put a bandage over the cut, put on a new bandage after cleaning the cut or if the bandage gets wet or dirty. · Avoid any activity that could cause your cut to reopen. · Be safe with medicines. Read and follow all instructions on the label. ? If the doctor gave you a prescription medicine for pain, take it as prescribed. ? If you are not taking a prescription pain medicine, ask your doctor if you can take an over-the-counter medicine. If the cut is closed with stitches, staples, or Steri-Strips  · Follow the above instructions for open or closed cuts. · Do not remove the stitches or staples on your own. Your doctor will tell you when to come back to have the stitches or staples removed. · Leave Steri-Strips on until they fall off. If the cut is closed with a skin adhesive  · Follow the above instructions for open or closed cuts. · Leave the skin adhesive on your skin until it falls off on its own. This may take 5 to 10 days. · Do not scratch, rub, or pick at the adhesive. · Do not put the sticky part of a bandage directly on the adhesive. · Do not put any kind of ointment, cream, or lotion over the area. This can make the adhesive fall off too soon. Do not use hydrogen peroxide or alcohol, which can slow healing. When should you call for help? Call your doctor now or seek immediate medical care if:    · You have new pain, or your pain gets worse.     · The skin near the cut is cold or pale or changes color.     · You have tingling, weakness, or numbness near the cut.     · The cut starts to bleed, and blood soaks through the bandage. Oozing small amounts of blood is normal.     · You have trouble moving the area near the cut.     · You have symptoms of infection, such as:  ? Increased pain, swelling, warmth, or redness around the cut.  ? Red streaks leading from the cut.  ? Pus draining from the cut.  ? A fever. Watch closely for changes in your health, and be sure to contact your doctor if:    · The cut reopens.     · You do not get better as expected. Where can you learn more?   Go to http://www.gray.com/  Enter M735 in the search box to learn more about \"Cuts: Care Instructions. \"  Current as of: July 1, 2021               Content Version: 13.2  © 7032-4747 [employer]. Care instructions adapted under license by [employer] (which disclaims liability or warranty for this information). If you have questions about a medical condition or this instruction, always ask your healthcare professional. [place] [address] any warranty or liability for your use of this information.

## 2022-06-28 NOTE — PROGRESS NOTES
Visit Vitals  /70 (BP 1 Location: Left arm)   Pulse 77   Temp 97.5 °F (36.4 °C) (Temporal)   Resp 18   Ht 5' 1\" (1.549 m)   Wt 133 lb 8 oz (60.6 kg)   SpO2 98%   BMI 25.22 kg/m²     Chief Complaint   Patient presents with    Cyst     left leg     1. Have you been to the ER, urgent care clinic since your last visit? Hospitalized since your last visit? No    2. Have you seen or consulted any other health care providers outside of the [address] [place] [address] since your last visit? Include any pap smears or colon screening.  No

## 2022-06-29 ENCOUNTER — TELEPHONE (OUTPATIENT)
Dept: FAMILY MEDICINE CLINIC | Age: 87
End: 2022-06-29

## 2022-06-29 NOTE — TELEPHONE ENCOUNTER
Pt requesting a call back in regards to her AVS stating that she was seen for her L leg and the pt stated that it was her r leg

## 2022-07-12 ENCOUNTER — TELEPHONE (OUTPATIENT)
Dept: FAMILY MEDICINE CLINIC | Age: 87
End: 2022-07-12

## 2022-07-12 NOTE — TELEPHONE ENCOUNTER
Requesting a call back in regards to the biopsy results on pt's leg. Stated that the pt now has more on her leg and pictures were sent via Ku on 7/4/22.

## 2022-07-14 NOTE — TELEPHONE ENCOUNTER
Results provided to [facility].  Requests results faxed to Dr Negro Bean with possibility of a sooner appt to discuss additional growths

## 2022-07-20 ENCOUNTER — CLINICAL SUPPORT (OUTPATIENT)
Dept: FAMILY MEDICINE CLINIC | Age: 87
End: 2022-07-20
Payer: MEDICARE

## 2022-07-20 DIAGNOSIS — R35.0 URINARY FREQUENCY: Primary | ICD-10-CM

## 2022-07-20 DIAGNOSIS — N39.0 URINARY TRACT INFECTION WITHOUT HEMATURIA, SITE UNSPECIFIED: ICD-10-CM

## 2022-07-20 LAB
BILIRUB UR QL STRIP: NEGATIVE
GLUCOSE UR-MCNC: NEGATIVE MG/DL
KETONES P FAST UR STRIP-MCNC: NORMAL MG/DL
PH UR STRIP: 5.5 [PH] (ref 4.6–8)
PROT UR QL STRIP: NEGATIVE
SP GR UR STRIP: 1.01 (ref 1–1.03)
UA UROBILINOGEN AMB POC: NORMAL (ref 0.2–1)
URINALYSIS CLARITY POC: CLEAR
URINALYSIS COLOR POC: YELLOW
URINE BLOOD POC: NEGATIVE
URINE LEUKOCYTES POC: NORMAL
URINE NITRITES POC: NEGATIVE

## 2022-07-20 PROCEDURE — 81002 URINALYSIS NONAUTO W/O SCOPE: CPT | Performed by: INTERNAL MEDICINE

## 2022-07-20 NOTE — PROGRESS NOTES
Daughter dropped off urine for follow up test for UTI. Results for orders placed or performed in visit on 07/20/22   AMB POC URINALYSIS DIP STICK MANUAL W/O MICRO   Result Value Ref Range    Color (UA POC) Yellow     Clarity (UA POC) Clear     Glucose (UA POC) Negative Negative    Bilirubin (UA POC) Negative Negative    Ketones (UA POC) Trace Negative    Specific gravity (UA POC) 1.015 1.001 - 1.035    Blood (UA POC) Negative Negative    pH (UA POC) 5.5 4.6 - 8.0    Protein (UA POC) Negative Negative    Urobilinogen (UA POC) 0.2 mg/dL 0.2 - 1    Nitrites (UA POC) Negative Negative    Leukocyte esterase (UA POC) 1+ Negative     If positive, requested send for culture.

## 2022-07-21 LAB
BACTERIA SPEC CULT: NORMAL
CC UR VC: NORMAL
SERVICE CMNT-IMP: NORMAL

## 2022-08-02 ENCOUNTER — OFFICE VISIT (OUTPATIENT)
Dept: FAMILY MEDICINE CLINIC | Age: 87
End: 2022-08-02
Payer: MEDICARE

## 2022-08-02 VITALS
WEIGHT: 136.2 LBS | SYSTOLIC BLOOD PRESSURE: 112 MMHG | BODY MASS INDEX: 25.71 KG/M2 | DIASTOLIC BLOOD PRESSURE: 80 MMHG | RESPIRATION RATE: 18 BRPM | TEMPERATURE: 97.1 F | HEART RATE: 94 BPM | OXYGEN SATURATION: 98 % | HEIGHT: 61 IN

## 2022-08-02 DIAGNOSIS — R35.89 POLYURIA: ICD-10-CM

## 2022-08-02 DIAGNOSIS — E11.65 TYPE 2 DIABETES MELLITUS WITH HYPERGLYCEMIA, WITHOUT LONG-TERM CURRENT USE OF INSULIN (HCC): Primary | ICD-10-CM

## 2022-08-02 DIAGNOSIS — D04.71 SQUAMOUS CELL CARCINOMA IN SITU (SCCIS) OF SKIN OF RIGHT LOWER LEG: ICD-10-CM

## 2022-08-02 PROCEDURE — 99214 OFFICE O/P EST MOD 30 MIN: CPT | Performed by: FAMILY MEDICINE

## 2022-08-02 RX ORDER — ACARBOSE 25 MG/1
25 TABLET ORAL 2 TIMES DAILY WITH MEALS
Qty: 60 TABLET | Refills: 11 | Status: SHIPPED | OUTPATIENT
Start: 2022-08-02 | End: 2022-10-23 | Stop reason: ALTCHOICE

## 2022-08-02 RX ORDER — METFORMIN HYDROCHLORIDE 500 MG/1
TABLET, EXTENDED RELEASE ORAL
Qty: 270 TABLET | Refills: 3 | Status: SHIPPED | OUTPATIENT
Start: 2022-08-02

## 2022-08-02 NOTE — PROGRESS NOTES
Jason Lloyd is a 80 y.o. female    HPI:  Diabetes. Sugars controlled poorly lately, running as high as 350 this week. Has been running 200-250's fasting over past couple months, but gradually climbing lately   Hypoglycemia: none  Tolerating current treatment well  Current medications include metformin 500mg BID    Lab Results   Component Value Date/Time    Hemoglobin A1c 8.7 (H) 11/19/2021 12:00 PM    Hemoglobin A1c 8.4 (H) 08/26/2021 11:33 AM    Hemoglobin A1c 6.9 (H) 04/05/2021 11:18 AM    Glucose 292 (H) 08/26/2021 11:33 AM    Glucose (POC) 223 (H) 07/07/2021 11:58 AM    Microalbumin/Creat ratio (mg/g creat) 18 08/26/2021 11:33 AM    Microalbumin,urine random 1.54 08/26/2021 11:33 AM    LDL, calculated 97 02/03/2020 10:52 AM    Creatinine 0.74 08/26/2021 11:33 AM     Urinary symptoms   include frequency, urgency. Pt does not complain of (or denies) symtoms of hematuria, fevers, chills. Onset of symptoms was 2mo. Symptoms are unchanged. Current treatment includes fluids. PMH, SH, Medications/Allergies: reviewed, on chart. Current Outpatient Medications   Medication Sig    multivitamin (ONE A DAY) tablet Take 1 Tablet by mouth daily. In patch form    pramipexole (MIRAPEX) 0.25 mg tablet Take 0.25mg in the afternoon and 0.50 qhs.    hydrocortisone (HYTONE) 2.5 % topical cream Apply  to affected area two (2) times a day. use thin layer    metFORMIN ER (GLUCOPHAGE XR) 500 mg tablet Take 1 Tablet by mouth two (2) times a day. Indications: type 2 diabetes mellitus    buPROPion XL (WELLBUTRIN XL) 150 mg tablet Take 1 Tablet by mouth daily. Indications: major depressive disorder    OTHER 50 mg. CBD patch apply every 8 hours daily. cyanocobalamin (Vitamin B-12) 1,000 mcg tablet Take 1,000 mcg by mouth daily.     Blood-Glucose Meter misc Use to test glucose once daily, E 11.9   Relion meter please    RELION PRIME TEST STRIPS strip USE 1 TEST STRIP EACH TIME TO TEST GLUCOSE 2 TIMES A DAY     No current facility-administered medications for this visit. ROS:  Constitutional: No fever, chills or abnormal weight loss  Respiratory: No cough, SOB   CV: No chest pain or Palpitations    Visit Vitals  /80 (BP 1 Location: Left upper arm, BP Patient Position: Sitting, BP Cuff Size: Adult long)   Pulse 94   Temp 97.1 °F (36.2 °C) (Temporal)   Resp 18   Ht 5' 1\" (1.549 m)   Wt 136 lb 3.2 oz (61.8 kg)   SpO2 98%   BMI 25.73 kg/m²     Wt Readings from Last 10 Encounters:   08/02/22 136 lb 3.2 oz (61.8 kg)   06/28/22 133 lb 8 oz (60.6 kg)   06/20/22 138 lb 3.2 oz (62.7 kg)   05/02/22 135 lb (61.2 kg)   04/11/22 132 lb 6.4 oz (60.1 kg)   03/10/22 131 lb 3.2 oz (59.5 kg)   01/13/22 126 lb 3.2 oz (57.2 kg)   12/30/21 125 lb 9.6 oz (57 kg)   11/19/21 129 lb 6.4 oz (58.7 kg)   10/20/21 132 lb 6.4 oz (60.1 kg)     BP Readings from Last 3 Encounters:   08/02/22 112/80   06/28/22 118/70   06/20/22 132/78       Physical Examination: General appearance - alert, well appearing, and in no distress  Mental status - alert, oriented to person, place, and time  Eyes - pupils equal and reactive, extraocular eye movements intact  ENT - bilateral external ears and nose normal. Normal lips  Neck - supple, no significant adenopathy, no thyromegaly or mass  Chest - clear to auscultation, no wheezes, rales or rhonchi, symmetric air entry  Heart - normal rate, regular rhythm, normal S1, S2, no murmurs, rubs, clicks or gallops  Extremities - peripheral pulses normal, no pedal edema, no clubbing or cyanosis. R shin with heavy scab to anterior shin at the bx site. No spreading erythema, TTP, or purulent d/c seen. A/P:  SCC R shin, fully excised. Path report reviewed with pt. Wound care ed. Does have some other spots. Plans to see Derm 8/2022 for further checks. DM2  Not well controlled. Discussed options.  Boost metformin to 500mg AM and 1000mg PM, and add acarbose 25mg qpm with dinner, and boost to BID after a week or so if mehdi well.    Polyuria  Check UA with reflex culture. F/U 2-4 weeks with PCP or per labs.

## 2022-08-02 NOTE — PROGRESS NOTES
Tabitha Triana is a 80 y.o. female presenting for/with:    Chief Complaint   Patient presents with    Diabetes     Reports 250-350s readings. Reports eating dark chocolate nuts at night      Skin Exam     Daughter wants to confirm (R)LE is healing appropriately. DX of squamous cell carcinoma. Has F/U with [facility] next week. Altered mental status     Daughter reports increased confusion and fatigue. Requesting work up for UTI, unable to provide sample at this time. Visit Vitals  /80 (BP 1 Location: Left upper arm, BP Patient Position: Sitting, BP Cuff Size: Adult long)   Pulse 94   Temp 97.1 °F (36.2 °C) (Temporal)   Resp 18   Ht 5' 1\" (1.549 m)   Wt 136 lb 3.2 oz (61.8 kg)   SpO2 98%   BMI 25.73 kg/m²     Pain Scale: 0 - No pain/10  Pain Location:     1. \"Have you been to the ER, urgent care clinic since your last visit? Hospitalized since your last visit? \" No    2. \"Have you seen or consulted any other health care providers outside of the [address] [place] [address] since your last visit? \" No     3. For patients aged 39-70: Has the patient had a colonoscopy / FIT/ Cologuard? NA - based on age      If the patient is female:    4. For patients aged 41-77: Has the patient had a mammogram within the past 2 years? NA - based on age or sex      11. For patients aged 21-65: Has the patient had a pap smear?  NA - based on age or sex      Symptom review:  NO  Fever   NO  Shaking chills  NO  Cough  NO  Body aches  NO  Coughing up blood  NO  Chest congestion  NO  Chest pain  NO  Shortness of breath  NO  Profound Loss of smell/taste  NO  Nausea/Vomiting   NO  Loose stool/Diarrhea  NO  any skin issues    Patient Risk Factors Reviewed as follows:  NO  have you been in Close contact with confirmed COVID19 patient   NO  History of recent travel to affected geographical areas within the past 14 days  NO  COPD  NO  Active Cancer/Leukemia/Lymphoma/Chemotherapy  NO  Oral steroid use  NO  Pregnant  YES  Diabetes Mellitus  YES  Heart disease  NO  Asthma  NO Health care worker at home  [address] care worker  NO Is there a Pregnant Woman in the home  NO Dialysis pt in the home   NO a large number of people living in the home    Learning Assessment 4/11/2022   PRIMARY LEARNER Patient   PRIMARY LANGUAGE ENGLISH   LEARNER PREFERENCE PRIMARY READING     -   ANSWERED BY pt   RELATIONSHIP SELF     Fall Risk Assessment, last 12 mths 8/2/2022   Able to walk? Yes   Fall in past 12 months? 1   Do you feel unsteady? 1   Are you worried about falling 1   Is TUG test greater than 12 seconds? -   Is the gait abnormal? 1   Number of falls in past 12 months 1   Fall with injury? 0       3 most recent PHQ Screens 8/2/2022   PHQ Not Done -   Little interest or pleasure in doing things Not at all   Feeling down, depressed, irritable, or hopeless Not at all   Total Score PHQ 2 0   Trouble falling or staying asleep, or sleeping too much -   Feeling tired or having little energy -   Poor appetite, weight loss, or overeating -   Feeling bad about yourself - or that you are a failure or have let yourself or your family down -   Trouble concentrating on things such as school, work, reading, or watching TV -   Moving or speaking so slowly that other people could have noticed; or the opposite being so fidgety that others notice -   Thoughts of being better off dead, or hurting yourself in some way -   PHQ 9 Score -   How difficult have these problems made it for you to do your work, take care of your home and get along with others -     Abuse Screening Questionnaire 8/2/2022   Do you ever feel afraid of your partner? N   Are you in a relationship with someone who physically or mentally threatens you? N   Is it safe for you to go home?  Y       ADL Assessment 8/2/2022   Feeding yourself No Help Needed   Getting from bed to chair No Help Needed   Getting dressed No Help Needed   Bathing or showering No Help Needed   Walk across the room (includes cane/walker) No Help Needed   Using the telphone No Help Needed   Taking your medications No Help Needed   Preparing meals No Help Needed   Managing money (expenses/bills) No Help Needed   Moderately strenuous housework (laundry) No Help Needed   Shopping for personal items (toiletries/medicines) No Help Needed   Shopping for groceries No Help Needed   Driving No Help Needed   Climbing a flight of stairs No Help Needed   Getting to places beyond walking distances No Help Needed      Advance Care Planning 6/28/2022   Patient's Healthcare Decision Maker is: Legal Next of [address] Directive Yes, not on file   Patient Would Like to Complete Advance Directive -

## 2022-08-03 LAB
ALBUMIN SERPL-MCNC: 3.6 G/DL (ref 3.5–5)
ALBUMIN/GLOB SERPL: 1.2 {RATIO} (ref 1.1–2.2)
ALP SERPL-CCNC: 72 U/L (ref 45–117)
ALT SERPL-CCNC: 24 U/L (ref 12–78)
ANION GAP SERPL CALC-SCNC: 8 MMOL/L (ref 5–15)
APPEARANCE UR: CLEAR
AST SERPL-CCNC: 13 U/L (ref 15–37)
BACTERIA URNS QL MICRO: NEGATIVE /HPF
BILIRUB SERPL-MCNC: 0.3 MG/DL (ref 0.2–1)
BILIRUB UR QL: NEGATIVE
BUN SERPL-MCNC: 15 MG/DL (ref 6–20)
BUN/CREAT SERPL: 20 (ref 12–20)
CALCIUM SERPL-MCNC: 9.1 MG/DL (ref 8.5–10.1)
CHLORIDE SERPL-SCNC: 102 MMOL/L (ref 97–108)
CHOLEST SERPL-MCNC: 191 MG/DL
CO2 SERPL-SCNC: 28 MMOL/L (ref 21–32)
COLOR UR: ABNORMAL
CREAT SERPL-MCNC: 0.76 MG/DL (ref 0.55–1.02)
CREAT UR-MCNC: 92.1 MG/DL
EPITH CASTS URNS QL MICRO: ABNORMAL /LPF
EST. AVERAGE GLUCOSE BLD GHB EST-MCNC: 180 MG/DL
GLOBULIN SER CALC-MCNC: 3 G/DL (ref 2–4)
GLUCOSE SERPL-MCNC: 214 MG/DL (ref 65–100)
GLUCOSE UR STRIP.AUTO-MCNC: >1000 MG/DL
HBA1C MFR BLD: 7.9 % (ref 4–5.6)
HDLC SERPL-MCNC: 59 MG/DL
HDLC SERPL: 3.2 {RATIO} (ref 0–5)
HGB UR QL STRIP: NEGATIVE
HYALINE CASTS URNS QL MICRO: ABNORMAL /LPF (ref 0–5)
KETONES UR QL STRIP.AUTO: ABNORMAL MG/DL
LDLC SERPL CALC-MCNC: 103.4 MG/DL (ref 0–100)
LEUKOCYTE ESTERASE UR QL STRIP.AUTO: NEGATIVE
MICROALBUMIN UR-MCNC: 1.33 MG/DL
MICROALBUMIN/CREAT UR-RTO: 14 MG/G (ref 0–30)
NITRITE UR QL STRIP.AUTO: NEGATIVE
PH UR STRIP: 5.5 [PH] (ref 5–8)
POTASSIUM SERPL-SCNC: 3.6 MMOL/L (ref 3.5–5.1)
PROT SERPL-MCNC: 6.6 G/DL (ref 6.4–8.2)
PROT UR STRIP-MCNC: NEGATIVE MG/DL
RBC #/AREA URNS HPF: ABNORMAL /HPF (ref 0–5)
SODIUM SERPL-SCNC: 138 MMOL/L (ref 136–145)
SP GR UR REFRACTOMETRY: 1.02 (ref 1–1.03)
TRIGL SERPL-MCNC: 143 MG/DL (ref ?–150)
UA: UC IF INDICATED,UAUC: ABNORMAL
UROBILINOGEN UR QL STRIP.AUTO: 0.2 EU/DL (ref 0.2–1)
VLDLC SERPL CALC-MCNC: 28.6 MG/DL
WBC URNS QL MICRO: ABNORMAL /HPF (ref 0–4)

## 2022-08-03 NOTE — PROGRESS NOTES
Labs good except for sugar. Looks like the rise is pretty recent, as the A1c is good at 7.9. See how the new helper for sugar works.

## 2022-08-21 NOTE — PROGRESS NOTES
Ms. Shon Biggs is a 80 y.o. female who is here for evaluation of   Chief Complaint   Patient presents with    Diabetes     Saw Dr Faby Jeff when sugars were elevated and had meds adjusted but states she did not make changes she just adjusted her diet and has gotten her sugars down . . has log today     Breast pain     C/o R sided breast pain states this has been off and on for years    Hoarse     Hoarse w/ some cough with production . .. no color to the mucus . Valentín Nieto ..using Zyrtec in the spring for allergies     Other     Recurrent UTI's brought in a urine today for recheck. ... denies any symptoms at this time    . ASSESSMENT AND PLAN:    1. Type 2 diabetes mellitus with hyperglycemia, without long-term current use of insulin ([address]  Given her comorbidities, her blood sugar is actually right where we want it. She has occasional high values and occasional low normal values but nothing too low. In general the blood sugars are between 100 and 200. Discussed the possibility of diabetic education referral but they would like to wait on this for now  - AMB POC URINALYSIS DIP STICK AUTO W/O MICRO    2. Reactive depression  Seems to be doing well at this point. No change in medication    3. Essential hypertension  Currently at goal    4. Frequency of urination  Urinalysis today is normal  - AMB POC URINALYSIS DIP STICK AUTO W/O MICRO    5. Breast pain  No visual or palpable mass. We will arrange for diagnostic mammogram and ultrasound. 6. Hoarseness  Postnasal drip versus reflux or both. Discussed over-the-counter treatment strategies including Flonase and Pepcid. Orders Placed This Encounter    AMB POC URINALYSIS DIP STICK AUTO W/O MICRO           HPI  51-year-old woman with a history of diabetes, mild cognitive impairment and hypertension returns for periodic assessment of multiple medical problems.   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  Follow-up CT scanning demonstrated stability. She has issues with recurrent urinary tract infections and occasional cellulitis particularly on the lower extremities. Recently seen by Dr. Shaun West who added metformin but she elected not to take it instead trying to do a better job with diet. Her blood sugar today is between 110 and as high as 300 but mostly in the 100 range. No hypoglycemic symptoms. Complains of hoarseness which has been longstanding and some discomfort in the right breast.    ROS:  Denies  fever, chills, cough, chest pain, SOB,  nausea, vomiting, or diarrhea. Denies wt loss, wt gain, hemoptysis, hematochezia or melena. Physical Examination:    Visit Vitals  /70 (BP 1 Location: Left arm, BP Patient Position: Sitting)   Pulse (!) 102   Temp 98.9 °F (37.2 °C) (Temporal)   Resp 18   Ht 5' 1\" (1.549 m)   Wt 133 lb 9.6 oz (60.6 kg)   SpO2 97%   BMI 25.24 kg/m²      General:  Alert, cooperative, no distress. Head:  Normocephalic, without obvious abnormality, atraumatic. Eyes:  Conjunctivae/corneas clear. Pupils equal, round, reactive to light. Extraocular movements intact. Lungs:   Clear to auscultation bilaterally. Chest wall:  No tenderness or deformity. No palpable or visual anomalies in either breast.  Both axillae are also clinically normal.  There is reproducible tenderness in the right upper outer quadrant with deep palpation. Cardiac:  RRR   Abdomen:   Soft, non-tender. Bowel sounds normal. No masses. No organomegaly. Extremities: Extremities normal, atraumatic, no cyanosis or edema. Pulses: 2+ and symmetric all extremities. Skin: Healing site on lower leg from squamous cell skin cancer biopsy   Lymph nodes: Cervical, supraclavicular, and axillary nodes normal.   Neurologic: CNII-XII intact. Normal strength, sensation, and reflexes throughout.      On this date 08/25/2022 I have spent 30 minutes reviewing previous notes, test results and face to face with the patient discussing the diagnosis and importance of compliance with the treatment plan as well as documenting on the day of the visit.     Jaden Way MD FACP    (signed electronically) on 8/25/2022 at 2:25 PM

## 2022-08-25 ENCOUNTER — OFFICE VISIT (OUTPATIENT)
Dept: FAMILY MEDICINE CLINIC | Age: 87
End: 2022-08-25
Payer: MEDICARE

## 2022-08-25 VITALS
DIASTOLIC BLOOD PRESSURE: 70 MMHG | SYSTOLIC BLOOD PRESSURE: 120 MMHG | RESPIRATION RATE: 18 BRPM | TEMPERATURE: 98.9 F | WEIGHT: 133.6 LBS | HEART RATE: 102 BPM | OXYGEN SATURATION: 97 % | BODY MASS INDEX: 25.22 KG/M2 | HEIGHT: 61 IN

## 2022-08-25 DIAGNOSIS — R35.0 FREQUENCY OF URINATION: ICD-10-CM

## 2022-08-25 DIAGNOSIS — R49.0 HOARSENESS: ICD-10-CM

## 2022-08-25 DIAGNOSIS — E11.65 TYPE 2 DIABETES MELLITUS WITH HYPERGLYCEMIA, WITHOUT LONG-TERM CURRENT USE OF INSULIN (HCC): Primary | ICD-10-CM

## 2022-08-25 DIAGNOSIS — I10 ESSENTIAL HYPERTENSION: ICD-10-CM

## 2022-08-25 DIAGNOSIS — F32.9 REACTIVE DEPRESSION: ICD-10-CM

## 2022-08-25 DIAGNOSIS — N64.4 BREAST PAIN: ICD-10-CM

## 2022-08-25 LAB
BILIRUB UR QL STRIP: NEGATIVE
GLUCOSE UR-MCNC: NORMAL MG/DL
KETONES P FAST UR STRIP-MCNC: NORMAL MG/DL
PH UR STRIP: 6 [PH] (ref 4.6–8)
PROT UR QL STRIP: NEGATIVE
SP GR UR STRIP: 1.02 (ref 1–1.03)
UA UROBILINOGEN AMB POC: NORMAL (ref 0.2–1)
URINALYSIS CLARITY POC: CLEAR
URINALYSIS COLOR POC: YELLOW
URINE BLOOD POC: NEGATIVE
URINE LEUKOCYTES POC: NORMAL
URINE NITRITES POC: NEGATIVE

## 2022-08-25 PROCEDURE — 81003 URINALYSIS AUTO W/O SCOPE: CPT | Performed by: INTERNAL MEDICINE

## 2022-08-25 PROCEDURE — 99214 OFFICE O/P EST MOD 30 MIN: CPT | Performed by: INTERNAL MEDICINE

## 2022-08-25 NOTE — PROGRESS NOTES
Mikaela Penny is a 80 y.o. female presenting for/with:    Chief Complaint   Patient presents with    Diabetes     Saw Dr Ladon Eisenmenger when sugars were elevated and had meds adjusted but states she did not make changes she just adjusted her diet and has gotten her sugars down . . has log today     Breast pain     C/o R sided breast pain states this has been off and on for years    Hoarse     Hoarse w/ some cough with production . .. no color to the mucus . Cleophus Hatch ..using Zyrtec in the spring for allergies     Other     Recurrent UTI's brought in a urine today for recheck. ... denies any symptoms at this time        Visit Vitals  /70 (BP 1 Location: Left arm, BP Patient Position: Sitting)   Pulse (!) 102   Temp 98.9 °F (37.2 °C) (Temporal)   Resp 18   Ht 5' 1\" (1.549 m)   Wt 133 lb 9.6 oz (60.6 kg)   SpO2 97%   BMI 25.24 kg/m²     Pain Scale: /10  Pain Location:     1. \"Have you been to the ER, urgent care clinic since your last visit? Hospitalized since your last visit? \" No    2. \"Have you seen or consulted any other health care providers outside of the [address] [place] [address] since your last visit? \" No     3. For patients aged 39-70: Has the patient had a colonoscopy / FIT/ Cologuard? NA - based on age      If the patient is female:    4. For patients aged 41-77: Has the patient had a mammogram within the past 2 years? NA - based on age or sex      11. For patients aged 21-65: Has the patient had a pap smear?  NA - based on age or sex      Symptom review:  NO  Fever   NO  Shaking chills  NO  Cough  NO  Body aches  NO  Coughing up blood  NO  Chest congestion  NO  Chest pain  NO  Shortness of breath  NO  Profound Loss of smell/taste  NO  Nausea/Vomiting   NO  Loose stool/Diarrhea  NO  any skin issues    Patient Risk Factors Reviewed as follows:  NO  have you been in Close contact with confirmed COVID19 patient   NO  History of recent travel to affected geographical areas within the past 14 days  NO  COPD  NO  Active Cancer/Leukemia/Lymphoma/Chemotherapy  NO  Oral steroid use  NO  Pregnant  YES  Diabetes Mellitus  YES  Heart disease  NO  Asthma  NO Health care worker at home  [address] care worker  NO Is there a Pregnant Woman in the home  NO Dialysis pt in the home   No a large number of people living in the home    Learning Assessment 4/11/2022   PRIMARY LEARNER Patient   PRIMARY LANGUAGE ENGLISH   LEARNER PREFERENCE PRIMARY READING     -   ANSWERED BY pt   RELATIONSHIP SELF     Fall Risk Assessment, last 12 mths 8/25/2022   Able to walk? Yes   Fall in past 12 months? 0   Do you feel unsteady? 0   Are you worried about falling 0   Is TUG test greater than 12 seconds? -   Is the gait abnormal? -   Number of falls in past 12 months -   Fall with injury? -       3 most recent PHQ Screens 8/25/2022   PHQ Not Done -   Little interest or pleasure in doing things Several days   Feeling down, depressed, irritable, or hopeless Several days   Total Score PHQ 2 2   Trouble falling or staying asleep, or sleeping too much -   Feeling tired or having little energy -   Poor appetite, weight loss, or overeating -   Feeling bad about yourself - or that you are a failure or have let yourself or your family down -   Trouble concentrating on things such as school, work, reading, or watching TV -   Moving or speaking so slowly that other people could have noticed; or the opposite being so fidgety that others notice -   Thoughts of being better off dead, or hurting yourself in some way -   PHQ 9 Score -   How difficult have these problems made it for you to do your work, take care of your home and get along with others -     Abuse Screening Questionnaire 8/25/2022   Do you ever feel afraid of your partner? N   Are you in a relationship with someone who physically or mentally threatens you? N   Is it safe for you to go home?  Y       ADL Assessment 8/25/2022   Feeding yourself No Help Needed   Getting from bed to chair No Help Needed   Getting dressed No Help Needed   Bathing or showering No Help Needed   Walk across the room (includes cane/walker) No Help Needed   Using the telphone No Help Needed   Taking your medications No Help Needed   Preparing meals No Help Needed   Managing money (expenses/bills) No Help Needed   Moderately strenuous housework (laundry) No Help Needed   Shopping for personal items (toiletries/medicines) Help Needed   Shopping for groceries Help Needed   Driving Help Needed   Climbing a flight of stairs Help Needed   Getting to places beyond walking distances Help Needed      Advance Care Planning 6/28/2022   Patient's Healthcare Decision Maker is: [facility] Yes, not on file   Patient Would Like to Complete Advance Directive -

## 2022-08-26 ENCOUNTER — TRANSCRIBE ORDER (OUTPATIENT)
Dept: SCHEDULING | Age: 87
End: 2022-08-26

## 2022-08-26 DIAGNOSIS — N64.4 BREAST PAIN: Primary | ICD-10-CM

## 2022-09-22 ENCOUNTER — HOSPITAL ENCOUNTER (OUTPATIENT)
Dept: ULTRASOUND IMAGING | Age: 87
Discharge: HOME | End: 2022-09-22
Attending: INTERNAL MEDICINE
Payer: MEDICARE

## 2022-09-22 ENCOUNTER — HOSPITAL ENCOUNTER (OUTPATIENT)
Dept: MAMMOGRAPHY | Age: 87
Discharge: HOME | End: 2022-09-22
Attending: INTERNAL MEDICINE
Payer: MEDICARE

## 2022-09-22 DIAGNOSIS — N64.4 BREAST PAIN: ICD-10-CM

## 2022-09-22 DIAGNOSIS — Z12.31 VISIT FOR SCREENING MAMMOGRAM: ICD-10-CM

## 2022-09-22 PROCEDURE — 77063 BREAST TOMOSYNTHESIS BI: CPT

## 2022-10-23 NOTE — PROGRESS NOTES
Ms. Sonia Prince is a 80 y.o. female who is here for evaluation of   Chief Complaint   Patient presents with    Diabetes     Routine F/U - Reports \"chocolate smears on glucose readings\"    UTI     C/O UTI S/S. States brought in sample. Referral Follow Up     Reports having basal cell removed on (L)LE. Was then DX with MRSA at that location. States has been dealing with symptoms x6 weeks. Eating Concern     Reports poor PO intake recently. States even snacks have declined. Altered mental status     States decline still present. Had eval recently. Has not gotten results yet. .       ASSESSMENT AND PLAN:    1. Type 2 diabetes mellitus with hyperglycemia, without long-term current use of insulin ([facility])  Currently doing well given her age and comorbidities. Continue current medications. 2. Reactive depression  Currently doing quite well and living at home and using Wellbutrin  3. Breast pain  Resolved  4. Needs flu shot  - INFLUENZA, FLUAD, (AGE 65 Y+), IM, PF, 0.5 ML    5. Urinary frequency  Likely has atrophic vaginitis as the junaid of her recurrent UTI. Broach the idea of topical versus oral HRT. They will research this and consider it at her next visit in January. I would certainly have a low threshold for restarting it given her age and recurrent symptoms.  - AMB POC URINALYSIS DIP STICK AUTO W/O MICRO  - CULTURE, URINE; Future  - nitrofurantoin, macrocrystal-monohydrate, (Macrobid) 100 mg capsule; Take 1 Capsule by mouth two (2) times a day for 5 days. Dispense: 10 Capsule; Refill: 0      Orders Placed This Encounter    CULTURE, URINE     Standing Status:   Future     Standing Expiration Date:   10/31/2023    Influenza, FLUAD, (age 72 y+), IM, PF, 0.5 mL    AMB POC URINALYSIS DIP STICK AUTO W/O MICRO    nitrofurantoin, macrocrystal-monohydrate, (Macrobid) 100 mg capsule     Sig: Take 1 Capsule by mouth two (2) times a day for 5 days.      Dispense:  10 Capsule     Refill:  0           HPI 80-year-old woman arrives today with a history of type 2 diabetes managed largely with metformin and diet, reactive depression, hypertension, occasional UTI and more recently-breast pain. During her last evaluation she complained of pain in her breast on the right and she underwent mammography which proved normal.    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 Follow-up CT scan had noted stability. Cj Sinclair notes that she has a lot of vaginal itching and this may be the junaid for her recurrent UTI. Her urine today is positive. ROS:  Denies  fever, chills, cough, chest pain, SOB,  nausea, vomiting, or diarrhea. Denies wt loss, wt gain, hemoptysis, hematochezia or melena. Physical Examination:    Visit Vitals  /78 (BP 1 Location: Left upper arm, BP Patient Position: Sitting, BP Cuff Size: Adult long)   Pulse 86   Temp 97.2 °F (36.2 °C) (Temporal)   Resp 18   Ht 5' 1\" (1.549 m)   Wt 134 lb 6.4 oz (61 kg)   SpO2 97%   BMI 25.39 kg/m²      General:  Alert, cooperative, no distress. Head:  Normocephalic, without obvious abnormality, atraumatic. Eyes:  Conjunctivae/corneas clear. Pupils equal, round, reactive to light. Extraocular movements intact. Lungs:   Clear to auscultation bilaterally. Chest wall:  No tenderness or deformity. Cardiac:  RRR   Abdomen:   Soft, non-tender. Bowel sounds normal. No masses. No organomegaly. Extremities: Extremities normal, atraumatic, no cyanosis or edema. Pulses: 2+ and symmetric all extremities. Skin: Skin color, texture, turgor normal. No rashes or lesions. Lymph nodes: Cervical, supraclavicular, and axillary nodes normal.   Neurologic: CNII-XII intact. Normal strength, sensation, and reflexes throughout.      On this date 10/31/2022 I have spent 30 minutes reviewing previous notes, test results and face to face with the patient discussing the diagnosis and importance of compliance with the treatment plan as well as documenting on the day of the visit.     Jose F Alvarado MD FACP    (signed electronically) on 10/31/2022 at 10:24 AM

## 2022-10-31 ENCOUNTER — OFFICE VISIT (OUTPATIENT)
Dept: FAMILY MEDICINE CLINIC | Age: 87
End: 2022-10-31
Payer: MEDICARE

## 2022-10-31 VITALS
TEMPERATURE: 97.2 F | HEART RATE: 86 BPM | BODY MASS INDEX: 25.37 KG/M2 | SYSTOLIC BLOOD PRESSURE: 101 MMHG | RESPIRATION RATE: 18 BRPM | WEIGHT: 134.4 LBS | OXYGEN SATURATION: 97 % | HEIGHT: 61 IN | DIASTOLIC BLOOD PRESSURE: 78 MMHG

## 2022-10-31 DIAGNOSIS — E11.65 TYPE 2 DIABETES MELLITUS WITH HYPERGLYCEMIA, WITHOUT LONG-TERM CURRENT USE OF INSULIN (HCC): Primary | ICD-10-CM

## 2022-10-31 DIAGNOSIS — Z23 NEEDS FLU SHOT: ICD-10-CM

## 2022-10-31 DIAGNOSIS — N64.4 BREAST PAIN: ICD-10-CM

## 2022-10-31 DIAGNOSIS — F32.9 REACTIVE DEPRESSION: ICD-10-CM

## 2022-10-31 DIAGNOSIS — R35.0 URINARY FREQUENCY: ICD-10-CM

## 2022-10-31 PROBLEM — C44.722 SQUAMOUS CELL CARCINOMA OF RIGHT LOWER LEG: Status: ACTIVE | Noted: 2022-08-31

## 2022-10-31 LAB
BILIRUB UR QL STRIP: NEGATIVE
GLUCOSE UR-MCNC: NORMAL MG/DL
KETONES P FAST UR STRIP-MCNC: NEGATIVE MG/DL
PH UR STRIP: 5.5 [PH] (ref 4.6–8)
PROT UR QL STRIP: NEGATIVE
SP GR UR STRIP: 1 (ref 1–1.03)
UA UROBILINOGEN AMB POC: NORMAL (ref 0.2–1)
URINALYSIS CLARITY POC: NORMAL
URINALYSIS COLOR POC: YELLOW
URINE BLOOD POC: NORMAL
URINE LEUKOCYTES POC: NORMAL
URINE NITRITES POC: POSITIVE

## 2022-10-31 PROCEDURE — 90694 VACC AIIV4 NO PRSRV 0.5ML IM: CPT | Performed by: INTERNAL MEDICINE

## 2022-10-31 PROCEDURE — 81003 URINALYSIS AUTO W/O SCOPE: CPT | Performed by: INTERNAL MEDICINE

## 2022-10-31 PROCEDURE — G0008 ADMIN INFLUENZA VIRUS VAC: HCPCS | Performed by: INTERNAL MEDICINE

## 2022-10-31 PROCEDURE — 99214 OFFICE O/P EST MOD 30 MIN: CPT | Performed by: INTERNAL MEDICINE

## 2022-10-31 RX ORDER — NITROFURANTOIN 25; 75 MG/1; MG/1
100 CAPSULE ORAL 2 TIMES DAILY
Qty: 10 CAPSULE | Refills: 0 | Status: SHIPPED | OUTPATIENT
Start: 2022-10-31 | End: 2022-11-05

## 2022-10-31 NOTE — PROGRESS NOTES
Lisbeth Byrne is a 80 y.o. female presenting for/with:    Chief Complaint   Patient presents with    Diabetes     Routine F/U - Reports \"chocolate smears on glucose readings\"    UTI     C/O UTI S/S. States brought in sample. Referral Follow Up     Reports having basal cell removed on (L)LE. Was then DX with MRSA at that location. States has been dealing with symptoms x6 weeks. Eating Concern     Reports poor PO intake recently. States even snacks have declined. Altered mental status     States decline still present. Had eval recently. Has not gotten results yet. Visit Vitals  /78 (BP 1 Location: Left upper arm, BP Patient Position: Sitting, BP Cuff Size: Adult long)   Pulse 86   Temp 97.2 °F (36.2 °C) (Temporal)   Resp 18   Ht 5' 1\" (1.549 m)   Wt 134 lb 6.4 oz (61 kg)   SpO2 97%   BMI 25.39 kg/m²     Pain Scale: 0 - No pain/10  Pain Location:     1. \"Have you been to the ER, urgent care clinic since your last visit? Hospitalized since your last visit? \" No    2. \"Have you seen or consulted any other health care providers outside of the [address] [place] [address] since your last visit? \" Yes Dermatology      3. For patients aged 39-70: Has the patient had a colonoscopy / FIT/ Cologuard? No based on age    If the patient is female:    4. For patients aged 41-77: Has the patient had a mammogram within the past 2 years? NA - based on age or sex      11. For patients aged 21-65: Has the patient had a pap smear? NA - based on age or sex      Learning Assessment 4/11/2022   PRIMARY LEARNER Patient   PRIMARY LANGUAGE ENGLISH   LEARNER PREFERENCE PRIMARY READING     -   ANSWERED BY pt   RELATIONSHIP SELF     Fall Risk Assessment, last 12 mths 10/31/2022   Able to walk? Yes   Fall in past 12 months? 1   Do you feel unsteady? 1   Are you worried about falling 1   Is TUG test greater than 12 seconds? -   Is the gait abnormal? 1   Number of falls in past 12 months 2   Fall with injury?  1       3 most recent PHQ Screens 10/31/2022   PHQ Not Done -   Little interest or pleasure in doing things Not at all   Feeling down, depressed, irritable, or hopeless Not at all   Total Score PHQ 2 0   Trouble falling or staying asleep, or sleeping too much -   Feeling tired or having little energy -   Poor appetite, weight loss, or overeating -   Feeling bad about yourself - or that you are a failure or have let yourself or your family down -   Trouble concentrating on things such as school, work, reading, or watching TV -   Moving or speaking so slowly that other people could have noticed; or the opposite being so fidgety that others notice -   Thoughts of being better off dead, or hurting yourself in some way -   PHQ 9 Score -   How difficult have these problems made it for you to do your work, take care of your home and get along with others -     Abuse Screening Questionnaire 10/31/2022   Do you ever feel afraid of your partner? N   Are you in a relationship with someone who physically or mentally threatens you? N   Is it safe for you to go home?  Y       ADL Assessment 10/31/2022   Feeding yourself No Help Needed   Getting from bed to chair No Help Needed   Getting dressed No Help Needed   Bathing or showering No Help Needed   Walk across the room (includes cane/walker) No Help Needed   Using the telphone No Help Needed   Taking your medications Help Needed   Preparing meals Help Needed   Managing money (expenses/bills) Help Needed   Moderately strenuous housework (laundry) Help Needed   Shopping for personal items (toiletries/medicines) Help Needed   Shopping for groceries Help Needed   Driving Help Needed   Climbing a flight of stairs Help Needed   Getting to places beyond walking distances Help Needed      Advance Care Planning 6/28/2022   Patient's Healthcare Decision Maker is: Legal Next of Kin   Confirm Advance Directive Yes, not on file   Patient Would Like to Complete Advance Directive -      After obtaining consent, and per orders of Dr. Sasha Robison, injection of Fluad to (R) deltoid given by Stephen Ayala. Patient instructed to remain in clinic for 20 minutes afterwards, and to report any adverse reaction to me immediately.     Results for orders placed or performed in visit on 10/31/22   AMB POC URINALYSIS DIP STICK AUTO W/O MICRO   Result Value Ref Range    Color (UA POC) Yellow     Clarity (UA POC) Turbid     Glucose (UA POC) 1+ Negative    Bilirubin (UA POC) Negative Negative    Ketones (UA POC) Negative Negative    Specific gravity (UA POC) 1.005 1.001 - 1.035    Blood (UA POC) Trace Negative    pH (UA POC) 5.5 4.6 - 8.0    Protein (UA POC) Negative Negative    Urobilinogen (UA POC) 0.2 mg/dL 0.2 - 1    Nitrites (UA POC) Positive Negative    Leukocyte esterase (UA POC) 1+ Negative

## 2022-10-31 NOTE — PATIENT INSTRUCTIONS
Vaccine Information Statement    Influenza (Flu) Vaccine (Inactivated or Recombinant): What You Need to Know    Many vaccine information statements are available in [place] and other languages. See www.immunize.org/vis. Hojas de información sobre vacunas están disponibles en español y en muchos otros idiomas. Visite www.immunize.org/vis. 1. Why get vaccinated? Influenza vaccine can prevent influenza (flu). Flu is a contagious disease that spreads around the [facility] every year, usually between October and May. Anyone can get the flu, but it is more dangerous for some people. Infants and young children, people 72 years and older, pregnant people, and people with certain health conditions or a weakened immune system are at greatest risk of flu complications. Pneumonia, bronchitis, sinus infections, and ear infections are examples of flu-related complications. If you have a medical condition, such as heart disease, cancer, or diabetes, flu can make it worse. Flu can cause fever and chills, sore throat, muscle aches, fatigue, cough, headache, and runny or stuffy nose. Some people may have vomiting and diarrhea, though this is more common in children than adults. In an average year, thousands of people in the [facility] die from flu, and many more are hospitalized. Flu vaccine prevents millions of illnesses and flu-related visits to the doctor each year. 2. Influenza vaccines     CDC recommends everyone 6 months and older get vaccinated every flu season. Children 6 months through 6years of age may need 2 doses during a single flu season. Everyone else needs only 1 dose each flu season. It takes about 2 weeks for protection to develop after vaccination. There are many flu viruses, and they are always changing. Each year a new flu vaccine is made to protect against the influenza viruses believed to be likely to cause disease in the upcoming flu season.  Even when the vaccine doesnt exactly match these viruses, it may still provide some protection. Influenza vaccine does not cause flu. Influenza vaccine may be given at the same time as other vaccines. 3. Talk with your health care provider    Tell your vaccination provider if the person getting the vaccine:  Has had an allergic reaction after a previous dose of influenza vaccine, or has any severe, life-threatening allergies   Has ever had Guillain-Barré Syndrome (also called GBS)    In some cases, your health care provider may decide to postpone influenza vaccination until a future visit. Influenza vaccine can be administered at any time during pregnancy. People who are or will be pregnant during influenza season should receive inactivated influenza vaccine. People with minor illnesses, such as a cold, may be vaccinated. People who are moderately or severely ill should usually wait until they recover before getting influenza vaccine. Your health care provider can give you more information. 4. Risks of a vaccine reaction    Soreness, redness, and swelling where the shot is given, fever, muscle aches, and headache can happen after influenza vaccination. There may be a very small increased risk of Guillain-Barré Syndrome (GBS) after inactivated influenza vaccine (the flu shot). [facility] children who get the flu shot along with pneumococcal vaccine (PCV13) and/or DTaP vaccine at the same time might be slightly more likely to have a seizure caused by fever. Tell your health care provider if a child who is getting flu vaccine has ever had a seizure. People sometimes faint after medical procedures, including vaccination. Tell your provider if you feel dizzy or have vision changes or ringing in the ears. As with any medicine, there is a very remote chance of a vaccine causing a severe allergic reaction, other serious injury, or death. 5. What if there is a serious problem?     An allergic reaction could occur after the vaccinated person leaves the clinic. If you see signs of a severe allergic reaction (hives, swelling of the face and throat, difficulty breathing, a fast heartbeat, dizziness, or weakness), call 9-1-1 and get the person to the nearest hospital.    For other signs that concern you, call your health care provider. Adverse reactions should be reported to the Vaccine Adverse Event Reporting System (VAERS). Your health care provider will usually file this report, or you can do it yourself. Visit the VAERS website at www.vaers. [facility].gov or call 7-503.171.6074. VAERS is only for reporting reactions, and VAERS staff members do not give medical advice. 6. The National Vaccine Injury Compensation Program    The [facility] Vaccine Injury Compensation Program (VICP) is a federal program that was created to compensate people who may have been injured by certain vaccines. Claims regarding alleged injury or death due to vaccination have a time limit for filing, which may be as short as two years. Visit the VICP website at www.[facility].gov/vaccinecompensation or call 4-150.944.5889 to learn about the program and about filing a claim. 7. How can I learn more? Ask your health care provider. Call your local or state health department. Visit the website of the Food and Drug Administration (FDA) for vaccine package inserts and additional information at www.fda.gov/vaccines-blood-biologics/vaccines. Contact the Centers for Disease Control and Prevention (CDC): Call 1-659.254.1153 (1-800-CDC-INFO) or  Visit CDCs influenza website at www.cdc.gov/flu. Vaccine Information Statement   Inactivated Influenza Vaccine   8/6/2021  42 AMILCAR Bey [id number]-40   Department of Health and Human Services  Centers for Disease Control and Prevention    Office Use Only

## 2022-11-03 LAB
BACTERIA SPEC CULT: ABNORMAL
CC UR VC: ABNORMAL
SERVICE CMNT-IMP: ABNORMAL

## 2022-11-10 ENCOUNTER — OFFICE VISIT (OUTPATIENT)
Dept: NEUROLOGY | Age: 87
End: 2022-11-10
Payer: MEDICARE

## 2022-11-10 VITALS
WEIGHT: 133 LBS | BODY MASS INDEX: 25.11 KG/M2 | HEART RATE: 91 BPM | DIASTOLIC BLOOD PRESSURE: 60 MMHG | OXYGEN SATURATION: 98 % | SYSTOLIC BLOOD PRESSURE: 108 MMHG | HEIGHT: 61 IN

## 2022-11-10 DIAGNOSIS — E11.42 DIABETIC PERIPHERAL NEUROPATHY ASSOCIATED WITH TYPE 2 DIABETES MELLITUS (HCC): Primary | ICD-10-CM

## 2022-11-10 DIAGNOSIS — G31.84 MILD COGNITIVE IMPAIRMENT: ICD-10-CM

## 2022-11-10 DIAGNOSIS — G25.81 RLS (RESTLESS LEGS SYNDROME): ICD-10-CM

## 2022-11-10 PROCEDURE — G9717 DOC PT DX DEP/BP F/U NT REQ: HCPCS | Performed by: PSYCHIATRY & NEUROLOGY

## 2022-11-10 PROCEDURE — G8427 DOCREV CUR MEDS BY ELIG CLIN: HCPCS | Performed by: PSYCHIATRY & NEUROLOGY

## 2022-11-10 PROCEDURE — 1090F PRES/ABSN URINE INCON ASSESS: CPT | Performed by: PSYCHIATRY & NEUROLOGY

## 2022-11-10 PROCEDURE — 1123F ACP DISCUSS/DSCN MKR DOCD: CPT | Performed by: PSYCHIATRY & NEUROLOGY

## 2022-11-10 PROCEDURE — 3051F HG A1C>EQUAL 7.0%<8.0%: CPT | Performed by: PSYCHIATRY & NEUROLOGY

## 2022-11-10 PROCEDURE — 1101F PT FALLS ASSESS-DOCD LE1/YR: CPT | Performed by: PSYCHIATRY & NEUROLOGY

## 2022-11-10 PROCEDURE — G8417 CALC BMI ABV UP PARAM F/U: HCPCS | Performed by: PSYCHIATRY & NEUROLOGY

## 2022-11-10 PROCEDURE — 99214 OFFICE O/P EST MOD 30 MIN: CPT | Performed by: PSYCHIATRY & NEUROLOGY

## 2022-11-10 PROCEDURE — G8536 NO DOC ELDER MAL SCRN: HCPCS | Performed by: PSYCHIATRY & NEUROLOGY

## 2022-11-10 RX ORDER — PRAMIPEXOLE DIHYDROCHLORIDE 0.25 MG/1
TABLET ORAL
Qty: 90 TABLET | Refills: 5 | Status: SHIPPED | OUTPATIENT
Start: 2022-11-10

## 2022-11-10 NOTE — PROGRESS NOTES
Neurology Clinic Follow up Note    Patient ID:  Bryce Whitman  [id number]  50 y.o.  1935      Ms. Matthew Goldsmith is here for follow up today of  Chief Complaint   Patient presents with    Dementia     Completed Testing with Dr. Tam Mccallum. Last Appointment With Me:  4/22/2022    \"Odalis Pineda is presenting for evaluation of atypical movements. These were initially noted by her physical therapist who is following her for chronic imbalance/falls. PT reported intermittent myoclonic jerking of the lower extremities b/l L>R with passive movements. Denies similar symptoms into the upper extremities. Family has also noted intermittent mouth movements and truncal swaying when patient is seated. She has a previous diagnosis of RLS on ropinirole over the past 15 years per pt/family. H/O depression/anxiety but no prior exposure to antipsychotics per pt/family. She describes a need to move the legs with LE paresthesias/numbness affecting sleep. Ropinirole does appear to be beneficial for RLS symptoms. She has tried gabapentin in the past but did not tolerate the medication. Other ROS: memory impairment-no signs of dementia on prior neuropsychologic testing per family, previously felt to be secondary to anxiety/depression. +Gait instability/falls. Denies tremors. \"    Interval History:   Patient returns for f/u of RLS, DM related polyneuropathy confirmed on recent EMG and MCI. She has suffered from several UTIs and skin CA resection (RLE) with MRSA complication since last visit. RLS symptoms are stable since last visit. She describes this as a sensation of needing to move the limbs. Walking tends to improve symptoms. She has been taking pramipexole AM dose intermittently, qhs dose consistently. She feels medication is helpful. Denies significant side effects with medication. In regards to her peripheral neuropathy, she reports these symptoms are stable. Denies significant dysesthesias or recent falls. Admits to occasion imbalance. She is not presently using an assist device. She has completed neuropsychologic testing again recently with Dr. Angela Byrne c/w STEVE. She has difficulty with processing at times, multitasking, short term recall deficits. She is receiving assistance with medications (daughter is loading her pill box). Finances are handled by family. She is no longer driving. She is residing at home alone, however her daughter lives only 4 miles from her home and checks in with her often. PMHx/ PSHx/ FHx/ SHx:  Reviewed and unchanged previous visit. Past Medical History:   Diagnosis Date    Anxiety     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    bursitis    Depression     Diabetes (HCC)     GERD (gastroesophageal reflux disease)     H/O hand fracture     Hyperlipidemia     Menopause     Osteopenia     Recurrent UTI     RLS (restless legs syndrome)          ROS:  Comprehensive review of systems negative except for as noted above. Objective:       Meds:  Current Outpatient Medications   Medication Sig Dispense Refill    metFORMIN ER (GLUCOPHAGE XR) 500 mg tablet Take 1 Tablet by mouth every morning AND 2 Tablets daily (with dinner). Indications: type 2 diabetes mellitus (Patient taking differently: Take 1 Tablet by mouth every morning AND 1 Tablets daily (with dinner). Indications: type 2 diabetes mellitus) 270 Tablet 3    multivitamin (ONE A DAY) tablet Take 1 Tablet by mouth daily. In patch form      pramipexole (MIRAPEX) 0.25 mg tablet Take 0.25mg in the afternoon and 0.50 qhs. 90 Tablet 5    buPROPion XL (WELLBUTRIN XL) 150 mg tablet Take 1 Tablet by mouth daily. Indications: major depressive disorder 90 Tablet 4    OTHER 50 mg. CBD patch apply every 8 hours daily.       Blood-Glucose Meter misc Use to test glucose once daily, E 11.9   Relion meter please 1 Each 0    RELION PRIME TEST STRIPS strip USE 1 TEST STRIP EACH TIME TO TEST GLUCOSE 2 TIMES A  Strip 11       Exam:  Visit Vitals  /60   Pulse 91   Ht 5' 1\" (1.549 m)   Wt 133 lb (60.3 kg)   SpO2 98%   BMI 25.13 kg/m²     NEUROLOGICAL EXAM:  General: Awake, alert, speech fluent. CN: PERRL, EOMI without nystagmus, VFF to confrontation, facial sensation and strength are normal and symmetric, hearing is intact to finger rub bilaterally, palate and tongue movements are intact and symmetric. Motor: Normal tone, bulk and strength bilaterally. Reflexes: Deferred  Coordination: FNF, LUBNA, HTS intact. Sensation: LT intact throughout. Gait: Steady    LABS  Component      Latest Ref Rng & Units 6/24/2021 6/24/2021 6/24/2021           2:06 PM  2:06 PM  2:06 PM   Protein, total      6.0 - 8.5 g/dL      Albumin      2.9 - 4.4 g/dL      Alpha-1-globulin      0.0 - 0.4 g/dL      ALPHA-2 GLOBULIN      0.4 - 1.0 g/dL      Beta globulin      0.7 - 1.3 g/dL      Gamma globulin      0.4 - 1.8 g/dL      M-Guillermo      Not Observed g/dL      Globulin, total      2.2 - 3.9 g/dL      A/G ratio      0.7 - 1.7      Please note            METHYLMALONIC ACID, SERUM      0 - 378 nmol/L   172   Disclaimer         Comment   TSH      0.450 - 4.500 uIU/mL  2.480    Ferritin      15 - 150 ng/mL 84       Component      Latest Ref Rng & Units 6/24/2021           2:06 PM   Protein, total      6.0 - 8.5 g/dL 6.6   Albumin      2.9 - 4.4 g/dL 3.7   Alpha-1-globulin      0.0 - 0.4 g/dL 0.2   ALPHA-2 GLOBULIN      0.4 - 1.0 g/dL 0.9   Beta globulin      0.7 - 1.3 g/dL 1.1   Gamma globulin      0.4 - 1.8 g/dL 0.7   M-Guillermo      Not Observed g/dL Not Observed   Globulin, total      2.2 - 3.9 g/dL 2.9   A/G ratio      0.7 - 1.7 1.3   Please note       Comment   METHYLMALONIC ACID, SERUM      0 - 378 nmol/L    Disclaimer          TSH      0.450 - 4.500 uIU/mL    Ferritin      15 - 150 ng/mL      IMAGING:  MRI Results (most recent):  No results found for this or any previous visit.     EMG  Impression:  Extensive electrodiagnostic examination of the right lower extremity and additional nerve conduction studies of the left lower extremity reveals the followin. 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 2. No evidence of a right lumbosacral motor radiculopathy. Assessment:     Encounter Diagnoses     ICD-10-CM ICD-9-CM   1. Diabetic peripheral neuropathy associated with type 2 diabetes mellitus (HCC)  E11.42 250.60     357.2   2. RLS (restless legs syndrome)  G25.81 333.94   3. Mild cognitive impairment  G31.84 35.83     80year old pleasant female referred for evaluation of gait instability/falls, lower extremity paresthesias, RLS, cognitive deficits in the setting of comorbid anxiety/depression. 1. Imbalance/falls with lower extremity paresthesias-Initial examination c/w length-dependent polyneuropathy. Suspect related to uncontrolled DM. No evidence of contributing metabolic derangements on recent laboratory investigations. EMG confirmed a generalized length-dependent polyneuropathy, axon loss and moderate in degree electrically. Discussed need for strict glucose control to prevent further progression. This may contribute to gait instability. 2. RLS-Weaned off of Requip secondary to associated dyskinesias. Start Lyrica but did not tolerate this due to side effects. Also with h/o intolerance to gabapentin in the past. Currently maintained on pramipexole and doing much better. She will monitor for any associated fatigue or impulsivity on medication. 3. Cognitive impairment-Family reports some interval decline. Repeat neuropsychologic testing was completed and consistent with mild cognitive impairment with recommendations for repeat testing in 12 months.     Plan:   Continue Pramipexole 0.25mg in the AM and 0.5mg qhs for RLS  Discussed strict glucose control to prevent progression of DM related polyneuropathy  Discussed need for ongoing assistance with medications/finances due to MCI. Will plan for repeat neuropsychologic testing in 1 year (~10/2023)      Follow-up and Dispositions    Return in about 6 months (around 5/10/2023). I have discussed the diagnosis with the patient today and the intended plan as seen in the above orders with both the patient as well as referring provider and/or PCP via electronic correspondence. The patient has received an after-visit summary and questions were answered concerning future plans. I have discussed medication side effects and warnings with the patient as well.       Signed:  Christine Jiménez DO  11/10/2022

## 2022-12-01 ENCOUNTER — VIRTUAL VISIT (OUTPATIENT)
Dept: FAMILY MEDICINE CLINIC | Age: 87
End: 2022-12-01
Payer: MEDICARE

## 2022-12-01 DIAGNOSIS — R35.0 FREQUENCY OF URINATION: Primary | ICD-10-CM

## 2022-12-01 LAB
BILIRUB UR QL STRIP: NEGATIVE
GLUCOSE UR-MCNC: NORMAL MG/DL
KETONES P FAST UR STRIP-MCNC: NEGATIVE MG/DL
PH UR STRIP: 5.5 [PH] (ref 4.6–8)
PROT UR QL STRIP: NEGATIVE
SP GR UR STRIP: 1.01 (ref 1–1.03)
UA UROBILINOGEN AMB POC: NORMAL (ref 0.2–1)
URINALYSIS CLARITY POC: CLEAR
URINALYSIS COLOR POC: YELLOW
URINE BLOOD POC: NEGATIVE
URINE LEUKOCYTES POC: NEGATIVE
URINE NITRITES POC: NEGATIVE

## 2022-12-01 PROCEDURE — 81002 URINALYSIS NONAUTO W/O SCOPE: CPT | Performed by: INTERNAL MEDICINE

## 2022-12-01 PROCEDURE — 1101F PT FALLS ASSESS-DOCD LE1/YR: CPT | Performed by: INTERNAL MEDICINE

## 2022-12-01 PROCEDURE — G2025 DIS SITE TELE SVCS RHC/FQHC: HCPCS | Performed by: INTERNAL MEDICINE

## 2022-12-01 NOTE — PROGRESS NOTES
Results for orders placed or performed in visit on 12/01/22   AMB POC URINALYSIS DIP STICK MANUAL W/O MICRO   Result Value Ref Range    Color (UA POC) Yellow     Clarity (UA POC) Clear     Glucose (UA POC) 1+ Negative    Bilirubin (UA POC) Negative Negative    Ketones (UA POC) Negative Negative    Specific gravity (UA POC) 1.015 1.001 - 1.035    Blood (UA POC) Negative Negative    pH (UA POC) 5.5 4.6 - 8.0    Protein (UA POC) Negative Negative    Urobilinogen (UA POC) 0.2 mg/dL 0.2 - 1    Nitrites (UA POC) Negative Negative    Leukocyte esterase (UA POC) Negative Negative

## 2022-12-01 NOTE — PROGRESS NOTES
Lisbeth Byrne is a 80 y.o. female evaluated via telephone on 12/1/2022. Consent:  She and/or health care decision maker is aware that that she may receive a bill for this telephone service, depending on her insurance coverage, and has provided verbal consent to proceed: Yes    A/P:  1. Frequency of urination  Patient family reassured. No biochemical evidence of UTI.  - AMB POC URINALYSIS DIP STICK MANUAL W/O MICRO        HPI: This visit was conducted by me as the provider on my cell phone at [facility] and by the patient and her daughter at their residence on their cell phone. She has had multiple UTIs in the past often heralded by frequency. She has no other symptoms today. They are about to move somewhat into the home for home care on an intermittent basis. There has been no urinary odor or alteration in behavior. I affirm this is a Patient Initiated Episode with an Established Patient who has not had a related appointment within my department in the past 7 days or scheduled within the next 24 hours. On this date 12/01/2022 I have spent 15 minutes reviewing previous notes, test results and face to face with the patient discussing the diagnosis and importance of compliance with the treatment plan as well as documenting on the day of the visit.      Note: not billable if this call serves to triage the patient into an appointment for the relevant concern      Homero Muñoz MD

## 2022-12-19 ENCOUNTER — OFFICE VISIT (OUTPATIENT)
Dept: FAMILY MEDICINE CLINIC | Age: 87
End: 2022-12-19
Payer: MEDICARE

## 2022-12-19 VITALS
WEIGHT: 135.8 LBS | HEIGHT: 61 IN | RESPIRATION RATE: 18 BRPM | HEART RATE: 87 BPM | DIASTOLIC BLOOD PRESSURE: 89 MMHG | OXYGEN SATURATION: 98 % | SYSTOLIC BLOOD PRESSURE: 128 MMHG | BODY MASS INDEX: 25.64 KG/M2

## 2022-12-19 DIAGNOSIS — R35.0 URINARY FREQUENCY: ICD-10-CM

## 2022-12-19 DIAGNOSIS — R35.0 FREQUENCY OF URINATION: Primary | ICD-10-CM

## 2022-12-19 LAB
BILIRUB UR QL STRIP: NEGATIVE
GLUCOSE UR-MCNC: NEGATIVE MG/DL
KETONES P FAST UR STRIP-MCNC: NEGATIVE MG/DL
PH UR STRIP: 6.5 [PH] (ref 4.6–8)
PROT UR QL STRIP: NEGATIVE
SP GR UR STRIP: 1.01 (ref 1–1.03)
UA UROBILINOGEN AMB POC: NORMAL (ref 0.2–1)
URINALYSIS CLARITY POC: NORMAL
URINALYSIS COLOR POC: YELLOW
URINE BLOOD POC: NORMAL
URINE LEUKOCYTES POC: NORMAL
URINE NITRITES POC: POSITIVE

## 2022-12-19 PROCEDURE — G8417 CALC BMI ABV UP PARAM F/U: HCPCS | Performed by: INTERNAL MEDICINE

## 2022-12-19 PROCEDURE — G8536 NO DOC ELDER MAL SCRN: HCPCS | Performed by: INTERNAL MEDICINE

## 2022-12-19 PROCEDURE — 1123F ACP DISCUSS/DSCN MKR DOCD: CPT | Performed by: INTERNAL MEDICINE

## 2022-12-19 PROCEDURE — G9717 DOC PT DX DEP/BP F/U NT REQ: HCPCS | Performed by: INTERNAL MEDICINE

## 2022-12-19 PROCEDURE — G8427 DOCREV CUR MEDS BY ELIG CLIN: HCPCS | Performed by: INTERNAL MEDICINE

## 2022-12-19 PROCEDURE — 1090F PRES/ABSN URINE INCON ASSESS: CPT | Performed by: INTERNAL MEDICINE

## 2022-12-19 PROCEDURE — 81003 URINALYSIS AUTO W/O SCOPE: CPT | Performed by: INTERNAL MEDICINE

## 2022-12-19 PROCEDURE — 1101F PT FALLS ASSESS-DOCD LE1/YR: CPT | Performed by: INTERNAL MEDICINE

## 2022-12-19 PROCEDURE — 99213 OFFICE O/P EST LOW 20 MIN: CPT | Performed by: INTERNAL MEDICINE

## 2022-12-19 RX ORDER — NITROFURANTOIN 25; 75 MG/1; MG/1
100 CAPSULE ORAL 2 TIMES DAILY
Qty: 10 CAPSULE | Refills: 0 | Status: SHIPPED | OUTPATIENT
Start: 2022-12-19 | End: 2022-12-24

## 2022-12-19 NOTE — PROGRESS NOTES
Chief Complaint   Patient presents with    UTI     C/O lower ABD pain x 1-2 weeks. Daughter reports being more sluggish recently. Denies fever       ASSESSMENT AND PLAN:    Her most recent urinalysis sent for culture in October yielded a Klebsiella which was sensitive to Cipro and Macrobid. 1. Frequency of urination  - AMB POC URINALYSIS DIP STICK AUTO W/O MICRO  - CULTURE, URINE; Future  - CULTURE, URINE  - nitrofurantoin, macrocrystal-monohydrate, (Macrobid) 100 mg capsule; Take 1 Capsule by mouth two (2) times a day for 5 days. Dispense: 10 Capsule; Refill: 0    2. Urinary frequency  - nitrofurantoin, macrocrystal-monohydrate, (Macrobid) 100 mg capsule; Take 1 Capsule by mouth two (2) times a day for 5 days. Dispense: 10 Capsule; Refill: 0      Orders Placed This Encounter    CULTURE, URINE     Standing Status:   Future     Number of Occurrences:   1     Standing Expiration Date:   12/19/2023    AMB POC URINALYSIS DIP STICK AUTO W/O MICRO    nitrofurantoin, macrocrystal-monohydrate, (Macrobid) 100 mg capsule     Sig: Take 1 Capsule by mouth two (2) times a day for 5 days. Dispense:  10 Capsule     Refill:  0       Luke Go MD, FACP      HPI:    Complaining of lower abdominal midline pain and urinary discomfort for possibly several weeks she says. No fever or chills. Allergies   Allergen Reactions    Doxycycline Rash    Sulfa (Sulfonamide Antibiotics) Unknown (comments)       Current Outpatient Medications   Medication Sig    nitrofurantoin, macrocrystal-monohydrate, (Macrobid) 100 mg capsule Take 1 Capsule by mouth two (2) times a day for 5 days. pramipexole (MIRAPEX) 0.25 mg tablet Take 0.25mg in the afternoon and 0.50 qhs.    metFORMIN ER (GLUCOPHAGE XR) 500 mg tablet Take 1 Tablet by mouth every morning AND 2 Tablets daily (with dinner). Indications: type 2 diabetes mellitus (Patient taking differently: Take 1 Tablet by mouth every morning AND 1 Tablets daily (with dinner). Indications: type 2 diabetes mellitus)    multivitamin (ONE A DAY) tablet Take 1 Tablet by mouth daily. In patch form    buPROPion XL (WELLBUTRIN XL) 150 mg tablet Take 1 Tablet by mouth daily. Indications: major depressive disorder    OTHER 50 mg. CBD patch apply every 8 hours daily. Blood-Glucose Meter misc Use to test glucose once daily, E 11.9   Relion meter please    RELION PRIME TEST STRIPS strip USE 1 TEST STRIP EACH TIME TO TEST GLUCOSE 2 TIMES A DAY     No current facility-administered medications for this visit. Past Medical History:   Diagnosis Date    Anxiety     Degenerative joint disease (DJD) of hip 01/2013    bursitis    Depression     Diabetes (HCC)     GERD (gastroesophageal reflux disease)     H/O hand fracture     Hyperlipidemia     Menopause     Osteopenia     Recurrent UTI     RLS (restless legs syndrome)     Squamous cell carcinoma of skin of lower extremity     afterwards had MRSA which is cleared per family         ROS:  Denies fever, chills, cough, chest pain, SOB,  nausea, vomiting, or diarrhea. Denies wt loss, wt gain, hemoptysis, hematochezia or melena. Physical Examination:    Visit Vitals  /89 (BP 1 Location: Left upper arm, BP Patient Position: Sitting, BP Cuff Size: Adult long)   Pulse 87   Resp 18   Ht 5' 1\" (1.549 m)   Wt 135 lb 12.8 oz (61.6 kg)   SpO2 98%   BMI 25.66 kg/m²      General:  Alert, cooperative, no distress. Head:  Normocephalic, without obvious abnormality, atraumatic. Eyes:  Conjunctivae/corneas clear. Pupils equal, round, reactive to light. Chest wall:  No tenderness or deformity. Extremities: Extremities normal, atraumatic, no cyanosis or edema.    Skin:  No rash   Lymph nodes: Cervical and supraclavicular nodes are normal.   Neurologic: Moves all extremities, fluent speech

## 2022-12-19 NOTE — PROGRESS NOTES
Jericho Zarate is a 80 y.o. female presenting for/with:    Chief Complaint   Patient presents with    UTI     C/O lower ABD pain x 1-2 weeks. Daughter reports being more sluggish recently. Denies fever       Visit Vitals  /89 (BP 1 Location: Left upper arm, BP Patient Position: Sitting, BP Cuff Size: Adult long)   Pulse 87   Resp 18   Ht 5' 1\" (1.549 m)   Wt 135 lb 12.8 oz (61.6 kg)   SpO2 98%   BMI 25.66 kg/m²     Pain Scale: 0 - No pain/10  Pain Location:     1. \"Have you been to the ER, urgent care clinic since your last visit? Hospitalized since your last visit? \" No    2. \"Have you seen or consulted any other health care providers outside of the [address] [place] [address] since your last visit? \" No     3. For patients aged 39-70: Has the patient had a colonoscopy / FIT/ Cologuard? NA - based on age      If the patient is female:    4. For patients aged 41-77: Has the patient had a mammogram within the past 2 years? NA - based on age or sex      11. For patients aged 21-65: Has the patient had a pap smear? NA - based on age or sex      Learning Assessment 4/11/2022   PRIMARY LEARNER Patient   PRIMARY LANGUAGE ENGLISH   LEARNER PREFERENCE PRIMARY READING     -   ANSWERED BY pt   RELATIONSHIP SELF     Fall Risk Assessment, last 12 mths 12/19/2022   Able to walk? Yes   Fall in past 12 months? 0   Do you feel unsteady? 0   Are you worried about falling 0   Is TUG test greater than 12 seconds? -   Is the gait abnormal? -   Number of falls in past 12 months -   Fall with injury?  -       3 most recent PHQ Screens 12/19/2022   PHQ Not Done -   Little interest or pleasure in doing things Not at all   Feeling down, depressed, irritable, or hopeless Not at all   Total Score PHQ 2 0   Trouble falling or staying asleep, or sleeping too much -   Feeling tired or having little energy -   Poor appetite, weight loss, or overeating -   Feeling bad about yourself - or that you are a failure or have let yourself or your family down -   Trouble concentrating on things such as school, work, reading, or watching TV -   Moving or speaking so slowly that other people could have noticed; or the opposite being so fidgety that others notice -   Thoughts of being better off dead, or hurting yourself in some way -   PHQ 9 Score -   How difficult have these problems made it for you to do your work, take care of your home and get along with others -     Abuse Screening Questionnaire 12/19/2022   Do you ever feel afraid of your partner? N   Are you in a relationship with someone who physically or mentally threatens you? N   Is it safe for you to go home?  Y       ADL Assessment 12/19/2022   Feeding yourself No Help Needed   Getting from bed to chair No Help Needed   Getting dressed No Help Needed   Bathing or showering No Help Needed   Walk across the room (includes cane/walker) No Help Needed   Using the telphone No Help Needed   Taking your medications Help Needed   Preparing meals Help Needed   Managing money (expenses/bills) No Help Needed   Moderately strenuous housework (laundry) No Help Needed   Shopping for personal items (toiletries/medicines) Help Needed   Shopping for groceries Help Needed   Driving Help Needed   Climbing a flight of stairs No Help Needed   Getting to places beyond walking distances No Help Needed      Advance Care Planning 6/28/2022   Patient's Healthcare Decision Maker is: Legal Next of Kin   Confirm Advance Directive Yes, not on file   Patient Would Like to Complete Advance Directive -      Results for orders placed or performed in visit on 12/19/22   AMB POC URINALYSIS DIP STICK AUTO W/O MICRO   Result Value Ref Range    Color (UA POC) Yellow     Clarity (UA POC) Cloudy     Glucose (UA POC) Negative Negative    Bilirubin (UA POC) Negative Negative    Ketones (UA POC) Negative Negative    Specific gravity (UA POC) 1.010 1.001 - 1.035    Blood (UA POC) 1+ Negative    pH (UA POC) 6.5 4.6 - 8.0    Protein (UA POC) Negative Negative    Urobilinogen (UA POC) 0.2 mg/dL 0.2 - 1    Nitrites (UA POC) Positive Negative    Leukocyte esterase (UA POC) 4+ Negative

## 2022-12-22 LAB
BACTERIA SPEC CULT: ABNORMAL
CC UR VC: ABNORMAL
SERVICE CMNT-IMP: ABNORMAL

## 2023-01-05 DIAGNOSIS — R35.0 FREQUENCY OF URINATION: ICD-10-CM

## 2023-01-05 DIAGNOSIS — R35.0 URINARY FREQUENCY: ICD-10-CM

## 2023-01-05 RX ORDER — NITROFURANTOIN 25; 75 MG/1; MG/1
100 CAPSULE ORAL 2 TIMES DAILY
Qty: 10 CAPSULE | Refills: 0 | Status: SHIPPED | OUTPATIENT
Start: 2023-01-05 | End: 2023-01-10

## 2023-01-23 ENCOUNTER — VIRTUAL VISIT (OUTPATIENT)
Dept: FAMILY MEDICINE CLINIC | Age: 88
End: 2023-01-23
Payer: MEDICARE

## 2023-01-23 DIAGNOSIS — N30.00 ACUTE CYSTITIS WITHOUT HEMATURIA: Primary | ICD-10-CM

## 2023-01-23 PROCEDURE — 1101F PT FALLS ASSESS-DOCD LE1/YR: CPT | Performed by: INTERNAL MEDICINE

## 2023-01-23 PROCEDURE — G2025 DIS SITE TELE SVCS RHC/FQHC: HCPCS | Performed by: INTERNAL MEDICINE

## 2023-01-23 RX ORDER — NITROFURANTOIN 25; 75 MG/1; MG/1
100 CAPSULE ORAL 2 TIMES DAILY
Qty: 14 CAPSULE | Refills: 0 | Status: SHIPPED | OUTPATIENT
Start: 2023-01-23 | End: 2023-01-30

## 2023-01-23 NOTE — PROGRESS NOTES
Bianca Carlisle is a 80 y.o. female evaluated via telephone on 1/23/2023. Consent:  She and/or health care decision maker is aware that that she may receive a bill for this telephone service, depending on her insurance coverage, and has provided verbal consent to proceed: Yes    A/P:  1. Acute cystitis without hematuria  - nitrofurantoin, macrocrystal-monohydrate, (Macrobid) 100 mg capsule; Take 1 Capsule by mouth two (2) times a day for 7 days. Dispense: 14 Capsule; Refill: 0        HPI: Spoke with her daughter, Coleman Beck who confirms that she is having frequent burning with urination. Unable to provide specimen today but we will have her in clinic next week and will check specimen at that time. She may be a good candidate for Hip-Matty. I affirm this is a Patient Initiated Episode with an Established Patient who has not had a related appointment within my department in the past 7 days or scheduled within the next 24 hours. The patient performed their portion of the visit on their phone at their residence and I performed my portion of the telephone visit at [facility]. On this date 01/23/2023 I have spent 15 minutes reviewing previous notes, test results and face to face with the patient discussing the diagnosis and importance of compliance with the treatment plan as well as documenting on the day of the visit.      Note: not billable if this call serves to triage the patient into an appointment for the relevant concern      Tonya Viera MD

## 2023-01-25 DIAGNOSIS — F32.9 REACTIVE DEPRESSION: ICD-10-CM

## 2023-01-25 RX ORDER — BUPROPION HYDROCHLORIDE 150 MG/1
TABLET ORAL
Qty: 90 TABLET | Refills: 4 | Status: SHIPPED | OUTPATIENT
Start: 2023-01-25

## 2023-01-29 NOTE — PROGRESS NOTES
ASSESSMENT AND PLAN:    1. Type 2 diabetes mellitus with hyperglycemia, without long-term current use of insulin (HCC)  Given her age and generally good glycemic control I think she can cut back on the frequency of testing to perhaps once a week or as needed. Currently not on any diabetic medications  2. Adrenal mass ([address]  Clinically stable based on most recent imaging  3. Reactive depression  Doing well  4. Painful urination  Sending urine for culture and beginning Hip-Matty. Advised to follow-up with Dr. Maria Elena Arambula at least once to see if there are any anatomic factors  - CULTURE, URINE; Future  - CULTURE, URINE  - methenamine hippurate (HIPREX) 1 gram tablet; Take 1 Tablet by mouth two (2) times daily (with meals). Indications: urinary tract infection prevention  Dispense: 180 Tablet; Refill: 4      Orders Placed This Encounter    CULTURE, URINE     Standing Status:   Future     Number of Occurrences:   1     Standing Expiration Date:   1/30/2024    methenamine hippurate (HIPREX) 1 gram tablet     Sig: Take 1 Tablet by mouth two (2) times daily (with meals). Indications: urinary tract infection prevention     Dispense:  180 Tablet     Refill:  4            Chief Complaint   Patient presents with    Bladder Infection     States was dealing with a bladder infection. States S/S have improved. Still having some burning with urination. Using diaper rash ointment for external itching. Diabetes     Reports higher glucose readings recently Averaging around 200. Reports this morning was around 160. Insomnia     Reports only sleeping 3-4 hours at a time throughout the night. HPI  49-year-old woman who has a history of mild dementia, longstanding type 2 diabetes, reactive depression and frequent UTI arrives today for interval assessment. She lives in [place] and has daughters who are very attentive to her care.   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    She has ongoing concerns and symptoms regarding her vagina and urination. Previously has seen Dr. Sravan Gutiérrez    ROS:    Denies nausea, vomiting, diarrhea, weight loss, weight gain, hemoptysis, hematochezia or melena. Denies rash, frequency, or dysuria. EXAM:   The patient appears tired and mildly ill  Visit Vitals  BP (!) 140/88 (BP 1 Location: Left upper arm, BP Patient Position: Sitting, BP Cuff Size: Adult long)   Pulse 84   Resp 18   Ht 5' 1\" (1.549 m)   Wt 135 lb 3.2 oz (61.3 kg)   SpO2 97%   BMI 25.55 kg/m²     HEENT:  NC/AT PERRLA, EOMI, oropharynx is clear, oral mucosa   Neck: supple, without JVD, thyromegaly, mass or bruit  Lungs:  clear,without wheezes, rales, rubs or ronchi   Cardiovascular: RRR without MGR   Abdomen is soft without tenderness, guarding, mass or organomegaly. Extremities: no cyanosis, clubbing or edema   Neurological:  fluent, ambulatory, CN 2-12 are grossly intact  Skin:  No rash    Duration 30 minutes, education, counseling.   Amara Barbosa MD

## 2023-01-30 ENCOUNTER — OFFICE VISIT (OUTPATIENT)
Dept: FAMILY MEDICINE CLINIC | Age: 88
End: 2023-01-30
Payer: MEDICARE

## 2023-01-30 VITALS
HEART RATE: 84 BPM | HEIGHT: 61 IN | RESPIRATION RATE: 18 BRPM | OXYGEN SATURATION: 97 % | SYSTOLIC BLOOD PRESSURE: 140 MMHG | WEIGHT: 135.2 LBS | BODY MASS INDEX: 25.53 KG/M2 | DIASTOLIC BLOOD PRESSURE: 88 MMHG

## 2023-01-30 DIAGNOSIS — E27.8 ADRENAL MASS (HCC): ICD-10-CM

## 2023-01-30 DIAGNOSIS — F32.9 REACTIVE DEPRESSION: ICD-10-CM

## 2023-01-30 DIAGNOSIS — R30.9 PAINFUL URINATION: ICD-10-CM

## 2023-01-30 DIAGNOSIS — E11.65 TYPE 2 DIABETES MELLITUS WITH HYPERGLYCEMIA, WITHOUT LONG-TERM CURRENT USE OF INSULIN (HCC): Primary | ICD-10-CM

## 2023-01-30 PROCEDURE — 1090F PRES/ABSN URINE INCON ASSESS: CPT | Performed by: INTERNAL MEDICINE

## 2023-01-30 PROCEDURE — G8427 DOCREV CUR MEDS BY ELIG CLIN: HCPCS | Performed by: INTERNAL MEDICINE

## 2023-01-30 PROCEDURE — G9717 DOC PT DX DEP/BP F/U NT REQ: HCPCS | Performed by: INTERNAL MEDICINE

## 2023-01-30 PROCEDURE — 1123F ACP DISCUSS/DSCN MKR DOCD: CPT | Performed by: INTERNAL MEDICINE

## 2023-01-30 PROCEDURE — G8536 NO DOC ELDER MAL SCRN: HCPCS | Performed by: INTERNAL MEDICINE

## 2023-01-30 PROCEDURE — 99214 OFFICE O/P EST MOD 30 MIN: CPT | Performed by: INTERNAL MEDICINE

## 2023-01-30 PROCEDURE — 1101F PT FALLS ASSESS-DOCD LE1/YR: CPT | Performed by: INTERNAL MEDICINE

## 2023-01-30 PROCEDURE — G8417 CALC BMI ABV UP PARAM F/U: HCPCS | Performed by: INTERNAL MEDICINE

## 2023-01-30 RX ORDER — METHENAMINE HIPPURATE 1000 MG/1
1 TABLET ORAL 2 TIMES DAILY WITH MEALS
Qty: 180 TABLET | Refills: 4 | Status: SHIPPED | OUTPATIENT
Start: 2023-01-30

## 2023-01-30 NOTE — PROGRESS NOTES
Aviva Shanks is a 80 y.o. female presenting for/with:    Chief Complaint   Patient presents with    Bladder Infection     States was dealing with a bladder infection. States S/S have improved. Still having some burning with urination. Using diaper rash ointment for external itching. Diabetes     Reports higher glucose readings recently Averaging around 200. Reports this morning was around 160. Insomnia     Reports only sleeping 3-4 hours at a time throughout the night. Visit Vitals  BP (!) 140/88 (BP 1 Location: Left upper arm, BP Patient Position: Sitting, BP Cuff Size: Adult long)   Pulse 84   Resp 18   Ht 5' 1\" (1.549 m)   Wt 135 lb 3.2 oz (61.3 kg)   SpO2 97%   BMI 25.55 kg/m²     Pain Scale: 0 - No pain/10  Pain Location:     1. \"Have you been to the ER, urgent care clinic since your last visit? Hospitalized since your last visit? \" No    2. \"Have you seen or consulted any other health care providers outside of the [address] [place] [address] since your last visit? \" No     3. For patients aged 39-70: Has the patient had a colonoscopy / FIT/ Cologuard? NA - based on age      If the patient is female:    4. For patients aged 41-77: Has the patient had a mammogram within the past 2 years? NA - based on age or sex      11. For patients aged 21-65: Has the patient had a pap smear? NA - based on age or sex      Learning Assessment 4/11/2022   PRIMARY LEARNER Patient   PRIMARY LANGUAGE ENGLISH   LEARNER PREFERENCE PRIMARY READING     -   ANSWERED BY pt   RELATIONSHIP SELF     Fall Risk Assessment, last 12 mths 1/30/2023   Able to walk? Yes   Fall in past 12 months? 0   Do you feel unsteady? 0   Are you worried about falling 0   Is TUG test greater than 12 seconds? -   Is the gait abnormal? -   Number of falls in past 12 months -   Fall with injury?  -       3 most recent PHQ Screens 1/30/2023   PHQ Not Done -   Little interest or pleasure in doing things Not at all   Feeling down, depressed, irritable, or hopeless Not at all   Total Score PHQ 2 0   Trouble falling or staying asleep, or sleeping too much -   Feeling tired or having little energy -   Poor appetite, weight loss, or overeating -   Feeling bad about yourself - or that you are a failure or have let yourself or your family down -   Trouble concentrating on things such as school, work, reading, or watching TV -   Moving or speaking so slowly that other people could have noticed; or the opposite being so fidgety that others notice -   Thoughts of being better off dead, or hurting yourself in some way -   PHQ 9 Score -   How difficult have these problems made it for you to do your work, take care of your home and get along with others -     Abuse Screening Questionnaire 1/30/2023   Do you ever feel afraid of your partner? N   Are you in a relationship with someone who physically or mentally threatens you? N   Is it safe for you to go home?  Y       ADL Assessment 1/30/2023   Feeding yourself No Help Needed   Getting from bed to chair No Help Needed   Getting dressed No Help Needed   Bathing or showering No Help Needed   Walk across the room (includes cane/walker) No Help Needed   Using the telphone No Help Needed   Taking your medications No Help Needed   Preparing meals No Help Needed   Managing money (expenses/bills) Help Needed   Moderately strenuous housework (laundry) Help Needed   Shopping for personal items (toiletries/medicines) Help Needed   Shopping for groceries Help Needed   Driving Help Needed   Climbing a flight of stairs No Help Needed   Getting to places beyond walking distances No Help Needed      Advance Care Planning 6/28/2022   Patient's Healthcare Decision Maker is: Legal Next of Kin   Confirm Advance Directive Yes, not on file   Patient Would Like to Complete Advance Directive -

## 2023-02-01 LAB
BACTERIA SPEC CULT: NORMAL
CC UR VC: NORMAL
SERVICE CMNT-IMP: NORMAL

## 2023-04-21 ENCOUNTER — OFFICE VISIT (OUTPATIENT)
Dept: FAMILY MEDICINE CLINIC | Age: 88
End: 2023-04-21
Payer: MEDICARE

## 2023-04-21 VITALS
SYSTOLIC BLOOD PRESSURE: 130 MMHG | WEIGHT: 135 LBS | HEART RATE: 97 BPM | RESPIRATION RATE: 18 BRPM | TEMPERATURE: 97.5 F | DIASTOLIC BLOOD PRESSURE: 78 MMHG | HEIGHT: 61 IN | BODY MASS INDEX: 25.49 KG/M2

## 2023-04-21 DIAGNOSIS — N30.00 ACUTE CYSTITIS WITHOUT HEMATURIA: ICD-10-CM

## 2023-04-21 DIAGNOSIS — R35.0 FREQUENCY OF URINATION: Primary | ICD-10-CM

## 2023-04-21 DIAGNOSIS — N64.4 BREAST PAIN: Primary | ICD-10-CM

## 2023-04-21 LAB
BILIRUB UR QL STRIP: NEGATIVE
GLUCOSE UR-MCNC: NEGATIVE MG/DL
KETONES P FAST UR STRIP-MCNC: NORMAL MG/DL
PH UR STRIP: 5.5 [PH] (ref 4.6–8)
PROT UR QL STRIP: NEGATIVE
SP GR UR STRIP: 1.01 (ref 1–1.03)
UA UROBILINOGEN AMB POC: NORMAL (ref 0.2–1)
URINALYSIS CLARITY POC: NORMAL
URINALYSIS COLOR POC: YELLOW
URINE BLOOD POC: NEGATIVE
URINE LEUKOCYTES POC: NORMAL
URINE NITRITES POC: NEGATIVE

## 2023-04-21 PROCEDURE — 81002 URINALYSIS NONAUTO W/O SCOPE: CPT | Performed by: INTERNAL MEDICINE

## 2023-04-21 RX ORDER — NITROFURANTOIN 25; 75 MG/1; MG/1
100 CAPSULE ORAL 2 TIMES DAILY
Qty: 14 CAPSULE | Refills: 0 | Status: SHIPPED | OUTPATIENT
Start: 2023-04-21 | End: 2023-04-28

## 2023-04-21 NOTE — PROGRESS NOTES
Mando Mckeon is a 80 y.o. female presenting for/with:    Chief Complaint   Patient presents with    Bladder Infection     C/O of urine frequency/no energy for the past couple of days. Visit Vitals  /78 (BP 1 Location: Left arm, BP Patient Position: Sitting, BP Cuff Size: Adult long)   Pulse 97   Temp 97.5 °F (36.4 °C) (Temporal)   Resp 18   Ht 5' 1\" (1.549 m)   Wt 135 lb (61.2 kg)   BMI 25.51 kg/m²     Pain Scale: 0 - No pain/10  Pain Location:     1. \"Have you been to the ER, urgent care clinic since your last visit? Hospitalized since your last visit? \" No    2. \"Have you seen or consulted any other health care providers outside of the [address] [place] [address] since your last visit? \" No     3. For patients aged 39-70: Has the patient had a colonoscopy / FIT/ Cologuard? NA - based on age      If the patient is female:    4. For patients aged 41-77: Has the patient had a mammogram within the past 2 years? NA - based on age or sex      11. For patients aged 21-65: Has the patient had a pap smear? NA - based on age or sex      Learning Assessment 4/11/2022   PRIMARY LEARNER Patient   PRIMARY LANGUAGE ENGLISH   LEARNER PREFERENCE PRIMARY READING     -   ANSWERED BY pt   RELATIONSHIP SELF     Fall Risk Assessment, last 12 mths 4/21/2023   Able to walk? Yes   Fall in past 12 months? 0   Do you feel unsteady? 0   Are you worried about falling 0   Is TUG test greater than 12 seconds? -   Is the gait abnormal? -   Number of falls in past 12 months -   Fall with injury?  -       3 most recent PHQ Screens 4/21/2023   PHQ Not Done -   Little interest or pleasure in doing things Not at all   Feeling down, depressed, irritable, or hopeless Nearly every day   Total Score PHQ 2 3   Trouble falling or staying asleep, or sleeping too much Nearly every day   Feeling tired or having little energy Nearly every day   Poor appetite, weight loss, or overeating Nearly every day   Feeling bad about yourself - or that you are a failure or have let yourself or your family down Not at all   Trouble concentrating on things such as school, work, reading, or watching TV Not at all   Moving or speaking so slowly that other people could have noticed; or the opposite being so fidgety that others notice Not at all   Thoughts of being better off dead, or hurting yourself in some way Not at all   PHQ 9 Score 12   How difficult have these problems made it for you to do your work, take care of your home and get along with others Somewhat difficult     Abuse Screening Questionnaire 1/30/2023   Do you ever feel afraid of your partner? N   Are you in a relationship with someone who physically or mentally threatens you? N   Is it safe for you to go home?  Y       ADL Assessment 1/30/2023   Feeding yourself No Help Needed   Getting from bed to chair No Help Needed   Getting dressed No Help Needed   Bathing or showering No Help Needed   Walk across the room (includes cane/walker) No Help Needed   Using the telphone No Help Needed   Taking your medications No Help Needed   Preparing meals No Help Needed   Managing money (expenses/bills) Help Needed   Moderately strenuous housework (laundry) Help Needed   Shopping for personal items (toiletries/medicines) Help Needed   Shopping for groceries Help Needed   Driving Help Needed   Climbing a flight of stairs No Help Needed   Getting to places beyond walking distances No Help Needed      Advance Care Planning 6/28/2022   Patient's Healthcare Decision Maker is: Legal Next of Kin   Confirm Advance Directive Yes, not on file   Patient Would Like to Complete Advance Directive -      Results for orders placed or performed in visit on 04/21/23   AMB POC URINALYSIS DIP STICK MANUAL W/O MICRO   Result Value Ref Range    Color (UA POC) Yellow     Clarity (UA POC) Cloudy     Glucose (UA POC) Negative Negative    Bilirubin (UA POC) Negative Negative    Ketones (UA POC) Trace Negative    Specific gravity (UA POC) 1.015 1.001 - 1.035    Blood (UA POC) Negative Negative    pH (UA POC) 5.5 4.6 - 8.0    Protein (UA POC) Negative Negative    Urobilinogen (UA POC) 0.2 mg/dL 0.2 - 1    Nitrites (UA POC) Negative Negative    Leukocyte esterase (UA POC) 1+ Negative

## 2023-04-21 NOTE — PROGRESS NOTES
Chief Complaint   Patient presents with    Bladder Infection     C/O of urine frequency/no energy for the past couple of days. ASSESSMENT AND PLAN:    1. Frequency of urination  - AMB POC URINALYSIS DIP STICK MANUAL W/O MICRO  - CULTURE, URINE; Future  - CULTURE, URINE    2. Acute cystitis without hematuria  - nitrofurantoin, macrocrystal-monohydrate, (Macrobid) 100 mg capsule; Take 1 Capsule by mouth two (2) times a day for 7 days. Dispense: 14 Capsule; Refill: 0      Orders Placed This Encounter    CULTURE, URINE     Standing Status:   Future     Number of Occurrences:   1     Standing Expiration Date:   4/21/2024    AMB POC URINALYSIS DIP STICK MANUAL W/O MICRO    nitrofurantoin, macrocrystal-monohydrate, (Macrobid) 100 mg capsule     Sig: Take 1 Capsule by mouth two (2) times a day for 7 days. Dispense:  14 Capsule     Refill:  0       Buffy Rabago MD, FACP      HPI:         is a 80 y.o. female who arrives for frequent urination. She has had difficulties with this over the years and since she has started [employer] the frequency has decreased. Over the last day and a half she has had increasingly frequent urination and is concerned about UTI. Allergies   Allergen Reactions    Doxycycline Rash    Sulfa (Sulfonamide Antibiotics) Unknown (comments)       Current Outpatient Medications   Medication Sig    nitrofurantoin, macrocrystal-monohydrate, (Macrobid) 100 mg capsule Take 1 Capsule by mouth two (2) times a day for 7 days. methenamine hippurate (HIPREX) 1 gram tablet Take 1 Tablet by mouth two (2) times daily (with meals).  Indications: urinary tract infection prevention    buPROPion XL (WELLBUTRIN XL) 150 mg tablet TAKE 1 TABLET BY MOUTH DAILY FOR MAJOR DEPRESSION    metFORMIN ER (GLUCOPHAGE XR) 500 mg tablet TAKE 1 TABLET BY MOUTH TWICE DAILY FOR DIABETES    pramipexole (MIRAPEX) 0.25 mg tablet Take 0.25mg in the afternoon and 0.50 qhs.    multivitamin (ONE A DAY) tablet Take 1 Tablet by mouth daily. In patch form    OTHER 50 mg. CBD patch apply every 8 hours daily. Blood-Glucose Meter misc Use to test glucose once daily, E 11.9   Relion meter please    RELION PRIME TEST STRIPS strip USE 1 TEST STRIP EACH TIME TO TEST GLUCOSE 2 TIMES A DAY     No current facility-administered medications for this visit. Past Medical History:   Diagnosis Date    Anxiety     Degenerative joint disease (DJD) of hip 01/2013    bursitis    Depression     Diabetes (HCC)     GERD (gastroesophageal reflux disease)     H/O hand fracture     Hyperlipidemia     Menopause     Osteopenia     Recurrent UTI     RLS (restless legs syndrome)     Squamous cell carcinoma of skin of lower extremity     afterwards had MRSA which is cleared per family         ROS:  Denies fever, chills, cough, chest pain, SOB,  nausea, vomiting, or diarrhea. Denies wt loss, wt gain, hemoptysis, hematochezia or melena. Physical Examination:    Visit Vitals  /78 (BP 1 Location: Left arm, BP Patient Position: Sitting, BP Cuff Size: Adult long)   Pulse 97   Temp 97.5 °F (36.4 °C) (Temporal)   Resp 18   Ht 5' 1\" (1.549 m)   Wt 135 lb (61.2 kg)   BMI 25.51 kg/m²      General:  Alert, cooperative, no distress. Head:  Normocephalic, without obvious abnormality, atraumatic. Eyes:  Conjunctivae/corneas clear. Pupils equal, round, reactive to light. Chest: No wheezes, rales. Rubs or ronchi   Cardiac: RRR.  No peripheral edema     Skin: No rash

## 2023-04-23 LAB
BACTERIA SPEC CULT: NORMAL
CC UR VC: NORMAL
SERVICE CMNT-IMP: NORMAL

## 2023-05-16 ENCOUNTER — OFFICE VISIT (OUTPATIENT)
Age: 88
End: 2023-05-16
Payer: MEDICARE

## 2023-05-16 VITALS
WEIGHT: 135 LBS | SYSTOLIC BLOOD PRESSURE: 128 MMHG | HEART RATE: 83 BPM | DIASTOLIC BLOOD PRESSURE: 70 MMHG | BODY MASS INDEX: 25.51 KG/M2 | OXYGEN SATURATION: 98 %

## 2023-05-16 DIAGNOSIS — E11.42 TYPE 2 DIABETES MELLITUS WITH DIABETIC POLYNEUROPATHY, UNSPECIFIED WHETHER LONG TERM INSULIN USE (HCC): ICD-10-CM

## 2023-05-16 DIAGNOSIS — G62.9 POLYNEUROPATHY, UNSPECIFIED: ICD-10-CM

## 2023-05-16 DIAGNOSIS — G31.84 MILD COGNITIVE IMPAIRMENT OF UNCERTAIN OR UNKNOWN ETIOLOGY: Primary | ICD-10-CM

## 2023-05-16 PROCEDURE — G8427 DOCREV CUR MEDS BY ELIG CLIN: HCPCS | Performed by: PSYCHIATRY & NEUROLOGY

## 2023-05-16 PROCEDURE — 1123F ACP DISCUSS/DSCN MKR DOCD: CPT | Performed by: PSYCHIATRY & NEUROLOGY

## 2023-05-16 PROCEDURE — 1036F TOBACCO NON-USER: CPT | Performed by: PSYCHIATRY & NEUROLOGY

## 2023-05-16 PROCEDURE — 1090F PRES/ABSN URINE INCON ASSESS: CPT | Performed by: PSYCHIATRY & NEUROLOGY

## 2023-05-16 PROCEDURE — 99214 OFFICE O/P EST MOD 30 MIN: CPT | Performed by: PSYCHIATRY & NEUROLOGY

## 2023-05-16 PROCEDURE — G8419 CALC BMI OUT NRM PARAM NOF/U: HCPCS | Performed by: PSYCHIATRY & NEUROLOGY

## 2023-05-16 RX ORDER — METFORMIN HYDROCHLORIDE 500 MG/1
TABLET, EXTENDED RELEASE ORAL
COMMUNITY
Start: 2022-06-20

## 2023-05-16 RX ORDER — PRAMIPEXOLE DIHYDROCHLORIDE 0.25 MG/1
TABLET ORAL
COMMUNITY
Start: 2022-08-27 | End: 2023-05-16 | Stop reason: SDUPTHER

## 2023-05-16 RX ORDER — METHENAMINE HIPPURATE 1000 MG/1
TABLET ORAL
COMMUNITY
Start: 2023-04-20

## 2023-05-16 RX ORDER — PRAMIPEXOLE DIHYDROCHLORIDE 0.25 MG/1
TABLET ORAL
Qty: 90 TABLET | Refills: 5 | Status: SHIPPED | OUTPATIENT
Start: 2023-05-16

## 2023-05-16 RX ORDER — BUPROPION HYDROCHLORIDE 150 MG/1
TABLET ORAL
COMMUNITY
Start: 2021-12-30

## 2023-05-16 RX ORDER — BLOOD-GLUCOSE METER
EACH MISCELLANEOUS
COMMUNITY
Start: 2018-11-12

## 2023-05-16 ASSESSMENT — PATIENT HEALTH QUESTIONNAIRE - PHQ9
SUM OF ALL RESPONSES TO PHQ QUESTIONS 1-9: 16
4. FEELING TIRED OR HAVING LITTLE ENERGY: 3
SUM OF ALL RESPONSES TO PHQ QUESTIONS 1-9: 16
6. FEELING BAD ABOUT YOURSELF - OR THAT YOU ARE A FAILURE OR HAVE LET YOURSELF OR YOUR FAMILY DOWN: 2
SUM OF ALL RESPONSES TO PHQ9 QUESTIONS 1 & 2: 5
SUM OF ALL RESPONSES TO PHQ QUESTIONS 1-9: 16
1. LITTLE INTEREST OR PLEASURE IN DOING THINGS: 3
5. POOR APPETITE OR OVEREATING: 3
7. TROUBLE CONCENTRATING ON THINGS, SUCH AS READING THE NEWSPAPER OR WATCHING TELEVISION: 0
8. MOVING OR SPEAKING SO SLOWLY THAT OTHER PEOPLE COULD HAVE NOTICED. OR THE OPPOSITE, BEING SO FIGETY OR RESTLESS THAT YOU HAVE BEEN MOVING AROUND A LOT MORE THAN USUAL: 0
2. FEELING DOWN, DEPRESSED OR HOPELESS: 2
3. TROUBLE FALLING OR STAYING ASLEEP: 3
SUM OF ALL RESPONSES TO PHQ QUESTIONS 1-9: 16

## 2023-05-16 NOTE — PROGRESS NOTES
Neurology Clinic Follow up Note    Patient ID:  Ravindra Bell  [id number]  13 y.o.  1935      Ms. Kehinde Quintana is here for follow up today of MCI, RLS, neuropathy. Last Appointment With Me:  11/10/2022    \"Muna Hahn is presenting for evaluation of atypical movements. These were initially noted by her physical therapist who is following her for chronic imbalance/falls. PT reported intermittent myoclonic jerking of the lower extremities b/l L>R with passive movements. Denies similar symptoms into the upper extremities. Family has also noted intermittent mouth movements and truncal swaying when patient is seated. She has a previous diagnosis of RLS on ropinirole over the past 15 years per pt/family. H/O depression/anxiety but no prior exposure to antipsychotics per pt/family. She describes a need to move the legs with LE paresthesias/numbness affecting sleep. Ropinirole does appear to be beneficial for RLS symptoms. She has tried gabapentin in the past but did not tolerate the medication. Other ROS: memory impairment-no signs of dementia on prior neuropsychologic testing per family, previously felt to be secondary to anxiety/depression. +Gait instability/falls. Denies tremors. \"    Interval History:   Patient returns for f/u of RLS, DM related polyneuropathy confirmed on recent EMG and MCI. RLS symptoms are stable since last visit. She describes this as a sensation of needing to move the limbs. She has been taking pramipexole AM dose intermittently, qhs dose consistently. She feels medication is helpful. Denies significant side effects with medication. In regards to her peripheral neuropathy, she reports these symptoms are stable. Denies significant dysesthesias or recent falls. Admits to occasion imbalance. No reported falls. She is not presently using an assist device. She has completed neuropsychologic testing with Dr. Mally Ching c/w LATOYA.  She has difficulty with processing at times,

## 2023-05-18 RX ORDER — METHENAMINE HIPPURATE 1000 MG/1
1 TABLET ORAL 2 TIMES DAILY WITH MEALS
COMMUNITY
Start: 2023-01-30 | End: 2023-07-13 | Stop reason: SDUPTHER

## 2023-05-19 ENCOUNTER — TELEPHONE (OUTPATIENT)
Age: 88
End: 2023-05-19

## 2023-07-13 ENCOUNTER — OFFICE VISIT (OUTPATIENT)
Age: 88
End: 2023-07-13
Payer: MEDICARE

## 2023-07-13 VITALS
TEMPERATURE: 98.2 F | HEIGHT: 61 IN | DIASTOLIC BLOOD PRESSURE: 77 MMHG | OXYGEN SATURATION: 98 % | RESPIRATION RATE: 18 BRPM | SYSTOLIC BLOOD PRESSURE: 158 MMHG | HEART RATE: 77 BPM | BODY MASS INDEX: 25.3 KG/M2 | WEIGHT: 134 LBS

## 2023-07-13 DIAGNOSIS — R60.0 BILATERAL LEG EDEMA: ICD-10-CM

## 2023-07-13 DIAGNOSIS — I10 ESSENTIAL (PRIMARY) HYPERTENSION: ICD-10-CM

## 2023-07-13 DIAGNOSIS — E11.65 TYPE 2 DIABETES MELLITUS WITH HYPERGLYCEMIA, WITHOUT LONG-TERM CURRENT USE OF INSULIN (HCC): ICD-10-CM

## 2023-07-13 DIAGNOSIS — R35.0 FREQUENCY OF MICTURITION: Primary | ICD-10-CM

## 2023-07-13 LAB
BILIRUBIN, URINE, POC: NEGATIVE
BLOOD URINE, POC: NORMAL
GLUCOSE URINE, POC: NEGATIVE
KETONES, URINE, POC: NEGATIVE
LEUKOCYTE ESTERASE, URINE, POC: NORMAL
NITRITE, URINE, POC: NEGATIVE
PH, URINE, POC: 5.5 (ref 4.6–8)
PROTEIN,URINE, POC: NEGATIVE
SPECIFIC GRAVITY, URINE, POC: 1 (ref 1–1.03)
URINALYSIS CLARITY, POC: CLEAR
URINALYSIS COLOR, POC: YELLOW
UROBILINOGEN, POC: NORMAL

## 2023-07-13 PROCEDURE — 81003 URINALYSIS AUTO W/O SCOPE: CPT | Performed by: INTERNAL MEDICINE

## 2023-07-13 RX ORDER — NITROFURANTOIN 25; 75 MG/1; MG/1
100 CAPSULE ORAL 2 TIMES DAILY
Qty: 20 CAPSULE | Refills: 0 | Status: SHIPPED | OUTPATIENT
Start: 2023-07-13 | End: 2023-07-23

## 2023-07-13 RX ORDER — TORSEMIDE 20 MG/1
20 TABLET ORAL
Qty: 30 TABLET | Refills: 4 | Status: SHIPPED | OUTPATIENT
Start: 2023-07-13

## 2023-07-13 SDOH — ECONOMIC STABILITY: HOUSING INSECURITY
IN THE LAST 12 MONTHS, WAS THERE A TIME WHEN YOU DID NOT HAVE A STEADY PLACE TO SLEEP OR SLEPT IN A SHELTER (INCLUDING NOW)?: NO

## 2023-07-13 SDOH — ECONOMIC STABILITY: FOOD INSECURITY: WITHIN THE PAST 12 MONTHS, YOU WORRIED THAT YOUR FOOD WOULD RUN OUT BEFORE YOU GOT MONEY TO BUY MORE.: NEVER TRUE

## 2023-07-13 SDOH — ECONOMIC STABILITY: INCOME INSECURITY: HOW HARD IS IT FOR YOU TO PAY FOR THE VERY BASICS LIKE FOOD, HOUSING, MEDICAL CARE, AND HEATING?: NOT HARD AT ALL

## 2023-07-13 SDOH — ECONOMIC STABILITY: FOOD INSECURITY: WITHIN THE PAST 12 MONTHS, THE FOOD YOU BOUGHT JUST DIDN'T LAST AND YOU DIDN'T HAVE MONEY TO GET MORE.: NEVER TRUE

## 2023-07-13 ASSESSMENT — PATIENT HEALTH QUESTIONNAIRE - PHQ9
5. POOR APPETITE OR OVEREATING: 3
4. FEELING TIRED OR HAVING LITTLE ENERGY: 3
9. THOUGHTS THAT YOU WOULD BE BETTER OFF DEAD, OR OF HURTING YOURSELF: 0
SUM OF ALL RESPONSES TO PHQ QUESTIONS 1-9: 11
6. FEELING BAD ABOUT YOURSELF - OR THAT YOU ARE A FAILURE OR HAVE LET YOURSELF OR YOUR FAMILY DOWN: 1
10. IF YOU CHECKED OFF ANY PROBLEMS, HOW DIFFICULT HAVE THESE PROBLEMS MADE IT FOR YOU TO DO YOUR WORK, TAKE CARE OF THINGS AT HOME, OR GET ALONG WITH OTHER PEOPLE: 1
1. LITTLE INTEREST OR PLEASURE IN DOING THINGS: 0
SUM OF ALL RESPONSES TO PHQ9 QUESTIONS 1 & 2: 1
7. TROUBLE CONCENTRATING ON THINGS, SUCH AS READING THE NEWSPAPER OR WATCHING TELEVISION: 0
2. FEELING DOWN, DEPRESSED OR HOPELESS: 1
3. TROUBLE FALLING OR STAYING ASLEEP: 3
SUM OF ALL RESPONSES TO PHQ QUESTIONS 1-9: 11
8. MOVING OR SPEAKING SO SLOWLY THAT OTHER PEOPLE COULD HAVE NOTICED. OR THE OPPOSITE, BEING SO FIGETY OR RESTLESS THAT YOU HAVE BEEN MOVING AROUND A LOT MORE THAN USUAL: 0

## 2023-07-13 NOTE — PROGRESS NOTES
1. Frequency of micturition  - AMB POC URINALYSIS DIP STICK AUTO W/O MICRO  - Culture, Urine; Future  - Culture, Urine  - nitrofurantoin, macrocrystal-monohydrate, (MACROBID) 100 MG capsule; Take 1 capsule by mouth 2 times daily for 10 days  Dispense: 20 capsule; Refill: 0    2. Type 2 diabetes mellitus with hyperglycemia, without long-term current use of insulin (HCC)  - Hemoglobin A1C; Future  - Hemoglobin A1C    3. Bilateral leg edema  Although she does have underlying peripheral neuropathy, her most recent complaint of worsening anesthesia of her feet is likely due to the recent onset of lower extremity swelling. I suspect that when we are able to reduce the edema her feet will feel normal to her again. We will ask her to take the torsemide 20 mg for 3 days and then reassess. Follow-up in 3 weeks. Duration of visit 30 minutes primarily counseling and education. - torsemide (DEMADEX) 20 MG tablet; Take 1 tablet by mouth daily (with breakfast)  Dispense: 30 tablet; Refill: 4    4. Essential (primary) hypertension  - CBC with Auto Differential; Future  - Comprehensive Metabolic Panel; Future  - TSH; Future  - TSH  - Comprehensive Metabolic Panel  - CBC with Auto Differential         Chief Complaint   Patient presents with    Other     Swelling in both feet/ toes. Spot on Left arm- for the past 2 weeks    Right hip pain- Pt reports doing exercises 2 x daily- pt brought in exercise worksheets for PCP review. Pt reports not being able to eat or drink for the past week. Urinary Frequency     Reports urinary frequency for the past month    Diabetes     Pt brought in home readings for PCP review. Orders Placed This Encounter   Procedures    Culture, Urine     Standing Status:   Future     Number of Occurrences:   1     Standing Expiration Date:   7/13/2024     Order Specific Question:   Specify (ex-cath, midstream, cysto, etc)?      Answer:   void    CBC with Auto Differential     Standing Status:

## 2023-07-14 LAB
ALBUMIN SERPL-MCNC: 3.7 G/DL (ref 3.5–5)
ALBUMIN/GLOB SERPL: 1.4 (ref 1.1–2.2)
ALP SERPL-CCNC: 64 U/L (ref 45–117)
ALT SERPL-CCNC: 26 U/L (ref 12–78)
ANION GAP SERPL CALC-SCNC: 10 MMOL/L (ref 5–15)
AST SERPL-CCNC: 20 U/L (ref 15–37)
BASOPHILS # BLD: 0.1 K/UL (ref 0–0.1)
BASOPHILS NFR BLD: 1 % (ref 0–1)
BILIRUB SERPL-MCNC: 0.4 MG/DL (ref 0.2–1)
BUN SERPL-MCNC: 17 MG/DL (ref 6–20)
BUN/CREAT SERPL: 23 (ref 12–20)
CALCIUM SERPL-MCNC: 9.9 MG/DL (ref 8.5–10.1)
CHLORIDE SERPL-SCNC: 104 MMOL/L (ref 97–108)
CO2 SERPL-SCNC: 25 MMOL/L (ref 21–32)
CREAT SERPL-MCNC: 0.75 MG/DL (ref 0.55–1.02)
DIFFERENTIAL METHOD BLD: NORMAL
EOSINOPHIL # BLD: 0.3 K/UL (ref 0–0.4)
EOSINOPHIL NFR BLD: 4 % (ref 0–7)
ERYTHROCYTE [DISTWIDTH] IN BLOOD BY AUTOMATED COUNT: 12.5 % (ref 11.5–14.5)
EST. AVERAGE GLUCOSE BLD GHB EST-MCNC: 160 MG/DL
GLOBULIN SER CALC-MCNC: 2.7 G/DL (ref 2–4)
GLUCOSE SERPL-MCNC: 109 MG/DL (ref 65–100)
HBA1C MFR BLD: 7.2 % (ref 4–5.6)
HCT VFR BLD AUTO: 41.3 % (ref 35–47)
HGB BLD-MCNC: 13.2 G/DL (ref 11.5–16)
IMM GRANULOCYTES # BLD AUTO: 0 K/UL (ref 0–0.04)
IMM GRANULOCYTES NFR BLD AUTO: 0 % (ref 0–0.5)
LYMPHOCYTES # BLD: 1.9 K/UL (ref 0.8–3.5)
LYMPHOCYTES NFR BLD: 21 % (ref 12–49)
MCH RBC QN AUTO: 30.4 PG (ref 26–34)
MCHC RBC AUTO-ENTMCNC: 32 G/DL (ref 30–36.5)
MCV RBC AUTO: 95.2 FL (ref 80–99)
MONOCYTES # BLD: 0.8 K/UL (ref 0–1)
MONOCYTES NFR BLD: 9 % (ref 5–13)
NEUTS SEG # BLD: 5.8 K/UL (ref 1.8–8)
NEUTS SEG NFR BLD: 65 % (ref 32–75)
NRBC # BLD: 0 K/UL (ref 0–0.01)
NRBC BLD-RTO: 0 PER 100 WBC
PLATELET # BLD AUTO: 319 K/UL (ref 150–400)
PMV BLD AUTO: 10.6 FL (ref 8.9–12.9)
POTASSIUM SERPL-SCNC: 4.5 MMOL/L (ref 3.5–5.1)
PROT SERPL-MCNC: 6.4 G/DL (ref 6.4–8.2)
RBC # BLD AUTO: 4.34 M/UL (ref 3.8–5.2)
SODIUM SERPL-SCNC: 139 MMOL/L (ref 136–145)
TSH SERPL DL<=0.05 MIU/L-ACNC: 2.31 UIU/ML (ref 0.36–3.74)
WBC # BLD AUTO: 8.8 K/UL (ref 3.6–11)

## 2023-07-15 LAB
BACTERIA SPEC CULT: NORMAL
CC UR VC: NORMAL
SERVICE CMNT-IMP: NORMAL

## 2023-08-09 ENCOUNTER — OFFICE VISIT (OUTPATIENT)
Age: 88
End: 2023-08-09
Payer: MEDICARE

## 2023-08-09 VITALS
OXYGEN SATURATION: 94 % | HEIGHT: 61 IN | HEART RATE: 92 BPM | BODY MASS INDEX: 24.92 KG/M2 | TEMPERATURE: 98.8 F | DIASTOLIC BLOOD PRESSURE: 68 MMHG | WEIGHT: 132 LBS | RESPIRATION RATE: 16 BRPM | SYSTOLIC BLOOD PRESSURE: 108 MMHG

## 2023-08-09 DIAGNOSIS — C44.629 SQUAMOUS CELL CARCINOMA OF SKIN OF LEFT UPPER ARM: ICD-10-CM

## 2023-08-09 PROCEDURE — 1123F ACP DISCUSS/DSCN MKR DOCD: CPT | Performed by: SURGERY

## 2023-08-09 PROCEDURE — G8420 CALC BMI NORM PARAMETERS: HCPCS | Performed by: SURGERY

## 2023-08-09 PROCEDURE — 1090F PRES/ABSN URINE INCON ASSESS: CPT | Performed by: SURGERY

## 2023-08-09 PROCEDURE — 99203 OFFICE O/P NEW LOW 30 MIN: CPT | Performed by: SURGERY

## 2023-08-09 PROCEDURE — G8427 DOCREV CUR MEDS BY ELIG CLIN: HCPCS | Performed by: SURGERY

## 2023-08-09 PROCEDURE — 1036F TOBACCO NON-USER: CPT | Performed by: SURGERY

## 2023-08-09 ASSESSMENT — PATIENT HEALTH QUESTIONNAIRE - PHQ9
SUM OF ALL RESPONSES TO PHQ QUESTIONS 1-9: 0
2. FEELING DOWN, DEPRESSED OR HOPELESS: 0
1. LITTLE INTEREST OR PLEASURE IN DOING THINGS: 0
SUM OF ALL RESPONSES TO PHQ9 QUESTIONS 1 & 2: 0
SUM OF ALL RESPONSES TO PHQ QUESTIONS 1-9: 0

## 2023-08-09 NOTE — PROGRESS NOTES
Obdulia Gan is a 80 y.o. female who presents today with the following:  Chief Complaint   Patient presents with    New Patient    Skin Problem     Left arm (round [place])       Skin Problem      80year-old female who presents as a referral from Dr. Tyree Henning for evaluation of a lesion on the left upper arm just proximal to the elbow that has been present for approximately 2 months. They believe it is increased in size. It has mild sensitivity. She does have a personal history of a squamous cell carcinoma that was excised on what they believe is a right lower leg a year ago. No other history  of skin cancers. She believes that she had to have Mohs surgery for the leg lesion although I do not have the records present. She is not on aspirin or any other blood thinners. Past Medical History:   Diagnosis Date    Anxiety     Degenerative joint disease (DJD) of hip 2013    bursitis    Depression     Diabetes (HCC)     GERD (gastroesophageal reflux disease)     H/O hand fracture     Hyperlipidemia     Menopause     Osteopenia     Recurrent UTI     RLS (restless legs syndrome)     Squamous cell carcinoma of skin of lower extremity     afterwards had MRSA which is cleared per family       Past Surgical History:   Procedure Laterality Date    APPENDECTOMY      COLONOSCOPY      DILATION AND CURETTAGE OF UTERUS      TONSILLECTOMY AND ADENOIDECTOMY         Social History     Socioeconomic History    Marital status:      Spouse name: Not on file    Number of children: Not on file    Years of education: Not on file    Highest education level: Not on file   Occupational History    Not on file   Tobacco Use    Smoking status: Former     Packs/day: 0.50     Types: Cigarettes     Quit date: 1960     Years since quittin.1    Smokeless tobacco: Never   Vaping Use    Vaping Use: Never used   Substance and Sexual Activity    Alcohol use: Yes     Alcohol/week: 3.0 standard drinks    Drug use:  Yes

## 2023-08-09 NOTE — PROGRESS NOTES
Identified pt with two pt identifiers (name and ). Reviewed chart in preparation for visit and have obtained necessary documentation. Ying Galvan is a 80 y.o. female New Patient and Skin Problem (Left arm (round [place]))  . There were no vitals filed for this visit. 1. Have you been to the ER, urgent care clinic since your last visit? Hospitalized since your last visit?  no     2. Have you seen or consulted any other health care providers outside of the [address] [place] [address] since your last visit? Include any pap smears or colon screening.   yes - [facility]

## 2023-08-16 ENCOUNTER — OFFICE VISIT (OUTPATIENT)
Age: 88
End: 2023-08-16

## 2023-08-16 ENCOUNTER — HOSPITAL ENCOUNTER (OUTPATIENT)
Facility: HOSPITAL | Age: 88
Discharge: HOME | End: 2023-08-19

## 2023-08-16 VITALS
OXYGEN SATURATION: 95 % | RESPIRATION RATE: 16 BRPM | HEART RATE: 93 BPM | TEMPERATURE: 96.8 F | WEIGHT: 132 LBS | SYSTOLIC BLOOD PRESSURE: 114 MMHG | HEIGHT: 61 IN | BODY MASS INDEX: 24.92 KG/M2 | DIASTOLIC BLOOD PRESSURE: 72 MMHG

## 2023-08-16 DIAGNOSIS — C44.629 SQUAMOUS CELL CARCINOMA OF SKIN OF LEFT UPPER ARM: Primary | ICD-10-CM

## 2023-08-16 ASSESSMENT — PATIENT HEALTH QUESTIONNAIRE - PHQ9
SUM OF ALL RESPONSES TO PHQ QUESTIONS 1-9: 0
SUM OF ALL RESPONSES TO PHQ9 QUESTIONS 1 & 2: 0
SUM OF ALL RESPONSES TO PHQ QUESTIONS 1-9: 0
2. FEELING DOWN, DEPRESSED OR HOPELESS: 0
1. LITTLE INTEREST OR PLEASURE IN DOING THINGS: 0

## 2023-08-16 NOTE — PROGRESS NOTES
Identified pt with two pt identifiers (name and ). Reviewed chart in preparation for visit and have obtained necessary documentation. Wero Kellogg is a 80 y.o. female Skin Problem (Lesion to left elbow)  . There were no vitals filed for this visit. 1. Have you been to the ER, urgent care clinic since your last visit? Hospitalized since your last visit?  no     2. Have you seen or consulted any other health care providers outside of the [address] [place] [address] since your last visit? Include any pap smears or colon screening.   no

## 2023-08-16 NOTE — PROGRESS NOTES
Nicolette Owens MD    Location of neoplasm: Left upper arm just proximal to the elbow    Diagnosis:  Encounter Diagnosis   Name Primary? Squamous cell carcinoma of skin of left upper arm Yes     Procedure:        Local anesthesia given: 5  ml of 1% lidocaine with epinephrine    Excision diameter: 25  mm  Depth: 7 mm    Type of closure: simple    Description of closure: simple interrupted    Antibiotic dressing is applied, and wound care instructions provided. Be alert for any signs of cutaneous infection. The procedure was well tolerated without complications. Follow up: the specimen is labeled and sent to pathology for evaluation, return for suture removal in 12 days, the patient may return prn. [unfilled]  OFFICE PROCEDURE PROGRESS NOTE        Chart reviewed for the following:   Kayleen Diggs MD, have reviewed the History, Physical and updated the Allergic reactions for Muna Hahn     TIME OUT performed immediately prior to start of procedure:   Kayleen Diggs MD, have performed the following reviews on [address] prior to the start of the procedure:            * Patient was identified by name and date of birth   * Agreement on procedure being performed was verified  * Risks and Benefits explained to the patient  * Procedure site verified and marked as necessary  * Patient was positioned for comfort  * Consent was signed and verified     Time In: 6565  Time Out: 5014      Date of procedure: 8/16/2023    Procedure performed by: Suzzane Severe, MD    Provider assisted by: None     Patient assisted by: daughter    How tolerated by patient: tolerated the procedure well with no complications    Pain Scale: 2/10    Post Procedural Pain Scale: 0 - No Hurt    Comments:  Informed consent was obtained. The area was prepped with Betadine and a field block was established with 1% lidocaine with epinephrine. The area was then draped.   The lesion was excised in an

## 2023-08-18 ENCOUNTER — OFFICE VISIT (OUTPATIENT)
Age: 88
End: 2023-08-18

## 2023-08-18 VITALS
HEIGHT: 61 IN | SYSTOLIC BLOOD PRESSURE: 122 MMHG | WEIGHT: 130.8 LBS | OXYGEN SATURATION: 98 % | HEART RATE: 86 BPM | DIASTOLIC BLOOD PRESSURE: 73 MMHG | RESPIRATION RATE: 16 BRPM | TEMPERATURE: 97.8 F | BODY MASS INDEX: 24.7 KG/M2

## 2023-08-18 DIAGNOSIS — I10 ESSENTIAL (PRIMARY) HYPERTENSION: ICD-10-CM

## 2023-08-18 DIAGNOSIS — Z00.00 MEDICARE ANNUAL WELLNESS VISIT, SUBSEQUENT: Primary | ICD-10-CM

## 2023-08-18 DIAGNOSIS — E11.65 TYPE 2 DIABETES MELLITUS WITH HYPERGLYCEMIA, WITHOUT LONG-TERM CURRENT USE OF INSULIN (HCC): ICD-10-CM

## 2023-08-18 SDOH — ECONOMIC STABILITY: INCOME INSECURITY: HOW HARD IS IT FOR YOU TO PAY FOR THE VERY BASICS LIKE FOOD, HOUSING, MEDICAL CARE, AND HEATING?: NOT HARD AT ALL

## 2023-08-18 SDOH — ECONOMIC STABILITY: FOOD INSECURITY: WITHIN THE PAST 12 MONTHS, YOU WORRIED THAT YOUR FOOD WOULD RUN OUT BEFORE YOU GOT MONEY TO BUY MORE.: NEVER TRUE

## 2023-08-18 SDOH — ECONOMIC STABILITY: FOOD INSECURITY: WITHIN THE PAST 12 MONTHS, THE FOOD YOU BOUGHT JUST DIDN'T LAST AND YOU DIDN'T HAVE MONEY TO GET MORE.: NEVER TRUE

## 2023-08-18 ASSESSMENT — PATIENT HEALTH QUESTIONNAIRE - PHQ9
SUM OF ALL RESPONSES TO PHQ QUESTIONS 1-9: 16
SUM OF ALL RESPONSES TO PHQ QUESTIONS 1-9: 16
2. FEELING DOWN, DEPRESSED OR HOPELESS: 2
7. TROUBLE CONCENTRATING ON THINGS, SUCH AS READING THE NEWSPAPER OR WATCHING TELEVISION: 1
9. THOUGHTS THAT YOU WOULD BE BETTER OFF DEAD, OR OF HURTING YOURSELF: 0
8. MOVING OR SPEAKING SO SLOWLY THAT OTHER PEOPLE COULD HAVE NOTICED. OR THE OPPOSITE, BEING SO FIGETY OR RESTLESS THAT YOU HAVE BEEN MOVING AROUND A LOT MORE THAN USUAL: 1
3. TROUBLE FALLING OR STAYING ASLEEP: 3
1. LITTLE INTEREST OR PLEASURE IN DOING THINGS: 2
10. IF YOU CHECKED OFF ANY PROBLEMS, HOW DIFFICULT HAVE THESE PROBLEMS MADE IT FOR YOU TO DO YOUR WORK, TAKE CARE OF THINGS AT HOME, OR GET ALONG WITH OTHER PEOPLE: 1
SUM OF ALL RESPONSES TO PHQ QUESTIONS 1-9: 16
4. FEELING TIRED OR HAVING LITTLE ENERGY: 3
SUM OF ALL RESPONSES TO PHQ9 QUESTIONS 1 & 2: 4
6. FEELING BAD ABOUT YOURSELF - OR THAT YOU ARE A FAILURE OR HAVE LET YOURSELF OR YOUR FAMILY DOWN: 1
SUM OF ALL RESPONSES TO PHQ QUESTIONS 1-9: 16
5. POOR APPETITE OR OVEREATING: 3

## 2023-08-18 ASSESSMENT — LIFESTYLE VARIABLES
HOW OFTEN DO YOU HAVE A DRINK CONTAINING ALCOHOL: 2-3 TIMES A WEEK
HOW MANY STANDARD DRINKS CONTAINING ALCOHOL DO YOU HAVE ON A TYPICAL DAY: 1 OR 2

## 2023-08-18 NOTE — PROGRESS NOTES
Duration-30 minutes primarily counseling and education in addition to reviewing prior notes and laboratory tests as well as relevant imaging results. Assessment and Plan:    1. Medicare annual wellness visit, subsequent  2. Type 2 diabetes mellitus with hyperglycemia, without long-term current use of insulin (HCC)  Reduce metformin to 500 mg once a day due to loss of appetite  3. Essential (primary) hypertension  Well-controlled         Ms. Yuli Hector is a 80 y.o. female who is here for evaluation of   Chief Complaint   Patient presents with    Medicare AWV   . No orders of the defined types were placed in this encounter. No follow-up provider specified. Current Outpatient Medications   Medication Sig Dispense Refill    Cetirizine-Pseudoephedrine (ZYRTEC-D PO) Take by mouth      torsemide (DEMADEX) 20 MG tablet Take 1 tablet by mouth daily (with breakfast) 30 tablet 4    Multiple Vitamin (MULTIVITAMIN ADULT PO) Take 1 tablet by mouth daily      Blood Glucose Monitoring Suppl ([employer] BLOOD GLUCOSE METER) w/Device KIT Use to test glucose once daily, E 11.9   Relion meter please      buPROPion (WELLBUTRIN XL) 150 MG extended release tablet TAKE 1 TABLET BY MOUTH DAILY FOR MAJOR DEPRESSION      metFORMIN (GLUCOPHAGE-XR) 500 MG extended release tablet TAKE 1 TABLET BY MOUTH TWICE DAILY FOR DIABETES      methenamine (HIPREX) 1 g tablet TAKE 1 TABLET BY MOUTH TWICE DAILY WITH MEALS FOR UTI PREVENTION      NONFORMULARY 50 mg CBD patch uses every evening as needed for anxiety      pramipexole (MIRAPEX) 0.25 MG tablet Take 0.25mg PRN in AM and 0.25 to 0.5mg PM 90 tablet 5     No current facility-administered medications for this visit. HPI this is a very pleasant 44-year-old woman who lives in [facility]. She is here today as part of a routine follow-up visit and has symptoms of bilateral hip pain with internal and external rotation of the hips.   She denies any frequency or burning with

## 2023-08-28 ENCOUNTER — OFFICE VISIT (OUTPATIENT)
Age: 88
End: 2023-08-28

## 2023-08-28 VITALS
BODY MASS INDEX: 25.11 KG/M2 | HEART RATE: 93 BPM | SYSTOLIC BLOOD PRESSURE: 121 MMHG | TEMPERATURE: 98.6 F | OXYGEN SATURATION: 94 % | WEIGHT: 133 LBS | HEIGHT: 61 IN | DIASTOLIC BLOOD PRESSURE: 77 MMHG | RESPIRATION RATE: 16 BRPM

## 2023-08-28 DIAGNOSIS — Z48.02 VISIT FOR SUTURE REMOVAL: Primary | ICD-10-CM

## 2023-08-28 PROCEDURE — 99024 POSTOP FOLLOW-UP VISIT: CPT | Performed by: SURGERY

## 2023-08-28 ASSESSMENT — PATIENT HEALTH QUESTIONNAIRE - PHQ9
1. LITTLE INTEREST OR PLEASURE IN DOING THINGS: 0
SUM OF ALL RESPONSES TO PHQ QUESTIONS 1-9: 0

## 2023-08-28 NOTE — PROGRESS NOTES
Identified pt with two pt identifiers (name and ). Reviewed chart in preparation for visit and have obtained necessary documentation. Fito Levine is a 80 y.o. female Follow-up (Suture removal)  . There were no vitals filed for this visit. 1. Have you been to the ER, urgent care clinic since your last visit? Hospitalized since your last visit?  no     2. Have you seen or consulted any other health care providers outside of the [address] [place] [address] since your last visit? Include any pap smears or colon screening.   no

## 2023-08-28 NOTE — PATIENT INSTRUCTIONS
Patient Education        Learning About Stitches and Staples Removal  When are stitches and staples removed? Your doctor will tell you when to have your stitches or staples removed, usually in 7 to 14 days. How long you'll be told to wait will depend on things like where the wound is located, how big and how deep the wound is, and what your general health is like. Do not remove the stitches on your own. Stitches on the face are usually removed within a week. But stitches and staples on other areas of the body, such as on the back or belly or over a joint, may need to stay in place longer, often a week or two. Be sure to follow your doctor's instructions. How are stitches and staples removed? It usually doesn't hurt when the doctor removes the stitches or staples. You may feel a tug as each stitch or staple is removed. You will either be seated or lying down. To remove stitches, the doctor will use scissors to cut each of the knots and then pull the threads out. To remove staples, the doctor will use a tool to take out the staples one at a time. The area may still feel tender after the stitches or staples are gone. But it should feel better within a few minutes or up to a few hours. What can you expect after stitches and staples are removed? Depending on the type and location of the cut, you will have a scar. Scars usually fade over time. Keep the area clean, but you won't need a bandage. When should you call for help? Call your doctor now or seek immediate medical care if :  You have new pain, or your pain gets worse. You have trouble moving the area near the scar. You have symptoms of infection, such as: Increased pain, swelling, warmth, or redness around the scar. Red streaks leading from the scar. Pus draining from the scar. A fever. Watch closely for changes in your health, and be sure to contact your doctor if:   The scar opens. You do not get better as expected.   Follow-up care is a key

## 2023-09-26 ENCOUNTER — OFFICE VISIT (OUTPATIENT)
Age: 88
End: 2023-09-26

## 2023-09-26 VITALS
DIASTOLIC BLOOD PRESSURE: 71 MMHG | OXYGEN SATURATION: 95 % | SYSTOLIC BLOOD PRESSURE: 110 MMHG | WEIGHT: 134 LBS | RESPIRATION RATE: 12 BRPM | HEART RATE: 88 BPM | HEIGHT: 61 IN | TEMPERATURE: 98.2 F | BODY MASS INDEX: 25.3 KG/M2

## 2023-09-26 DIAGNOSIS — C44.629 SQUAMOUS CELL CARCINOMA OF SKIN OF LEFT ELBOW: Primary | ICD-10-CM

## 2023-09-26 ASSESSMENT — PATIENT HEALTH QUESTIONNAIRE - PHQ9
SUM OF ALL RESPONSES TO PHQ QUESTIONS 1-9: 0
SUM OF ALL RESPONSES TO PHQ QUESTIONS 1-9: 0
SUM OF ALL RESPONSES TO PHQ9 QUESTIONS 1 & 2: 0
1. LITTLE INTEREST OR PLEASURE IN DOING THINGS: 0
SUM OF ALL RESPONSES TO PHQ QUESTIONS 1-9: 0
SUM OF ALL RESPONSES TO PHQ QUESTIONS 1-9: 0
2. FEELING DOWN, DEPRESSED OR HOPELESS: 0

## 2023-09-26 NOTE — PROGRESS NOTES
Identified pt with two pt identifiers (name and ). Reviewed chart in preparation for visit and have obtained necessary documentation. Marley Martin is a 80 y.o. female Skin Problem (Left arm)  . There were no vitals filed for this visit. 1. Have you been to the ER, urgent care clinic since your last visit? Hospitalized since your last visit?  no     2. Have you seen or consulted any other health care providers outside of the [address] [place] [address] since your last visit? Include any pap smears or colon screening.   no
recovery window from their procedure. The patient was made aware that saran Covid-19  may worsen their prognosis for recovering from their procedure  and lend to a higher morbidity and/or mortality risk. All material risks, benefits, and reasonable alternatives including postponing the procedure were discussed. The patient does wish to proceed with the procedure at this time. No follow-ups on file.      Naheed Cabrera MD

## 2023-09-27 DIAGNOSIS — R60.0 BILATERAL LEG EDEMA: ICD-10-CM

## 2023-09-27 RX ORDER — TORSEMIDE 20 MG/1
20 TABLET ORAL DAILY
Qty: 30 TABLET | Refills: 4 | OUTPATIENT
Start: 2023-09-27

## 2023-09-27 RX ORDER — TORSEMIDE 20 MG/1
20 TABLET ORAL DAILY
Qty: 30 TABLET | Refills: 4 | Status: SHIPPED | OUTPATIENT
Start: 2023-09-27

## 2023-10-01 ENCOUNTER — ANESTHESIA EVENT (OUTPATIENT)
Facility: HOSPITAL | Age: 88
End: 2023-10-01
Payer: MEDICARE

## 2023-10-02 ENCOUNTER — ANESTHESIA (OUTPATIENT)
Facility: HOSPITAL | Age: 88
End: 2023-10-02
Payer: MEDICARE

## 2023-10-02 ENCOUNTER — HOSPITAL ENCOUNTER (OUTPATIENT)
Facility: HOSPITAL | Age: 88
Setting detail: OUTPATIENT SURGERY
Discharge: HOME | End: 2023-10-02
Attending: SURGERY | Admitting: SURGERY
Payer: MEDICARE

## 2023-10-02 ENCOUNTER — PREP FOR PROCEDURE (OUTPATIENT)
Age: 88
End: 2023-10-02

## 2023-10-02 VITALS
OXYGEN SATURATION: 97 % | BODY MASS INDEX: 25.11 KG/M2 | SYSTOLIC BLOOD PRESSURE: 113 MMHG | HEART RATE: 77 BPM | DIASTOLIC BLOOD PRESSURE: 57 MMHG | RESPIRATION RATE: 16 BRPM | HEIGHT: 61 IN | WEIGHT: 133 LBS | TEMPERATURE: 98.9 F

## 2023-10-02 LAB
GLUCOSE BLD STRIP.AUTO-MCNC: 197 MG/DL (ref 65–117)
SERVICE CMNT-IMP: ABNORMAL

## 2023-10-02 PROCEDURE — 11604 EXC TR-EXT MAL+MARG 3.1-4 CM: CPT | Performed by: SURGERY

## 2023-10-02 PROCEDURE — 3700000001 HC ADD 15 MINUTES (ANESTHESIA): Performed by: SURGERY

## 2023-10-02 PROCEDURE — 2709999900 HC NON-CHARGEABLE SUPPLY: Performed by: SURGERY

## 2023-10-02 PROCEDURE — 2500000003 HC RX 250 WO HCPCS: Performed by: SURGERY

## 2023-10-02 PROCEDURE — 2580000003 HC RX 258: Performed by: SURGERY

## 2023-10-02 PROCEDURE — 3600000002 HC SURGERY LEVEL 2 BASE: Performed by: SURGERY

## 2023-10-02 PROCEDURE — 12032 INTMD RPR S/A/T/EXT 2.6-7.5: CPT | Performed by: SURGERY

## 2023-10-02 PROCEDURE — 7100000010 HC PHASE II RECOVERY - FIRST 15 MIN: Performed by: SURGERY

## 2023-10-02 PROCEDURE — 3600000012 HC SURGERY LEVEL 2 ADDTL 15MIN: Performed by: SURGERY

## 2023-10-02 PROCEDURE — 82962 GLUCOSE BLOOD TEST: CPT

## 2023-10-02 PROCEDURE — 6360000002 HC RX W HCPCS: Performed by: SURGERY

## 2023-10-02 PROCEDURE — 3700000000 HC ANESTHESIA ATTENDED CARE: Performed by: SURGERY

## 2023-10-02 PROCEDURE — 88305 TISSUE EXAM BY PATHOLOGIST: CPT

## 2023-10-02 PROCEDURE — 6360000002 HC RX W HCPCS: Performed by: ANESTHESIOLOGY

## 2023-10-02 RX ORDER — SODIUM CHLORIDE 9 MG/ML
INJECTION, SOLUTION INTRAVENOUS PRN
Status: DISCONTINUED | OUTPATIENT
Start: 2023-10-02 | End: 2023-10-02 | Stop reason: HOSPADM

## 2023-10-02 RX ORDER — BUPIVACAINE HYDROCHLORIDE 5 MG/ML
INJECTION, SOLUTION EPIDURAL; INTRACAUDAL PRN
Status: DISCONTINUED | OUTPATIENT
Start: 2023-10-02 | End: 2023-10-02 | Stop reason: ALTCHOICE

## 2023-10-02 RX ORDER — PROCHLORPERAZINE EDISYLATE 5 MG/ML
5 INJECTION INTRAMUSCULAR; INTRAVENOUS
Status: DISCONTINUED | OUTPATIENT
Start: 2023-10-02 | End: 2023-10-02 | Stop reason: HOSPADM

## 2023-10-02 RX ORDER — FENTANYL CITRATE 50 UG/ML
INJECTION, SOLUTION INTRAMUSCULAR; INTRAVENOUS PRN
Status: DISCONTINUED | OUTPATIENT
Start: 2023-10-02 | End: 2023-10-02 | Stop reason: SDUPTHER

## 2023-10-02 RX ORDER — SODIUM CHLORIDE 0.9 % (FLUSH) 0.9 %
5-40 SYRINGE (ML) INJECTION EVERY 12 HOURS SCHEDULED
Status: DISCONTINUED | OUTPATIENT
Start: 2023-10-02 | End: 2023-10-02 | Stop reason: HOSPADM

## 2023-10-02 RX ORDER — SODIUM CHLORIDE 0.9 % (FLUSH) 0.9 %
5-40 SYRINGE (ML) INJECTION PRN
Status: CANCELLED | OUTPATIENT
Start: 2023-10-02

## 2023-10-02 RX ORDER — LIDOCAINE HYDROCHLORIDE AND EPINEPHRINE 10; 10 MG/ML; UG/ML
20 INJECTION, SOLUTION INFILTRATION; PERINEURAL ONCE
Status: CANCELLED | OUTPATIENT
Start: 2023-10-02 | End: 2023-10-02

## 2023-10-02 RX ORDER — SODIUM CHLORIDE 0.9 % (FLUSH) 0.9 %
5-40 SYRINGE (ML) INJECTION PRN
Status: DISCONTINUED | OUTPATIENT
Start: 2023-10-02 | End: 2023-10-02 | Stop reason: HOSPADM

## 2023-10-02 RX ORDER — DIPHENHYDRAMINE HYDROCHLORIDE 50 MG/ML
12.5 INJECTION INTRAMUSCULAR; INTRAVENOUS
Status: DISCONTINUED | OUTPATIENT
Start: 2023-10-02 | End: 2023-10-02 | Stop reason: HOSPADM

## 2023-10-02 RX ORDER — HYDROMORPHONE HYDROCHLORIDE 2 MG/ML
0.5 INJECTION, SOLUTION INTRAMUSCULAR; INTRAVENOUS; SUBCUTANEOUS EVERY 5 MIN PRN
Status: DISCONTINUED | OUTPATIENT
Start: 2023-10-02 | End: 2023-10-02 | Stop reason: HOSPADM

## 2023-10-02 RX ORDER — FENTANYL CITRATE 50 UG/ML
25 INJECTION, SOLUTION INTRAMUSCULAR; INTRAVENOUS EVERY 5 MIN PRN
Status: DISCONTINUED | OUTPATIENT
Start: 2023-10-02 | End: 2023-10-02 | Stop reason: HOSPADM

## 2023-10-02 RX ORDER — BUPIVACAINE HYDROCHLORIDE 5 MG/ML
30 INJECTION, SOLUTION EPIDURAL; INTRACAUDAL ONCE
Status: CANCELLED | OUTPATIENT
Start: 2023-10-02 | End: 2023-10-02

## 2023-10-02 RX ORDER — LABETALOL HYDROCHLORIDE 5 MG/ML
10 INJECTION, SOLUTION INTRAVENOUS
Status: DISCONTINUED | OUTPATIENT
Start: 2023-10-02 | End: 2023-10-02 | Stop reason: HOSPADM

## 2023-10-02 RX ORDER — SODIUM CHLORIDE, SODIUM LACTATE, POTASSIUM CHLORIDE, CALCIUM CHLORIDE 600; 310; 30; 20 MG/100ML; MG/100ML; MG/100ML; MG/100ML
INJECTION, SOLUTION INTRAVENOUS CONTINUOUS
Status: CANCELLED | OUTPATIENT
Start: 2023-10-02

## 2023-10-02 RX ORDER — ONDANSETRON 2 MG/ML
4 INJECTION INTRAMUSCULAR; INTRAVENOUS
Status: DISCONTINUED | OUTPATIENT
Start: 2023-10-02 | End: 2023-10-02 | Stop reason: HOSPADM

## 2023-10-02 RX ORDER — LIDOCAINE HYDROCHLORIDE AND EPINEPHRINE 10; 10 MG/ML; UG/ML
INJECTION, SOLUTION INFILTRATION; PERINEURAL PRN
Status: DISCONTINUED | OUTPATIENT
Start: 2023-10-02 | End: 2023-10-02 | Stop reason: ALTCHOICE

## 2023-10-02 RX ORDER — BUPIVACAINE HYDROCHLORIDE 5 MG/ML
30 INJECTION, SOLUTION EPIDURAL; INTRACAUDAL ONCE
Status: DISCONTINUED | OUTPATIENT
Start: 2023-10-02 | End: 2023-10-02 | Stop reason: HOSPADM

## 2023-10-02 RX ORDER — SODIUM CHLORIDE 9 MG/ML
INJECTION, SOLUTION INTRAVENOUS PRN
Status: CANCELLED | OUTPATIENT
Start: 2023-10-02

## 2023-10-02 RX ORDER — OXYCODONE HYDROCHLORIDE 5 MG/1
5 TABLET ORAL
Status: DISCONTINUED | OUTPATIENT
Start: 2023-10-02 | End: 2023-10-02 | Stop reason: HOSPADM

## 2023-10-02 RX ORDER — SODIUM CHLORIDE, SODIUM LACTATE, POTASSIUM CHLORIDE, CALCIUM CHLORIDE 600; 310; 30; 20 MG/100ML; MG/100ML; MG/100ML; MG/100ML
INJECTION, SOLUTION INTRAVENOUS CONTINUOUS
Status: DISCONTINUED | OUTPATIENT
Start: 2023-10-02 | End: 2023-10-02 | Stop reason: HOSPADM

## 2023-10-02 RX ORDER — LIDOCAINE HYDROCHLORIDE AND EPINEPHRINE 10; 10 MG/ML; UG/ML
20 INJECTION, SOLUTION INFILTRATION; PERINEURAL ONCE
Status: DISCONTINUED | OUTPATIENT
Start: 2023-10-02 | End: 2023-10-02 | Stop reason: HOSPADM

## 2023-10-02 RX ORDER — MIDAZOLAM HYDROCHLORIDE 1 MG/ML
INJECTION INTRAMUSCULAR; INTRAVENOUS PRN
Status: DISCONTINUED | OUTPATIENT
Start: 2023-10-02 | End: 2023-10-02 | Stop reason: SDUPTHER

## 2023-10-02 RX ORDER — SODIUM CHLORIDE 0.9 % (FLUSH) 0.9 %
5-40 SYRINGE (ML) INJECTION EVERY 12 HOURS SCHEDULED
Status: CANCELLED | OUTPATIENT
Start: 2023-10-02

## 2023-10-02 RX ORDER — IPRATROPIUM BROMIDE AND ALBUTEROL SULFATE 2.5; .5 MG/3ML; MG/3ML
1 SOLUTION RESPIRATORY (INHALATION)
Status: DISCONTINUED | OUTPATIENT
Start: 2023-10-02 | End: 2023-10-02 | Stop reason: HOSPADM

## 2023-10-02 RX ADMIN — SODIUM CHLORIDE, POTASSIUM CHLORIDE, SODIUM LACTATE AND CALCIUM CHLORIDE: 600; 310; 30; 20 INJECTION, SOLUTION INTRAVENOUS at 08:17

## 2023-10-02 RX ADMIN — FENTANYL CITRATE 25 MCG: 50 INJECTION, SOLUTION INTRAMUSCULAR; INTRAVENOUS at 09:14

## 2023-10-02 RX ADMIN — MIDAZOLAM 1 MG: 1 INJECTION INTRAMUSCULAR; INTRAVENOUS at 09:02

## 2023-10-02 RX ADMIN — FENTANYL CITRATE 25 MCG: 50 INJECTION, SOLUTION INTRAMUSCULAR; INTRAVENOUS at 09:29

## 2023-10-02 RX ADMIN — MIDAZOLAM 1 MG: 1 INJECTION INTRAMUSCULAR; INTRAVENOUS at 09:07

## 2023-10-02 RX ADMIN — FENTANYL CITRATE 25 MCG: 50 INJECTION, SOLUTION INTRAMUSCULAR; INTRAVENOUS at 09:08

## 2023-10-02 RX ADMIN — FENTANYL CITRATE 25 MCG: 50 INJECTION, SOLUTION INTRAMUSCULAR; INTRAVENOUS at 09:18

## 2023-10-02 ASSESSMENT — PAIN - FUNCTIONAL ASSESSMENT: PAIN_FUNCTIONAL_ASSESSMENT: 0-10

## 2023-10-02 ASSESSMENT — PAIN SCALES - GENERAL: PAINLEVEL_OUTOF10: 0

## 2023-10-02 NOTE — H&P
Comments: Well-healed incision on the left elbow from prior office based excision. She has a nodular lesion present within 5 mm of her incision that measures 1 cm and is raised consistent with a keratoacanthoma. Neurological:      Mental Status: She is alert. 1. 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 We will plan on OR excision. Explained that if she develops recurrence afterwards would recommend evaluation for possible Mohs surgery. Risks of surgery were shared including the risks of bleeding, infection, poor wound healing or injury to the bursa of the elbow. She understands and wishes to proceed. We will schedule. The patient was counseled at length about the risks of saran Covid-19 during their perioperative period and any recovery window from their procedure. The patient was made aware that saran Covid-19  may worsen their prognosis for recovering from their procedure  and lend to a higher morbidity and/or mortality risk. All material risks, benefits, and reasonable alternatives including postponing the procedure were discussed. The patient does wish to proceed with the procedure at this time. No follow-ups on file.      Julissa Posada MD

## 2023-10-02 NOTE — ANESTHESIA PRE PROCEDURE
.                 Abdominal: normal exam            Vascular: Other Findings:           Anesthesia Plan      MAC     ASA 3       Induction: intravenous. MIPS: Postoperative opioids intended. Anesthetic plan and risks discussed with patient and child/children.         Attending anesthesiologist reviewed and agrees with Preprocedure content                Naun Norton DO   10/2/2023

## 2023-10-02 NOTE — PROGRESS NOTES
Pt denies pain, nausea. Discharge instructions given to irma, pt's daughter. Voices understanding.  Pt meets criteria for discharge

## 2023-10-02 NOTE — ANESTHESIA POSTPROCEDURE EVALUATION
Department of Anesthesiology  Postprocedure Note    Patient: Chris Singh  MRN: [id number]  YOB: 1935  Date of evaluation: 10/2/2023      Procedure Summary     Date: 10/02/23 Room / Location: [address] [place] [address] [place] [address] / [facility] MAIN OR    Anesthesia Start: 0900 Anesthesia Stop: 5499    Procedure: EXCISION LEFT ELBOW SQUAMOUS CELL CARCINOMA (MAC/LOCAL) (Left: Arm Upper) Diagnosis:       Squamous cell skin cancer, elbow, left      (Squamous cell skin cancer, elbow, left [C44.629])    Surgeons: Zac Barksdale MD Responsible Provider: Jaya Brody DO    Anesthesia Type: MAC ASA Status: 3          Anesthesia Type: No value filed.     Casandra Phase I:      Casandra Phase II: Casandra Score: 9      Anesthesia Post Evaluation    Patient location during evaluation: PACU  Patient participation: complete - patient participated  Level of consciousness: awake and alert  Pain score: 0  Airway patency: patent  Nausea & Vomiting: no nausea and no vomiting  Complications: no  Cardiovascular status: blood pressure returned to baseline  Respiratory status: acceptable  Hydration status: euvolemic  Pain management: adequate

## 2023-10-02 NOTE — BRIEF OP NOTE
Brief Postoperative Note      Patient: Pawel Schmitz  YOB: 1935  MRN: [id number]    Date of Procedure: 10/2/2023    Pre-Op Diagnosis Codes:     * Squamous cell skin cancer, elbow, left [C44.629]    Post-Op Diagnosis: Same       Procedure(s):  EXCISION LEFT ELBOW SQUAMOUS CELL CARCINOMA (MAC/LOCAL)    Surgeon(s):  Beverly Urban MD    Assistant:  * No surgical staff found *    Anesthesia: Monitor Anesthesia Care    Estimated Blood Loss (mL): Minimal    Complications: None    Specimens:   ID Type Source Tests Collected by Time Destination   A : squamous cell carcinoma left elbow Tissue Arm SURGICAL PATHOLOGY Beverly Urban MD 10/2/2023 4765        Implants:  * No implants in log *      Drains: * No LDAs found *    Findings: 1 cm lesion of left elbow  This procedure was not performed to treat primary cutaneous melanoma through wide local excision      Electronically signed by Edinson Olvera MD on 10/2/2023 at 9:48 AM

## 2023-10-02 NOTE — DISCHARGE INSTRUCTIONS
Keep dressing on left elbow until follow up in office. If water gets under the dressing or dressing becomes dislodged, remove dressing and gently wash with warm soap and water daily followed by drying and applying a thin layer of antibiotic ointment and a bandaid. Repeat daily. Tylenol for pain as needed and directed. Call for any problems.

## 2023-10-02 NOTE — OP NOTE
[facility]  OPERATIVE REPORT    Name:  Ever Akbar  MR#:  [id number]  :  1935  ACCOUNT #:  [de-identified]  DATE OF SERVICE:  10/02/2023    PREOPERATIVE DIAGNOSIS:  Squamous cell carcinoma of the left elbow. POSTOPERATIVE DIAGNOSIS:  Squamous cell carcinoma of the left elbow. PROCEDURE PERFORMED:  1. Excision of left elbow squamous cell carcinoma. 2.  Intermediate wound closure of 4 cm of left elbow. SURGEON:  Edinson Olvera MD    ASSISTANT:  Anuradha Paulino RN    ANESTHESIA:  MAC plus 6 mL of 1% lidocaine with epinephrine and 0.5% Marcaine mixture. COMPLICATIONS:  None. SPECIMENS REMOVED:  Squamous cell carcinoma of the left elbow. IMPLANTS:  None. ESTIMATED BLOOD LOSS:  Minimal.    DRAINS:  None. FINDINGS:  A 1 cm lesion of left elbow. DISPOSITION:  Stable. PROCEDURE:  The patient was brought to the operative theater. Monitoring devices and nasal cannula O2 were placed per Anesthesia. The patient was placed in the supine position. The left arm was then prepped and draped in a standard aseptic fashion. A time-out was then performed. The lesion had been marked in the holding area. A field block was established for local anesthetic and the lesion was excised in an elliptical fashion using a 15-blade scalpel and Bovie cauterization to the subcutaneous fat. The specimen was then oriented with a long suture for the lateral margin and a short suture for the superior margin and passed off the field. We then undermined the edges using Bovie cauterization and reapproximated the deep layer using 3-0 Monocryl, and the skin was closed with interrupted 4-0 nylon sutures. An Aquacel and DuoDERM dressing were applied. The length of the incision was 4 cm. The patient tolerated the procedure well and was transferred to PACU in stable condition.       Edinson Olvera MD      [id number]  D:  10/02/2023 9:51  T:  10/02/2023 11:17  JOB #:  [id number]  CC:

## 2023-10-10 ENCOUNTER — OFFICE VISIT (OUTPATIENT)
Age: 88
End: 2023-10-10

## 2023-10-10 VITALS
BODY MASS INDEX: 25.49 KG/M2 | HEART RATE: 95 BPM | WEIGHT: 135 LBS | DIASTOLIC BLOOD PRESSURE: 72 MMHG | RESPIRATION RATE: 18 BRPM | HEIGHT: 61 IN | OXYGEN SATURATION: 95 % | TEMPERATURE: 98.1 F | SYSTOLIC BLOOD PRESSURE: 118 MMHG

## 2023-10-10 DIAGNOSIS — Z48.89 POSTOPERATIVE VISIT: ICD-10-CM

## 2023-10-10 DIAGNOSIS — Z48.02 VISIT FOR SUTURE REMOVAL: Primary | ICD-10-CM

## 2023-10-10 PROCEDURE — 99024 POSTOP FOLLOW-UP VISIT: CPT | Performed by: SURGERY

## 2023-10-10 ASSESSMENT — PATIENT HEALTH QUESTIONNAIRE - PHQ9
2. FEELING DOWN, DEPRESSED OR HOPELESS: 0
SUM OF ALL RESPONSES TO PHQ QUESTIONS 1-9: 0
SUM OF ALL RESPONSES TO PHQ QUESTIONS 1-9: 0
SUM OF ALL RESPONSES TO PHQ9 QUESTIONS 1 & 2: 0
SUM OF ALL RESPONSES TO PHQ QUESTIONS 1-9: 0
SUM OF ALL RESPONSES TO PHQ QUESTIONS 1-9: 0
1. LITTLE INTEREST OR PLEASURE IN DOING THINGS: 0

## 2023-10-10 NOTE — PROGRESS NOTES
Identified pt with two pt identifiers (name and ). Reviewed chart in preparation for visit and have obtained necessary documentation. Rosa M Garcia is a 80 y.o. female Post-Op Check (Left elbow)  . There were no vitals filed for this visit. 1. Have you been to the ER, urgent care clinic since your last visit? Hospitalized since your last visit?  no     2. Have you seen or consulted any other health care providers outside of the [address] [place] [address] since your last visit? Include any pap smears or colon screening.   no

## 2023-10-18 ENCOUNTER — HOSPITAL ENCOUNTER (OUTPATIENT)
Facility: HOSPITAL | Age: 88
Discharge: HOME | End: 2023-10-21

## 2023-10-18 ENCOUNTER — OFFICE VISIT (OUTPATIENT)
Age: 88
End: 2023-10-18

## 2023-10-18 VITALS
HEIGHT: 61 IN | BODY MASS INDEX: 25.3 KG/M2 | SYSTOLIC BLOOD PRESSURE: 127 MMHG | RESPIRATION RATE: 15 BRPM | OXYGEN SATURATION: 96 % | HEART RATE: 88 BPM | TEMPERATURE: 98.7 F | DIASTOLIC BLOOD PRESSURE: 75 MMHG | WEIGHT: 134 LBS

## 2023-10-18 DIAGNOSIS — C44.319 BASAL CELL CARCINOMA (BCC) OF CHIN: Primary | ICD-10-CM

## 2023-10-18 DIAGNOSIS — Z48.02 VISIT FOR SUTURE REMOVAL: ICD-10-CM

## 2023-10-18 NOTE — PROGRESS NOTES
Jackelyn Delvalle MD    Location of neoplasm: Left chin    Diagnosis:  Encounter Diagnoses   Name Primary? Basal cell carcinoma (BCC) of chin Yes    Visit for suture removal      Procedure: Excision of left chin neoplasm       Local anesthesia given: 2 ml of 1% buffered lidocaine    Excision diameter: 7  mm  Depth: 3 mm    Type of closure: simple    Description of closure: simple interrupted    Antibiotic dressing is applied, and wound care instructions provided. Be alert for any signs of cutaneous infection. The procedure was well tolerated without complications. Follow up: the specimen is labeled and sent to pathology for evaluation. [unfilled]  OFFICE PROCEDURE PROGRESS NOTE        Chart reviewed for the following:   Leonides Garrett MD, have reviewed the History, Physical and updated the Allergic reactions for Muna P Carneal     TIME OUT performed immediately prior to start of procedure:   Leonides Garrett MD, have performed the following reviews on [address] prior to the start of the procedure:            * Patient was identified by name and date of birth   * Agreement on procedure being performed was verified  * Risks and Benefits explained to the patient  * Procedure site verified and marked as necessary  * Patient was positioned for comfort  * Consent was signed and verified     Time In: 7609  Time Out: 1630      Date of procedure: 10/18/2023    Procedure performed by: Conchetta Rinne, MD    Provider assisted by: None     Patient assisted by: daughter    How tolerated by patient: tolerated the procedure well with no complications    Pain Scale: 0 - No pain/10    Post Procedural Pain Scale: 0 - No Hurt    Comments:  Informed consent was obtained. We had offered her referral for Mohs surgery but there was to be a delay before she could be evaluated.   She understood the risks including the risks of bleeding, scar formation, recurrence, need for additional surgery or

## 2023-10-18 NOTE — PROGRESS NOTES
Identified pt with two pt identifiers (name and ). Reviewed chart in preparation for visit and have obtained necessary documentation. Shayy Castañeda is a 80 y.o. female Post-Op Check (Suture removal)  . There were no vitals filed for this visit. 1. Have you been to the ER, urgent care clinic since your last visit? Hospitalized since your last visit?  no     2. Have you seen or consulted any other health care providers outside of the [address] [place] [address] since your last visit? Include any pap smears or colon screening.   no

## 2023-10-25 ENCOUNTER — OFFICE VISIT (OUTPATIENT)
Age: 88
End: 2023-10-25

## 2023-10-25 VITALS
HEIGHT: 61 IN | DIASTOLIC BLOOD PRESSURE: 80 MMHG | RESPIRATION RATE: 16 BRPM | SYSTOLIC BLOOD PRESSURE: 126 MMHG | BODY MASS INDEX: 24.92 KG/M2 | WEIGHT: 132 LBS | HEART RATE: 85 BPM | OXYGEN SATURATION: 85 %

## 2023-10-25 DIAGNOSIS — Z48.89 POSTOPERATIVE VISIT: Primary | ICD-10-CM

## 2023-10-25 PROCEDURE — 99024 POSTOP FOLLOW-UP VISIT: CPT | Performed by: SURGERY

## 2023-10-25 ASSESSMENT — PATIENT HEALTH QUESTIONNAIRE - PHQ9
2. FEELING DOWN, DEPRESSED OR HOPELESS: 0
SUM OF ALL RESPONSES TO PHQ9 QUESTIONS 1 & 2: 0
1. LITTLE INTEREST OR PLEASURE IN DOING THINGS: 0
SUM OF ALL RESPONSES TO PHQ QUESTIONS 1-9: 0

## 2023-10-25 NOTE — PROGRESS NOTES
Identified pt with two pt identifiers (name and ). Reviewed chart in preparation for visit and have obtained necessary documentation. Evette Lea is a 80 y.o. female Follow-up (Suture removal)  . There were no vitals filed for this visit. 1. Have you been to the ER, urgent care clinic since your last visit? Hospitalized since your last visit?  no     2. Have you seen or consulted any other health care providers outside of the [address] [place] [address] since your last visit? Include any pap smears or colon screening.   no

## 2023-11-09 ENCOUNTER — OFFICE VISIT (OUTPATIENT)
Age: 88
End: 2023-11-09
Payer: MEDICARE

## 2023-11-09 VITALS
OXYGEN SATURATION: 98 % | RESPIRATION RATE: 18 BRPM | HEART RATE: 79 BPM | DIASTOLIC BLOOD PRESSURE: 68 MMHG | TEMPERATURE: 98.6 F | SYSTOLIC BLOOD PRESSURE: 112 MMHG | BODY MASS INDEX: 25.15 KG/M2 | WEIGHT: 133.2 LBS | HEIGHT: 61 IN

## 2023-11-09 DIAGNOSIS — E11.65 TYPE 2 DIABETES MELLITUS WITH HYPERGLYCEMIA, WITHOUT LONG-TERM CURRENT USE OF INSULIN (HCC): Primary | ICD-10-CM

## 2023-11-09 DIAGNOSIS — R35.0 FREQUENCY OF MICTURITION: ICD-10-CM

## 2023-11-09 DIAGNOSIS — I10 ESSENTIAL (PRIMARY) HYPERTENSION: ICD-10-CM

## 2023-11-09 PROCEDURE — 99213 OFFICE O/P EST LOW 20 MIN: CPT | Performed by: INTERNAL MEDICINE

## 2023-11-09 PROCEDURE — 1090F PRES/ABSN URINE INCON ASSESS: CPT | Performed by: INTERNAL MEDICINE

## 2023-11-09 PROCEDURE — 1036F TOBACCO NON-USER: CPT | Performed by: INTERNAL MEDICINE

## 2023-11-09 PROCEDURE — G8427 DOCREV CUR MEDS BY ELIG CLIN: HCPCS | Performed by: INTERNAL MEDICINE

## 2023-11-09 PROCEDURE — G8419 CALC BMI OUT NRM PARAM NOF/U: HCPCS | Performed by: INTERNAL MEDICINE

## 2023-11-09 PROCEDURE — 1123F ACP DISCUSS/DSCN MKR DOCD: CPT | Performed by: INTERNAL MEDICINE

## 2023-11-09 PROCEDURE — G8484 FLU IMMUNIZE NO ADMIN: HCPCS | Performed by: INTERNAL MEDICINE

## 2023-11-09 PROCEDURE — 3051F HG A1C>EQUAL 7.0%<8.0%: CPT | Performed by: INTERNAL MEDICINE

## 2023-11-09 RX ORDER — TRIAMCINOLONE ACETONIDE 1 MG/G
OINTMENT TOPICAL
COMMUNITY
Start: 2023-11-02

## 2023-11-09 ASSESSMENT — PATIENT HEALTH QUESTIONNAIRE - PHQ9
3. TROUBLE FALLING OR STAYING ASLEEP: 3
SUM OF ALL RESPONSES TO PHQ QUESTIONS 1-9: 11
2. FEELING DOWN, DEPRESSED OR HOPELESS: 1
SUM OF ALL RESPONSES TO PHQ QUESTIONS 1-9: 11
SUM OF ALL RESPONSES TO PHQ QUESTIONS 1-9: 11
9. THOUGHTS THAT YOU WOULD BE BETTER OFF DEAD, OR OF HURTING YOURSELF: 0
7. TROUBLE CONCENTRATING ON THINGS, SUCH AS READING THE NEWSPAPER OR WATCHING TELEVISION: 1
1. LITTLE INTEREST OR PLEASURE IN DOING THINGS: 0
6. FEELING BAD ABOUT YOURSELF - OR THAT YOU ARE A FAILURE OR HAVE LET YOURSELF OR YOUR FAMILY DOWN: 0
SUM OF ALL RESPONSES TO PHQ9 QUESTIONS 1 & 2: 1
SUM OF ALL RESPONSES TO PHQ QUESTIONS 1-9: 11
8. MOVING OR SPEAKING SO SLOWLY THAT OTHER PEOPLE COULD HAVE NOTICED. OR THE OPPOSITE, BEING SO FIGETY OR RESTLESS THAT YOU HAVE BEEN MOVING AROUND A LOT MORE THAN USUAL: 0
5. POOR APPETITE OR OVEREATING: 3
4. FEELING TIRED OR HAVING LITTLE ENERGY: 3
10. IF YOU CHECKED OFF ANY PROBLEMS, HOW DIFFICULT HAVE THESE PROBLEMS MADE IT FOR YOU TO DO YOUR WORK, TAKE CARE OF THINGS AT HOME, OR GET ALONG WITH OTHER PEOPLE: 0

## 2023-11-09 NOTE — PROGRESS NOTES
1. Type 2 diabetes mellitus with hyperglycemia, without long-term current use of insulin (HCC)  We should tolerate a mild to modest degree of hyperglycemia given the fact that she is 80years old. My treatment goals would still remain to have a blood sugar between 150 and 250 most of the time. Certainly if she is above 300 consistently we would need to adjust her medications but for now I think she is doing fine    2. Essential (primary) hypertension  Currently at goal    3. Frequency of micturition  Advised against routine urine checks in the absence of symptoms particularly given the fact that she is being actively treated for vaginitis by GYN. Chief Complaint   Patient presents with    Diabetes    Hypertension         No orders of the defined types were placed in this encounter. Phuc Lebron MD, FACP      HPI:         is a 80 y.o. female who arrives for interval assessment of diabetes, hypertension and vaginitis. Currently in the care of Dr. Leonarda Dominguez who is treating her with an antifungal.    Blood sugar is between 180 and 250 most of the time. She is scheduled to see neurology next week. Allergies   Allergen Reactions    Doxycycline Rash and Other (See Comments)    Sulfa Antibiotics Rash and Other (See Comments)     Unknown response       Outpatient Encounter Medications as of 11/9/2023   Medication Sig Dispense Refill    Cetirizine HCl 10 MG CAPS Take by mouth      terconazole (TERAZOL 3) 0.8 % vaginal cream Insert 1 applicatorful every day by vaginal route for 3 days.       triamcinolone (KENALOG) 0.1 % ointment APPLY A THIN LAYER TO THE AFFECTED AREA(S) BY TOPICAL ROUTE 2 TIMES PER DAY      torsemide (DEMADEX) 20 MG tablet TAKE 1 TABLET BY MOUTH DAILY WITH BREAKFAST (Patient taking differently: Take 1 tablet by mouth daily as needed Pt takes in frequently) 30 tablet 4    metFORMIN (GLUCOPHAGE) 500 MG tablet Take 1 tablet by mouth daily      Multiple Vitamin (MULTIVITAMIN

## 2023-11-27 ENCOUNTER — OFFICE VISIT (OUTPATIENT)
Age: 88
End: 2023-11-27
Payer: MEDICARE

## 2023-11-27 VITALS — OXYGEN SATURATION: 98 % | RESPIRATION RATE: 18 BRPM | HEART RATE: 104 BPM | TEMPERATURE: 98 F

## 2023-11-27 DIAGNOSIS — U07.1 COVID-19: ICD-10-CM

## 2023-11-27 DIAGNOSIS — R05.9 COUGH, UNSPECIFIED TYPE: Primary | ICD-10-CM

## 2023-11-27 LAB
EXP DATE SOLUTION: ABNORMAL
EXP DATE SWAB: ABNORMAL
EXPIRATION DATE: ABNORMAL
LOT NUMBER POC: ABNORMAL
LOT NUMBER SOLUTION: ABNORMAL
LOT NUMBER SWAB: ABNORMAL
SARS-COV-2 RNA, POC: POSITIVE

## 2023-11-27 PROCEDURE — 99213 OFFICE O/P EST LOW 20 MIN: CPT | Performed by: NURSE PRACTITIONER

## 2023-11-27 PROCEDURE — 1036F TOBACCO NON-USER: CPT | Performed by: NURSE PRACTITIONER

## 2023-11-27 PROCEDURE — G8419 CALC BMI OUT NRM PARAM NOF/U: HCPCS | Performed by: NURSE PRACTITIONER

## 2023-11-27 PROCEDURE — 1123F ACP DISCUSS/DSCN MKR DOCD: CPT | Performed by: NURSE PRACTITIONER

## 2023-11-27 PROCEDURE — 1090F PRES/ABSN URINE INCON ASSESS: CPT | Performed by: NURSE PRACTITIONER

## 2023-11-27 PROCEDURE — G8484 FLU IMMUNIZE NO ADMIN: HCPCS | Performed by: NURSE PRACTITIONER

## 2023-11-27 PROCEDURE — 87635 SARS-COV-2 COVID-19 AMP PRB: CPT | Performed by: NURSE PRACTITIONER

## 2023-11-27 PROCEDURE — G8427 DOCREV CUR MEDS BY ELIG CLIN: HCPCS | Performed by: NURSE PRACTITIONER

## 2023-11-27 RX ORDER — AZITHROMYCIN 250 MG/1
250 TABLET, FILM COATED ORAL SEE ADMIN INSTRUCTIONS
Qty: 6 TABLET | Refills: 0 | Status: SHIPPED | OUTPATIENT
Start: 2023-11-27 | End: 2023-12-02

## 2023-11-27 ASSESSMENT — PATIENT HEALTH QUESTIONNAIRE - PHQ9
SUM OF ALL RESPONSES TO PHQ QUESTIONS 1-9: 0
2. FEELING DOWN, DEPRESSED OR HOPELESS: 0
SUM OF ALL RESPONSES TO PHQ QUESTIONS 1-9: 0
SUM OF ALL RESPONSES TO PHQ9 QUESTIONS 1 & 2: 0
SUM OF ALL RESPONSES TO PHQ QUESTIONS 1-9: 0
1. LITTLE INTEREST OR PLEASURE IN DOING THINGS: 0
SUM OF ALL RESPONSES TO PHQ QUESTIONS 1-9: 0

## 2023-11-27 NOTE — PROGRESS NOTES
Chief Complaint   Patient presents with    Cough     Patient states she has had a cough since last Tues . Alli Running Some production . Alli Running No wheezing or sob . Alli Running Has not taken anything otc. . states 1 week ago she did have a few days of low grade temps but nothing since . .. Denies any other symptoms at this time          HPI:       is a 80 y.o. female. Goes by [employer]. Dr. Myra Albarran is her PCP. She has a past medical history of Anxiety, DJD of hip, Depression, Diabetes, GERD, Hand fracture, HLD, Menopause, Osteopenia, Recurrent UTI, RLS, and SCC of skin of lower extremity. New Issues:  She presents for a cough since last Tuesday. Daughter reports that she had some intermittent low grade fevers the week before that. Cough has gotten severe. Productive at times. Making her tired. Has not taken anything OTC. Allergies   Allergen Reactions    Doxycycline Rash and Other (See Comments)    Sulfa Antibiotics Rash and Other (See Comments)     Unknown response       Current Outpatient Medications   Medication Sig Dispense Refill    azithromycin (ZITHROMAX) 250 MG tablet Take 1 tablet by mouth See Admin Instructions for 5 days 500mg on day 1 followed by 250mg on days 2 - 5 6 tablet 0    Cetirizine HCl 10 MG CAPS Take by mouth      Multiple Vitamins-Minerals (PRESERVISION AREDS 2 PO)       terconazole (TERAZOL 3) 0.8 % vaginal cream Insert 1 applicatorful every day by vaginal route for 3 days.       triamcinolone (KENALOG) 0.1 % ointment APPLY A THIN LAYER TO THE AFFECTED AREA(S) BY TOPICAL ROUTE 2 TIMES PER DAY      torsemide (DEMADEX) 20 MG tablet TAKE 1 TABLET BY MOUTH DAILY WITH BREAKFAST (Patient taking differently: Take 1 tablet by mouth daily as needed Pt takes in frequently) 30 tablet 4    metFORMIN (GLUCOPHAGE) 500 MG tablet Take 1 tablet by mouth daily      Multiple Vitamin (MULTIVITAMIN ADULT PO) Take 1 tablet by mouth daily      Blood Glucose Monitoring Suppl ([employer] BLOOD GLUCOSE METER) w/Device KIT Use to test

## 2024-01-12 ENCOUNTER — TELEPHONE (OUTPATIENT)
Age: 89
End: 2024-01-12

## 2024-01-15 RX ORDER — PRAMIPEXOLE DIHYDROCHLORIDE 0.25 MG/1
TABLET ORAL
Qty: 90 TABLET | Refills: 1 | Status: SHIPPED | OUTPATIENT
Start: 2024-01-15

## 2024-01-26 RX ORDER — BUPROPION HYDROCHLORIDE 150 MG/1
TABLET ORAL
Qty: 90 TABLET | Refills: 4 | Status: SHIPPED | OUTPATIENT
Start: 2024-01-26

## 2024-02-07 PROBLEM — E27.8 ADRENAL MASS (HCC): Status: RESOLVED | Noted: 2021-07-06 | Resolved: 2024-02-07

## 2024-02-08 ENCOUNTER — OFFICE VISIT (OUTPATIENT)
Age: 89
End: 2024-02-08
Payer: MEDICARE

## 2024-02-08 VITALS
SYSTOLIC BLOOD PRESSURE: 132 MMHG | HEART RATE: 80 BPM | WEIGHT: 132.2 LBS | BODY MASS INDEX: 24.96 KG/M2 | DIASTOLIC BLOOD PRESSURE: 79 MMHG | OXYGEN SATURATION: 98 % | HEIGHT: 61 IN | RESPIRATION RATE: 17 BRPM

## 2024-02-08 DIAGNOSIS — R01.1 SYSTOLIC MURMUR: ICD-10-CM

## 2024-02-08 DIAGNOSIS — R35.0 FREQUENCY OF MICTURITION: ICD-10-CM

## 2024-02-08 DIAGNOSIS — E27.8 ADRENAL INCIDENTALOMA (HCC): ICD-10-CM

## 2024-02-08 DIAGNOSIS — E11.42 TYPE 2 DIABETES MELLITUS WITH DIABETIC POLYNEUROPATHY, UNSPECIFIED WHETHER LONG TERM INSULIN USE (HCC): Primary | ICD-10-CM

## 2024-02-08 PROCEDURE — 99213 OFFICE O/P EST LOW 20 MIN: CPT | Performed by: INTERNAL MEDICINE

## 2024-02-08 PROCEDURE — G8427 DOCREV CUR MEDS BY ELIG CLIN: HCPCS | Performed by: INTERNAL MEDICINE

## 2024-02-08 PROCEDURE — G8420 CALC BMI NORM PARAMETERS: HCPCS | Performed by: INTERNAL MEDICINE

## 2024-02-08 PROCEDURE — 1090F PRES/ABSN URINE INCON ASSESS: CPT | Performed by: INTERNAL MEDICINE

## 2024-02-08 PROCEDURE — 1123F ACP DISCUSS/DSCN MKR DOCD: CPT | Performed by: INTERNAL MEDICINE

## 2024-02-08 PROCEDURE — G8484 FLU IMMUNIZE NO ADMIN: HCPCS | Performed by: INTERNAL MEDICINE

## 2024-02-08 PROCEDURE — 1036F TOBACCO NON-USER: CPT | Performed by: INTERNAL MEDICINE

## 2024-02-08 RX ORDER — TRIAMCINOLONE ACETONIDE 1 MG/G
OINTMENT TOPICAL
Qty: 453.6 G | Refills: 4 | Status: SHIPPED | OUTPATIENT
Start: 2024-02-08

## 2024-02-08 NOTE — PROGRESS NOTES
1. Type 2 diabetes mellitus with diabetic polyneuropathy, unspecified whether long term insulin use (HCC)  Other than cutting back on her carbs a little bit I think she is doing great.  She is soon-to-be 89 years old and I think the less hypoglycemia we expose her to the better.    2. Systolic murmur  Stable    3. Adrenal incidentaloma (HCC)  Followed elsewhere    4. Frequency of micturition  Checking urine today  - Urinalysis with Microscopic; Future  - Culture, Urine; Future         Chief Complaint   Patient presents with    Peripheral Neuropathy     C/O numbness and pain to (B) toes. Starting to feel pain in (R) ball of foot.     Other     Started a new oral shake that has \"brain food\" states is now more engaging.     Diabetes     Reports increased carb intake. States sugar has been running high. Daughter reports a lot of prepackaged foods    RLS     Reports at times she needs to take her \"restless leg medicine three times daily\"         Orders Placed This Encounter   Procedures    Culture, Urine     Standing Status:   Future     Standing Expiration Date:   2/8/2025     Order Specific Question:   Specify (ex-cath, midstream, cysto, etc)?     Answer:   midstream    Urinalysis with Microscopic     Standing Status:   Future     Standing Expiration Date:   2/8/2025     Order Specific Question:   SPECIFY(EX-CATH,MIDSTREAM,CYSTO,ETC)?     Answer:   midstream       Aryan Campos MD, FACP      HPI:         is a 88 y.o. female who arrives for interval assessment of multiple concerns.  She has diabetes and her restless leg syndrome and neuropathy are little bit more of a problem particularly at night.  She feels pins-and-needles and sort of a movement across the bottom of her feet at times.  She reports that she is eating more carbs than he should and her blood sugar is in the 200s and occasionally higher.    She is due to follow-up with Dr. Cabrera for continued observation of other concerns    Her mental status 
No Help Needed   Walk across the room (includes cane/walker) No Help Needed No Help Needed No Help Needed   Using the telphone No Help Needed No Help Needed No Help Needed   Taking your medications No Help Needed No Help Needed No Help Needed   Preparing meals No Help Needed Help Needed No Help Needed   Managing money (expenses/bills) No Help Needed Help Needed No Help Needed   Moderately strenuous housework (laundry) No Help Needed Help Needed Help Needed   Shopping for personal items (toiletries/medicines) No Help Needed Help Needed No Help Needed   Shopping for groceries No Help Needed Help Needed No Help Needed   Driving No Help Needed Help Needed Help Needed   Climbing a flight of stairs No Help Needed Help Needed No Help Needed   Getting to places beyond walking distances No Help Needed Help Needed No Help Needed           11/9/2023    10:00 AM   AMB Abuse Screening   Do you ever feel afraid of your partner? N   Are you in a relationship with someone who physically or mentally threatens you? N   Is it safe for you to go home? Y       Advance Care Planning     The patient has appointed the following active healthcare agents:    Primary Decision Maker: Palmira Sanchez - Child - [phone]    Secondary Decision Maker: Yanci Armstrong - Child - [phone]    Secondary Decision Maker: Anahy Sheridan - Child - [phone]

## 2024-02-09 LAB
APPEARANCE UR: CLEAR
BACTERIA SPEC CULT: NORMAL
BACTERIA URNS QL MICRO: NEGATIVE /HPF
BILIRUB UR QL: NEGATIVE
CC UR VC: NORMAL
COLOR UR: ABNORMAL
EPITH CASTS URNS QL MICRO: ABNORMAL /LPF
GLUCOSE UR STRIP.AUTO-MCNC: NEGATIVE MG/DL
HGB UR QL STRIP: NEGATIVE
HYALINE CASTS URNS QL MICRO: ABNORMAL /LPF (ref 0–5)
KETONES UR QL STRIP.AUTO: ABNORMAL MG/DL
LEUKOCYTE ESTERASE UR QL STRIP.AUTO: NEGATIVE
NITRITE UR QL STRIP.AUTO: NEGATIVE
PH UR STRIP: 5.5 (ref 5–8)
PROT UR STRIP-MCNC: NEGATIVE MG/DL
RBC #/AREA URNS HPF: ABNORMAL /HPF (ref 0–5)
SERVICE CMNT-IMP: NORMAL
SP GR UR REFRACTOMETRY: 1.02 (ref 1–1.03)
UROBILINOGEN UR QL STRIP.AUTO: 0.2 EU/DL (ref 0.2–1)
WBC URNS QL MICRO: ABNORMAL /HPF (ref 0–4)

## 2024-02-19 NOTE — PROGRESS NOTES
Intake Note      Patient Name: Muna Hahn  YOB: 1935    Age: 88 y.o.  Date of Intake: 2/20/2024   Education: 13 Ethnicity White   Gender: Female Referring Provider: Dr. Marinelli     REASON FOR REFERRAL AND EVALUATION PROCEDURES:  Muna Hahn  was referred for evaluation by her neurologist to assist in differential diagnosis and individualized treatment planning. she understood the rationale and procedures for evaluation, as well as the limits to confidentiality, and agreed to participate. she consented to have this report made available to her  treating providers through her  electronic medical records.   History Sources: Patient, Relative (two daughters), and Medical Record    HISTORY OF PRESENT ILLNESS:  The patient is an 88-year-old female with pertinent medical history noted for anxiety, depression, type 2 diabetes mellitus, diabetic peripheral neuropathy, hyperlipidemia, vitamin D deficiency, B12 deficiency, memory difficulties, and cerebral microvascular disease.  Review of the patient's medical record revealed she participated in neuropsychological evaluations with Dr. Taylor in 2021 and October 2022.  Her 2022 evaluation was interpreted as \"again shows impairment\" with \"the primary area of impairment involves short-term memory and thinking/abstract thinking and reasoning.\"  Dr. Taylor noted \"while I again suspect combination of chronic sleep and mood disorder issue, her overall profile has moved more clearly over time into the crossover range of an MCI/mild dementia status.\"    During the current clinical interview, the patient presented accompanied by 2 of her daughters though she appeared capable of providing meaningful history.  Per the patient's report, over the past several years, she has experienced the insidious onset of gradually progressive changes in her cognitive functioning, particularly with respect to episodic memory.  For example, the patient reported she has

## 2024-02-20 ENCOUNTER — PROCEDURE VISIT (OUTPATIENT)
Age: 89
End: 2024-02-20
Payer: MEDICARE

## 2024-02-20 ENCOUNTER — OFFICE VISIT (OUTPATIENT)
Age: 89
End: 2024-02-20
Payer: MEDICARE

## 2024-02-20 DIAGNOSIS — Z76.89 SLEEP CONCERN: ICD-10-CM

## 2024-02-20 DIAGNOSIS — F33.0 MILD EPISODE OF RECURRENT MAJOR DEPRESSIVE DISORDER (HCC): Primary | ICD-10-CM

## 2024-02-20 DIAGNOSIS — R41.3 MEMORY LOSS: ICD-10-CM

## 2024-02-20 DIAGNOSIS — F33.1 MODERATE EPISODE OF RECURRENT MAJOR DEPRESSIVE DISORDER (HCC): Primary | ICD-10-CM

## 2024-02-20 DIAGNOSIS — G31.84 MILD COGNITIVE IMPAIRMENT WITH MEMORY LOSS: ICD-10-CM

## 2024-02-20 DIAGNOSIS — F41.9 ANXIETY: ICD-10-CM

## 2024-02-20 DIAGNOSIS — F41.1 GENERALIZED ANXIETY DISORDER: ICD-10-CM

## 2024-02-20 PROCEDURE — 96138 PSYCL/NRPSYC TECH 1ST: CPT | Performed by: CLINICAL NEUROPSYCHOLOGIST

## 2024-02-20 PROCEDURE — 96139 PSYCL/NRPSYC TST TECH EA: CPT | Performed by: CLINICAL NEUROPSYCHOLOGIST

## 2024-02-20 PROCEDURE — 96133 NRPSYC TST EVAL PHYS/QHP EA: CPT | Performed by: CLINICAL NEUROPSYCHOLOGIST

## 2024-02-20 PROCEDURE — 96132 NRPSYC TST EVAL PHYS/QHP 1ST: CPT | Performed by: CLINICAL NEUROPSYCHOLOGIST

## 2024-02-20 PROCEDURE — 1123F ACP DISCUSS/DSCN MKR DOCD: CPT | Performed by: CLINICAL NEUROPSYCHOLOGIST

## 2024-02-20 PROCEDURE — 1036F TOBACCO NON-USER: CPT | Performed by: CLINICAL NEUROPSYCHOLOGIST

## 2024-02-20 PROCEDURE — 90791 PSYCH DIAGNOSTIC EVALUATION: CPT | Performed by: CLINICAL NEUROPSYCHOLOGIST

## 2024-03-05 NOTE — PROGRESS NOTES
\"Average\" 25th - 74th \"Exceptionally Low\" <2nd   \"Low Average\" 9th - 24th    \"Below Average\" 2nd - 8th    \"Exceptionally Low\" <2nd      Performance and symptom validity are analyzed in a number of ways, including administration of neuropsychological measures that have been empirically shown to identify suboptimal performance or purposeful exaggeration. These tests and their scores have been redacted from this report in order to ensure test security, but are available to formally trained neuropsychologists upon request. In addition, when possible, the overall pattern of performance is analyzed for consistency between measures, consistency with the expected severity of impairment, and the presenting symptoms are compared against base rates of symptoms in other patients with similar problems. The patient's performances were within acceptable limits, indicating the results of the present evaluation are believed to be valid and reliable.    Premorbid Functioning:   Average  Attention:   Brief auditory attention: Average  Processing Speed:  Low average to high average but generally in the average range  Executive Functioning:   Mental set switching: Average; 0 errors  Problem Solving: Low average  Inhibition: Above average for speed and high average for accuracy  Inhibition/Switching: Above average for speed and high average for accuracy  Visuospatial:  Basic visuoperception: Within normal limits  Pattern reconstruction: Average  Figure copy: Average  Language:  Confrontation naming: Within normal limits  Letter fluency: High average  Semantic Fluency: Average  Learning   List learning: Below average  Story learning: Below average  Basic figure learning: Below average  Memory:  List recall: Below average  Story recall: Below average  Basic figure recall: Below average (retaining 100%)  Progressively complex figure recall: Below average  Recognition:  List recognition: Low average  Story recognition: Below average  Basic

## 2024-03-08 ENCOUNTER — OFFICE VISIT (OUTPATIENT)
Age: 89
End: 2024-03-08
Payer: MEDICARE

## 2024-03-08 DIAGNOSIS — F33.1 MODERATE EPISODE OF RECURRENT MAJOR DEPRESSIVE DISORDER (HCC): Primary | ICD-10-CM

## 2024-03-08 DIAGNOSIS — F41.1 GENERALIZED ANXIETY DISORDER: ICD-10-CM

## 2024-03-08 DIAGNOSIS — R41.3 MEMORY LOSS: ICD-10-CM

## 2024-03-08 PROCEDURE — 96132 NRPSYC TST EVAL PHYS/QHP 1ST: CPT | Performed by: CLINICAL NEUROPSYCHOLOGIST

## 2024-03-08 NOTE — PROGRESS NOTES
Prior to seeing the patient for today's visit, I reviewed pertinent records, including the previously completed report, the records in Epic, and any updated visits from other providers since the patient's last visit.    I provided feedback services related to the previously completed report. Attendees included: Patient and Children. Education was provided regarding my diagnostic impressions, and we discussed treatment plan/options. Attendees were provided with the opportunity to ask questions, which were answered to the best of my ability.    We discussed, in detail, the following:  Reviewed findings from evaluation including test results, diagnosis, and suspected contributing factors  Discussed recommendations outlined in report  Answered questions to the best of my ability    The patient needs to:   Follow up with referring provider for ongoing management, Initiate psychotherapy using resources (See handout), Use behavioral strategies to improve sleep (See handout), Use practical strategies to compensate for cognitive weaknesses (See handout), Emphasize modifiable risk and protective factors for cognitive functioning (e.g., exercise, diet, cognitive stimulation; see handout), and Access community resources we discussed for additional support (See handout)    The patient had the following reactions to recommendations: Patient and family expressed understanding of results and were encouraged by findings.  We discussed at length how the patient's depression and sleep problems may be contributing to these memory issues.  She continued to show evidence of preserved episodic memory that do not seem consistent with her test findings.    MENTAL STATUS:  Appearance: Casually dressed, Well-groomed, and Appeared stated age  Orientation: Person, Place, Time, and Situation  Motor: Ambulated independently, Adequate gait, Adequate posture, No involuntary movements, and Adequate strength  Thought Processes: Clear, Coherent,

## 2024-04-28 RX ORDER — METFORMIN HYDROCHLORIDE 500 MG/1
TABLET, EXTENDED RELEASE ORAL
Qty: 180 TABLET | Refills: 4 | Status: SHIPPED | OUTPATIENT
Start: 2024-04-28

## 2024-04-29 RX ORDER — METHENAMINE HIPPURATE 1000 MG/1
TABLET ORAL
Qty: 180 TABLET | Refills: 5 | Status: SHIPPED | OUTPATIENT
Start: 2024-04-29

## 2024-05-09 RX ORDER — PRAMIPEXOLE DIHYDROCHLORIDE 0.25 MG/1
TABLET ORAL
Qty: 30 TABLET | Refills: 0 | Status: SHIPPED | OUTPATIENT
Start: 2024-05-09

## 2024-05-09 NOTE — TELEPHONE ENCOUNTER
----- Message from Leni SONG LPN sent at 5/8/2024  7:37 AM EDT -----  Regarding: FW: Pramipexole refill requested by pharmacist   Contact: [phone]    ----- Message -----  From: Muna Hahn ADRIANO \"Kylee\"  Sent: 5/7/2024   6:43 PM EDT  To: #  Subject: Pramipexole refill requested by pharmacist       Derick!  My mom, Kylee Hahn, is out of her much needed Restless Legs medication!  Pramipexole 0.25mg.  Our local [facility] pharmacy has requested this twice. Can you PLEASE send in a refill today for a 30 day supply?  We are scheduled to see Dr. Marinelli on May 22nd and will need a new script at that time.  THANK YOU so much. She really can’t  function without this and we need it asap.   See you in a few weeks.  Palmira Sanchez,  daughter   [phone]

## 2024-05-14 RX ORDER — PRAMIPEXOLE DIHYDROCHLORIDE 0.25 MG/1
TABLET ORAL
Qty: 90 TABLET | Refills: 1 | OUTPATIENT
Start: 2024-05-14

## 2024-05-22 ENCOUNTER — OFFICE VISIT (OUTPATIENT)
Age: 89
End: 2024-05-22
Payer: MEDICARE

## 2024-05-22 VITALS
DIASTOLIC BLOOD PRESSURE: 68 MMHG | OXYGEN SATURATION: 97 % | HEART RATE: 78 BPM | BODY MASS INDEX: 25.49 KG/M2 | WEIGHT: 135 LBS | HEIGHT: 61 IN | SYSTOLIC BLOOD PRESSURE: 122 MMHG

## 2024-05-22 DIAGNOSIS — G31.84 MILD COGNITIVE IMPAIRMENT WITH MEMORY LOSS: ICD-10-CM

## 2024-05-22 DIAGNOSIS — G25.81 RESTLESS LEGS SYNDROME: Primary | ICD-10-CM

## 2024-05-22 DIAGNOSIS — F41.1 GENERALIZED ANXIETY DISORDER: ICD-10-CM

## 2024-05-22 DIAGNOSIS — F33.1 MODERATE EPISODE OF RECURRENT MAJOR DEPRESSIVE DISORDER (HCC): ICD-10-CM

## 2024-05-22 DIAGNOSIS — E11.42 TYPE 2 DIABETES MELLITUS WITH DIABETIC POLYNEUROPATHY, UNSPECIFIED WHETHER LONG TERM INSULIN USE (HCC): ICD-10-CM

## 2024-05-22 PROCEDURE — 1123F ACP DISCUSS/DSCN MKR DOCD: CPT | Performed by: PSYCHIATRY & NEUROLOGY

## 2024-05-22 PROCEDURE — 1090F PRES/ABSN URINE INCON ASSESS: CPT | Performed by: PSYCHIATRY & NEUROLOGY

## 2024-05-22 PROCEDURE — 99214 OFFICE O/P EST MOD 30 MIN: CPT | Performed by: PSYCHIATRY & NEUROLOGY

## 2024-05-22 PROCEDURE — G8419 CALC BMI OUT NRM PARAM NOF/U: HCPCS | Performed by: PSYCHIATRY & NEUROLOGY

## 2024-05-22 PROCEDURE — 1036F TOBACCO NON-USER: CPT | Performed by: PSYCHIATRY & NEUROLOGY

## 2024-05-22 PROCEDURE — G8427 DOCREV CUR MEDS BY ELIG CLIN: HCPCS | Performed by: PSYCHIATRY & NEUROLOGY

## 2024-05-22 RX ORDER — PRAMIPEXOLE DIHYDROCHLORIDE 0.25 MG/1
TABLET ORAL
Qty: 180 TABLET | Refills: 3 | Status: SHIPPED | OUTPATIENT
Start: 2024-05-22

## 2024-05-22 RX ORDER — NORTRIPTYLINE HYDROCHLORIDE 25 MG/1
25 CAPSULE ORAL NIGHTLY
Qty: 30 CAPSULE | Refills: 5 | Status: SHIPPED | OUTPATIENT
Start: 2024-05-22

## 2024-05-22 NOTE — PROGRESS NOTES
Neurology Clinic Follow up Note    Patient ID:  Muna Hahn  [id number]  1935      Ms. Hahn is here for follow up today of MCI, RLS, neuropathy.        Last Appointment With Me:  5/16/2023    \"Muna Hahn is presenting for evaluation of atypical movements. These were initially noted by her physical therapist who is following her for chronic imbalance/falls. PT reported intermittent myoclonic jerking of the lower extremities b/l L>R with passive movements. Denies similar symptoms into the upper extremities. Family has also noted intermittent mouth movements and truncal swaying when patient is seated. She has a previous diagnosis of RLS on ropinirole over the past 15 years per pt/family. H/O depression/anxiety but no prior exposure to antipsychotics per pt/family. She describes a need to move the legs with LE paresthesias/numbness affecting sleep. Ropinirole does appear to be beneficial for RLS symptoms. She has tried gabapentin in the past but did not tolerate the medication.    Other ROS: memory impairment-no signs of dementia on prior neuropsychologic testing per family, previously felt to be secondary to anxiety/depression. +Gait instability/falls. Denies tremors.\"    Interval History:   Patient returns for f/u of RLS, DM related polyneuropathy confirmed on recent EMG and MCI.     RLS symptoms have progressed slightly since last visit. She describes this as a sensation of needing to move the limbs. She has been taking pramipexole 0.25mg at 6 and 9PM. At times she is requiring 0.5mg in the evenings to assist with sleep. She feels medication is helpful. Denies significant side effects with medication.     In regards to her peripheral neuropathy, she reports these symptoms are more prominent in the evenings. No reported falls. She is not presently using an assist device.     She has completed neuropsychologic testing with Dr. Taylor c/w LATOYA. She has difficulty with processing at times,

## 2024-06-24 ENCOUNTER — OFFICE VISIT (OUTPATIENT)
Age: 89
End: 2024-06-24
Payer: MEDICARE

## 2024-06-24 VITALS
DIASTOLIC BLOOD PRESSURE: 72 MMHG | HEART RATE: 83 BPM | RESPIRATION RATE: 18 BRPM | HEIGHT: 61 IN | BODY MASS INDEX: 25 KG/M2 | SYSTOLIC BLOOD PRESSURE: 128 MMHG | WEIGHT: 132.4 LBS

## 2024-06-24 DIAGNOSIS — E11.42 TYPE 2 DIABETES MELLITUS WITH DIABETIC POLYNEUROPATHY, UNSPECIFIED WHETHER LONG TERM INSULIN USE (HCC): Primary | ICD-10-CM

## 2024-06-24 DIAGNOSIS — F32.4 MAJOR DEPRESSIVE DISORDER WITH SINGLE EPISODE, IN PARTIAL REMISSION (HCC): ICD-10-CM

## 2024-06-24 DIAGNOSIS — E27.8 ADRENAL INCIDENTALOMA (HCC): ICD-10-CM

## 2024-06-24 PROCEDURE — 1123F ACP DISCUSS/DSCN MKR DOCD: CPT | Performed by: INTERNAL MEDICINE

## 2024-06-24 PROCEDURE — 1036F TOBACCO NON-USER: CPT | Performed by: INTERNAL MEDICINE

## 2024-06-24 PROCEDURE — 1090F PRES/ABSN URINE INCON ASSESS: CPT | Performed by: INTERNAL MEDICINE

## 2024-06-24 PROCEDURE — G8419 CALC BMI OUT NRM PARAM NOF/U: HCPCS | Performed by: INTERNAL MEDICINE

## 2024-06-24 PROCEDURE — G8427 DOCREV CUR MEDS BY ELIG CLIN: HCPCS | Performed by: INTERNAL MEDICINE

## 2024-06-24 PROCEDURE — 99214 OFFICE O/P EST MOD 30 MIN: CPT | Performed by: INTERNAL MEDICINE

## 2024-06-24 RX ORDER — CLOBETASOL PROPIONATE 0.5 MG/G
OINTMENT TOPICAL
COMMUNITY
Start: 2024-06-07

## 2024-06-24 RX ORDER — TRIAMCINOLONE ACETONIDE 1 MG/G
OINTMENT TOPICAL
COMMUNITY
Start: 2024-06-07

## 2024-06-24 ASSESSMENT — PATIENT HEALTH QUESTIONNAIRE - PHQ9
5. POOR APPETITE OR OVEREATING: NEARLY EVERY DAY
10. IF YOU CHECKED OFF ANY PROBLEMS, HOW DIFFICULT HAVE THESE PROBLEMS MADE IT FOR YOU TO DO YOUR WORK, TAKE CARE OF THINGS AT HOME, OR GET ALONG WITH OTHER PEOPLE: VERY DIFFICULT
7. TROUBLE CONCENTRATING ON THINGS, SUCH AS READING THE NEWSPAPER OR WATCHING TELEVISION: SEVERAL DAYS
1. LITTLE INTEREST OR PLEASURE IN DOING THINGS: MORE THAN HALF THE DAYS
SUM OF ALL RESPONSES TO PHQ QUESTIONS 1-9: 15
SUM OF ALL RESPONSES TO PHQ9 QUESTIONS 1 & 2: 4
SUM OF ALL RESPONSES TO PHQ QUESTIONS 1-9: 15
8. MOVING OR SPEAKING SO SLOWLY THAT OTHER PEOPLE COULD HAVE NOTICED. OR THE OPPOSITE, BEING SO FIGETY OR RESTLESS THAT YOU HAVE BEEN MOVING AROUND A LOT MORE THAN USUAL: NOT AT ALL
3. TROUBLE FALLING OR STAYING ASLEEP: NEARLY EVERY DAY
6. FEELING BAD ABOUT YOURSELF - OR THAT YOU ARE A FAILURE OR HAVE LET YOURSELF OR YOUR FAMILY DOWN: SEVERAL DAYS
SUM OF ALL RESPONSES TO PHQ QUESTIONS 1-9: 15
2. FEELING DOWN, DEPRESSED OR HOPELESS: MORE THAN HALF THE DAYS
4. FEELING TIRED OR HAVING LITTLE ENERGY: NEARLY EVERY DAY
SUM OF ALL RESPONSES TO PHQ QUESTIONS 1-9: 15
9. THOUGHTS THAT YOU WOULD BE BETTER OFF DEAD, OR OF HURTING YOURSELF: NOT AT ALL

## 2024-06-24 NOTE — PROGRESS NOTES
1. Type 2 diabetes mellitus with diabetic polyneuropathy, unspecified whether long term insulin use (HCC)  Currently--goal FS < 250.      2. Adrenal incidentaloma (HCC)  Monitor status    3. Major depressive disorder with single episode, in partial remission (HCC)  Actually doing well on current meds.         Chief Complaint   Patient presents with    Depression     Family states that she is still sad, focuses on things. Does smile mor    Diabetes     Has weekly readings of glucose readings. Highest 323.    nasal drainage     Reports nasal drainage and hoarsenss    Medication Check     States nortriptyline caused confusion  Methenamine caused sweating.     Leg Injury     Has an area on her leg that family is watching.     Other     States completed psych screening. Family was very happy with Dr Kim         No orders of the defined types were placed in this encounter.      Aryan Campos MD, FACP      HPI:         is a 89 y.o. female who arrives for assessment of depression, dementia, DM and HTN.    Did not do well with Nortriptyline.  But she is doing well with Rize (OTC).  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        Allergies   Allergen Reactions    Doxycycline Rash and Other (See Comments)    Sulfa Antibiotics Rash and Other (See Comments)     Unknown response       Outpatient Encounter Medications as of 6/24/2024   Medication Sig Dispense Refill    clobetasol (TEMOVATE) 0.05 % ointment       triamcinolone (KENALOG) 0.1 % ointment APPLY THIN LAYER TOPICALLY TO THE AFFECTED AREA TWICE DAILY      pramipexole (MIRAPEX) 0.25 MG tablet Take 0.25mg PRN in AM and 0.25 to 0.5mg  tablet 3    metFORMIN (GLUCOPHAGE) 500 MG tablet Take 1 tablet by mouth daily 60 tablet 2    buPROPion (WELLBUTRIN XL) 150 MG extended release tablet TAKE 1 TABLET BY MOUTH DAILY FOR MAJOR DEPRESSION 90 tablet 4    Cetirizine HCl 10 MG CAPS Take by mouth daily      Multiple Vitamin (MULTIVITAMIN ADULT PO) Take 1 
Palmira Sanchez - Child - [phone]    Secondary Decision Maker: Yanci Armstrong - Child - [phone]    Secondary Decision Maker: Anahy Sheridan - Child - [phone]

## 2024-09-24 NOTE — ACP (ADVANCE CARE PLANNING)
Addended by: MENA JOHNSON on: 9/24/2024 01:24 PM     Modules accepted: Orders     No advanced directives on file. Verified emergency contact.

## 2024-10-13 NOTE — PROGRESS NOTES
Duration 30 minutes primarily education, review of imaging, labs and records.    Assessment & Plan  1. Fatigue.  Her weight is within a healthy range. She is encouraged to engage in outdoor activities such as walking around her yard a few times daily to enhance muscle strength and metabolism, which could potentially improve her energy levels. Additionally, she is advised to increase fluid intake, particularly in the mid-morning and mid-afternoon, to combat fatigue and dehydration. The use of a rollator is recommended to prevent falls and hip fractures.    2. Vaginal itching and UTI  She is advised to continue the full course of penicillin as prescribed by her other physician, as it is effective against the specific germ identified in her urine culture.    3. Depression.  She is encouraged to spend time outdoors to benefit from natural sunlight, which acts as a natural antidepressant. Light therapy was discussed as an alternative for the winter months.    4.  DM:  checking A1c    Follow-up  Return in February for follow-up.          Chief Complaint   Patient presents with    Medicare AWV    Frequent/Recurrent UTI     Seen by Dr Solo. Had urine culture completed, results on file. Provided PCN but daughter wanted to check with PCP    Immunizations     Wants to get flu vaccine at next visit.     Diabetes     Sugars are \"creeping\" up again.   States averages are 250s. Has been using sugar free candies and cookies    Depression     States has been \"Not wanting to socialize\" Has to be persuaded to bath. Increased fatigue         Orders Placed This Encounter   Procedures    TSH     Standing Status:   Future     Number of Occurrences:   1     Standing Expiration Date:   10/24/2025    CBC with Auto Differential     Standing Status:   Future     Number of Occurrences:   1     Standing Expiration Date:   10/24/2025    Hemoglobin A1C     Standing Status:   Future     Number of Occurrences:   1     Standing Expiration Date:

## 2024-10-24 ENCOUNTER — OFFICE VISIT (OUTPATIENT)
Age: 89
End: 2024-10-24

## 2024-10-24 VITALS
HEART RATE: 83 BPM | SYSTOLIC BLOOD PRESSURE: 117 MMHG | HEIGHT: 61 IN | OXYGEN SATURATION: 98 % | DIASTOLIC BLOOD PRESSURE: 69 MMHG | BODY MASS INDEX: 25.04 KG/M2 | WEIGHT: 132.6 LBS | RESPIRATION RATE: 18 BRPM

## 2024-10-24 DIAGNOSIS — E11.42 TYPE 2 DIABETES MELLITUS WITH DIABETIC POLYNEUROPATHY, UNSPECIFIED WHETHER LONG TERM INSULIN USE (HCC): ICD-10-CM

## 2024-10-24 DIAGNOSIS — I10 ESSENTIAL (PRIMARY) HYPERTENSION: ICD-10-CM

## 2024-10-24 DIAGNOSIS — Z00.00 MEDICARE ANNUAL WELLNESS VISIT, SUBSEQUENT: Primary | ICD-10-CM

## 2024-10-24 RX ORDER — PENICILLIN V POTASSIUM 500 MG/1
TABLET, FILM COATED ORAL
COMMUNITY
Start: 2024-10-24

## 2024-10-24 SDOH — ECONOMIC STABILITY: FOOD INSECURITY: WITHIN THE PAST 12 MONTHS, YOU WORRIED THAT YOUR FOOD WOULD RUN OUT BEFORE YOU GOT MONEY TO BUY MORE.: NEVER TRUE

## 2024-10-24 SDOH — ECONOMIC STABILITY: FOOD INSECURITY: WITHIN THE PAST 12 MONTHS, THE FOOD YOU BOUGHT JUST DIDN'T LAST AND YOU DIDN'T HAVE MONEY TO GET MORE.: NEVER TRUE

## 2024-10-24 SDOH — ECONOMIC STABILITY: INCOME INSECURITY: HOW HARD IS IT FOR YOU TO PAY FOR THE VERY BASICS LIKE FOOD, HOUSING, MEDICAL CARE, AND HEATING?: NOT HARD AT ALL

## 2024-10-24 ASSESSMENT — PATIENT HEALTH QUESTIONNAIRE - PHQ9
SUM OF ALL RESPONSES TO PHQ QUESTIONS 1-9: 13
5. POOR APPETITE OR OVEREATING: NEARLY EVERY DAY
6. FEELING BAD ABOUT YOURSELF - OR THAT YOU ARE A FAILURE OR HAVE LET YOURSELF OR YOUR FAMILY DOWN: NOT AT ALL
3. TROUBLE FALLING OR STAYING ASLEEP: MORE THAN HALF THE DAYS
2. FEELING DOWN, DEPRESSED OR HOPELESS: NEARLY EVERY DAY
7. TROUBLE CONCENTRATING ON THINGS, SUCH AS READING THE NEWSPAPER OR WATCHING TELEVISION: NOT AT ALL
4. FEELING TIRED OR HAVING LITTLE ENERGY: NEARLY EVERY DAY
1. LITTLE INTEREST OR PLEASURE IN DOING THINGS: MORE THAN HALF THE DAYS
9. THOUGHTS THAT YOU WOULD BE BETTER OFF DEAD, OR OF HURTING YOURSELF: NOT AT ALL
8. MOVING OR SPEAKING SO SLOWLY THAT OTHER PEOPLE COULD HAVE NOTICED. OR THE OPPOSITE, BEING SO FIGETY OR RESTLESS THAT YOU HAVE BEEN MOVING AROUND A LOT MORE THAN USUAL: NOT AT ALL
SUM OF ALL RESPONSES TO PHQ QUESTIONS 1-9: 13
SUM OF ALL RESPONSES TO PHQ QUESTIONS 1-9: 13
10. IF YOU CHECKED OFF ANY PROBLEMS, HOW DIFFICULT HAVE THESE PROBLEMS MADE IT FOR YOU TO DO YOUR WORK, TAKE CARE OF THINGS AT HOME, OR GET ALONG WITH OTHER PEOPLE: EXTREMELY DIFFICULT
SUM OF ALL RESPONSES TO PHQ9 QUESTIONS 1 & 2: 5
SUM OF ALL RESPONSES TO PHQ QUESTIONS 1-9: 13

## 2024-10-24 ASSESSMENT — LIFESTYLE VARIABLES
HOW MANY STANDARD DRINKS CONTAINING ALCOHOL DO YOU HAVE ON A TYPICAL DAY: PATIENT DOES NOT DRINK
HOW OFTEN DO YOU HAVE A DRINK CONTAINING ALCOHOL: NEVER

## 2024-10-24 NOTE — PROGRESS NOTES
Muna Hahn is a 89 y.o. female presenting for/with:    Chief Complaint   Patient presents with    Medicare AWV    Frequent/Recurrent UTI     Seen by Dr Solo. Had urine culture completed, results on file. Provided PCN but daughter wanted to check with PCP    Immunizations     Wants to get flu vaccine at next visit.     Diabetes     Sugars are \"creeping\" up again.   States averages are 250s. Has been using sugar free candies and cookies    Depression     States has been \"Not wanting to socialize\" Has to be persuaded to bath. Increased fatigue       Vitals:    10/24/24 1103   BP: 117/69   Site: Left Upper Arm   Position: Sitting   Cuff Size: Medium Adult   Pulse: 83   Resp: 18   SpO2: 98%   Weight: 60.1 kg (132 lb 9.6 oz)   Height: 1.549 m (5' 1\")       Pain Scale: 0 - No pain/10  Pain Location:     \"Have you been to the ER, urgent care clinic since your last visit?  Hospitalized since your last visit?\"    NO    “Have you seen or consulted any other health care providers outside of [facility] since your last visit?”    NO                 10/24/2024    10:59 AM   PHQ-9    Little interest or pleasure in doing things 2   Feeling down, depressed, or hopeless 3   Trouble falling or staying asleep, or sleeping too much 2   Feeling tired or having little energy 3   Poor appetite or overeating 3   Feeling bad about yourself - or that you are a failure or have let yourself or your family down 0   Trouble concentrating on things, such as reading the newspaper or watching television 0   Moving or speaking so slowly that other people could have noticed. Or the opposite - being so fidgety or restless that you have been moving around a lot more than usual 0   Thoughts that you would be better off dead, or of hurting yourself in some way 0   PHQ-2 Score 5   PHQ-9 Total Score 13   If you checked off any problems, how difficult have these problems made it for you to do your work, take care of things at home, or

## 2024-10-25 LAB
ALBUMIN SERPL-MCNC: 3.7 G/DL (ref 3.5–5)
ALBUMIN/GLOB SERPL: 1.2 (ref 1.1–2.2)
ALP SERPL-CCNC: 79 U/L (ref 45–117)
ALT SERPL-CCNC: 32 U/L (ref 12–78)
ANION GAP SERPL CALC-SCNC: 8 MMOL/L (ref 2–12)
AST SERPL-CCNC: 16 U/L (ref 15–37)
BASOPHILS # BLD: 0.1 K/UL (ref 0–0.1)
BASOPHILS NFR BLD: 1 % (ref 0–1)
BILIRUB SERPL-MCNC: 0.5 MG/DL (ref 0.2–1)
BUN SERPL-MCNC: 17 MG/DL (ref 6–20)
BUN/CREAT SERPL: 18 (ref 12–20)
CALCIUM SERPL-MCNC: 9.6 MG/DL (ref 8.5–10.1)
CHLORIDE SERPL-SCNC: 102 MMOL/L (ref 97–108)
CO2 SERPL-SCNC: 27 MMOL/L (ref 21–32)
CREAT SERPL-MCNC: 0.93 MG/DL (ref 0.55–1.02)
DIFFERENTIAL METHOD BLD: NORMAL
EOSINOPHIL # BLD: 0.3 K/UL (ref 0–0.4)
EOSINOPHIL NFR BLD: 3 % (ref 0–7)
ERYTHROCYTE [DISTWIDTH] IN BLOOD BY AUTOMATED COUNT: 12.6 % (ref 11.5–14.5)
EST. AVERAGE GLUCOSE BLD GHB EST-MCNC: 280 MG/DL
GLOBULIN SER CALC-MCNC: 3.1 G/DL (ref 2–4)
GLUCOSE SERPL-MCNC: 335 MG/DL (ref 65–100)
HBA1C MFR BLD: 11.4 % (ref 4–5.6)
HCT VFR BLD AUTO: 42.2 % (ref 35–47)
HGB BLD-MCNC: 13.9 G/DL (ref 11.5–16)
IMM GRANULOCYTES # BLD AUTO: 0 K/UL (ref 0–0.04)
IMM GRANULOCYTES NFR BLD AUTO: 0 % (ref 0–0.5)
LYMPHOCYTES # BLD: 1.4 K/UL (ref 0.8–3.5)
LYMPHOCYTES NFR BLD: 14 % (ref 12–49)
MCH RBC QN AUTO: 31.2 PG (ref 26–34)
MCHC RBC AUTO-ENTMCNC: 32.9 G/DL (ref 30–36.5)
MCV RBC AUTO: 94.8 FL (ref 80–99)
MONOCYTES # BLD: 0.7 K/UL (ref 0–1)
MONOCYTES NFR BLD: 7 % (ref 5–13)
NEUTS SEG # BLD: 7.3 K/UL (ref 1.8–8)
NEUTS SEG NFR BLD: 75 % (ref 32–75)
NRBC # BLD: 0 K/UL (ref 0–0.01)
NRBC BLD-RTO: 0 PER 100 WBC
PLATELET # BLD AUTO: 280 K/UL (ref 150–400)
PMV BLD AUTO: 10.7 FL (ref 8.9–12.9)
POTASSIUM SERPL-SCNC: 4.5 MMOL/L (ref 3.5–5.1)
PROT SERPL-MCNC: 6.8 G/DL (ref 6.4–8.2)
RBC # BLD AUTO: 4.45 M/UL (ref 3.8–5.2)
SODIUM SERPL-SCNC: 137 MMOL/L (ref 136–145)
TSH SERPL DL<=0.05 MIU/L-ACNC: 1.68 UIU/ML (ref 0.36–3.74)
WBC # BLD AUTO: 9.8 K/UL (ref 3.6–11)

## 2025-01-02 ENCOUNTER — TELEPHONE (OUTPATIENT)
Age: 89
End: 2025-01-02

## 2025-01-02 NOTE — TELEPHONE ENCOUNTER
Patient's daughter requesting VV on 1/6/25 due to potential snow forecast and patient's age. Please contact

## 2025-01-03 NOTE — TELEPHONE ENCOUNTER
Called and spoke with the Pt's daughter. They drive about 2 hours to get here and worried about driving on Monday for the appt. In the weather they are saying we may have. Wondering if Dr. Marinelli will do a VV this one time? Let her know we would call her Monday morning to let her know.

## 2025-01-06 ENCOUNTER — TELEMEDICINE (OUTPATIENT)
Age: 89
End: 2025-01-06
Payer: MEDICARE

## 2025-01-06 ENCOUNTER — TELEPHONE (OUTPATIENT)
Age: 89
End: 2025-01-06

## 2025-01-06 DIAGNOSIS — G31.84 MILD COGNITIVE IMPAIRMENT WITH MEMORY LOSS: ICD-10-CM

## 2025-01-06 DIAGNOSIS — G25.81 RESTLESS LEGS SYNDROME: Primary | ICD-10-CM

## 2025-01-06 DIAGNOSIS — E11.42 TYPE 2 DIABETES MELLITUS WITH DIABETIC POLYNEUROPATHY, UNSPECIFIED WHETHER LONG TERM INSULIN USE (HCC): ICD-10-CM

## 2025-01-06 PROCEDURE — 1123F ACP DISCUSS/DSCN MKR DOCD: CPT | Performed by: PSYCHIATRY & NEUROLOGY

## 2025-01-06 PROCEDURE — G8427 DOCREV CUR MEDS BY ELIG CLIN: HCPCS | Performed by: PSYCHIATRY & NEUROLOGY

## 2025-01-06 PROCEDURE — 1159F MED LIST DOCD IN RCRD: CPT | Performed by: PSYCHIATRY & NEUROLOGY

## 2025-01-06 PROCEDURE — 1090F PRES/ABSN URINE INCON ASSESS: CPT | Performed by: PSYCHIATRY & NEUROLOGY

## 2025-01-06 PROCEDURE — 99214 OFFICE O/P EST MOD 30 MIN: CPT | Performed by: PSYCHIATRY & NEUROLOGY

## 2025-01-06 RX ORDER — PRAMIPEXOLE DIHYDROCHLORIDE 0.25 MG/1
TABLET ORAL
Qty: 180 TABLET | Refills: 3 | Status: SHIPPED | OUTPATIENT
Start: 2025-01-06

## 2025-01-06 RX ORDER — LANOLIN ALCOHOL/MO/W.PET/CERES
400 CREAM (GRAM) TOPICAL DAILY
COMMUNITY

## 2025-01-06 NOTE — TELEPHONE ENCOUNTER
Patient's daughter called to provide her mother's updated Medicare number.    PSR has updated it and placed it into her chart.

## 2025-01-06 NOTE — PROGRESS NOTES
but has recently started using a cane for ambulation due to episodic imbalance.      She has completed neuropsychologic testing with Dr. Taylor c/w LATOYA. She has difficulty with processing at times, multitasking, short term recall deficits. She is receiving assistance with medications (daughter is loading her pill box). Finances are handled by family. She is no longer driving. She is residing at home alone, however her daughter lives only 4 miles from her home and checks in with her often.   Recent repeat testing 2/2024 with Dr. Kim did not reveal evolving dementia process but rather psychological etiologies contributing to cognitive deficits/MCI including depression/anxiety. Family has noted some worsening anxiety. Mood disorders are managed by her PCP.     PMHx/ PSHx/ FHx/ SHx:  Reviewed and unchanged previous visit.   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       Objective:       Meds:  Current Outpatient Medications   Medication Sig Dispense Refill    magnesium oxide (MAG-OX) 400 (240 Mg) MG tablet Take 1 tablet by mouth daily      clobetasol (TEMOVATE) 0.05 % ointment       triamcinolone (KENALOG) 0.1 % ointment APPLY THIN LAYER TOPICALLY TO THE AFFECTED AREA TWICE DAILY      pramipexole (MIRAPEX) 0.25 MG tablet Take 0.25mg PRN in AM and 0.25 to 0.5mg  tablet 3    metFORMIN (GLUCOPHAGE) 500 MG tablet Take 1 tablet by mouth daily 60 tablet 2    buPROPion (WELLBUTRIN XL) 150 MG extended release tablet TAKE 1 TABLET BY MOUTH DAILY FOR MAJOR DEPRESSION 90 tablet

## 2025-02-09 PROBLEM — F33.1 MODERATE EPISODE OF RECURRENT MAJOR DEPRESSIVE DISORDER (HCC): Status: ACTIVE | Noted: 2025-02-09

## 2025-02-17 SDOH — ECONOMIC STABILITY: FOOD INSECURITY: WITHIN THE PAST 12 MONTHS, YOU WORRIED THAT YOUR FOOD WOULD RUN OUT BEFORE YOU GOT MONEY TO BUY MORE.: NEVER TRUE

## 2025-02-17 SDOH — ECONOMIC STABILITY: TRANSPORTATION INSECURITY
IN THE PAST 12 MONTHS, HAS LACK OF TRANSPORTATION KEPT YOU FROM MEETINGS, WORK, OR FROM GETTING THINGS NEEDED FOR DAILY LIVING?: NO

## 2025-02-17 SDOH — ECONOMIC STABILITY: TRANSPORTATION INSECURITY
IN THE PAST 12 MONTHS, HAS THE LACK OF TRANSPORTATION KEPT YOU FROM MEDICAL APPOINTMENTS OR FROM GETTING MEDICATIONS?: NO

## 2025-02-17 SDOH — ECONOMIC STABILITY: INCOME INSECURITY: IN THE LAST 12 MONTHS, WAS THERE A TIME WHEN YOU WERE NOT ABLE TO PAY THE MORTGAGE OR RENT ON TIME?: NO

## 2025-02-17 SDOH — ECONOMIC STABILITY: FOOD INSECURITY: WITHIN THE PAST 12 MONTHS, THE FOOD YOU BOUGHT JUST DIDN'T LAST AND YOU DIDN'T HAVE MONEY TO GET MORE.: NEVER TRUE

## 2025-02-18 ENCOUNTER — TELEPHONE (OUTPATIENT)
Age: 89
End: 2025-02-18

## 2025-02-18 DIAGNOSIS — E11.42 TYPE 2 DIABETES MELLITUS WITH DIABETIC POLYNEUROPATHY, UNSPECIFIED WHETHER LONG TERM INSULIN USE (HCC): Primary | ICD-10-CM

## 2025-02-18 RX ORDER — GLIMEPIRIDE 1 MG/1
1 TABLET ORAL
Qty: 90 TABLET | Refills: 1 | Status: SHIPPED | OUTPATIENT
Start: 2025-02-18

## 2025-02-18 NOTE — TELEPHONE ENCOUNTER
Pt's daughter (Palmira) called. Reports that blood sugars are running 396 and higher. Blood sugars have been high for the past two weeks. Advised to watch diet and push water. Advised daughter to take patient to the nearest ER/Urgent Care. Pt's daughter reports that she is home with the flu and she is unable to take her mom anywhere and would lik to wait to hear back from Dr. Campos first to see if he recommends increasing the patient's metformin first.     Pt's appt scheduled 2/20/2025 was canceled and re scheduled 2/27/2025

## 2025-02-18 NOTE — TELEPHONE ENCOUNTER
Daughter called, pt had an appt scheduled for tomorrow, but she moved appt to next Thursday 2/27. Daughter stated that pt's blood sugar has been running around 320, wants to know if she should increase Metformin.     Please call back.     Thanks!    Detail Level: Simple

## 2025-02-27 ENCOUNTER — OFFICE VISIT (OUTPATIENT)
Age: 89
End: 2025-02-27
Payer: MEDICARE

## 2025-02-27 VITALS
SYSTOLIC BLOOD PRESSURE: 112 MMHG | BODY MASS INDEX: 24.77 KG/M2 | OXYGEN SATURATION: 97 % | WEIGHT: 131.2 LBS | HEIGHT: 61 IN | DIASTOLIC BLOOD PRESSURE: 68 MMHG | TEMPERATURE: 97.9 F | RESPIRATION RATE: 18 BRPM | HEART RATE: 81 BPM

## 2025-02-27 DIAGNOSIS — E11.65 TYPE 2 DIABETES MELLITUS WITH HYPERGLYCEMIA, WITHOUT LONG-TERM CURRENT USE OF INSULIN (HCC): Primary | ICD-10-CM

## 2025-02-27 DIAGNOSIS — Z91.81 AT HIGH RISK FOR FALLS: ICD-10-CM

## 2025-02-27 PROBLEM — F33.1 MODERATE EPISODE OF RECURRENT MAJOR DEPRESSIVE DISORDER (HCC): Status: RESOLVED | Noted: 2025-02-09 | Resolved: 2025-02-27

## 2025-02-27 PROCEDURE — G8420 CALC BMI NORM PARAMETERS: HCPCS | Performed by: INTERNAL MEDICINE

## 2025-02-27 PROCEDURE — 1090F PRES/ABSN URINE INCON ASSESS: CPT | Performed by: INTERNAL MEDICINE

## 2025-02-27 PROCEDURE — 1036F TOBACCO NON-USER: CPT | Performed by: INTERNAL MEDICINE

## 2025-02-27 PROCEDURE — 1126F AMNT PAIN NOTED NONE PRSNT: CPT | Performed by: INTERNAL MEDICINE

## 2025-02-27 PROCEDURE — G8427 DOCREV CUR MEDS BY ELIG CLIN: HCPCS | Performed by: INTERNAL MEDICINE

## 2025-02-27 PROCEDURE — 1159F MED LIST DOCD IN RCRD: CPT | Performed by: INTERNAL MEDICINE

## 2025-02-27 PROCEDURE — 1123F ACP DISCUSS/DSCN MKR DOCD: CPT | Performed by: INTERNAL MEDICINE

## 2025-02-27 PROCEDURE — 99214 OFFICE O/P EST MOD 30 MIN: CPT | Performed by: INTERNAL MEDICINE

## 2025-02-27 ASSESSMENT — PATIENT HEALTH QUESTIONNAIRE - PHQ9
SUM OF ALL RESPONSES TO PHQ9 QUESTIONS 1 & 2: 1
8. MOVING OR SPEAKING SO SLOWLY THAT OTHER PEOPLE COULD HAVE NOTICED. OR THE OPPOSITE, BEING SO FIGETY OR RESTLESS THAT YOU HAVE BEEN MOVING AROUND A LOT MORE THAN USUAL: NOT AT ALL
SUM OF ALL RESPONSES TO PHQ QUESTIONS 1-9: 3
7. TROUBLE CONCENTRATING ON THINGS, SUCH AS READING THE NEWSPAPER OR WATCHING TELEVISION: NOT AT ALL
6. FEELING BAD ABOUT YOURSELF - OR THAT YOU ARE A FAILURE OR HAVE LET YOURSELF OR YOUR FAMILY DOWN: NOT AT ALL
10. IF YOU CHECKED OFF ANY PROBLEMS, HOW DIFFICULT HAVE THESE PROBLEMS MADE IT FOR YOU TO DO YOUR WORK, TAKE CARE OF THINGS AT HOME, OR GET ALONG WITH OTHER PEOPLE: NOT DIFFICULT AT ALL
5. POOR APPETITE OR OVEREATING: NOT AT ALL
SUM OF ALL RESPONSES TO PHQ QUESTIONS 1-9: 3
3. TROUBLE FALLING OR STAYING ASLEEP: SEVERAL DAYS
2. FEELING DOWN, DEPRESSED OR HOPELESS: SEVERAL DAYS
SUM OF ALL RESPONSES TO PHQ QUESTIONS 1-9: 3
4. FEELING TIRED OR HAVING LITTLE ENERGY: SEVERAL DAYS
9. THOUGHTS THAT YOU WOULD BE BETTER OFF DEAD, OR OF HURTING YOURSELF: NOT AT ALL
1. LITTLE INTEREST OR PLEASURE IN DOING THINGS: NOT AT ALL
SUM OF ALL RESPONSES TO PHQ QUESTIONS 1-9: 3

## 2025-02-27 NOTE — PROGRESS NOTES
On the basis of positive falls risk screening, assessment and plan is as follows:  urged to use what she has learned at PT and to use her cane    Duration 30 minutes primarily education, review of imaging, labs and records.    Assessment & Plan  1. Diabetes mellitus.  Her blood glucose levels have been consistently elevated, with readings of 214, 265, 227, and 208. The last A1c was 11.4. She has been on glimepiride 1 mg. She is advised to continue her current regimen of glimepiride 1 mg. If her blood glucose levels fall below 150, she should temporarily discontinue the medication.    2. Sleep disturbance.  She reports difficulty sleeping and frequent nighttime urination. She is advised against the use of sleep-inducing medications due to their potential to exacerbate her balance issues.    3. Balance issues.  She reports a decline in balance but has not experienced any falls. She is encouraged to use a hiking stick to prevent falls and potential bone fractures.    4. Neuropathy.  She reports that her neuropathy symptoms have improved but still persist. She is advised to continue monitoring her blood glucose levels, as better control may further alleviate neuropathy symptoms.    5. Actinic keratosis.  She has a raw, scabbed lesion on the right side of her nose. A referral to a dermatologist has been made for further evaluation and potential treatment with liquid nitrogen or a topical cream.    6. Recurrent urinary tract infections.  She has had 2 to 3 UTIs recently. She is advised to discuss the possibility of atrophic vaginitis with her gynecologist, as it may be contributing to the recurrent UTIs.    7. Atrophic vaginitis.  She reports vaginal itching and is currently using clobetasol cream. She is advised to discuss the use of estrogen tablets with her gynecologist to address the atrophic vaginitis and potentially reduce recurrent UTIs.          Chief Complaint   Patient presents with    Diabetes    Other     Pt

## 2025-02-27 NOTE — PROGRESS NOTES
Muna Hahn is a 89 y.o. female presenting for/with:    Chief Complaint   Patient presents with    Diabetes    Other     Pt reports having neuropathy in both her left and right feet for the past several months.        Vitals:    02/27/25 0931   BP: 112/68   Site: Left Upper Arm   Position: Sitting   Cuff Size: Medium Adult   Pulse: 81   Resp: 18   Temp: 97.9 °F (36.6 °C)   TempSrc: Temporal   SpO2: 97%   Weight: 59.5 kg (131 lb 3.2 oz)   Height: 1.549 m (5' 1\")       Pain Scale: 0 - No pain/10  Pain Location:     \"Have you been to the ER, urgent care clinic since your last visit?  Hospitalized since your last visit?\"    NO    “Have you seen or consulted any other health care providers outside of [facility] since your last visit?”    NO                 2/27/2025     9:28 AM   PHQ-9    Little interest or pleasure in doing things 0   Feeling down, depressed, or hopeless 1   Trouble falling or staying asleep, or sleeping too much 1   Feeling tired or having little energy 1   Poor appetite or overeating 0   Feeling bad about yourself - or that you are a failure or have let yourself or your family down 0   Trouble concentrating on things, such as reading the newspaper or watching television 0   Moving or speaking so slowly that other people could have noticed. Or the opposite - being so fidgety or restless that you have been moving around a lot more than usual 0   Thoughts that you would be better off dead, or of hurting yourself in some way 0   PHQ-2 Score 1   PHQ-9 Total Score 3   If you checked off any problems, how difficult have these problems made it for you to do your work, take care of things at home, or get along with other people? 0           2/27/2025     9:30 AM 8/18/2023     9:50 AM   [facility] AMB LEARNING ASSESSMENT   Primary Learner Patient Patient   Primary Language ENGLISH ENGLISH   Learning Preference DEMONSTRATION LISTENING   Answered By patient patient   Relationship to Learner SELF

## 2025-04-19 RX ORDER — BUPROPION HYDROCHLORIDE 150 MG/1
TABLET ORAL
Qty: 90 TABLET | Refills: 4 | Status: SHIPPED | OUTPATIENT
Start: 2025-04-19

## 2025-04-25 RX ORDER — NITROFURANTOIN 25; 75 MG/1; MG/1
100 CAPSULE ORAL 2 TIMES DAILY
Qty: 20 CAPSULE | Refills: 0 | Status: SHIPPED | OUTPATIENT
Start: 2025-04-25 | End: 2025-05-05

## 2025-06-05 ENCOUNTER — OFFICE VISIT (OUTPATIENT)
Age: 89
End: 2025-06-05
Payer: MEDICARE

## 2025-06-05 VITALS
RESPIRATION RATE: 16 BRPM | TEMPERATURE: 97.8 F | BODY MASS INDEX: 25.37 KG/M2 | HEIGHT: 61 IN | HEART RATE: 78 BPM | DIASTOLIC BLOOD PRESSURE: 67 MMHG | SYSTOLIC BLOOD PRESSURE: 130 MMHG | OXYGEN SATURATION: 96 % | WEIGHT: 134.38 LBS

## 2025-06-05 DIAGNOSIS — I10 ESSENTIAL (PRIMARY) HYPERTENSION: ICD-10-CM

## 2025-06-05 DIAGNOSIS — H01.009 ACUTE BLEPHARITIS: ICD-10-CM

## 2025-06-05 DIAGNOSIS — R92.8 ABNORMALITY OF RIGHT BREAST ON SCREENING MAMMOGRAM: ICD-10-CM

## 2025-06-05 DIAGNOSIS — E11.65 TYPE 2 DIABETES MELLITUS WITH HYPERGLYCEMIA, WITHOUT LONG-TERM CURRENT USE OF INSULIN (HCC): Primary | ICD-10-CM

## 2025-06-05 DIAGNOSIS — N64.4 BREAST PAIN: ICD-10-CM

## 2025-06-05 DIAGNOSIS — N95.2 ATROPHIC VAGINITIS: ICD-10-CM

## 2025-06-05 DIAGNOSIS — E11.65 TYPE 2 DIABETES MELLITUS WITH HYPERGLYCEMIA, WITHOUT LONG-TERM CURRENT USE OF INSULIN (HCC): ICD-10-CM

## 2025-06-05 DIAGNOSIS — Z12.31 ENCOUNTER FOR SCREENING MAMMOGRAM FOR MALIGNANT NEOPLASM OF BREAST: ICD-10-CM

## 2025-06-05 PROCEDURE — G8419 CALC BMI OUT NRM PARAM NOF/U: HCPCS | Performed by: INTERNAL MEDICINE

## 2025-06-05 PROCEDURE — 1126F AMNT PAIN NOTED NONE PRSNT: CPT | Performed by: INTERNAL MEDICINE

## 2025-06-05 PROCEDURE — 1123F ACP DISCUSS/DSCN MKR DOCD: CPT | Performed by: INTERNAL MEDICINE

## 2025-06-05 PROCEDURE — 1090F PRES/ABSN URINE INCON ASSESS: CPT | Performed by: INTERNAL MEDICINE

## 2025-06-05 PROCEDURE — 1036F TOBACCO NON-USER: CPT | Performed by: INTERNAL MEDICINE

## 2025-06-05 PROCEDURE — G8427 DOCREV CUR MEDS BY ELIG CLIN: HCPCS | Performed by: INTERNAL MEDICINE

## 2025-06-05 PROCEDURE — 1159F MED LIST DOCD IN RCRD: CPT | Performed by: INTERNAL MEDICINE

## 2025-06-05 PROCEDURE — 99215 OFFICE O/P EST HI 40 MIN: CPT | Performed by: INTERNAL MEDICINE

## 2025-06-05 RX ORDER — ESTRADIOL 10 UG/1
TABLET, FILM COATED VAGINAL
Qty: 34 TABLET | Refills: 0 | Status: SHIPPED | OUTPATIENT
Start: 2025-06-05 | End: 2025-07-03

## 2025-06-05 RX ORDER — ERYTHROMYCIN 5 MG/G
0.25 OINTMENT OPHTHALMIC EVERY 6 HOURS
Qty: 3.5 G | Refills: 2 | Status: SHIPPED | OUTPATIENT
Start: 2025-06-05

## 2025-06-05 RX ORDER — NYSTATIN 100000 U/G
OINTMENT TOPICAL 2 TIMES DAILY
COMMUNITY
Start: 2025-06-03

## 2025-06-05 RX ORDER — LATANOPROST 50 UG/ML
1 SOLUTION/ DROPS OPHTHALMIC NIGHTLY
COMMUNITY
Start: 2025-04-15

## 2025-06-05 ASSESSMENT — PATIENT HEALTH QUESTIONNAIRE - PHQ9
SUM OF ALL RESPONSES TO PHQ QUESTIONS 1-9: 0
SUM OF ALL RESPONSES TO PHQ QUESTIONS 1-9: 0
2. FEELING DOWN, DEPRESSED OR HOPELESS: NOT AT ALL
1. LITTLE INTEREST OR PLEASURE IN DOING THINGS: NOT AT ALL
SUM OF ALL RESPONSES TO PHQ QUESTIONS 1-9: 0
SUM OF ALL RESPONSES TO PHQ QUESTIONS 1-9: 0

## 2025-06-05 NOTE — PROGRESS NOTES
Muna Hahn is a 89 y.o. female presenting for/with:    Chief Complaint   Patient presents with    Eye Problem     Left eye problem, had a fall about 1 week ago, wants to discuss some other issues            Vitals:    06/05/25 0945   BP: 130/67   BP Site: Left Upper Arm   Patient Position: Sitting   BP Cuff Size: Medium Adult   Pulse: 78   Resp: 16   Temp: 97.8 °F (36.6 °C)   TempSrc: Temporal   SpO2: 96%   Weight: 61 kg (134 lb 6 oz)   Height: 1.549 m (5' 1\")       Pain Scale: 0 - No pain/10  Pain Location:     \"Have you been to the ER, urgent care clinic since your last visit?  Hospitalized since your last visit?\"    No    “Have you seen or consulted any other health care providers outside of [facility] since your last visit?”    Yes, has been seen by eye doctor and Dr. Solo.                 6/5/2025     9:40 AM   PHQ-9    Little interest or pleasure in doing things 0   Feeling down, depressed, or hopeless 0   PHQ-2 Score 0   PHQ-9 Total Score 0           2/27/2025     9:30 AM 8/18/2023     9:50 AM   [facility] AMB LEARNING ASSESSMENT   Primary Learner Patient Patient   Primary Language ENGLISH ENGLISH   Learning Preference DEMONSTRATION LISTENING   Answered By patient patient   Relationship to Learner SELF SELF            6/5/2025     9:43 AM   Amb Fall Risk Assessment and TUG Test   Do you feel unsteady or are you worried about falling?  yes   2 or more falls in past year? yes   Fall with injury in past year? yes           6/5/2025     9:00 AM 2/27/2025     9:00 AM 11/9/2023    10:00 AM 8/18/2023     9:00 AM 7/13/2023     2:00 PM   ADL ASSESSMENT   Feeding yourself No Help Needed No Help Needed No Help Needed No Help Needed No Help Needed   Getting from bed to chair No Help Needed No Help Needed No Help Needed No Help Needed No Help Needed   Getting dressed No Help Needed No Help Needed No Help Needed No Help Needed No Help Needed   Bathing or showering No Help Needed No Help Needed No Help 
ophthalmic solution Place 1 drop into the left eye nightly      erythromycin (ROMYCIN) 5 MG/GM ophthalmic ointment Place 2.5 cm into the left eye every 6 hours 3.5 g 2    Estradiol (VAGIFEM) 10 MCG TABS vaginal tablet Place 1 tablet vaginally nightly for 14 days, THEN 1 tablet Twice a Week for 14 days. 34 tablet 0    buPROPion (WELLBUTRIN XL) 150 MG extended release tablet TAKE 1 TABLET BY MOUTH DAILY FOR MAJOR DEPRESSION 90 tablet 4    glimepiride (AMARYL) 1 MG tablet Take 1 tablet by mouth every morning (before breakfast) 90 tablet 1    magnesium oxide (MAG-OX) 400 (240 Mg) MG tablet Take 1 tablet by mouth daily      pramipexole (MIRAPEX) 0.25 MG tablet Take 0.25mg PRN in AM and 0.25 to 0.5mg  tablet 3    triamcinolone (KENALOG) 0.1 % ointment APPLY THIN LAYER TOPICALLY TO THE AFFECTED AREA TWICE DAILY      metFORMIN (GLUCOPHAGE) 500 MG tablet Take 1 tablet by mouth daily 60 tablet 2    Cetirizine HCl 10 MG CAPS Take by mouth daily      Multiple Vitamin (MULTIVITAMIN ADULT PO) Take 1 tablet by mouth daily      Blood Glucose Monitoring Suppl ([employer] BLOOD GLUCOSE METER) w/Device KIT Use to test glucose once daily, E 11.9   [employer] meter please      NONFORMULARY 50 mg CBD patch uses every evening as needed for anxiety       No facility-administered encounter medications on file as of 6/5/2025.         Past Medical History:   Diagnosis Date    Anxiety     Degenerative joint disease (DJD) of hip 01/2013    bursitis    Depression     Diabetes (HCC)     GERD (gastroesophageal reflux disease)     H/O hand fracture     Hearing loss     Hyperlipidemia     Menopause     Neuropathy     Osteopenia     Recurrent UTI     RLS (restless legs syndrome)     Squamous cell carcinoma of skin of lower extremity     afterwards had MRSA which is cleared per family    Urinary incontinence     Increasing         ROS:  Denies fever, chills, cough, chest pain, SOB,  nausea, vomiting, or diarrhea.  Denies wt loss, wt gain, hemoptysis,

## 2025-06-06 LAB
ALBUMIN SERPL-MCNC: 3.8 G/DL (ref 3.5–5)
ALBUMIN/GLOB SERPL: 1.2 (ref 1.1–2.2)
ALP SERPL-CCNC: 89 U/L (ref 45–117)
ALT SERPL-CCNC: 31 U/L (ref 12–78)
ANION GAP SERPL CALC-SCNC: 6 MMOL/L (ref 2–12)
AST SERPL-CCNC: 15 U/L (ref 15–37)
BASOPHILS # BLD: 0.04 K/UL (ref 0–0.1)
BASOPHILS NFR BLD: 0.4 % (ref 0–1)
BILIRUB SERPL-MCNC: 0.6 MG/DL (ref 0.2–1)
BUN SERPL-MCNC: 22 MG/DL (ref 6–20)
BUN/CREAT SERPL: 24 (ref 12–20)
CALCIUM SERPL-MCNC: 9.8 MG/DL (ref 8.5–10.1)
CHLORIDE SERPL-SCNC: 102 MMOL/L (ref 97–108)
CO2 SERPL-SCNC: 30 MMOL/L (ref 21–32)
CREAT SERPL-MCNC: 0.9 MG/DL (ref 0.55–1.02)
DIFFERENTIAL METHOD BLD: ABNORMAL
EOSINOPHIL # BLD: 0.2 K/UL (ref 0–0.4)
EOSINOPHIL NFR BLD: 2.2 % (ref 0–7)
ERYTHROCYTE [DISTWIDTH] IN BLOOD BY AUTOMATED COUNT: 12.4 % (ref 11.5–14.5)
EST. AVERAGE GLUCOSE BLD GHB EST-MCNC: 237 MG/DL
GLOBULIN SER CALC-MCNC: 3.1 G/DL (ref 2–4)
GLUCOSE SERPL-MCNC: 279 MG/DL (ref 65–100)
HBA1C MFR BLD: 9.9 % (ref 4–5.6)
HCT VFR BLD AUTO: 42.4 % (ref 35–47)
HGB BLD-MCNC: 13.8 G/DL (ref 11.5–16)
IMM GRANULOCYTES # BLD AUTO: 0.06 K/UL (ref 0–0.04)
IMM GRANULOCYTES NFR BLD AUTO: 0.7 % (ref 0–0.5)
LYMPHOCYTES # BLD: 1.59 K/UL (ref 0.8–3.5)
LYMPHOCYTES NFR BLD: 17.3 % (ref 12–49)
MCH RBC QN AUTO: 30.5 PG (ref 26–34)
MCHC RBC AUTO-ENTMCNC: 32.5 G/DL (ref 30–36.5)
MCV RBC AUTO: 93.6 FL (ref 80–99)
MONOCYTES # BLD: 0.98 K/UL (ref 0–1)
MONOCYTES NFR BLD: 10.7 % (ref 5–13)
NEUTS SEG # BLD: 6.31 K/UL (ref 1.8–8)
NEUTS SEG NFR BLD: 68.7 % (ref 32–75)
NRBC # BLD: 0 K/UL (ref 0–0.01)
NRBC BLD-RTO: 0 PER 100 WBC
PLATELET # BLD AUTO: 314 K/UL (ref 150–400)
PMV BLD AUTO: 11.1 FL (ref 8.9–12.9)
POTASSIUM SERPL-SCNC: 4.1 MMOL/L (ref 3.5–5.1)
PROT SERPL-MCNC: 6.9 G/DL (ref 6.4–8.2)
RBC # BLD AUTO: 4.53 M/UL (ref 3.8–5.2)
SODIUM SERPL-SCNC: 138 MMOL/L (ref 136–145)
TSH SERPL DL<=0.05 MIU/L-ACNC: 1.87 UIU/ML (ref 0.36–3.74)
WBC # BLD AUTO: 9.2 K/UL (ref 3.6–11)

## 2025-06-13 DIAGNOSIS — N95.2 ATROPHIC VAGINITIS: Primary | ICD-10-CM

## 2025-06-13 RX ORDER — ESTRADIOL 10 UG/1
TABLET, FILM COATED VAGINAL
Qty: 34 TABLET | Refills: 1 | Status: SHIPPED | OUTPATIENT
Start: 2025-06-13 | End: 2025-07-11

## 2025-07-29 ENCOUNTER — HOSPITAL ENCOUNTER (OUTPATIENT)
Facility: HOSPITAL | Age: 89
Discharge: HOME | End: 2025-08-01
Attending: INTERNAL MEDICINE
Payer: MEDICARE

## 2025-07-29 DIAGNOSIS — N64.4 BREAST PAIN: ICD-10-CM

## 2025-07-29 DIAGNOSIS — R92.8 ABNORMALITY OF RIGHT BREAST ON SCREENING MAMMOGRAM: ICD-10-CM

## 2025-07-29 PROCEDURE — 77066 DX MAMMO INCL CAD BI: CPT

## 2025-07-30 DIAGNOSIS — E11.42 TYPE 2 DIABETES MELLITUS WITH DIABETIC POLYNEUROPATHY, UNSPECIFIED WHETHER LONG TERM INSULIN USE (HCC): ICD-10-CM

## 2025-07-30 RX ORDER — GLIMEPIRIDE 1 MG/1
1 TABLET ORAL
Qty: 90 TABLET | Refills: 1 | Status: SHIPPED | OUTPATIENT
Start: 2025-07-30

## 2025-08-21 ENCOUNTER — OFFICE VISIT (OUTPATIENT)
Age: 89
End: 2025-08-21
Payer: MEDICARE

## 2025-08-21 VITALS
WEIGHT: 142.6 LBS | BODY MASS INDEX: 26.92 KG/M2 | SYSTOLIC BLOOD PRESSURE: 128 MMHG | HEART RATE: 68 BPM | HEIGHT: 61 IN | TEMPERATURE: 98.6 F | OXYGEN SATURATION: 98 % | DIASTOLIC BLOOD PRESSURE: 60 MMHG | RESPIRATION RATE: 16 BRPM

## 2025-08-21 DIAGNOSIS — R30.0 DYSURIA: ICD-10-CM

## 2025-08-21 DIAGNOSIS — E11.42 TYPE 2 DIABETES MELLITUS WITH DIABETIC POLYNEUROPATHY, UNSPECIFIED WHETHER LONG TERM INSULIN USE (HCC): Primary | ICD-10-CM

## 2025-08-21 PROCEDURE — 1036F TOBACCO NON-USER: CPT | Performed by: INTERNAL MEDICINE

## 2025-08-21 PROCEDURE — 1123F ACP DISCUSS/DSCN MKR DOCD: CPT | Performed by: INTERNAL MEDICINE

## 2025-08-21 PROCEDURE — G8419 CALC BMI OUT NRM PARAM NOF/U: HCPCS | Performed by: INTERNAL MEDICINE

## 2025-08-21 PROCEDURE — 1126F AMNT PAIN NOTED NONE PRSNT: CPT | Performed by: INTERNAL MEDICINE

## 2025-08-21 PROCEDURE — G8428 CUR MEDS NOT DOCUMENT: HCPCS | Performed by: INTERNAL MEDICINE

## 2025-08-21 PROCEDURE — 1090F PRES/ABSN URINE INCON ASSESS: CPT | Performed by: INTERNAL MEDICINE

## 2025-08-21 PROCEDURE — 99214 OFFICE O/P EST MOD 30 MIN: CPT | Performed by: INTERNAL MEDICINE

## 2025-08-21 PROCEDURE — 3046F HEMOGLOBIN A1C LEVEL >9.0%: CPT | Performed by: INTERNAL MEDICINE

## 2025-08-21 ASSESSMENT — PATIENT HEALTH QUESTIONNAIRE - PHQ9
1. LITTLE INTEREST OR PLEASURE IN DOING THINGS: NOT AT ALL
3. TROUBLE FALLING OR STAYING ASLEEP: NOT AT ALL
6. FEELING BAD ABOUT YOURSELF - OR THAT YOU ARE A FAILURE OR HAVE LET YOURSELF OR YOUR FAMILY DOWN: NOT AT ALL
4. FEELING TIRED OR HAVING LITTLE ENERGY: NOT AT ALL
SUM OF ALL RESPONSES TO PHQ QUESTIONS 1-9: 0
SUM OF ALL RESPONSES TO PHQ QUESTIONS 1-9: 0
9. THOUGHTS THAT YOU WOULD BE BETTER OFF DEAD, OR OF HURTING YOURSELF: NOT AT ALL
10. IF YOU CHECKED OFF ANY PROBLEMS, HOW DIFFICULT HAVE THESE PROBLEMS MADE IT FOR YOU TO DO YOUR WORK, TAKE CARE OF THINGS AT HOME, OR GET ALONG WITH OTHER PEOPLE: NOT DIFFICULT AT ALL
8. MOVING OR SPEAKING SO SLOWLY THAT OTHER PEOPLE COULD HAVE NOTICED. OR THE OPPOSITE, BEING SO FIGETY OR RESTLESS THAT YOU HAVE BEEN MOVING AROUND A LOT MORE THAN USUAL: NOT AT ALL
5. POOR APPETITE OR OVEREATING: NOT AT ALL
7. TROUBLE CONCENTRATING ON THINGS, SUCH AS READING THE NEWSPAPER OR WATCHING TELEVISION: NOT AT ALL
SUM OF ALL RESPONSES TO PHQ QUESTIONS 1-9: 0
2. FEELING DOWN, DEPRESSED OR HOPELESS: NOT AT ALL
SUM OF ALL RESPONSES TO PHQ QUESTIONS 1-9: 0

## 2025-08-22 LAB
APPEARANCE UR: CLEAR
BACTERIA URNS QL MICRO: NEGATIVE /HPF
BILIRUB UR QL: NEGATIVE
COLOR UR: ABNORMAL
EPITH CASTS URNS QL MICRO: ABNORMAL /LPF
GLUCOSE UR STRIP.AUTO-MCNC: 500 MG/DL
HGB UR QL STRIP: NEGATIVE
HYALINE CASTS URNS QL MICRO: ABNORMAL /LPF (ref 0–5)
KETONES UR QL STRIP.AUTO: ABNORMAL MG/DL
LEUKOCYTE ESTERASE UR QL STRIP.AUTO: ABNORMAL
NITRITE UR QL STRIP.AUTO: NEGATIVE
PH UR STRIP: 7.5 (ref 5–8)
PROT UR STRIP-MCNC: NEGATIVE MG/DL
RBC #/AREA URNS HPF: ABNORMAL /HPF (ref 0–5)
SP GR UR REFRACTOMETRY: 1.01 (ref 1–1.03)
URINE CULTURE IF INDICATED: ABNORMAL
UROBILINOGEN UR QL STRIP.AUTO: 0.2 EU/DL (ref 0.2–1)
WBC URNS QL MICRO: ABNORMAL /HPF (ref 0–4)

## (undated) DEVICE — DRESSING HYDROFIBER AQUACEL AG ADVANT 3.5X4 IN

## (undated) DEVICE — SUTURE PERMA-HAND SZ 2-0 L30IN NONABSORBABLE BLK L26MM SH K833H

## (undated) DEVICE — NEEDLE HYPO 18GA L1IN PNK POLYPR HUB S STL REG BVL STR W/O

## (undated) DEVICE — SHEET,DRAPE,53X77,STERILE: Brand: MEDLINE

## (undated) DEVICE — GOWN,REINFORCED,POLY,AURORA,XLARGE,STRL: Brand: MEDLINE

## (undated) DEVICE — GAUZE,SPONGE,4"X4",16PLY,STRL,LF,10/TRAY: Brand: MEDLINE

## (undated) DEVICE — 1230 DOUBLE MAGNET NEEDLE COUNTER,BLACK: Brand: DEVON

## (undated) DEVICE — INFECTION CONTROL KIT SYS

## (undated) DEVICE — BLANKET WRM AD PREM MISTRAL-AIR

## (undated) DEVICE — APPLICATOR MEDICATED 26 CC SOLUTION SCRB TEAL CHLORAPREP

## (undated) DEVICE — CLEANER ES TIP W2XL2IN ADH BK RADPQ FOR S STL ELECTRD

## (undated) DEVICE — SUTURE NONABSORBABLE MONOFILAMENT 4-0 FS-2 18 IN ETHILON 662H

## (undated) DEVICE — BANDAGE,GAUZE,BULKEE II,4.5"X4.1YD,STRL: Brand: MEDLINE

## (undated) DEVICE — MARKER SURG SKIN GENTIAN VLT REG TIP W/ 6IN RUL DYNJSM01

## (undated) DEVICE — SUTURE ETHLN SZ 3-0 L18IN NONABSORBABLE BLK FS-1 L24MM 3/8 663H

## (undated) DEVICE — INTENDED FOR TISSUE SEPARATION, AND OTHER PROCEDURES THAT REQUIRE A SHARP SURGICAL BLADE TO PUNCTURE OR CUT.: Brand: BARD-PARKER SAFETY BLADES SIZE 15, STERILE

## (undated) DEVICE — SUTURE MCRYL SZ 3-0 L27IN ABSRB UD L17MM RB-1 1/2 CIR Y215H

## (undated) DEVICE — ELECTRODE NDL 2.8IN COAT VALLEYLAB

## (undated) DEVICE — PENCIL ES L3M BTTN SWCH S STL HEX LOK BLDE ELECTRD HOLSTER

## (undated) DEVICE — SOLUTION IRRIG 1000ML 0.9% SOD CHL USP POUR PLAS BTL

## (undated) DEVICE — TUBING, SUCTION, 1/4" X 10', STRAIGHT: Brand: MEDLINE

## (undated) DEVICE — DRAPE,EXTREMITY,89X128,STERILE: Brand: MEDLINE

## (undated) DEVICE — NEEDLE HYPO 21GA L1.5IN GRN POLYPR HUB S STL THN WALL IV

## (undated) DEVICE — SYRINGE MED 10ML LUERLOCK TIP W/O SFTY DISP

## (undated) DEVICE — YANKAUER,TAPERED BULBOUS TIP,W/O VENT: Brand: MEDLINE